# Patient Record
Sex: MALE | Race: WHITE | NOT HISPANIC OR LATINO | Employment: OTHER | URBAN - NONMETROPOLITAN AREA
[De-identification: names, ages, dates, MRNs, and addresses within clinical notes are randomized per-mention and may not be internally consistent; named-entity substitution may affect disease eponyms.]

---

## 2017-01-01 ENCOUNTER — HOSPITAL ENCOUNTER (OUTPATIENT)
Dept: GENERAL RADIOLOGY | Facility: HOSPITAL | Age: 62
Discharge: HOME | End: 2017-01-01

## 2017-01-03 ENCOUNTER — TELEPHONE (OUTPATIENT)
Dept: PULMONOLOGY | Facility: CLINIC | Age: 62
End: 2017-01-03

## 2017-01-03 NOTE — TELEPHONE ENCOUNTER
Pt daughter called states pt has been seen at [facility].I called for records. Fax #[phone].

## 2017-01-10 ENCOUNTER — OFFICE VISIT (OUTPATIENT)
Dept: INTERNAL MEDICINE | Facility: CLINIC | Age: 62
End: 2017-01-10

## 2017-01-10 ENCOUNTER — HOSPITAL ENCOUNTER (OUTPATIENT)
Dept: GENERAL RADIOLOGY | Facility: HOSPITAL | Age: 62
Discharge: HOME | End: 2017-01-10
Attending: INTERNAL MEDICINE | Admitting: INTERNAL MEDICINE

## 2017-01-10 VITALS
DIASTOLIC BLOOD PRESSURE: 70 MMHG | SYSTOLIC BLOOD PRESSURE: 138 MMHG | WEIGHT: 171 LBS | TEMPERATURE: 98.1 F | HEART RATE: 78 BPM | HEIGHT: 72 IN | OXYGEN SATURATION: 98 % | BODY MASS INDEX: 23.16 KG/M2

## 2017-01-10 DIAGNOSIS — J18.9 PNEUMONIA OF RIGHT MIDDLE LOBE DUE TO INFECTIOUS ORGANISM: Primary | ICD-10-CM

## 2017-01-10 DIAGNOSIS — J18.9 PNEUMONIA OF RIGHT MIDDLE LOBE DUE TO INFECTIOUS ORGANISM: ICD-10-CM

## 2017-01-10 PROCEDURE — 99213 OFFICE O/P EST LOW 20 MIN: CPT | Performed by: INTERNAL MEDICINE

## 2017-01-10 PROCEDURE — 71020 HC CHEST PA AND LATERAL: CPT

## 2017-01-10 NOTE — PROGRESS NOTES
"Chief Complaint   Patient presents with   • Follow-up     for pneumonia. Pt states his chest still feels heavy.      Subjective   Saman Aguayo is a 61 y.o. male.     HPI Comments: Patient is here for follow-up of pneumonia.  Patient was seen in [facility] on January 1 and diagnosed with right lower lobe pneumonia.  He was treated with Levaquin 750 mg daily for 7 days.  He also received a shot of rocephin while at the [facility].   He is feeling better overall.  No current F/C/BA.   He still has a cough, but only with some clear mucous. Has some ongoing SOB.  Does have a feeling of weight or tightness in chest.  He is still wearing a heart monitor due to dizziness, will have this on for 30 days.   Has a good appetite and is drinking a lot of water.        The following portions of the patient's history were reviewed and updated as appropriate: allergies, current medications, past family history, past medical history, past social history, past surgical history and problem list.    Review of Systems   Constitutional: Positive for fatigue. Negative for activity change, appetite change, chills, diaphoresis and fever.   Respiratory: Positive for cough, chest tightness and shortness of breath. Negative for wheezing.    Cardiovascular: Negative for chest pain, palpitations and leg swelling.   Neurological: Positive for dizziness. Negative for weakness.       Objective   Visit Vitals   • /70   • Pulse 78   • Temp 98.1 °F (36.7 °C)   • Ht 72\" (182.9 cm)   • Wt 171 lb (77.6 kg)   • SpO2 98%   • BMI 23.19 kg/m2     Body mass index is 23.19 kg/(m^2).  Physical Exam   Constitutional: He is oriented to person, place, and time. He appears well-developed and well-nourished.   HENT:   Head: Normocephalic and atraumatic.   Mouth/Throat: Oropharynx is clear and moist.   Eyes: Conjunctivae and EOM are normal. Pupils are equal, round, and reactive to light.   Neck: Normal range of motion. Neck supple. No thyromegaly " present.   Cardiovascular: Normal rate, regular rhythm, normal heart sounds and intact distal pulses.    No murmur heard.  Pulmonary/Chest: Effort normal and breath sounds normal. No respiratory distress. He has no wheezes. He has no rales. He exhibits tenderness.   Tenderness over right lateral chest wall   Abdominal: Soft. Bowel sounds are normal.   Musculoskeletal: He exhibits no edema.   Lymphadenopathy:     He has no cervical adenopathy.   Neurological: He is alert and oriented to person, place, and time. No cranial nerve deficit.   Psychiatric: He has a normal mood and affect. Judgment normal.   Nursing note and vitals reviewed.      Assessment/Plan   Saman Aguayo is here today and the following problems have been addressed:      Saman was seen today for follow-up.    Diagnoses and all orders for this visit:    Pneumonia of right middle lobe due to infectious organism  -     XR Chest 2 View; Future    XR chest today  Take plain mucinex expectorant once a day  Drink plenty of water to thin secretions  Follow  diet    RTC in 2 weeks as scheduled, labs then

## 2017-01-10 NOTE — MR AVS SNAPSHOT
Saman Aguayo   1/10/2017 11:30 AM   Office Visit    Provider:  Marilyn K Vermeesch, MD   Department:  [facility] PRIMARY CARE   Dept Phone:  [phone]                Your Full Care Plan              Your Updated Medication List          This list is accurate as of: 1/10/17 12:11 PM.  Always use your most recent med list.                meclizine 25 MG tablet   Commonly known as:  ANTIVERT   1/2-1 tablet PO TID PRN dizziness       nicotine 21 MG/24HR patch   Commonly known as:  NICODERM CQ   1 patch daily x 6 weeks, then decrease to 14mg patch               You Were Diagnosed With        Codes Comments    Pneumonia of right middle lobe due to infectious organism    -  Primary ICD-10-CM: J18.9  ICD-9-CM: 483.8       Instructions     None    Patient Instructions History      MyChart Signup     Our records indicate that you have declined [facility] MyChart signup. If you would like to sign up for MyChart, please email [email or web address] or call [phone] to obtain an activation code.             Other Info from Your Visit           Your Appointments     Jan 26, 2017  8:30 AM EST   Follow Up with Marilyn K Vermeesch, MD   [facility] PRIMARY CARE (--)    [address]  [facility] [address] [phone]           Arrive 15 minutes prior to appointment.            Feb 10, 2017  9:30 AM EST   Follow Up with Max Almonte MD   [facility] CARDIOLOGY (--)    [address]  [facility] [address] [phone]           Arrive 15 minutes prior to appointment.            Feb 28, 2017  8:30 AM EST   CT codi chest without contrast with CODI CT 2   [facility] CT ([place])    [address] [address] [phone]           No Prep            Mar 09, 2017  9:30 AM EST   Follow Up with Nannette Lim MD   [facility] PULMONARY CRITICAL CARE AND SLEEP (--)    "[address]  [facility] [address] [phone]           Arrive 15 minutes prior to appointment.              Allergies     No Known Drug Allergy        Reason for Visit     Follow-up for pneumonia. Pt states his chest still feels heavy.       Vital Signs     Blood Pressure Pulse Temperature Height Weight Oxygen Saturation    138/70 78 98.1 °F (36.7 °C) 72\" (182.9 cm) 171 lb (77.6 kg) 98%    Body Mass Index Smoking Status                23.19 kg/m2 Former Smoker          Problems and Diagnoses Noted     Pneumonia due to infectious organism      "

## 2017-01-26 ENCOUNTER — OFFICE VISIT (OUTPATIENT)
Dept: INTERNAL MEDICINE | Facility: CLINIC | Age: 62
End: 2017-01-26

## 2017-01-26 VITALS
HEART RATE: 51 BPM | WEIGHT: 176 LBS | HEIGHT: 72 IN | BODY MASS INDEX: 23.84 KG/M2 | TEMPERATURE: 97.6 F | DIASTOLIC BLOOD PRESSURE: 70 MMHG | OXYGEN SATURATION: 97 % | SYSTOLIC BLOOD PRESSURE: 128 MMHG

## 2017-01-26 DIAGNOSIS — Z13.9 SCREENING: Primary | ICD-10-CM

## 2017-01-26 DIAGNOSIS — J18.9 PNEUMONIA OF RIGHT LOWER LOBE DUE TO INFECTIOUS ORGANISM: ICD-10-CM

## 2017-01-26 DIAGNOSIS — Z12.5 SCREENING PSA (PROSTATE SPECIFIC ANTIGEN): ICD-10-CM

## 2017-01-26 LAB
ALBUMIN SERPL-MCNC: 4.2 G/DL (ref 3.2–4.8)
ALBUMIN/GLOB SERPL: 1.2 G/DL (ref 1.5–2.5)
ALP SERPL-CCNC: 75 U/L (ref 25–100)
ALT SERPL W P-5'-P-CCNC: 22 U/L (ref 7–40)
ANION GAP SERPL CALCULATED.3IONS-SCNC: 6 MMOL/L (ref 3–11)
AST SERPL-CCNC: 24 U/L (ref 0–33)
BASOPHILS # BLD AUTO: 0.03 10*3/MM3 (ref 0–0.2)
BASOPHILS NFR BLD AUTO: 0.5 % (ref 0–1)
BILIRUB SERPL-MCNC: 0.6 MG/DL (ref 0.3–1.2)
BUN BLD-MCNC: 15 MG/DL (ref 9–23)
BUN/CREAT SERPL: 16.7 (ref 7–25)
CALCIUM SPEC-SCNC: 9.7 MG/DL (ref 8.7–10.4)
CHLORIDE SERPL-SCNC: 98 MMOL/L (ref 99–109)
CO2 SERPL-SCNC: 30 MMOL/L (ref 20–31)
CREAT BLD-MCNC: 0.9 MG/DL (ref 0.6–1.3)
DEPRECATED RDW RBC AUTO: 45.2 FL (ref 37–54)
EOSINOPHIL # BLD AUTO: 0.17 10*3/MM3 (ref 0.1–0.3)
EOSINOPHIL NFR BLD AUTO: 2.7 % (ref 0–3)
ERYTHROCYTE [DISTWIDTH] IN BLOOD BY AUTOMATED COUNT: 13.4 % (ref 11.3–14.5)
GFR SERPL CREATININE-BSD FRML MDRD: 85 ML/MIN/1.73
GLOBULIN UR ELPH-MCNC: 3.4 GM/DL
GLUCOSE BLD-MCNC: 79 MG/DL (ref 70–100)
HCT VFR BLD AUTO: 46.1 % (ref 38.9–50.9)
HCV AB SER DONR QL: NORMAL
HGB BLD-MCNC: 15.7 G/DL (ref 13.1–17.5)
IMM GRANULOCYTES # BLD: 0.02 10*3/MM3 (ref 0–0.03)
IMM GRANULOCYTES NFR BLD: 0.3 % (ref 0–0.6)
LYMPHOCYTES # BLD AUTO: 1.92 10*3/MM3 (ref 0.6–4.8)
LYMPHOCYTES NFR BLD AUTO: 30.1 % (ref 24–44)
MCH RBC QN AUTO: 31.4 PG (ref 27–31)
MCHC RBC AUTO-ENTMCNC: 34.1 G/DL (ref 32–36)
MCV RBC AUTO: 92.2 FL (ref 80–99)
MONOCYTES # BLD AUTO: 0.46 10*3/MM3 (ref 0–1)
MONOCYTES NFR BLD AUTO: 7.2 % (ref 0–12)
NEUTROPHILS # BLD AUTO: 3.78 10*3/MM3 (ref 1.5–8.3)
NEUTROPHILS NFR BLD AUTO: 59.2 % (ref 41–71)
PLATELET # BLD AUTO: 211 10*3/MM3 (ref 150–450)
PMV BLD AUTO: 11.4 FL (ref 6–12)
POTASSIUM BLD-SCNC: 4.6 MMOL/L (ref 3.5–5.5)
PROT SERPL-MCNC: 7.6 G/DL (ref 5.7–8.2)
PSA SERPL-MCNC: 1.6 NG/ML (ref 0–4)
RBC # BLD AUTO: 5 10*6/MM3 (ref 4.2–5.76)
SODIUM BLD-SCNC: 134 MMOL/L (ref 132–146)
WBC NRBC COR # BLD: 6.38 10*3/MM3 (ref 3.5–10.8)

## 2017-01-26 PROCEDURE — 85025 COMPLETE CBC W/AUTO DIFF WBC: CPT | Performed by: INTERNAL MEDICINE

## 2017-01-26 PROCEDURE — 86803 HEPATITIS C AB TEST: CPT | Performed by: INTERNAL MEDICINE

## 2017-01-26 PROCEDURE — 99214 OFFICE O/P EST MOD 30 MIN: CPT | Performed by: INTERNAL MEDICINE

## 2017-01-26 PROCEDURE — 36415 COLL VENOUS BLD VENIPUNCTURE: CPT | Performed by: INTERNAL MEDICINE

## 2017-01-26 PROCEDURE — 80053 COMPREHEN METABOLIC PANEL: CPT | Performed by: INTERNAL MEDICINE

## 2017-01-26 PROCEDURE — 90715 TDAP VACCINE 7 YRS/> IM: CPT | Performed by: INTERNAL MEDICINE

## 2017-01-26 PROCEDURE — 84153 ASSAY OF PSA TOTAL: CPT | Performed by: INTERNAL MEDICINE

## 2017-01-26 PROCEDURE — 90471 IMMUNIZATION ADMIN: CPT | Performed by: INTERNAL MEDICINE

## 2017-01-26 NOTE — PROGRESS NOTES
"Chief Complaint   Patient presents with   • Follow-up     2 weeks for labs. No new complaints.      Subjective   Saman Aguayo is a 61 y.o. male.     HPI Comments: Here for routine followup.  PMH of RML small cell lung CA, S/P resection.  Had pneumonia several weeks ago.  His wt is up 5 lbs from a few weeks ago.  O2 sats are good today.   He is feeling better overall, but states he gets fatigued in the afternoon.  Has a good appetite.  Has a minimal cough with clear or white mucous.  Some SOB with exertion.  He denies any type of pain.  No stomach pain or diarrhea from recent ABX.  Does get some dizziness, is still wearing cardiac monitor until next week. Occasional palpitations.  He will see Dr Almonte for followup in February.  Will have CT of chest at end of February.  Follow up with Dr Lim shortly after CT in early March.         The following portions of the patient's history were reviewed and updated as appropriate: allergies, current medications, past family history, past medical history, past social history, past surgical history and problem list.    Review of Systems   Constitutional: Positive for fatigue. Negative for chills and fever.   Respiratory: Positive for cough and shortness of breath.    Cardiovascular: Positive for palpitations. Negative for chest pain and leg swelling.   Gastrointestinal: Negative for diarrhea.   Neurological: Positive for dizziness.   All other systems reviewed and are negative.      Objective   Visit Vitals   • /70   • Pulse 51   • Temp 97.6 °F (36.4 °C)   • Ht 72\" (182.9 cm)   • Wt 176 lb (79.8 kg)   • SpO2 97%   • BMI 23.87 kg/m2     Body mass index is 23.87 kg/(m^2).  Physical Exam   Constitutional: He is oriented to person, place, and time. He appears well-developed and well-nourished.   HENT:   Head: Normocephalic and atraumatic.   Eyes: EOM are normal. Pupils are equal, round, and reactive to light.   Neck: Normal range of motion. Neck supple. No thyromegaly " present.   Cardiovascular: Normal rate, regular rhythm, normal heart sounds and intact distal pulses.    No murmur heard.  Cardiac event monitor in place   Pulmonary/Chest: Effort normal. No respiratory distress.   Few rhonchi noted in right upper lobe   Abdominal: Soft. Bowel sounds are normal.   Musculoskeletal: He exhibits no edema.   Lymphadenopathy:     He has no cervical adenopathy.   Neurological: He is alert and oriented to person, place, and time. No cranial nerve deficit.   Psychiatric: He has a normal mood and affect. Judgment normal.   Nursing note and vitals reviewed.      Assessment/Plan   Saman Aguayo is here today and the following problems have been addressed:      Saman was seen today for follow-up.    Diagnoses and all orders for this visit:    Screening  -     Hepatitis C Antibody; Future    Pneumonia of right lower lobe due to infectious organism  -     CBC & Differential; Future  -     Comprehensive Metabolic Panel; Future    Screening PSA (prostate specific antigen)  -     PSA; Future      Labs as noted  Recommend vit D 2000 u daily  Follow HH diet  See Dr Almonte for follow up of dizziness and palpitations  See Dr Lim for follow up of lung CA  Encouraged regular use of incentive spirometer several times daily    RTC 3 mo

## 2017-01-26 NOTE — MR AVS SNAPSHOT
Saman Aguayo   2017 8:30 AM   Office Visit    Provider:  Marilyn K Vermeesch, MD   Department:  [facility] PRIMARY CARE   Dept Phone:  [phone]                Your Full Care Plan              Today's Medication Changes          These changes are accurate as of: 17  9:24 AM.  If you have any questions, ask your nurse or doctor.               Stop taking medication(s)listed here:     meclizine 25 MG tablet   Commonly known as:  ANTIVERT           nicotine 21 MG/24HR patch   Commonly known as:  NICODERM CQ                      Your Updated Medication List      Notice  As of 2017  9:24 AM    You have not been prescribed any medications.            We Performed the Following     CBC & Differential     CBC Auto Differential     Comprehensive Metabolic Panel     Hepatitis C Antibody     PSA       You Were Diagnosed With        Codes Comments    Screening    -  Primary ICD-10-CM: Z13.9  ICD-9-CM: V82.9     Pneumonia of right lower lobe due to infectious organism     ICD-10-CM: J18.9  ICD-9-CM: 483.8     Screening PSA (prostate specific antigen)     ICD-10-CM: Z12.5  ICD-9-CM: V76.44       Instructions     None    Patient Instructions History      [facility] Signup     [facility] allows you to send messages to your doctor, view your test results, renew your prescriptions, schedule appointments, and more. To sign up, go to [employer] and click on the Sign Up Now link in the New User? box. Enter your [facility] Activation Code exactly as it appears below along with the last four digits of your Social Security Number and your Date of Birth () to complete the sign-up process. If you do not sign up before the expiration date, you must request a new code.    [facility] Activation Code: [email or web address]  Expires: 2017  5:37 AM    If you have questions, you can email [email or web address] or call [phone] to talk to our [facility] staff.  "Remember, MyChart is NOT to be used for urgent needs. For medical emergencies, dial 911.               Other Info from Your Visit           Your Appointments     Feb 10, 2017  9:30 AM EST   Follow Up with Max lAmonte MD   [facility] CARDIOLOGY (--)    [address]  [facility] [address] [phone]           Arrive 15 minutes prior to appointment.            Feb 28, 2017  8:30 AM EST   CT codi chest without contrast with CODI CT 2   [facility] CT ([place])    [address] [address] [phone]           No Prep            Mar 09, 2017  9:30 AM EST   Follow Up with Nannette Lim MD   [facility] PULMONARY CRITICAL CARE AND SLEEP (--)    [address]  [facility] [address] [phone]           Arrive 15 minutes prior to appointment.            Apr 17, 2017  8:00 AM EDT   Follow Up with Marilyn K Vermeesch, MD   [facility] PRIMARY CARE (--)    [address]  [facility] [address] [phone]           Arrive 15 minutes prior to appointment.              Allergies     No Known Drug Allergy        Reason for Visit     Follow-up 2 weeks for labs. No new complaints.       Vital Signs     Blood Pressure Pulse Temperature Height Weight Oxygen Saturation    128/70 51 97.6 °F (36.4 °C) 72\" (182.9 cm) 176 lb (79.8 kg) 97%    Body Mass Index Smoking Status                23.87 kg/m2 Former Smoker          Problems and Diagnoses Noted     Pneumonia due to infectious organism    Screening for prostate cancer    Screening    -  Primary      Results       "

## 2017-02-10 ENCOUNTER — OFFICE VISIT (OUTPATIENT)
Dept: CARDIOLOGY | Facility: CLINIC | Age: 62
End: 2017-02-10

## 2017-02-10 VITALS
SYSTOLIC BLOOD PRESSURE: 124 MMHG | OXYGEN SATURATION: 98 % | HEIGHT: 72 IN | RESPIRATION RATE: 16 BRPM | WEIGHT: 177.8 LBS | DIASTOLIC BLOOD PRESSURE: 70 MMHG | HEART RATE: 52 BPM | BODY MASS INDEX: 24.08 KG/M2

## 2017-02-10 DIAGNOSIS — R00.1 BRADYCARDIA: Primary | ICD-10-CM

## 2017-02-10 DIAGNOSIS — I49.1 PAC (PREMATURE ATRIAL CONTRACTION): ICD-10-CM

## 2017-02-10 PROCEDURE — 99213 OFFICE O/P EST LOW 20 MIN: CPT | Performed by: INTERNAL MEDICINE

## 2017-02-10 NOTE — PROGRESS NOTES
Subjective:     Encounter Date:02/10/2017      Patient ID: Saman Aguayo is a 61 y.o. male.    Chief Complaint: Palpitations  HPI  This is a 61-year-old male patient who recently wore a 30 day event recorder and had multiple symptomatic recurrences of palpitations.  In each and every case the patient was in atrial bigeminy.  Because of the compensatory pause after each PAC there were times when he had significant bradycardia.  However the patient had no presyncope or syncope.  He denies having fatigue or exertional limitations.  These palpitations were more common at rest and tended to improve with activity which is usually the case with PACs.  He continues to have atypical sharp chest pains which are nonexertional and fleeting in duration.  Previous stress testing was negative for ischemia.  He has his baseline shortness of breath which is unchanged in frequency duration and intensity.  There is no orthopnea PND or lower extremity edema.  He reports drinking coffee in large quantities.  The remainder of his past medical history, family history and social history remains unchanged compared to his last visit.    The following portions of the patient's history were reviewed and updated as appropriate: allergies, current medications, past family history, past medical history, past social history, past surgical history and problem  Review of Systems   Constitution: Negative for chills, diaphoresis, fever, weakness, malaise/fatigue, night sweats, weight gain and weight loss.   HENT: Negative for ear discharge, hearing loss, hoarse voice and nosebleeds.    Eyes: Negative for discharge, double vision, pain and photophobia.   Cardiovascular: Positive for chest pain, dyspnea on exertion, irregular heartbeat and palpitations. Negative for claudication, cyanosis, leg swelling, near-syncope, orthopnea, paroxysmal nocturnal dyspnea and syncope.   Respiratory: Positive for shortness of breath. Negative for cough, hemoptysis,  "sputum production and wheezing.    Endocrine: Negative for cold intolerance, heat intolerance, polydipsia, polyphagia and polyuria.   Hematologic/Lymphatic: Negative for adenopathy and bleeding problem. Does not bruise/bleed easily.   Skin: Negative for color change, flushing, itching and rash.   Musculoskeletal: Negative for muscle cramps, muscle weakness, myalgias and stiffness.   Gastrointestinal: Negative for abdominal pain, diarrhea, hematemesis, hematochezia, nausea and vomiting.   Genitourinary: Negative for dysuria, frequency and nocturia.   Neurological: Negative for focal weakness, loss of balance, numbness, paresthesias and seizures.   Psychiatric/Behavioral: Negative for altered mental status, hallucinations and suicidal ideas.   Allergic/Immunologic: Negative for HIV exposure, hives and persistent infections.         No current outpatient prescriptions on file.     Objective:     Physical Exam   Constitutional: He is oriented to person, place, and time. He appears well-developed and well-nourished.   HENT:   Head: Normocephalic and atraumatic.   Eyes: Conjunctivae are normal. No scleral icterus.   Cardiovascular: Normal rate, regular rhythm, normal heart sounds and intact distal pulses.  Exam reveals no gallop and no friction rub.    No murmur heard.  Pulmonary/Chest: Effort normal and breath sounds normal. No respiratory distress.   Abdominal: Soft. Bowel sounds are normal. There is no tenderness.   Musculoskeletal: He exhibits no edema.   Neurological: He is alert and oriented to person, place, and time.   Skin: Skin is warm and dry.   Psychiatric: He has a normal mood and affect. His behavior is normal.     Blood pressure 124/70, pulse 52, resp. rate 16, height 72\" (182.9 cm), weight 177 lb 12.8 oz (80.6 kg), SpO2 98 %.   Lab Review:       Assessment:         1. Bradycardia  There is no intrinsic sinus node dysfunction and his relative bradycardia is secondary to the compensatory pauses after " frequent PACs.    2. PAC (premature atrial contraction)  I suspect his frequent PACs are due to his large amount of caffeine intake.  When reviewing his 24-hour Holter monitor the patient had only 2% of his total QRS complexes represented as PACs.  However when he is symptomatic there is virtually 100% correlation between the presence of symptoms and atrial bigeminy.  Procedures     Plan:       At this point there would need be no benefit from permanent pacemaker implantation.  I would not recommend medical antianginal therapy.  The patient has been counseled regarding the need to discontinue caffeinated beverage intake.  He has been advised to switch to decaffeinated beverages at the very minimum.  He should be drinking no more than 1 or 2 caffeinated beverages per day.  He has also been counseled regarding other potential cardiac stimulants such as over-the-counter cold allergy and sinus preparations.  He has been reassured regarding the benign nature of PACs.  He indicates that because the symptoms do not interfere with his day-to-day activities he would prefer not to take prescription medications.  No additional cardiovascular testing is indicated.

## 2017-02-28 ENCOUNTER — HOSPITAL ENCOUNTER (OUTPATIENT)
Dept: CT IMAGING | Facility: HOSPITAL | Age: 62
Discharge: HOME | End: 2017-02-28
Attending: INTERNAL MEDICINE | Admitting: INTERNAL MEDICINE

## 2017-02-28 DIAGNOSIS — C34.90 NON-SMALL CELL LUNG CANCER (NSCLC) (HCC): ICD-10-CM

## 2017-02-28 PROCEDURE — 71250 CT THORAX DX C-: CPT

## 2017-03-09 ENCOUNTER — PROCEDURE VISIT (OUTPATIENT)
Dept: PULMONOLOGY | Facility: CLINIC | Age: 62
End: 2017-03-09

## 2017-03-09 ENCOUNTER — OFFICE VISIT (OUTPATIENT)
Dept: PULMONOLOGY | Facility: CLINIC | Age: 62
End: 2017-03-09

## 2017-03-09 VITALS
SYSTOLIC BLOOD PRESSURE: 122 MMHG | BODY MASS INDEX: 23.98 KG/M2 | DIASTOLIC BLOOD PRESSURE: 70 MMHG | HEIGHT: 72 IN | OXYGEN SATURATION: 96 % | HEART RATE: 57 BPM | WEIGHT: 177 LBS | RESPIRATION RATE: 18 BRPM

## 2017-03-09 DIAGNOSIS — C34.90 NON-SMALL CELL LUNG CANCER (NSCLC) (HCC): ICD-10-CM

## 2017-03-09 DIAGNOSIS — J43.9 PULMONARY EMPHYSEMA, UNSPECIFIED EMPHYSEMA TYPE (HCC): Primary | ICD-10-CM

## 2017-03-09 DIAGNOSIS — J44.9 CHRONIC OBSTRUCTIVE PULMONARY DISEASE, UNSPECIFIED COPD TYPE (HCC): Primary | ICD-10-CM

## 2017-03-09 PROCEDURE — 94729 DIFFUSING CAPACITY: CPT | Performed by: INTERNAL MEDICINE

## 2017-03-09 PROCEDURE — 94726 PLETHYSMOGRAPHY LUNG VOLUMES: CPT | Performed by: INTERNAL MEDICINE

## 2017-03-09 PROCEDURE — 94060 EVALUATION OF WHEEZING: CPT | Performed by: INTERNAL MEDICINE

## 2017-03-09 PROCEDURE — 99213 OFFICE O/P EST LOW 20 MIN: CPT | Performed by: INTERNAL MEDICINE

## 2017-03-09 NOTE — PROGRESS NOTES
"Chief Complaint   Patient presents with   • Follow-up         Subjective   Saman Aguayo is a 61 y.o. male.     History of Present Illness   The patient is here for follow-up of COPD.    He reports doing well at this time.  He has only needed to use the Ventolin may be twice since his last visit.  His shortness of breath continues to be stable even though he is on no inhaled medications.    He has quit smoking and continues to be smoke free.    He is doing well since his right middle lobectomy and has even returned to work.  He does endorse getting tired easier but denies any shortness of breath.    The following portions of the patient's history were reviewed and updated as appropriate: allergies, current medications, past family history, past medical history, past social history and past surgical history.    Review of Systems   HENT: Negative for sinus pressure, sneezing and sore throat.    Respiratory: Positive for cough and shortness of breath. Negative for wheezing.        Objective   Visit Vitals   • /70   • Pulse 57   • Resp 18   • Ht 72\" (182.9 cm)   • Wt 177 lb (80.3 kg)   • SpO2 96%   • BMI 24.01 kg/m2     Physical Exam   Constitutional: He is oriented to person, place, and time. He appears well-developed.   HENT:   Head: Normocephalic and atraumatic.   Few missing teeth.   Eyes: EOM are normal.   Cardiovascular: Normal rate and regular rhythm.    Pulmonary/Chest: Effort normal and breath sounds normal. He has no wheezes.   Musculoskeletal:   Gait normal.   Neurological: He is alert and oriented to person, place, and time.   Skin: Skin is dry.   Psychiatric: He has a normal mood and affect.   Vitals reviewed.          Assessment/Plan   Saman was seen today for follow-up.    Diagnoses and all orders for this visit:    COPD  Comments:  Mild    Non-small cell lung cancer (NSCLC)  -     CT Chest Without Contrast; Future    Other orders  -     SCANNED - IMAGING           Return in about 3 months (around " 6/9/2017) for Recheck, For Hilda.    DISCUSSION (if any):  I reviewed the patient's PFTs from today with him.  PFTs have not changed much since having the right middle lobectomy.    He will continue Ventolin as needed for shortness of breath.    I will repeat a CT of the chest in 3 months if this is stable and we will schedule it again in 6 months.    I have told him that if he has increased use of the Ventolin inhaler to call the office and let me know.      Errors in dictation may reflect use of voice recognition software and not all errors in transcription may have been detected prior to signing    This document was electronically signed by Nannette Lim MD March 9, 2017  9:41 AM

## 2017-06-01 ENCOUNTER — HOSPITAL ENCOUNTER (OUTPATIENT)
Dept: CT IMAGING | Facility: HOSPITAL | Age: 62
Discharge: HOME | End: 2017-06-01
Attending: INTERNAL MEDICINE | Admitting: INTERNAL MEDICINE

## 2017-06-01 DIAGNOSIS — C34.90 NON-SMALL CELL LUNG CANCER (NSCLC) (HCC): ICD-10-CM

## 2017-06-01 PROCEDURE — 71250 CT THORAX DX C-: CPT

## 2017-06-12 ENCOUNTER — OFFICE VISIT (OUTPATIENT)
Dept: PULMONOLOGY | Facility: CLINIC | Age: 62
End: 2017-06-12

## 2017-06-12 VITALS
OXYGEN SATURATION: 95 % | SYSTOLIC BLOOD PRESSURE: 118 MMHG | HEART RATE: 45 BPM | RESPIRATION RATE: 16 BRPM | WEIGHT: 174.8 LBS | DIASTOLIC BLOOD PRESSURE: 64 MMHG | BODY MASS INDEX: 23.68 KG/M2 | HEIGHT: 72 IN

## 2017-06-12 DIAGNOSIS — C34.90 NON-SMALL CELL LUNG CANCER (NSCLC) (HCC): ICD-10-CM

## 2017-06-12 DIAGNOSIS — J30.9 ALLERGIC RHINITIS, UNSPECIFIED ALLERGIC RHINITIS TRIGGER, UNSPECIFIED RHINITIS SEASONALITY: ICD-10-CM

## 2017-06-12 DIAGNOSIS — J44.9 CHRONIC OBSTRUCTIVE PULMONARY DISEASE, UNSPECIFIED COPD TYPE (HCC): Primary | ICD-10-CM

## 2017-06-12 PROCEDURE — 99214 OFFICE O/P EST MOD 30 MIN: CPT | Performed by: NURSE PRACTITIONER

## 2017-06-12 RX ORDER — FLUNISOLIDE 0.25 MG/ML
1 SOLUTION NASAL EVERY 12 HOURS
Qty: 1 BOTTLE | Refills: 5 | Status: SHIPPED | OUTPATIENT
Start: 2017-06-12 | End: 2017-07-11

## 2017-06-12 NOTE — PROGRESS NOTES
"Chief Complaint   Patient presents with   • Follow-up     COPD, CT         Subjective   Saman Aguayo is a 62 y.o. male.     History of Present Illness   The patient is here for follow-up of COPD, lung cancer and CT results.     He is doing well at this time. He has only used Ventolin 2 times since his last visit. He feels like his shortness of breath has improved.     He has some fatigue in the afternoon but that is his only complaint.     He has some nasal drainage but does not use any medication regularly.    The following portions of the patient's history were reviewed and updated as appropriate: allergies, current medications, past family history, past medical history, past social history and past surgical history.    Review of Systems   HENT: Negative for sinus pressure, sneezing and sore throat.    Respiratory: Positive for cough. Negative for shortness of breath and wheezing.        Objective   /64  Pulse (!) 45  Resp 16  Ht 72\" (182.9 cm)  Wt 174 lb 12.8 oz (79.3 kg)  SpO2 95%  BMI 23.71 kg/m2  Physical Exam   Constitutional: He is oriented to person, place, and time. He appears well-developed.   HENT:   Head: Normocephalic and atraumatic.   Moderate erythema and drainage.   Eyes: EOM are normal.   Neck: Neck supple.   Cardiovascular: Regular rhythm.    Bradycardia.    Pulmonary/Chest: Effort normal. No respiratory distress.   Good A/E bilaterally without wheezing noted.   Musculoskeletal: He exhibits no edema.   Gait normal.   Neurological: He is alert and oriented to person, place, and time.   Skin: Skin is warm and dry.   Psychiatric: He has a normal mood and affect.   Vitals reviewed.          Assessment/Plan   Saman was seen today for follow-up.    Diagnoses and all orders for this visit:    COPD    Non-small cell lung cancer (NSCLC)  -     CT Chest Without Contrast; Future    Allergic rhinitis, unspecified allergic rhinitis trigger, unspecified rhinitis seasonality    Other orders  -     " flunisolide (NASALIDE) 25 MCG/ACT (0.025%) solution nasal spray; Inhale 1 spray Every 12 (Twelve) Hours.           Return in about 6 months (around 12/12/2017) for Recheck, Imaging studies, For Dr. Lim.    DISCUSSION (if any):  Discussed CT results and reviewed images with the patient and his wife. The CT is stable so he may wait 6 months for follow-up CT.    Add Nasalide for better control of allergic rhinitis symptoms.    His heart rate manually checked is 50. He follows with Dr. Almonte, who is aware of the patients bradycardia. The patient is completely asymptomatic at this time. He follows with Dr. Almonte in August. I advised him to call Dr. Aldana office to schedule sooner if his begins to have unusual symptoms like increased dizziness or palpitations or he should go to the ER.       Errors in dictation may reflect use of voice recognition software and not all errors in transcription may have been detected prior to signing    This document was electronically signed by SAMIRA Wylie June 12, 2017  10:45 AM

## 2017-06-13 ENCOUNTER — OFFICE VISIT (OUTPATIENT)
Dept: CARDIAC SURGERY | Facility: CLINIC | Age: 62
End: 2017-06-13

## 2017-06-13 VITALS
WEIGHT: 174 LBS | DIASTOLIC BLOOD PRESSURE: 70 MMHG | SYSTOLIC BLOOD PRESSURE: 130 MMHG | TEMPERATURE: 97.9 F | HEART RATE: 47 BPM | BODY MASS INDEX: 23.57 KG/M2 | HEIGHT: 72 IN | OXYGEN SATURATION: 97 %

## 2017-06-13 DIAGNOSIS — C34.2 LUNG CANCER, MIDDLE LOBE (HCC): Primary | ICD-10-CM

## 2017-06-13 PROCEDURE — 99213 OFFICE O/P EST LOW 20 MIN: CPT | Performed by: THORACIC SURGERY (CARDIOTHORACIC VASCULAR SURGERY)

## 2017-07-11 ENCOUNTER — OFFICE VISIT (OUTPATIENT)
Dept: INTERNAL MEDICINE | Facility: CLINIC | Age: 62
End: 2017-07-11

## 2017-07-11 VITALS
TEMPERATURE: 98.5 F | BODY MASS INDEX: 23.7 KG/M2 | HEIGHT: 72 IN | SYSTOLIC BLOOD PRESSURE: 130 MMHG | DIASTOLIC BLOOD PRESSURE: 62 MMHG | WEIGHT: 175 LBS | HEART RATE: 44 BPM | OXYGEN SATURATION: 98 %

## 2017-07-11 DIAGNOSIS — IMO0002 SECONDARY PULMONARY HYPERTENSION: Primary | ICD-10-CM

## 2017-07-11 DIAGNOSIS — Z90.2 STATUS POST LOBECTOMY OF LUNG: ICD-10-CM

## 2017-07-11 DIAGNOSIS — J43.9 PULMONARY EMPHYSEMA, UNSPECIFIED EMPHYSEMA TYPE (HCC): ICD-10-CM

## 2017-07-11 PROBLEM — J18.9 PNEUMONIA DUE TO INFECTIOUS ORGANISM: Status: RESOLVED | Noted: 2017-01-10 | Resolved: 2017-07-11

## 2017-07-11 LAB
ALBUMIN SERPL-MCNC: 4.2 G/DL (ref 3.5–5)
ALBUMIN/GLOB SERPL: 1.3 G/DL (ref 1–2)
ALP SERPL-CCNC: 74 U/L (ref 38–126)
ALT SERPL-CCNC: 34 U/L (ref 13–69)
AST SERPL-CCNC: 24 U/L (ref 15–46)
BASOPHILS # BLD AUTO: 0.04 10*3/MM3 (ref 0–0.2)
BASOPHILS NFR BLD AUTO: 0.7 % (ref 0–2.5)
BILIRUB SERPL-MCNC: 0.6 MG/DL (ref 0.2–1.3)
BUN SERPL-MCNC: 15 MG/DL (ref 7–20)
BUN/CREAT SERPL: 16.7 (ref 6.3–21.9)
CALCIUM SERPL-MCNC: 9.4 MG/DL (ref 8.4–10.2)
CHLORIDE SERPL-SCNC: 102 MMOL/L (ref 98–107)
CO2 SERPL-SCNC: 29 MMOL/L (ref 26–30)
CREAT SERPL-MCNC: 0.9 MG/DL (ref 0.6–1.3)
EOSINOPHIL # BLD AUTO: 0.1 10*3/MM3 (ref 0–0.7)
EOSINOPHIL NFR BLD AUTO: 1.8 % (ref 0–7)
ERYTHROCYTE [DISTWIDTH] IN BLOOD BY AUTOMATED COUNT: 13 % (ref 11.5–14.5)
GLOBULIN SER CALC-MCNC: 3.2 GM/DL
GLUCOSE SERPL-MCNC: 100 MG/DL (ref 74–98)
HCT VFR BLD AUTO: 47 % (ref 42–52)
HGB BLD-MCNC: 16 G/DL (ref 14–18)
IMM GRANULOCYTES # BLD: 0.02 10*3/MM3 (ref 0–0.06)
IMM GRANULOCYTES NFR BLD: 0.4 % (ref 0–0.6)
LYMPHOCYTES # BLD AUTO: 1.36 10*3/MM3 (ref 0.6–3.4)
LYMPHOCYTES NFR BLD AUTO: 24.8 % (ref 10–50)
MCH RBC QN AUTO: 31.5 PG (ref 27–31)
MCHC RBC AUTO-ENTMCNC: 34 G/DL (ref 30–37)
MCV RBC AUTO: 92.5 FL (ref 80–94)
MONOCYTES # BLD AUTO: 0.46 10*3/MM3 (ref 0–0.9)
MONOCYTES NFR BLD AUTO: 8.4 % (ref 0–12)
NEUTROPHILS # BLD AUTO: 3.5 10*3/MM3 (ref 2–6.9)
NEUTROPHILS NFR BLD AUTO: 63.9 % (ref 37–80)
NRBC BLD AUTO-RTO: 0 /100 WBC (ref 0–0)
PLATELET # BLD AUTO: 183 10*3/MM3 (ref 130–400)
POTASSIUM SERPL-SCNC: 5.2 MMOL/L (ref 3.5–5.1)
PROT SERPL-MCNC: 7.4 G/DL (ref 6.3–8.2)
RBC # BLD AUTO: 5.08 10*6/MM3 (ref 4.7–6.1)
SODIUM SERPL-SCNC: 138 MMOL/L (ref 137–145)
WBC # BLD AUTO: 5.48 10*3/MM3 (ref 4.8–10.8)

## 2017-07-11 PROCEDURE — 90471 IMMUNIZATION ADMIN: CPT | Performed by: INTERNAL MEDICINE

## 2017-07-11 PROCEDURE — 99213 OFFICE O/P EST LOW 20 MIN: CPT | Performed by: INTERNAL MEDICINE

## 2017-07-11 PROCEDURE — 90736 HZV VACCINE LIVE SUBQ: CPT | Performed by: INTERNAL MEDICINE

## 2017-07-11 NOTE — PROGRESS NOTES
"Chief Complaint   Patient presents with   • Follow-up     for secondary pulmonary HTN and COPD. No new complaints.      Subjective   Saman Aguayo is a 62 y.o. male.     HPI Comments: Patient is here today for follow-up of COPD, secondary pulmonary hypertension, bradycardia and small cell lung cancer.  Patient is status post right VATS procedure with right middle lobectomy in November 2016 for small cell lung cancer.  He has occasional dizziness with changes in positions.   He has tried to cut back his caffeine, usually 3 cups a day, used to drink 8-10 cups a day.  Does not smoke at all.   He is eating a lot of Mcgregor cough drops.   His BP is well controlled.   He denies CP, SOB or edema.   He has a good appetite.  Has maintained his wt.   Has been active and does not get SOB.   His insurance approved shingles shot.   Last colonoscopy July 2015.  Gets scope every 3-5 yrs due to FH of colon cancer in mother and father.  He denies abdominal pain, black stools or blood in stool.          The following portions of the patient's history were reviewed and updated as appropriate: allergies, current medications, past family history, past medical history, past social history, past surgical history and problem list.    Review of Systems   Respiratory: Negative for cough and shortness of breath.    Cardiovascular: Negative for chest pain, palpitations and leg swelling.   Musculoskeletal: Positive for arthralgias.   Neurological: Positive for dizziness.   All other systems reviewed and are negative.      Objective   /62  Pulse (!) 44  Temp 98.5 °F (36.9 °C)  Ht 72\" (182.9 cm)  Wt 175 lb (79.4 kg)  SpO2 98%  BMI 23.73 kg/m2  Body mass index is 23.73 kg/(m^2).  Physical Exam   Constitutional: He is oriented to person, place, and time. He appears well-developed and well-nourished.   HENT:   Head: Normocephalic and atraumatic.   Mouth/Throat: Oropharynx is clear and moist.   Eyes: Conjunctivae and EOM are normal. Pupils " are equal, round, and reactive to light.   Neck: Normal range of motion. Neck supple. No thyromegaly present.   Cardiovascular: Regular rhythm, normal heart sounds and intact distal pulses.    No murmur heard.  HR in the 40-50 range   Pulmonary/Chest: Effort normal and breath sounds normal. No respiratory distress.   Abdominal: Soft. Bowel sounds are normal.   Musculoskeletal: He exhibits no edema.   Lymphadenopathy:     He has no cervical adenopathy.   Neurological: He is alert and oriented to person, place, and time. No cranial nerve deficit.   Psychiatric: He has a normal mood and affect. Judgment normal.   Nursing note and vitals reviewed.      Assessment/Plan   Saman Aguayo is here today and the following problems have been addressed:      Saman was seen today for follow-up.    Diagnoses and all orders for this visit:    Secondary pulmonary hypertension  -     CBC & Differential  -     Comprehensive Metabolic Panel    Status post lobectomy of lung    Pulmonary emphysema, unspecified emphysema type  -     CBC & Differential  -     Comprehensive Metabolic Panel    Labs as noted  Given zostavax today  Has chronic PND, uses cough drops- does not want to use nasal spray given per Dr Lim  Encouraged ongoing  diet and regular exercise  Will call pt with lab results  Continue colonoscopy screening every 3-5 yrs due to FH of colon CA      RTC 6 mo    Please note that portions of this note were completed with a voice recognition program.  Efforts were made to edit dictation, but occasionally words are mistranscribed.

## 2017-08-08 ENCOUNTER — OFFICE VISIT (OUTPATIENT)
Dept: CARDIOLOGY | Facility: CLINIC | Age: 62
End: 2017-08-08

## 2017-08-08 VITALS
BODY MASS INDEX: 24.11 KG/M2 | OXYGEN SATURATION: 98 % | SYSTOLIC BLOOD PRESSURE: 122 MMHG | HEART RATE: 47 BPM | HEIGHT: 72 IN | RESPIRATION RATE: 16 BRPM | DIASTOLIC BLOOD PRESSURE: 58 MMHG | WEIGHT: 178 LBS

## 2017-08-08 DIAGNOSIS — R00.1 BRADYCARDIA: Primary | ICD-10-CM

## 2017-08-08 PROCEDURE — 99213 OFFICE O/P EST LOW 20 MIN: CPT | Performed by: INTERNAL MEDICINE

## 2017-08-08 NOTE — PROGRESS NOTES
Subjective:     Encounter Date:08/08/2017      Patient ID: Saman Aguayo is a 62 y.o. male.    Chief Complaint:Bradycardia  HPI  This is a 62-year-old male patient with long-standing sinus bradycardia.  The patient indicates that his dizziness palpitations and fatigue have all improved since his last visit.  He indicates that he walked over 10 miles this past weekend without any chest discomfort or shortness of breath.  He feels as though his energy levels are improving.  He has no orthopnea PND or lower extremity edema.  There has been no syncopal episodes.  The remainder of his past medical history, family history and social history are unchanged compared to his last visit.  The following portions of the patient's history were reviewed and updated as appropriate: allergies, current medications, past family history, past medical history, past social history, past surgical history and problem  Review of Systems   Constitution: Positive for malaise/fatigue. Negative for chills, diaphoresis, fever, weakness, night sweats, weight gain and weight loss.   HENT: Negative for ear discharge, hearing loss, hoarse voice and nosebleeds.    Eyes: Negative for discharge, double vision, pain and photophobia.   Cardiovascular: Positive for palpitations. Negative for chest pain, claudication, cyanosis, dyspnea on exertion, irregular heartbeat, leg swelling, near-syncope, orthopnea, paroxysmal nocturnal dyspnea and syncope.   Respiratory: Negative for cough, hemoptysis, sputum production and wheezing.    Endocrine: Negative for cold intolerance, heat intolerance, polydipsia, polyphagia and polyuria.   Hematologic/Lymphatic: Negative for adenopathy and bleeding problem. Does not bruise/bleed easily.   Skin: Negative for color change, flushing, itching and rash.   Musculoskeletal: Negative for muscle cramps, muscle weakness, myalgias and stiffness.   Gastrointestinal: Negative for abdominal pain, diarrhea, hematemesis,  "hematochezia, nausea and vomiting.   Genitourinary: Negative for dysuria, frequency and nocturia.   Neurological: Positive for dizziness. Negative for focal weakness, loss of balance, numbness, paresthesias and seizures.   Psychiatric/Behavioral: Negative for altered mental status, hallucinations and suicidal ideas.   Allergic/Immunologic: Negative for HIV exposure, hives and persistent infections.         No current outpatient prescriptions on file.     Objective:     Physical Exam   Constitutional: He is oriented to person, place, and time. He appears well-developed and well-nourished.   HENT:   Head: Normocephalic and atraumatic.   Eyes: Conjunctivae are normal. No scleral icterus.   Cardiovascular: Normal rate, regular rhythm, normal heart sounds and intact distal pulses.  Exam reveals no gallop and no friction rub.    No murmur heard.  Pulmonary/Chest: Effort normal and breath sounds normal. No respiratory distress.   Abdominal: Soft. Bowel sounds are normal. There is no tenderness.   Musculoskeletal: He exhibits no edema.   Neurological: He is alert and oriented to person, place, and time.   Skin: Skin is warm and dry.   Psychiatric: He has a normal mood and affect. His behavior is normal.     Blood pressure 122/58, pulse (!) 47, resp. rate 16, height 72\" (182.9 cm), weight 178 lb (80.7 kg), SpO2 98 %.   Lab Review:       Assessment:         1. Bradycardia  Mild to moderate asymptomatic sinus bradycardia.  Overall the patient feels as though he is improving.  Procedures     Plan:       No changes in his medication therapy are indicated at this time.  At this point the patient would not benefit from a permanent pacemaker.  No additional cardiovascular testing is warranted.         "

## 2017-10-18 ENCOUNTER — FLU SHOT (OUTPATIENT)
Dept: INTERNAL MEDICINE | Facility: CLINIC | Age: 62
End: 2017-10-18

## 2017-10-18 DIAGNOSIS — Z23 NEED FOR INFLUENZA VACCINE: ICD-10-CM

## 2017-10-18 PROCEDURE — 90471 IMMUNIZATION ADMIN: CPT | Performed by: INTERNAL MEDICINE

## 2017-10-18 PROCEDURE — 90686 IIV4 VACC NO PRSV 0.5 ML IM: CPT | Performed by: INTERNAL MEDICINE

## 2017-11-06 ENCOUNTER — TELEPHONE (OUTPATIENT)
Dept: PULMONOLOGY | Facility: CLINIC | Age: 62
End: 2017-11-06

## 2017-11-06 NOTE — TELEPHONE ENCOUNTER
Patient wife called states she had received a letter in mail pt ct scan has been denied, in pt chart there is pa-for ct. I gave patient wife number to  Central Scheduling.

## 2017-11-27 ENCOUNTER — TELEPHONE (OUTPATIENT)
Dept: CARDIAC SURGERY | Facility: CLINIC | Age: 62
End: 2017-11-27

## 2017-11-27 NOTE — TELEPHONE ENCOUNTER
PT'S WIFE CALLING TO SCHEDULE  F/U. PT DUE TO HAVE CT SCAN 12/1/17. PT WANTS AN EARLY APPT IF POSSIBLE.

## 2017-12-01 ENCOUNTER — HOSPITAL ENCOUNTER (OUTPATIENT)
Dept: CT IMAGING | Facility: HOSPITAL | Age: 62
Discharge: HOME | End: 2017-12-01
Admitting: NURSE PRACTITIONER

## 2017-12-01 DIAGNOSIS — C34.90 NON-SMALL CELL LUNG CANCER (NSCLC) (HCC): ICD-10-CM

## 2017-12-01 PROCEDURE — 71250 CT THORAX DX C-: CPT

## 2017-12-12 ENCOUNTER — OFFICE VISIT (OUTPATIENT)
Dept: CARDIAC SURGERY | Facility: CLINIC | Age: 62
End: 2017-12-12

## 2017-12-12 VITALS
OXYGEN SATURATION: 95 % | DIASTOLIC BLOOD PRESSURE: 78 MMHG | WEIGHT: 177 LBS | HEART RATE: 67 BPM | TEMPERATURE: 98.5 F | HEIGHT: 72 IN | BODY MASS INDEX: 23.98 KG/M2 | SYSTOLIC BLOOD PRESSURE: 138 MMHG

## 2017-12-12 DIAGNOSIS — C34.2 MALIGNANT NEOPLASM OF MIDDLE LOBE OF RIGHT LUNG (HCC): Primary | ICD-10-CM

## 2017-12-12 PROCEDURE — 99212 OFFICE O/P EST SF 10 MIN: CPT | Performed by: PHYSICIAN ASSISTANT

## 2017-12-12 NOTE — PROGRESS NOTES
12/12/2017  Patient Information  Saman Aguayo                                                                                          [address] [place] [address] [address] [place] [address]   1955  'PCP/Referring Physician'  Marilyn K. Vermeesch, MD  [phone]  No ref. provider found    Chief Complaint   Patient presents with   • Follow-up     6 month follow up for CT chest results, complains of having some dizzy spells.   • Lung Cancer       History of Present Illness:  62 year old M with [id number] stage IA non small cell carcinoma s/p RML lobectomy on 11/18/17 presents to clinic for 6 month follow up. States he has been doing well, no complaints.     Patient Active Problem List   Diagnosis   • Osteoarthritis   • Tobacco use   • Breast lump   • Bradycardia   • Heat syncope   • Secondary pulmonary hypertension   • Vertigo   • Abnormal ECG   • Lung cancer, middle lobe   • Status post lobectomy of lung   • COPD (chronic obstructive pulmonary disease)   • Screening PSA (prostate specific antigen)   • PAC (premature atrial contraction)     Past Medical History:   Diagnosis Date   • Abnormal ECG    • Abscess of skin    • Arrhythmia     bradycardia   • Arthritis     HANDS AND BACK    • Bunion    • COPD (chronic obstructive pulmonary disease)    • History of chest x-ray 09/23/2013    No acute cardiopulmonary disease   • Loose, teeth     top right loose   • Low back pain    • Lung cancer    • Lung cancer, middle lobe 11/8/2016   • Wears eyeglasses      Past Surgical History:   Procedure Laterality Date   • BRONCHOSCOPY     • BUNIONECTOMY Left 2013    GREAT TOE   • COLONOSCOPY     • HERNIA REPAIR Left 2002    Dr. Singh/inguinal    • LUNG REMOVAL, PARTIAL     • THORACOSCOPY N/A 11/18/2016    Procedure: BRONCH THORACOSCOPY VIDEO ASSISTED  WITH LOBECTOMY, MEDIALSTINAL LYMPH NODE DISECTION;  Surgeon: Chris Porter MD;  Location: [facility];  Service:      No current outpatient prescriptions on file.  Allergies   Allergen Reactions   • No  Known Drug Allergy      Social History     Social History   • Marital status:      Spouse name: N/A   • Number of children: 1   • Years of education: N/A     Occupational History   • Runs a radiator shop      Social History Main Topics   • Smoking status: Former Smoker     Packs/day: 3.00     Years: 50.00     Types: Cigarettes     Quit date: 11/4/2016   • Smokeless tobacco: Never Used   • Alcohol use 12.6 oz/week     21 Cans of beer per week      Comment: 3 cans/daily   • Drug use: No   • Sexual activity: Defer     Other Topics Concern   • Not on file     Social History Narrative     Family History   Problem Relation Age of Onset   • Heart attack Father    • Heart disease Father    • Colon cancer Father    • Colon cancer Mother    • Liver cancer Mother      Review of Systems   Constitution: Negative. Negative for chills, fever, malaise/fatigue, night sweats and weight loss.   HENT: Negative.  Negative for hearing loss, odynophagia and sore throat.    Eyes: Negative.    Cardiovascular: Negative.  Negative for chest pain, dyspnea on exertion, leg swelling, orthopnea and palpitations.   Respiratory: Negative.  Negative for cough and hemoptysis.    Endocrine: Negative.  Negative for cold intolerance, heat intolerance, polydipsia, polyphagia and polyuria.   Hematologic/Lymphatic: Bruises/bleeds easily.   Skin: Negative.  Negative for itching and rash.   Musculoskeletal: Negative.  Negative for joint pain, joint swelling and myalgias.   Gastrointestinal: Negative.  Negative for abdominal pain, constipation, diarrhea, hematemesis, hematochezia, melena, nausea and vomiting.   Genitourinary: Negative.  Negative for dysuria, frequency and hematuria.   Neurological: Positive for dizziness. Negative for focal weakness, headaches, numbness and seizures.   Psychiatric/Behavioral: Negative.  Negative for suicidal ideas.   Allergic/Immunologic: Negative.    All other systems reviewed and are negative.    Vitals:    12/12/17  "1231   BP: 138/78   BP Location: Right arm   Pulse: 67   Temp: 98.5 °F (36.9 °C)   SpO2: 95%   Weight: 80.3 kg (177 lb)   Height: 182.9 cm (72\")      Physical Exam   Constitutional: He is oriented to person, place, and time. He appears well-developed and well-nourished.   HENT:   Head: Normocephalic and atraumatic.   Eyes: Conjunctivae and EOM are normal. Pupils are equal, round, and reactive to light.   Neck: Normal range of motion. Neck supple.   Cardiovascular: Normal rate, regular rhythm and normal heart sounds.    Pulmonary/Chest: Effort normal and breath sounds normal.   Abdominal: Soft. Bowel sounds are normal.   Musculoskeletal: Normal range of motion.   Neurological: He is alert and oriented to person, place, and time. He has normal reflexes.   Skin: Skin is warm and dry.   Psychiatric: He has a normal mood and affect. His behavior is normal. Judgment and thought content normal.       Labs/Imaging:    Assessment/Plan:  62 year old M with [id number] stage IA non small cell carcinoma s/p RML lobectomy on 11/18/17 presents to clinic for 6 month follow up. Chest CT (12/1/17) revealed stable RUL nodule 5mm.     - Repeat chest CT in 6 months    Patient Active Problem List   Diagnosis   • Osteoarthritis   • Tobacco use   • Breast lump   • Bradycardia   • Heat syncope   • Secondary pulmonary hypertension   • Vertigo   • Abnormal ECG   • Lung cancer, middle lobe   • Status post lobectomy of lung   • COPD (chronic obstructive pulmonary disease)   • Screening PSA (prostate specific antigen)   • PAC (premature atrial contraction)     Signed by:                 "

## 2017-12-13 ENCOUNTER — OFFICE VISIT (OUTPATIENT)
Dept: PULMONOLOGY | Facility: CLINIC | Age: 62
End: 2017-12-13

## 2017-12-13 VITALS
HEIGHT: 72 IN | DIASTOLIC BLOOD PRESSURE: 68 MMHG | WEIGHT: 178 LBS | SYSTOLIC BLOOD PRESSURE: 119 MMHG | OXYGEN SATURATION: 96 % | BODY MASS INDEX: 24.11 KG/M2 | RESPIRATION RATE: 17 BRPM | HEART RATE: 58 BPM

## 2017-12-13 DIAGNOSIS — C34.91 NON-SMALL CELL CANCER OF RIGHT LUNG (HCC): Primary | ICD-10-CM

## 2017-12-13 DIAGNOSIS — J43.9 PULMONARY EMPHYSEMA, UNSPECIFIED EMPHYSEMA TYPE (HCC): ICD-10-CM

## 2017-12-13 PROCEDURE — 99213 OFFICE O/P EST LOW 20 MIN: CPT | Performed by: INTERNAL MEDICINE

## 2017-12-13 NOTE — PROGRESS NOTES
"Chief Complaint   Patient presents with   • Follow-up     Ct Scan, Non-small cell lung cancer          Subjective   Saman Aguayo is a 62 y.o. male.     History of Present Illness   Comes in today to follow-up on the results of CT scan.    Continues to have no limitation in exercise.  No cough or phlegm production.    The following portions of the patient's history were reviewed and updated as appropriate: allergies, current medications, past family history, past medical history, past social history and past surgical history.    Review of Systems   Constitutional: Negative for chills and fever.   HENT: Positive for sneezing. Negative for postnasal drip, sinus pain and sore throat.    Respiratory: Negative for cough and shortness of breath.        Objective   Visit Vitals   • /68   • Pulse 58   • Resp 17   • Ht 182.9 cm (72.01\")   • Wt 80.7 kg (178 lb)   • SpO2 96%   • BMI 24.14 kg/m2       Physical Exam   Constitutional: He appears well-developed and well-nourished.   Eyes: EOM are normal.   Cardiovascular: Normal rate.    Pulmonary/Chest:   Effort was normal   Musculoskeletal: He exhibits no edema.   Neurological: He is alert.   Psychiatric: He has a normal mood and affect.   Vitals reviewed.        Assessment/Plan   Saman was seen today for follow-up.    Diagnoses and all orders for this visit:    Non-small cell cancer of right lung  -     CT Chest Without Contrast; Future    Pulmonary emphysema, unspecified emphysema type         Return in about 4 months (around 4/13/2018) for Recheck, Imaging study.    DISCUSSION (if any):  I reviewed the CT images with the patient and also compared them to the last CT in June.    The overall size of the right upper lobe nodule has remained same although there seems to be slight increase in the solid component.  It still has remained 5 mm.    Due to history of recent cancer and heavy past history of smoking, a repeat CT scan will be performed in 4 months and if that CT shows " stable right upper lobe nodule, then we can go back to every 6 month surveillance CTs.      Dictated utilizing Dragon dictation.    This document was electronically signed by Nannette Lim MD December 13, 2017  10:19 AM

## 2017-12-27 ENCOUNTER — OFFICE VISIT (OUTPATIENT)
Dept: INTERNAL MEDICINE | Facility: CLINIC | Age: 62
End: 2017-12-27

## 2017-12-27 VITALS
SYSTOLIC BLOOD PRESSURE: 118 MMHG | BODY MASS INDEX: 24.11 KG/M2 | HEART RATE: 54 BPM | TEMPERATURE: 97.8 F | OXYGEN SATURATION: 95 % | DIASTOLIC BLOOD PRESSURE: 66 MMHG | WEIGHT: 178 LBS | HEIGHT: 72 IN

## 2017-12-27 DIAGNOSIS — J43.9 PULMONARY EMPHYSEMA, UNSPECIFIED EMPHYSEMA TYPE (HCC): Primary | ICD-10-CM

## 2017-12-27 DIAGNOSIS — R68.89 SENSITIVITY TO THE COLD: ICD-10-CM

## 2017-12-27 LAB
BASOPHILS # BLD AUTO: 0.05 10*3/MM3 (ref 0–0.2)
BASOPHILS NFR BLD AUTO: 0.8 % (ref 0–2.5)
EOSINOPHIL # BLD AUTO: 0.1 10*3/MM3 (ref 0–0.7)
EOSINOPHIL NFR BLD AUTO: 1.7 % (ref 0–7)
ERYTHROCYTE [DISTWIDTH] IN BLOOD BY AUTOMATED COUNT: 12.8 % (ref 11.5–14.5)
HCT VFR BLD AUTO: 48.7 % (ref 42–52)
HGB BLD-MCNC: 16.4 G/DL (ref 14–18)
IMM GRANULOCYTES # BLD: 0.02 10*3/MM3 (ref 0–0.06)
IMM GRANULOCYTES NFR BLD: 0.3 % (ref 0–0.6)
LYMPHOCYTES # BLD AUTO: 1.41 10*3/MM3 (ref 0.6–3.4)
LYMPHOCYTES NFR BLD AUTO: 23.6 % (ref 10–50)
MCH RBC QN AUTO: 30.7 PG (ref 27–31)
MCHC RBC AUTO-ENTMCNC: 33.7 G/DL (ref 30–37)
MCV RBC AUTO: 91 FL (ref 80–94)
MONOCYTES # BLD AUTO: 0.41 10*3/MM3 (ref 0–0.9)
MONOCYTES NFR BLD AUTO: 6.9 % (ref 0–12)
NEUTROPHILS # BLD AUTO: 3.98 10*3/MM3 (ref 2–6.9)
NEUTROPHILS NFR BLD AUTO: 66.7 % (ref 37–80)
NRBC BLD AUTO-RTO: 0 /100 WBC (ref 0–0)
PLATELET # BLD AUTO: 199 10*3/MM3 (ref 130–400)
RBC # BLD AUTO: 5.35 10*6/MM3 (ref 4.7–6.1)
T4 FREE SERPL-MCNC: 1 NG/DL (ref 0.78–2.19)
TSH SERPL DL<=0.005 MIU/L-ACNC: 2.43 MIU/ML (ref 0.47–4.68)
WBC # BLD AUTO: 5.97 10*3/MM3 (ref 4.8–10.8)

## 2017-12-27 PROCEDURE — 99213 OFFICE O/P EST LOW 20 MIN: CPT | Performed by: INTERNAL MEDICINE

## 2017-12-27 NOTE — PROGRESS NOTES
"Chief Complaint   Patient presents with   • Follow-up     6 months for COPD. Pt states he stays cold all the time.      Subjective   Saman Aguayo is a 62 y.o. male.     HPI Comments: Here for follow up of COPD, DJD.  History of lung CA, RML removed.  Has a RUL nodule that is being followed closely.   He is complaining that he cannot stay warm for past 4-5 mo.  Worse over his legs. He has no difficulty with pain in legs during ambulation, can walk for miles.  His mother had thyroid issues.   He did smoke for over 50 yrs.  He quit smoking about 1.5 yrs ago.   Some constipation, no diarrhea, no voice change.   He is losing more hair.    He does have loosening of teeth.  States this runs in family.  His mother never smoked and she lost her teeth also.  His teeth are falling out.  He does not go to dentist often.   Has had his flu shot.  Has also had his shingles shot.          The following portions of the patient's history were reviewed and updated as appropriate: allergies, current medications, past family history, past medical history, past social history, past surgical history and problem list.    Review of Systems   Constitutional: Negative for chills, fever and unexpected weight change.   HENT:        Tooth loss   Respiratory: Negative for cough, shortness of breath and wheezing.    Cardiovascular: Negative for chest pain, palpitations and leg swelling.   Gastrointestinal: Positive for constipation.   Endocrine: Positive for cold intolerance.   All other systems reviewed and are negative.      Objective   /66  Pulse 54  Temp 97.8 °F (36.6 °C)  Ht 182.9 cm (72\")  Wt 80.7 kg (178 lb)  SpO2 95%  BMI 24.14 kg/m2  Body mass index is 24.14 kg/(m^2).  Physical Exam   Constitutional: He is oriented to person, place, and time. He appears well-developed and well-nourished.   HENT:   Head: Normocephalic and atraumatic.   Mouth/Throat: Oropharynx is clear and moist.   Eyes: Conjunctivae and EOM are normal. Pupils " are equal, round, and reactive to light.   Neck: Normal range of motion. Neck supple. No thyromegaly present.   Cardiovascular: Normal rate, regular rhythm, normal heart sounds and intact distal pulses.    No murmur heard.  Pulmonary/Chest: Effort normal and breath sounds normal. No respiratory distress.   Abdominal: Soft. Bowel sounds are normal.   Musculoskeletal: He exhibits no edema.   Lymphadenopathy:     He has no cervical adenopathy.   Neurological: He is alert and oriented to person, place, and time. No cranial nerve deficit.   Psychiatric: He has a normal mood and affect. Judgment normal.   Nursing note and vitals reviewed.      Assessment/Plan   Saman Aguayo is here today and the following problems have been addressed:      Saman was seen today for follow-up.    Diagnoses and all orders for this visit:    Pulmonary emphysema, unspecified emphysema type    Sensitivity to the cold  -     CBC & Differential  -     TSH  -     T4, Free    Labs as noted  Flu shot is UTD  He will see pulmonary for follow up CT of RUL mass  Will call him with lab results    RTC 6 mo  Please note that portions of this note were completed with a voice recognition program.  Efforts were made to edit dictation, but occasionally words are mistranscribed.

## 2018-03-02 ENCOUNTER — TRANSCRIBE ORDERS (OUTPATIENT)
Dept: ADMINISTRATIVE | Facility: HOSPITAL | Age: 63
End: 2018-03-02

## 2018-03-02 DIAGNOSIS — C34.90 BC (BRONCHOGENIC CARCINOMA) (HCC): Primary | ICD-10-CM

## 2018-03-16 ENCOUNTER — HOSPITAL ENCOUNTER (OUTPATIENT)
Dept: CT IMAGING | Facility: HOSPITAL | Age: 63
Discharge: HOME | End: 2018-03-16
Attending: INTERNAL MEDICINE | Admitting: INTERNAL MEDICINE

## 2018-03-16 DIAGNOSIS — C34.91 NON-SMALL CELL CANCER OF RIGHT LUNG (HCC): ICD-10-CM

## 2018-03-16 PROCEDURE — 71250 CT THORAX DX C-: CPT

## 2018-03-23 ENCOUNTER — APPOINTMENT (OUTPATIENT)
Dept: CT IMAGING | Facility: HOSPITAL | Age: 63
End: 2018-03-23
Attending: INTERNAL MEDICINE

## 2018-04-12 ENCOUNTER — TRANSCRIBE ORDERS (OUTPATIENT)
Dept: ADMINISTRATIVE | Facility: HOSPITAL | Age: 63
End: 2018-04-12

## 2018-04-12 DIAGNOSIS — C34.90 BC (BRONCHOGENIC CARCINOMA) (HCC): Primary | ICD-10-CM

## 2018-04-13 ENCOUNTER — LAB (OUTPATIENT)
Dept: LAB | Facility: HOSPITAL | Age: 63
End: 2018-04-13

## 2018-04-13 DIAGNOSIS — C34.90 BC (BRONCHOGENIC CARCINOMA) (HCC): ICD-10-CM

## 2018-04-13 LAB
ALBUMIN SERPL-MCNC: 4.2 G/DL (ref 3.5–5)
ALBUMIN/GLOB SERPL: 1.2 G/DL (ref 1–2)
ALP SERPL-CCNC: 66 U/L (ref 38–126)
ALT SERPL W P-5'-P-CCNC: 29 U/L (ref 13–69)
ANION GAP SERPL CALCULATED.3IONS-SCNC: 13.7 MMOL/L (ref 10–20)
AST SERPL-CCNC: 19 U/L (ref 15–46)
BASOPHILS # BLD AUTO: 0.06 10*3/MM3 (ref 0–0.2)
BASOPHILS NFR BLD AUTO: 0.9 % (ref 0–2.5)
BILIRUB SERPL-MCNC: 0.5 MG/DL (ref 0.2–1.3)
BUN BLD-MCNC: 20 MG/DL (ref 7–20)
BUN/CREAT SERPL: 22.2 (ref 6.3–21.9)
CALCIUM SPEC-SCNC: 8.9 MG/DL (ref 8.4–10.2)
CHLORIDE SERPL-SCNC: 99 MMOL/L (ref 98–107)
CO2 SERPL-SCNC: 29 MMOL/L (ref 26–30)
CREAT BLD-MCNC: 0.9 MG/DL (ref 0.6–1.3)
DEPRECATED RDW RBC AUTO: 43.8 FL (ref 37–54)
EOSINOPHIL # BLD AUTO: 0.21 10*3/MM3 (ref 0–0.7)
EOSINOPHIL NFR BLD AUTO: 3 % (ref 0–7)
ERYTHROCYTE [DISTWIDTH] IN BLOOD BY AUTOMATED COUNT: 12.9 % (ref 11.5–14.5)
GFR SERPL CREATININE-BSD FRML MDRD: 85 ML/MIN/1.73
GLOBULIN UR ELPH-MCNC: 3.4 GM/DL
GLUCOSE BLD-MCNC: 94 MG/DL (ref 74–98)
HCT VFR BLD AUTO: 46 % (ref 42–52)
HGB BLD-MCNC: 15.8 G/DL (ref 14–18)
IMM GRANULOCYTES # BLD: 0.02 10*3/MM3 (ref 0–0.06)
IMM GRANULOCYTES NFR BLD: 0.3 % (ref 0–0.6)
LYMPHOCYTES # BLD AUTO: 1.65 10*3/MM3 (ref 0.6–3.4)
LYMPHOCYTES NFR BLD AUTO: 23.7 % (ref 10–50)
MCH RBC QN AUTO: 31.5 PG (ref 27–31)
MCHC RBC AUTO-ENTMCNC: 34.3 G/DL (ref 30–37)
MCV RBC AUTO: 91.6 FL (ref 80–94)
MONOCYTES # BLD AUTO: 0.6 10*3/MM3 (ref 0–0.9)
MONOCYTES NFR BLD AUTO: 8.6 % (ref 0–12)
NEUTROPHILS # BLD AUTO: 4.42 10*3/MM3 (ref 2–6.9)
NEUTROPHILS NFR BLD AUTO: 63.5 % (ref 37–80)
NRBC BLD MANUAL-RTO: 0 /100 WBC (ref 0–0)
PLATELET # BLD AUTO: 171 10*3/MM3 (ref 130–400)
PMV BLD AUTO: 10.7 FL (ref 6–12)
POTASSIUM BLD-SCNC: 4.7 MMOL/L (ref 3.5–5.1)
PROT SERPL-MCNC: 7.6 G/DL (ref 6.3–8.2)
RBC # BLD AUTO: 5.02 10*6/MM3 (ref 4.7–6.1)
SODIUM BLD-SCNC: 137 MMOL/L (ref 137–145)
WBC NRBC COR # BLD: 6.96 10*3/MM3 (ref 4.8–10.8)

## 2018-04-13 PROCEDURE — 36415 COLL VENOUS BLD VENIPUNCTURE: CPT

## 2018-04-13 PROCEDURE — 80053 COMPREHEN METABOLIC PANEL: CPT

## 2018-04-13 PROCEDURE — 85025 COMPLETE CBC W/AUTO DIFF WBC: CPT

## 2018-05-09 ENCOUNTER — OFFICE VISIT (OUTPATIENT)
Dept: PULMONOLOGY | Facility: CLINIC | Age: 63
End: 2018-05-09

## 2018-05-09 DIAGNOSIS — R91.1 LUNG NODULE, SOLITARY: ICD-10-CM

## 2018-05-09 DIAGNOSIS — C34.91 NON-SMALL CELL CANCER OF RIGHT LUNG (HCC): Primary | ICD-10-CM

## 2018-05-09 DIAGNOSIS — J43.9 PULMONARY EMPHYSEMA, UNSPECIFIED EMPHYSEMA TYPE (HCC): ICD-10-CM

## 2018-05-09 PROCEDURE — 99213 OFFICE O/P EST LOW 20 MIN: CPT | Performed by: INTERNAL MEDICINE

## 2018-05-09 NOTE — PROGRESS NOTES
"Chief Complaint   Patient presents with   • Follow-up     Non-small cell cancer of right lung       Subjective   Saman Aguayo is a 63 y.o. male.     History of Present Illness   Comes in today to follow-up on the results of CT scan.    The following portions of the patient's history were reviewed and updated as appropriate: allergies, current medications, past family history, past medical history, past social history and past surgical history.    Review of Systems   Constitutional: Negative for chills and fever.   HENT: Negative for sinus pressure, sneezing and sore throat.    Respiratory: Negative for cough, shortness of breath and wheezing.    Psychiatric/Behavioral: Negative for sleep disturbance.       Objective   Visit Vitals  /62   Pulse 58   Resp 17   Ht 182.9 cm (72.01\")   Wt 80.7 kg (178 lb)   SpO2 95%   BMI 24.14 kg/m²       Physical Exam   Constitutional: He appears well-developed and well-nourished.   Eyes: EOM are normal.   Cardiovascular: Normal rate.    Pulmonary/Chest: He has no wheezes.   Effort was normal   Musculoskeletal: He exhibits no edema.   Neurological: He is alert.   Psychiatric: He has a normal mood and affect.   Vitals reviewed.      Assessment/Plan   Saman was seen today for follow-up.    Diagnoses and all orders for this visit:    Non-small cell cancer of right lung  -     Cancel: CT Chest Without Contrast; Future  -     CT Chest Without Contrast; Future    Pulmonary emphysema, unspecified emphysema type  -     Cancel: CT Chest Without Contrast; Future    Lung nodule, solitary  -     Cancel: CT Chest Without Contrast; Future  -     CT Chest Without Contrast; Future         Return in about 5 months (around 10/9/2018) for Recheck, Imaging study.    DISCUSSION (if any):  CT scan was reviewed with the patient and his wife.    Next CT in 5-6 months for continued surveillance.      Dictated utilizing Dragon dictation.    This document was electronically signed by Nannette Lim MD May " 12, 2018  1:27 PM

## 2018-05-12 VITALS
WEIGHT: 178 LBS | SYSTOLIC BLOOD PRESSURE: 130 MMHG | RESPIRATION RATE: 17 BRPM | HEIGHT: 72 IN | HEART RATE: 58 BPM | DIASTOLIC BLOOD PRESSURE: 62 MMHG | BODY MASS INDEX: 24.11 KG/M2 | OXYGEN SATURATION: 95 %

## 2018-07-06 ENCOUNTER — TELEPHONE (OUTPATIENT)
Dept: CARDIAC SURGERY | Facility: CLINIC | Age: 63
End: 2018-07-06

## 2018-07-12 ENCOUNTER — TELEPHONE (OUTPATIENT)
Dept: CARDIAC SURGERY | Facility: CLINIC | Age: 63
End: 2018-07-12

## 2018-07-12 NOTE — TELEPHONE ENCOUNTER
PT RECEIVED 2ND RECALL LETTER PER [facility] [place], PTS SPOUSE TOLD OFFICE THAT PT HAD CT TEST DONE ON 3/16, PTS SPOUSE WILL CALL OFFICE TOMORROW 7/13 TO GIVE INSURANCE INFO, PTS PHONE NUMBER, ADDRESS 7/12/18

## 2018-07-16 ENCOUNTER — TELEPHONE (OUTPATIENT)
Dept: CARDIAC SURGERY | Facility: CLINIC | Age: 63
End: 2018-07-16

## 2018-07-30 ENCOUNTER — OFFICE VISIT (OUTPATIENT)
Dept: SURGERY | Facility: CLINIC | Age: 63
End: 2018-07-30

## 2018-07-30 VITALS
TEMPERATURE: 97.7 F | DIASTOLIC BLOOD PRESSURE: 80 MMHG | WEIGHT: 177.6 LBS | SYSTOLIC BLOOD PRESSURE: 158 MMHG | OXYGEN SATURATION: 99 % | HEART RATE: 63 BPM | BODY MASS INDEX: 24.06 KG/M2 | HEIGHT: 72 IN

## 2018-07-30 DIAGNOSIS — Z86.010 HISTORY OF COLON POLYPS: ICD-10-CM

## 2018-07-30 DIAGNOSIS — Z80.0 FAMILY HISTORY OF COLON CANCER: Primary | ICD-10-CM

## 2018-07-30 PROCEDURE — 99203 OFFICE O/P NEW LOW 30 MIN: CPT | Performed by: SURGERY

## 2018-07-30 RX ORDER — BISACODYL 5 MG/1
5 TABLET, DELAYED RELEASE ORAL DAILY
Qty: 4 TABLET | Refills: 0 | Status: SHIPPED | OUTPATIENT
Start: 2018-07-30 | End: 2018-12-05

## 2018-07-30 RX ORDER — POLYETHYLENE GLYCOL 3350 17 G/17G
238 POWDER, FOR SOLUTION ORAL ONCE
Qty: 14 PACKET | Refills: 0 | Status: SHIPPED | OUTPATIENT
Start: 2018-07-30 | End: 2018-07-30

## 2018-07-30 NOTE — PROGRESS NOTES
Patient: Saman Aguayo    YOB: 1955    Date: 07/30/2018    Primary Care Provider: Marilyn K. Vermeesch, MD    Chief Complaint   Patient presents with   • Colonoscopy       Subjective .     History of present illness:  I saw the patient in the office today as a consultation for evaluation of colonoscopy. He states that both parents passed away from colon cancer. His last colonoscopy was 3 years ago by myself. He denies diarrhea, constipation, abdominal pain, rectal bleeding. He has a history of lung cancer and has had a partial lung removed 2-3 years ago.    He does have a history significant for colon cancer in his mother and father.    The following portions of the patient's history were reviewed and updated as appropriate: allergies, current medications, past family history, past medical history, past social history, past surgical history and problem list.      Review of Systems   Constitutional: Negative for chills, fever and unexpected weight change.   HENT: Negative for trouble swallowing and voice change.    Eyes: Negative for visual disturbance.   Respiratory: Negative for apnea, cough, chest tightness, shortness of breath and wheezing.    Cardiovascular: Negative for chest pain, palpitations and leg swelling.   Gastrointestinal: Negative for abdominal distention, abdominal pain, anal bleeding, blood in stool, constipation, diarrhea, nausea, rectal pain and vomiting.   Endocrine: Negative for cold intolerance and heat intolerance.   Genitourinary: Negative for difficulty urinating, dysuria, flank pain, scrotal swelling and testicular pain.   Musculoskeletal: Negative for back pain, gait problem and joint swelling.   Skin: Negative for color change, rash and wound.   Neurological: Negative for dizziness, syncope, speech difficulty, weakness, numbness and headaches.   Hematological: Negative for adenopathy. Does not bruise/bleed easily.   Psychiatric/Behavioral: Negative for confusion. The patient  "is not nervous/anxious.        History:  Past Medical History:   Diagnosis Date   • Abnormal ECG    • Abscess of skin    • Arrhythmia     bradycardia   • Arthritis     HANDS AND BACK    • Bunion    • COPD (chronic obstructive pulmonary disease) (CMS/HCC)    • History of chest x-ray 09/23/2013    No acute cardiopulmonary disease   • Loose, teeth     top right loose   • Low back pain    • Lung cancer (CMS/HCC)    • Lung cancer, middle lobe (CMS/HCC) 11/8/2016   • Wears eyeglasses        Past Surgical History:   Procedure Laterality Date   • BRONCHOSCOPY     • BUNIONECTOMY Left 2013    GREAT TOE   • COLONOSCOPY     • HERNIA REPAIR Left 2002    Dr. Singh/inguinal    • LUNG REMOVAL, PARTIAL     • THORACOSCOPY N/A 11/18/2016    Procedure: BRONCH THORACOSCOPY VIDEO ASSISTED  WITH LOBECTOMY, MEDIALSTINAL LYMPH NODE DISECTION;  Surgeon: Chris Porter MD;  Location: [facility];  Service:        Family History   Problem Relation Age of Onset   • Heart attack Father    • Heart disease Father    • Colon cancer Father    • Colon cancer Mother    • Liver cancer Mother        Social History   Substance Use Topics   • Smoking status: Former Smoker     Packs/day: 3.00     Years: 50.00     Types: Cigarettes     Quit date: 11/4/2016   • Smokeless tobacco: Never Used   • Alcohol use 12.6 oz/week     21 Cans of beer per week      Comment: 3 cans/daily       Medications:   Current Outpatient Prescriptions:   •  bisacodyl (bisacodyl) 5 MG EC tablet, Take 1 tablet by mouth Daily., Disp: 4 tablet, Rfl: 0       Allergies:   Allergies   Allergen Reactions   • No Known Drug Allergy        Objective     Vital Signs:  Vitals:    07/30/18 0932   BP: 158/80   Pulse: 63   Temp: 97.7 °F (36.5 °C)   SpO2: 99%   Weight: 80.6 kg (177 lb 9.6 oz)   Height: 182.9 cm (72\")       Physical Exam:   General Appearance:    Alert, cooperative, in no acute distress   Head:    Normocephalic, without obvious abnormality, atraumatic   Eyes:            Lids and " lashes normal, conjunctivae and sclerae normal, no   icterus, no pallor, corneas clear, PERRL   Ears:    Ears appear intact with no abnormalities noted   Throat:   No oral lesions, no thrush, oral mucosa moist   Neck:   No adenopathy, supple, trachea midline, no thyromegaly,  no JVD   Lungs:     Clear to auscultation,respirations regular, even and                  unlabored    Heart:    Regular rhythm and normal rate, normal S1 and S2, no            murmur   Abdomen:     no masses, no organomegaly, soft non-tender, non-distended, no guarding   Extremities:   Moves all extremities well, no edema, no cyanosis, no             redness   Pulses:   Pulses palpable and equal bilaterally   Skin:   No bleeding, bruising or rash   Lymph nodes:   No palpable adenopathy   Neurologic:   Cranial nerves 2 - 12 grossly intact, sensation intact  Psychiatric: No evidence of depression or anxiety   Results Review:   I reviewed the patient's new clinical results.  I reviewed the patient's new imaging results and agree with the interpretation.  I reviewed the patient's other test results and agree with the interpretation    Review of Systems was reviewed and confirmed as accurate today.    Assessment/Plan :    1. Family history of colon cancer    2. History of colon polyps        I recommend a colonoscopy for further evaluation. The procedure was explained as well as the risks which include but are not limited to bleeding, infection, perforation, abdominal pain etc. The patient understands these risks and the procedure and wishes to proceed.      Electronically signed by Shady Singh MD  07/31/18  9:33 AM      Portions of this note have been scribed for Shady Singh MD by Mireya Urbano MA 7/31/2018  7:39 AM

## 2018-08-07 ENCOUNTER — OFFICE VISIT (OUTPATIENT)
Dept: CARDIOLOGY | Facility: CLINIC | Age: 63
End: 2018-08-07

## 2018-08-07 VITALS
SYSTOLIC BLOOD PRESSURE: 140 MMHG | DIASTOLIC BLOOD PRESSURE: 62 MMHG | HEART RATE: 55 BPM | HEIGHT: 72 IN | OXYGEN SATURATION: 98 % | BODY MASS INDEX: 23.84 KG/M2 | RESPIRATION RATE: 18 BRPM | WEIGHT: 176 LBS

## 2018-08-07 DIAGNOSIS — Z90.2 STATUS POST LOBECTOMY OF LUNG: ICD-10-CM

## 2018-08-07 DIAGNOSIS — J43.2 CENTRILOBULAR EMPHYSEMA (HCC): ICD-10-CM

## 2018-08-07 DIAGNOSIS — I27.20 PULMONARY HYPERTENSION (HCC): ICD-10-CM

## 2018-08-07 DIAGNOSIS — R00.1 BRADYCARDIA: Primary | ICD-10-CM

## 2018-08-07 DIAGNOSIS — I49.1 PAC (PREMATURE ATRIAL CONTRACTION): ICD-10-CM

## 2018-08-07 PROCEDURE — 99214 OFFICE O/P EST MOD 30 MIN: CPT | Performed by: INTERNAL MEDICINE

## 2018-08-07 RX ORDER — POLYETHYLENE GLYCOL 3350 17 G/DOSE
POWDER (GRAM) ORAL
Refills: 0 | COMMUNITY
Start: 2018-07-30 | End: 2018-12-05

## 2018-08-07 NOTE — PROGRESS NOTES
Subjective:     Encounter Date:08/07/2018      Patient ID: Saman Aguayo is a 63 y.o. male.    Chief Complaint:Bradycardia  HPI  This is a 63-year-old male patient who presented to cardiology clinic for routine follow-up.  The patient has a history of mild asymptomatic sinus bradycardia.  He is not on any offending medications.  The patient indicates that he has done extremely well from a cardiac standpoint since his last visit.  The patient indicates that he walked over 8 miles on Saturday at a car show without any symptoms.  The patient has no chest discomfort at rest or with activity.  There is no exertional chest arm neck jaw shoulder or back discomfort.  There is no orthopnea PND or lower extremity edema.  There is no dizziness palpitations or syncope.  He remains a former smoker.  The following portions of the patient's history were reviewed and updated as appropriate: allergies, current medications, past family history, past medical history, past social history, past surgical history and problem  Review of Systems   Constitution: Negative for chills, diaphoresis, fever, weakness, malaise/fatigue, weight gain and weight loss.   HENT: Negative for ear discharge, hearing loss, hoarse voice and nosebleeds.    Eyes: Negative for discharge, double vision, pain and photophobia.   Cardiovascular: Negative for chest pain, claudication, cyanosis, dyspnea on exertion, irregular heartbeat, leg swelling, near-syncope, orthopnea, palpitations, paroxysmal nocturnal dyspnea and syncope.   Respiratory: Negative for cough, hemoptysis, shortness of breath, sputum production and wheezing.    Endocrine: Negative for cold intolerance, heat intolerance, polydipsia, polyphagia and polyuria.   Hematologic/Lymphatic: Negative for adenopathy and bleeding problem. Does not bruise/bleed easily.   Skin: Negative for color change, flushing, itching and rash.   Musculoskeletal: Negative for muscle cramps, muscle weakness, myalgias and  stiffness.   Gastrointestinal: Negative for abdominal pain, diarrhea, hematemesis, hematochezia, nausea and vomiting.   Genitourinary: Negative for dysuria, frequency and nocturia.   Neurological: Negative for focal weakness, loss of balance, numbness, paresthesias and seizures.   Psychiatric/Behavioral: Negative for altered mental status, hallucinations and suicidal ideas.   Allergic/Immunologic: Negative for HIV exposure, hives and persistent infections.           Current Outpatient Prescriptions:   •  bisacodyl (bisacodyl) 5 MG EC tablet, Take 1 tablet by mouth Daily., Disp: 4 tablet, Rfl: 0  •  [employer] PURELAX powder, TAKE 238 G BY MOUTH 1 (ONE) TIME FOR 1 DOSE., Disp: , Rfl: 0     Objective:     Physical Exam   Constitutional: He is oriented to person, place, and time. He appears well-developed and well-nourished.   HENT:   Head: Normocephalic and atraumatic.   Mouth/Throat: Oropharynx is clear and moist.   Eyes: Pupils are equal, round, and reactive to light. Conjunctivae and EOM are normal. No scleral icterus.   Neck: Normal range of motion. Neck supple. No JVD present. No tracheal deviation present. No thyromegaly present.   Cardiovascular: Normal rate, regular rhythm, S1 normal, S2 normal, normal heart sounds, intact distal pulses and normal pulses.  PMI is not displaced.  Exam reveals no gallop and no friction rub.    No murmur heard.  Pulmonary/Chest: Effort normal and breath sounds normal. No respiratory distress. He has no wheezes. He has no rales.   Abdominal: Soft. Bowel sounds are normal. He exhibits no distension and no mass. There is no tenderness. There is no rebound and no guarding.   Musculoskeletal: Normal range of motion. He exhibits no edema or deformity.   Neurological: He is alert and oriented to person, place, and time. He displays normal reflexes. No cranial nerve deficit. Coordination normal.   Skin: Skin is warm and dry. No rash noted. No erythema.   Psychiatric: He has a normal mood and  "affect. His behavior is normal. Thought content normal.     Blood pressure 140/62, pulse 55, resp. rate 18, height 182.9 cm (72.01\"), weight 79.8 kg (176 lb), SpO2 98 %.   Lab Review:       Assessment:         1. Bradycardia  Mild asymptomatic sinus bradycardia.    2. PAC (premature atrial contraction)  No evidence of symptomatic PACs.  No complaint of palpitations.  No documented arrhythmia.    3. Pulmonary hypertension  This is due to his underlying COPD.  Fortunately he no longer smokes.    Procedures     Plan:       No changes in his medication therapy have been made at today's visit.  No additional cardiovascular testing is indicated.  The patient has been congratulated on his smoking cessation and cautioned to never again resumed cigarette smoking.         "

## 2018-08-10 ENCOUNTER — OUTSIDE FACILITY SERVICE (OUTPATIENT)
Dept: SURGERY | Facility: CLINIC | Age: 63
End: 2018-08-10

## 2018-08-10 PROCEDURE — 45380 COLONOSCOPY AND BIOPSY: CPT | Performed by: SURGERY

## 2018-08-10 PROCEDURE — 45384 COLONOSCOPY W/LESION REMOVAL: CPT | Performed by: SURGERY

## 2018-08-21 ENCOUNTER — OFFICE VISIT (OUTPATIENT)
Dept: CARDIAC SURGERY | Facility: CLINIC | Age: 63
End: 2018-08-21

## 2018-08-21 VITALS
BODY MASS INDEX: 23.57 KG/M2 | DIASTOLIC BLOOD PRESSURE: 80 MMHG | HEART RATE: 53 BPM | SYSTOLIC BLOOD PRESSURE: 160 MMHG | OXYGEN SATURATION: 97 % | WEIGHT: 174 LBS | TEMPERATURE: 98.4 F | HEIGHT: 72 IN

## 2018-08-21 DIAGNOSIS — C34.2 LUNG CANCER, MIDDLE LOBE (HCC): Primary | ICD-10-CM

## 2018-08-21 PROCEDURE — 99213 OFFICE O/P EST LOW 20 MIN: CPT | Performed by: PHYSICIAN ASSISTANT

## 2018-08-21 NOTE — PROGRESS NOTES
08/21/2018  Patient Information  [address] [place] [address]   1955  'PCP/Referring Physician'  Vermeesch, Marilyn K, MD  [phone]  No ref. provider found    Chief Complaint   Patient presents with   • Follow-up     6 MO FU with CT Chest for Lung Cancer   • Lung Cancer       History of Present Illness:  Patient is a 63-year-old  male with history of pulmonary hypertension, vertigo, COPD and [id number] stage IA non-small cell carcinoma status post right middle lobectomy performed 11/18/17 who presents to office today for 6 month follow-up with review and discussion of 3/16/18 CT scan of the chest. The patient was last seen on 12/12/17, and denies any chest pain, shortness of breath, difficulty with ambulation, hemoptysis or weight loss.  The patient was recently seen by Dr. Lim in May 2018.  He is otherwise doing well.    Patient Active Problem List   Diagnosis   • Osteoarthritis   • Bradycardia   • Heat syncope   • Pulmonary hypertension   • Vertigo   • Abnormal ECG   • Lung cancer, middle lobe (CMS/HCC)   • Status post lobectomy of lung   • COPD (chronic obstructive pulmonary disease) (CMS/HCC)   • Screening PSA (prostate specific antigen)   • PAC (premature atrial contraction)   • Sensitivity to the cold   • Centrilobular emphysema (CMS/HCC)     Past Medical History:   Diagnosis Date   • Abnormal ECG    • Abscess of skin    • Arrhythmia     bradycardia   • Arthritis     HANDS AND BACK    • Bunion    • COPD (chronic obstructive pulmonary disease) (CMS/HCC)    • History of chest x-ray 09/23/2013    No acute cardiopulmonary disease   • Loose, teeth     top right loose   • Low back pain    • Lung cancer (CMS/HCC)    • Lung cancer, middle lobe (CMS/HCC) 11/8/2016   • Wears eyeglasses      Past Surgical History:   Procedure Laterality Date   • BRONCHOSCOPY     • BUNIONECTOMY Left 2013    GREAT TOE    • COLONOSCOPY     • HERNIA REPAIR Left 2002    Dr. Singh/inguinal    • LUNG REMOVAL, PARTIAL     • THORACOSCOPY N/A 11/18/2016    Procedure: BRONCH THORACOSCOPY VIDEO ASSISTED  WITH LOBECTOMY, MEDIALSTINAL LYMPH NODE DISECTION;  Surgeon: Chris Porter MD;  Location: [facility];  Service:        Current Outpatient Prescriptions:   •  bisacodyl (bisacodyl) 5 MG EC tablet, Take 1 tablet by mouth Daily., Disp: 4 tablet, Rfl: 0  •  [employer] PURELAX powder, TAKE 238 G BY MOUTH 1 (ONE) TIME FOR 1 DOSE., Disp: , Rfl: 0  Allergies   Allergen Reactions   • No Known Drug Allergy      Social History     Social History   • Marital status:      Spouse name: N/A   • Number of children: 1   • Years of education: N/A     Occupational History   • Runs a radiator shop      Social History Main Topics   • Smoking status: Former Smoker     Packs/day: 3.00     Years: 50.00     Types: Cigarettes     Quit date: 11/4/2016   • Smokeless tobacco: Never Used   • Alcohol use 12.6 oz/week     21 Cans of beer per week      Comment: 3 cans/daily   • Drug use: No   • Sexual activity: Defer     Other Topics Concern   • Not on file     Social History Narrative    Lives in [place].     Family History   Problem Relation Age of Onset   • Heart attack Father    • Heart disease Father    • Colon cancer Father    • Colon cancer Mother    • Liver cancer Mother      Review of Systems   Constitution: Negative for chills, fever, malaise/fatigue, night sweats and weight loss.   HENT: Negative for hearing loss, odynophagia and sore throat.    Cardiovascular: Positive for claudication. Negative for chest pain, dyspnea on exertion, leg swelling, orthopnea and palpitations.   Respiratory: Negative for cough and hemoptysis.    Endocrine: Negative for cold intolerance, heat intolerance, polydipsia, polyphagia and polyuria.   Hematologic/Lymphatic: Bruises/bleeds easily.   Skin: Negative for itching and rash.   Musculoskeletal: Positive for arthritis. Negative  "for joint pain, joint swelling and myalgias.   Gastrointestinal: Negative for abdominal pain, constipation, diarrhea, hematemesis, hematochezia, melena, nausea and vomiting.   Genitourinary: Negative for dysuria, frequency and hematuria.   Neurological: Negative for focal weakness, headaches, numbness and seizures.   Psychiatric/Behavioral: Negative for suicidal ideas.   All other systems reviewed and are negative.    Vitals:    08/21/18 0814   BP: 160/80   BP Location: Left arm   Patient Position: Sitting   Pulse: 53   Temp: 98.4 °F (36.9 °C)   SpO2: 97%   Weight: 78.9 kg (174 lb)   Height: 182.9 cm (72\")      Physical Exam:  Gen - NAD, pleasant, cooperative  CV - Regular rate and rhythm, no murmur gallop or rub  Pulm - Lungs clear to auscultation without wheeze or rhonchi   GI - Soft, normoactive bowel sounds, non-tender  Ext - Without edema  Lymph - Without palpable axillary, supraclavicular or anterior chain lymphadenopathy present    Labs/Imaging: 3/16/18 CT scan of the chest performed at [facility]  1.  Stable right upper lobe nodule favor benign.  2.  No new abnormality or acute findings.    Assessment/Plan:  Patient is a 63-year-old  male with history of pulmonary hypertension, vertigo, COPD and [id number] stage IA non-small cell carcinoma status post right middle lobectomy performed 11/18/17 who presents to office today for 6 month follow-up with review and discussion of 3/16/18 CT scan of the chest.  The patient has been doing well since he was last seen in office on 12/12/17.  We discussed the results of his most recent 3/16/18 CT scan of the chest which shows stable right upper lobe nodule.  The patient is followed by his pulmonologist, Dr. Lim, and will see him again on 10/10/18.  I will repeat a CT scan 6 months from his previous CT scan, which will be in September 2018.  I would like for the patient to follow-up with our office in March 2019 - at that time the patient will have " had another 6 month CT scan performed, which we may review and discuss at that time.  If the patient develops any acutely worsening symptoms, he may call our office at any time during the interval.       Patient Active Problem List   Diagnosis   • Osteoarthritis   • Bradycardia   • Heat syncope   • Pulmonary hypertension   • Vertigo   • Abnormal ECG   • Lung cancer, middle lobe (CMS/HCC)   • Status post lobectomy of lung   • COPD (chronic obstructive pulmonary disease) (CMS/HCC)   • Screening PSA (prostate specific antigen)   • PAC (premature atrial contraction)   • Sensitivity to the cold   • Centrilobular emphysema (CMS/HCC)     Signed by:

## 2018-08-28 DIAGNOSIS — C34.90 MALIGNANT NEOPLASM OF LUNG, UNSPECIFIED LATERALITY, UNSPECIFIED PART OF LUNG (HCC): Primary | ICD-10-CM

## 2018-09-12 ENCOUNTER — HOSPITAL ENCOUNTER (OUTPATIENT)
Dept: CT IMAGING | Facility: HOSPITAL | Age: 63
Discharge: HOME | End: 2018-09-12
Attending: INTERNAL MEDICINE | Admitting: INTERNAL MEDICINE

## 2018-09-12 DIAGNOSIS — R91.1 LUNG NODULE, SOLITARY: ICD-10-CM

## 2018-09-12 DIAGNOSIS — C34.91 NON-SMALL CELL CANCER OF RIGHT LUNG (HCC): ICD-10-CM

## 2018-09-12 PROCEDURE — 71250 CT THORAX DX C-: CPT

## 2018-09-13 ENCOUNTER — OFFICE VISIT (OUTPATIENT)
Dept: INTERNAL MEDICINE | Facility: CLINIC | Age: 63
End: 2018-09-13

## 2018-09-13 VITALS
TEMPERATURE: 98.1 F | WEIGHT: 179 LBS | DIASTOLIC BLOOD PRESSURE: 68 MMHG | HEART RATE: 56 BPM | BODY MASS INDEX: 24.24 KG/M2 | SYSTOLIC BLOOD PRESSURE: 130 MMHG | OXYGEN SATURATION: 96 % | HEIGHT: 72 IN

## 2018-09-13 DIAGNOSIS — Z00.00 ENCOUNTER FOR MEDICARE ANNUAL WELLNESS EXAM: Primary | ICD-10-CM

## 2018-09-13 PROCEDURE — 90662 IIV NO PRSV INCREASED AG IM: CPT | Performed by: INTERNAL MEDICINE

## 2018-09-13 PROCEDURE — G0009 ADMIN PNEUMOCOCCAL VACCINE: HCPCS | Performed by: INTERNAL MEDICINE

## 2018-09-13 PROCEDURE — 96160 PT-FOCUSED HLTH RISK ASSMT: CPT | Performed by: INTERNAL MEDICINE

## 2018-09-13 PROCEDURE — G0008 ADMIN INFLUENZA VIRUS VAC: HCPCS | Performed by: INTERNAL MEDICINE

## 2018-09-13 PROCEDURE — 90670 PCV13 VACCINE IM: CPT | Performed by: INTERNAL MEDICINE

## 2018-09-13 PROCEDURE — G0439 PPPS, SUBSEQ VISIT: HCPCS | Performed by: INTERNAL MEDICINE

## 2018-09-13 NOTE — PROGRESS NOTES
QUICK REFERENCE INFORMATION:  The ABCs of the Annual Wellness Visit    Subsequent Medicare Wellness Visit    HEALTH RISK ASSESSMENT    1955    Recent Hospitalizations:  No hospitalization(s) within the last year..        Current Medical Providers:  Patient Care Team:  Vermeesch, Marilyn K, MD as PCP - General (Internal Medicine)  Max Almonte MD as Consulting Physician (Cardiology)  Juan Alberto Carty MD as Surgeon (Cardiothoracic Surgery)  Shady Singh MD as Consulting Physician (General Surgery)        Smoking Status:  History   Smoking Status   • Former Smoker   • Packs/day: 3.00   • Years: 50.00   • Types: Cigarettes   • Quit date: 11/4/2016   Smokeless Tobacco   • Never Used       Alcohol Consumption:  History   Alcohol Use   • 12.6 oz/week   • 21 Cans of beer per week     Comment: 3 cans/daily       Depression Screen:   PHQ-2/PHQ-9 Depression Screening 9/13/2018   Little interest or pleasure in doing things 0   Feeling down, depressed, or hopeless 0   Total Score 0       Health Habits and Functional and Cognitive Screening:  Functional & Cognitive Status 9/13/2018   Do you have difficulty preparing food and eating? No   Do you have difficulty bathing yourself, getting dressed or grooming yourself? No   Do you have difficulty using the toilet? No   Do you have difficulty moving around from place to place? No   Do you have trouble with steps or getting out of a bed or a chair? No   In the past year have you fallen or experienced a near fall? No   Current Diet Well Balanced Diet   Dental Exam Not up to date   Eye Exam Not up to date   Exercise (times per week) 0 times per week   Current Exercise Activities Include None   Do you need help using the phone?  No   Are you deaf or do you have serious difficulty hearing?  No   Do you need help with transportation? No   Do you need help shopping? No   Do you need help preparing meals?  No   Do you need help with housework?  No   Do you need help with  laundry? No   Do you need help taking your medications? No   Do you need help managing money? No   Do you ever drive or ride in a car without wearing a seat belt? No   Have you felt unusual stress, anger or loneliness in the last month? No   Who do you live with? Spouse   If you need help, do you have trouble finding someone available to you? No   Do you have difficulty concentrating, remembering or making decisions? No           Does the patient have evidence of cognitive impairment? No    Aspirin use counseling: Start ASA 81 mg daily       Recent Lab Results:  CMP:  Lab Results   Component Value Date     (H) 07/11/2017    BUN 20 04/13/2018    CREATININE 0.90 04/13/2018    EGFRIFNONA 85 04/13/2018    EGFRIFAFRI 103 07/11/2017    BCR 22.2 (H) 04/13/2018     04/13/2018    K 4.7 04/13/2018    CO2 29.0 04/13/2018    CALCIUM 8.9 04/13/2018    PROTENTOTREF 7.4 07/11/2017    ALBUMIN 4.20 04/13/2018    LABGLOBREF 3.2 07/11/2017    LABIL2 1.3 07/11/2017    BILITOT 0.5 04/13/2018    ALKPHOS 66 04/13/2018    AST 19 04/13/2018    ALT 29 04/13/2018     Lipid Panel:  Lab Results   Component Value Date    TRIG 81 09/01/2016    HDL 49 09/01/2016    VLDL 16 09/01/2016     HbA1c:  Lab Results   Component Value Date    HGBA1C 5.30 11/17/2016       Visual Acuity:  No exam data present    Age-appropriate Screening Schedule:  Refer to the list below for future screening recommendations based on patient's age, sex and/or medical conditions. Orders for these recommended tests are listed in the plan section. The patient has been provided with a written plan.    Health Maintenance   Topic Date Due   • ZOSTER VACCINE (2 of 3) 09/05/2017   • PNEUMOCOCCAL VACCINES (65+ LOW/MEDIUM RISK) (1 of 2 - PCV13) 04/07/2018   • INFLUENZA VACCINE  08/01/2018   • COLONOSCOPY  07/17/2025   • TDAP/TD VACCINES (2 - Td) 01/26/2027        Subjective   History of Present Illness    Saman Aguayo is a 63 y.o. male who presents for an Subsequent  Wellness Visit. PMH of bradycardia, lung cancer, COPD, DJD.  He had a follow up lung CT yesterday for his lung CA.  He denies any wheezing or cough.  He does have some current allergies, not very bothersome, no meds taken.     The following portions of the patient's history were reviewed and updated as appropriate: allergies, current medications, past family history, past medical history, past social history, past surgical history and problem list.    Outpatient Medications Prior to Visit   Medication Sig Dispense Refill   • bisacodyl (bisacodyl) 5 MG EC tablet Take 1 tablet by mouth Daily. 4 tablet 0   • [facility] PURELAX powder TAKE 238 G BY MOUTH 1 (ONE) TIME FOR 1 DOSE.  0     No facility-administered medications prior to visit.        Patient Active Problem List   Diagnosis   • Osteoarthritis   • Bradycardia   • Heat syncope   • Pulmonary hypertension   • Vertigo   • Abnormal ECG   • Lung cancer, middle lobe (CMS/HCC)   • Status post lobectomy of lung   • COPD (chronic obstructive pulmonary disease) (CMS/[facility])   • Screening PSA (prostate specific antigen)   • PAC (premature atrial contraction)   • Sensitivity to the cold   • Centrilobular emphysema (CMS/[facility])   • Encounter for Medicare annual wellness exam       Advance Care Planning:  has NO advance directive - information provided to the patient today    Identification of Risk Factors:  Risk factors include: unhealthy diet and alcohol use.    Review of Systems   Constitutional: Negative.    HENT: Positive for congestion.    Eyes: Negative.    Respiratory: Negative.    Cardiovascular: Negative.    Gastrointestinal: Negative.    Endocrine: Negative.    Genitourinary: Negative.    Musculoskeletal: Positive for arthralgias.   Skin: Negative.    Allergic/Immunologic: Positive for environmental allergies.   Neurological: Negative.    Hematological: Bruises/bleeds easily.   Psychiatric/Behavioral: Negative.        Compared to one year ago, the patient feels his physical  "health is better.  Compared to one year ago, the patient feels his mental health is the same.    Objective     Physical Exam   Constitutional: He is oriented to person, place, and time. He appears well-developed and well-nourished.   Very pleasant gentleman in no apparent distress   HENT:   Head: Normocephalic and atraumatic.   Right Ear: External ear normal.   Left Ear: External ear normal.   Mouth/Throat: Oropharynx is clear and moist.   Eyes: Pupils are equal, round, and reactive to light. Conjunctivae and EOM are normal.   Neck: Normal range of motion. Neck supple. No thyromegaly present.   Cardiovascular: Normal rate, regular rhythm, normal heart sounds and intact distal pulses.    No murmur heard.  Pulmonary/Chest: Effort normal. No respiratory distress.   Slightly diminished breath sounds throughout   Abdominal: Soft. Bowel sounds are normal.   Musculoskeletal: He exhibits no edema.   Lymphadenopathy:     He has no cervical adenopathy.   Neurological: He is alert and oriented to person, place, and time. He displays normal reflexes. No cranial nerve deficit. Coordination normal.   Psychiatric: He has a normal mood and affect. His behavior is normal. Judgment and thought content normal.   Nursing note and vitals reviewed.      Vitals:    09/13/18 1346   BP: 130/68   Pulse: 56   Temp: 98.1 °F (36.7 °C)   SpO2: 96%   Weight: 81.2 kg (179 lb)   Height: 182.9 cm (72\")   PainSc: 0-No pain       Patient's Body mass index is 24.28 kg/m². BMI is within normal parameters. No follow-up required.      Assessment/Plan   Patient Self-Management and Personalized Health Advice  The patient has been provided with information about: alcohol use and designing advance directives and preventive services including:   · Advance directive, Diabetes screening, see lab orders, Influenza vaccine, Pneumococcal vaccine , recommend shingrix vaccine at pharmacy.    Visit Diagnoses:    ICD-10-CM ICD-9-CM   1. Encounter for Medicare annual " wellness exam Z00.00 V70.0       Orders Placed This Encounter   Procedures   • Pneumococcal Conjugate Vaccine 13-Valent All (PCV13)   • Flu Vaccine High Dose PF 65YR+ (3020-3830)   • Lipid Panel With / Chol / HDL Ratio       Outpatient Encounter Prescriptions as of 9/13/2018   Medication Sig Dispense Refill   • bisacodyl (bisacodyl) 5 MG EC tablet Take 1 tablet by mouth Daily. 4 tablet 0   • [facility] PURELAX powder TAKE 238 G BY MOUTH 1 (ONE) TIME FOR 1 DOSE.  0     No facility-administered encounter medications on file as of 9/13/2018.        Reviewed use of high risk medication in the elderly: not applicable  Reviewed for potential of harmful drug interactions in the elderly: not applicable     Needs annual eye exam, he will schedule  ASA 81 mg daily  Patient given information on advance directives today, request copy of living will for chart  Shingles vaccine recommended at local pharmacy  Labs as noted  Given prevnar and flu shot today  Follow-up with Dr. Lim regarding history of lung cancer and recent CT scan    Follow Up:  Return in about 1 year (around 9/13/2019) for Medicare Wellness.     An After Visit Summary and PPPS with all of these plans were given to the patient.

## 2018-09-14 LAB
CHOLEST SERPL-MCNC: 144 MG/DL (ref 0–199)
CHOLEST/HDLC SERPL: 2.53 {RATIO}
HDLC SERPL-MCNC: 57 MG/DL (ref 40–60)
LDLC SERPL CALC-MCNC: 76 MG/DL (ref 0–99)
TRIGL SERPL-MCNC: 55 MG/DL
VLDLC SERPL CALC-MCNC: 11 MG/DL

## 2018-09-24 DIAGNOSIS — C34.90 MALIGNANT NEOPLASM OF LUNG, UNSPECIFIED LATERALITY, UNSPECIFIED PART OF LUNG (HCC): Primary | ICD-10-CM

## 2018-10-01 ENCOUNTER — HOSPITAL ENCOUNTER (EMERGENCY)
Facility: HOSPITAL | Age: 63
Discharge: HOME | End: 2018-10-01
Attending: EMERGENCY MEDICINE | Admitting: EMERGENCY MEDICINE

## 2018-10-01 ENCOUNTER — APPOINTMENT (OUTPATIENT)
Dept: GENERAL RADIOLOGY | Facility: HOSPITAL | Age: 63
End: 2018-10-01

## 2018-10-01 VITALS
SYSTOLIC BLOOD PRESSURE: 163 MMHG | HEIGHT: 72 IN | OXYGEN SATURATION: 97 % | HEART RATE: 55 BPM | TEMPERATURE: 97.7 F | RESPIRATION RATE: 16 BRPM | DIASTOLIC BLOOD PRESSURE: 92 MMHG | BODY MASS INDEX: 23.84 KG/M2 | WEIGHT: 176 LBS

## 2018-10-01 DIAGNOSIS — S89.91XA INJURY OF RIGHT KNEE, INITIAL ENCOUNTER: Primary | ICD-10-CM

## 2018-10-01 PROCEDURE — 73562 X-RAY EXAM OF KNEE 3: CPT

## 2018-10-01 PROCEDURE — 99283 EMERGENCY DEPT VISIT LOW MDM: CPT

## 2018-10-01 NOTE — ED PROVIDER NOTES
Subjective   History of Present Illness   63-year-old male presenting with right knee pain.  States that he slipped on the wet ground yesterday and felt as though his knee popped.  He felt a valgus stress on the knee as it slid out from him.  Since then, he has had pain at the medial joint line.  Able to bear weight and flex and extended but these both hurt.  Denies other complaints.    Review of Systems   Musculoskeletal: Positive for arthralgias.   All other systems reviewed and are negative.      Past Medical History:   Diagnosis Date   • Abnormal ECG    • Abscess of skin    • Arrhythmia     bradycardia   • Arthritis     HANDS AND BACK    • Bunion    • COPD (chronic obstructive pulmonary disease) (CMS/HCC)    • History of chest x-ray 09/23/2013    No acute cardiopulmonary disease   • Loose, teeth     top right loose   • Low back pain    • Lung cancer (CMS/HCC)    • Lung cancer, middle lobe (CMS/HCC) 11/8/2016   • Wears eyeglasses        Allergies   Allergen Reactions   • No Known Drug Allergy        Past Surgical History:   Procedure Laterality Date   • BRONCHOSCOPY     • BUNIONECTOMY Left 2013    GREAT TOE   • COLONOSCOPY     • HERNIA REPAIR Left 2002    Dr. 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           Objective   Physical Exam   Constitutional: He is oriented to person, place, and time. He appears well-developed and well-nourished. No distress.   HENT:   Head: Normocephalic.   Eyes: No scleral icterus.   Neck: No tracheal deviation present.   Cardiovascular: Normal rate, regular rhythm, normal heart sounds and intact distal pulses.  Exam reveals no gallop and no friction rub.    No murmur heard.  Pulmonary/Chest: Effort normal and breath sounds normal. No stridor. No respiratory distress. He has no wheezes. He has no rales.   Musculoskeletal: Normal range of motion. He exhibits tenderness (TTP over R knee at medial joint line). He exhibits no edema or deformity.   Able to flex and extend normally but with pain.  Tender to palpation of the medial joint line.  No obvious deformity nor swelling.  Pain with any varus or valgus stress on the joint.  Anterior and posterior drawer tests are intact.  Tenderness to palpation on the patella, lateral joint line, femur, tibia or fibula.   Neurological: He is alert and oriented to person, place, and time.   Skin: Skin is warm and dry. No rash noted. He is not diaphoretic. No erythema. No pallor.   Psychiatric: He has a normal mood and affect. His behavior is normal.   Nursing note and vitals reviewed.      Procedures           ED Course                  MDM   53-year-old male here with right heel knee pain after fall.  Afebrile, vital signs stable.  Neurovascular intact.  Likely medial meniscal injury versus medial collateral ligament strain versus other.  Plan for an x-ray, offered crutches and he defers on these.  He does not want anything for pain right now either.    9:23 AM x-ray shows anterior soft tissue swelling but no acute fracture nor dislocation.  We'll  discharge home with a knee brace and recommendations or Tylenol and ibuprofen.  Patient will follow-up with his orthopedic surgeon as soon as he can.    Final diagnoses:   Injury of right knee, initial encounter            Matthew Lomax MD  10/01/18 0958

## 2018-10-02 ENCOUNTER — OFFICE VISIT (OUTPATIENT)
Dept: ORTHOPEDIC SURGERY | Facility: CLINIC | Age: 63
End: 2018-10-02

## 2018-10-02 VITALS — BODY MASS INDEX: 24.24 KG/M2 | WEIGHT: 179 LBS | RESPIRATION RATE: 18 BRPM | HEIGHT: 72 IN

## 2018-10-02 DIAGNOSIS — S83.411A SPRAIN OF MEDIAL COLLATERAL LIGAMENT OF RIGHT KNEE, INITIAL ENCOUNTER: Primary | ICD-10-CM

## 2018-10-02 PROCEDURE — 99203 OFFICE O/P NEW LOW 30 MIN: CPT | Performed by: ORTHOPAEDIC SURGERY

## 2018-10-02 NOTE — PROGRESS NOTES
Subjective   Patient ID: Saman Aguayo is a 63 y.o. male  Pain of the Right Knee (Patient states he fell on 09/30/18, he was washing his car so when he pulled up in the garage to get out of the car his feet went out from under him. He states his pain is 5/10.)             History of Present Illness    63-year-old with history of lung cancer status post resection 2 years ago no sign of distant metastatic disease followed by Dr. Hassan was cleaning his car on Sunday using flip-flop slipped his right knee when out laterally had pain in the medial side of the knee some swelling initial x-rays were negative for fracture comes in today for evaluation with a knee brace on.  Pain and swelling decreased yesterday no calf pain no hip pain or paresthesias is a history of prior knee locking buckling or mechanical symptoms.    Review of Systems   Constitutional: Negative for fever.   HENT: Negative for voice change.    Eyes: Negative for visual disturbance.   Respiratory: Negative for shortness of breath.    Cardiovascular: Negative for chest pain.   Gastrointestinal: Negative for abdominal distention and abdominal pain.   Genitourinary: Negative for dysuria.   Musculoskeletal: Positive for arthralgias. Negative for gait problem and joint swelling.   Skin: Negative for rash.   Neurological: Negative for speech difficulty.   Hematological: Does not bruise/bleed easily.   Psychiatric/Behavioral: Negative for confusion.       Past Medical History:   Diagnosis Date   • Abnormal ECG    • Abscess of skin    • Arrhythmia     bradycardia   • Arthritis     HANDS AND BACK    • Bunion    • COPD (chronic obstructive pulmonary disease) (CMS/HCC)    • History of chest x-ray 09/23/2013    No acute cardiopulmonary disease   • Loose, teeth     top right loose   • Low back pain    • Lung cancer (CMS/HCC)    • Lung cancer, middle lobe (CMS/HCC) 11/8/2016   • Wears eyeglasses         Past Surgical History:   Procedure Laterality Date   • BRONCHOSCOPY   "   • BUNIONECTOMY Left 2013    GREAT TOE   • COLONOSCOPY     • HERNIA REPAIR Left 2002    Dr. Singh/inguinal    • LUNG REMOVAL, PARTIAL     • THORACOSCOPY N/A 11/18/2016    Procedure: BRONCH THORACOSCOPY VIDEO ASSISTED  WITH LOBECTOMY, MEDIALSTINAL LYMPH NODE DISECTION;  Surgeon: Chris Porter MD;  Location: [facility];  Service:        Family History   Problem Relation Age of Onset   • Heart attack Father    • Heart disease Father    • Colon cancer Father    • Colon cancer Mother    • Liver cancer Mother        Social History     Social History   • Marital status:      Spouse name: N/A   • Number of children: 1   • Years of education: N/A     Occupational History   • 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       I have reviewed all of the above social hx, family hx, surgical hx, medications, allergies & ROS and confirm that it is accurate.    Allergies   Allergen Reactions   • No Known Drug Allergy          Current Outpatient Prescriptions:   •  bisacodyl (bisacodyl) 5 MG EC tablet, Take 1 tablet by mouth Daily., Disp: 4 tablet, Rfl: 0  •  [facility] PURELAX powder, TAKE 238 G BY MOUTH 1 (ONE) TIME FOR 1 DOSE., Disp: , Rfl: 0    Objective   Resp 18   Ht 182.9 cm (72\")   Wt 81.2 kg (179 lb)   BMI 24.28 kg/m²    Physical Exam  Constitutional: Patient is oriented to person, place, and time. Patient appears well-developed and well-nourished.   HENT:Head: Normocephalic and atraumatic.   Eyes: EOM are normal. Pupils are equal, round, and reactive to light.   Neck: Normal range of motion. Neck supple.   Cardiovascular: Normal rate.  "   Pulmonary/Chest: Effort normal and breath sounds normal.   Abdominal: Soft.   Neurological: Patient is alert and oriented to person, place, and time.   Skin: Skin is warm and dry.   Psychiatric: Patient has a normal mood and affect.   Nursing note and vitals reviewed.       Ortho Exam   Right knee with point tenderness over the MCL origin no ecchymosis slight tenderness with valgus stress but good stability negative Lockman sign no lateral joint line pain free slight effusion no erythema or abrasions contusions calf supple neurovascularly intact    Assessment/Plan   Review of Radiographic Studies:    No new imaging done today.      Procedures     Saman was seen today for pain.    Diagnoses and all orders for this visit:    Sprain of medial collateral ligament of right knee, initial encounter       Orthopedic activities reviewed and patient expressed appreciation and Use brace as instructed      Recommendations/Plan:   Work/Activity Status: May perform usual activities as tolerated    Patient agreeable to call or return sooner for any concerns.             Impression: Right medial knee pain grade 1 MCL strain     Plan:  Hinge brace weight-bear as tolerated icing continue ibuprofen recheck 2 weeks if still painful we'll order MRI at that time

## 2018-10-04 DIAGNOSIS — R91.1 NODULE OF UPPER LOBE OF RIGHT LUNG: Primary | ICD-10-CM

## 2018-10-09 ENCOUNTER — HOSPITAL ENCOUNTER (OUTPATIENT)
Dept: PET IMAGING | Facility: HOSPITAL | Age: 63
Discharge: HOME | End: 2018-10-09
Admitting: INTERNAL MEDICINE

## 2018-10-09 ENCOUNTER — HOSPITAL ENCOUNTER (OUTPATIENT)
Dept: PET IMAGING | Facility: HOSPITAL | Age: 63
Discharge: HOME | End: 2018-10-09

## 2018-10-09 LAB — GLUCOSE BLDC GLUCOMTR-MCNC: 87 MG/DL (ref 70–130)

## 2018-10-09 PROCEDURE — 82962 GLUCOSE BLOOD TEST: CPT

## 2018-10-09 PROCEDURE — 0 FLUDEOXYGLUCOSE F18 SOLUTION: Performed by: PHYSICIAN ASSISTANT

## 2018-10-09 PROCEDURE — 78815 PET IMAGE W/CT SKULL-THIGH: CPT

## 2018-10-09 PROCEDURE — A9552 F18 FDG: HCPCS | Performed by: PHYSICIAN ASSISTANT

## 2018-10-09 RX ADMIN — FLUDEOXYGLUCOSE F18 1 DOSE: 300 INJECTION INTRAVENOUS at 13:14

## 2018-10-10 ENCOUNTER — OFFICE VISIT (OUTPATIENT)
Dept: PULMONOLOGY | Facility: CLINIC | Age: 63
End: 2018-10-10

## 2018-10-10 VITALS
HEIGHT: 72 IN | DIASTOLIC BLOOD PRESSURE: 60 MMHG | RESPIRATION RATE: 18 BRPM | HEART RATE: 50 BPM | BODY MASS INDEX: 24.24 KG/M2 | WEIGHT: 179 LBS | OXYGEN SATURATION: 95 % | SYSTOLIC BLOOD PRESSURE: 110 MMHG

## 2018-10-10 DIAGNOSIS — R91.1 LUNG NODULE, SOLITARY: ICD-10-CM

## 2018-10-10 DIAGNOSIS — J43.9 PULMONARY EMPHYSEMA, UNSPECIFIED EMPHYSEMA TYPE (HCC): Primary | ICD-10-CM

## 2018-10-10 DIAGNOSIS — C34.91 NON-SMALL CELL CANCER OF RIGHT LUNG (HCC): ICD-10-CM

## 2018-10-10 DIAGNOSIS — R93.89 ABNORMAL CT OF THE CHEST: ICD-10-CM

## 2018-10-10 PROCEDURE — 99214 OFFICE O/P EST MOD 30 MIN: CPT | Performed by: INTERNAL MEDICINE

## 2018-10-10 NOTE — PROGRESS NOTES
"Chief Complaint   Patient presents with   • Follow-up     Non-small cell cancer of right lung          Subjective   Saman Aguayo is a 63 y.o. male.     History of Present Illness   Patient comes back today to discuss the results of CT scan and PET scan.    The patient underwent right-sided lobectomy in November 2016 for stage IA non-small cell lung cancer.  The patient has been having surveillance CT scans and his latest CT scan in September showed right upper lobe lung nodule that had somewhat enlarged and was now measured at 7 mm.    The patient's situation was discussed by our colleagues in [facility] and it was felt that he will need a PET scan and if the PET scan was positive, he may benefit from further therapy including CyberKnife etc.    The patient denies any weight loss, hemoptysis or night sweats.  He is complaining of sinus pressure and occasional \"swimmy head\".    He has not smoked was investigated more than 2 years.    He denies any shortness of breath, cough or phlegm production.    The following portions of the patient's history were reviewed and updated as appropriate: allergies, current medications, past family history, past medical history, past social history and past surgical history.    Review of Systems   Constitutional: Negative for chills and fever.   HENT: Positive for rhinorrhea, sinus pressure and sneezing. Negative for sore throat.    Respiratory: Positive for cough. Negative for shortness of breath and wheezing.    Psychiatric/Behavioral: Negative for sleep disturbance.       Objective   Visit Vitals  /60   Pulse 50   Resp 18   Ht 182.9 cm (72.01\")   Wt 81.2 kg (179 lb)   SpO2 95%   BMI 24.27 kg/m²       Physical Exam   Constitutional: He appears well-developed and well-nourished.   HENT:   Head: Normocephalic and atraumatic.   Eyes: EOM are normal.   Neck: Normal range of motion. Neck supple.   Cardiovascular: Normal rate.    Pulmonary/Chest: Effort normal and breath " sounds normal. He has no wheezes.   Effort was normal   Musculoskeletal: Normal range of motion. He exhibits no edema.   Neurological: He is alert.   Skin: Skin is warm and dry. No rash noted.   Psychiatric: He has a normal mood and affect. His behavior is normal.   Vitals reviewed.        Assessment/Plan   Saman was seen today for follow-up.    Diagnoses and all orders for this visit:    Pulmonary emphysema, unspecified emphysema type (CMS/HCC)  -     Pulmonary Function Test; Future    Non-small cell cancer of right lung (CMS/HCC)  -     CT Chest Without Contrast; Future    Abnormal CT of the chest  -     CT Chest Without Contrast; Future    Lung nodule, solitary  -     CT Chest Without Contrast; Future         Return in about 3 months (around 1/10/2019) for Recheck, Overbook.    DISCUSSION (if any):  I reviewed the CT images from September 2018.      I also reviewed the PET scan images from yesterday.    Based on the results of the PET scan I told the patient that the right upper lobe nodule was PET negative but unfortunately the sensitivity of a PET scan is extremely poor for nodules less than 8 mm and hence it is unreliable.    I discussed the possibility of preemptive CyberKnife for this lesion versus repeat CT scan in a few weeks time.    I also told the patient that as per my review, the CT scan portion on the PET scan shows some suggestion of air bronchograms within the nodule and it also seems to have remained stable between the CT scan on September 12 and October 9.    Given the stability and presence of potential air bronchograms within the lower the nodule, and the fact that it is less than 10 mm at this time, I offered follow-up CT scan as a acceptable alternative CyberKnife.    The patient and his wife have agreed to proceed with CT scan and this will be scheduled in the next 2-3 months.    I will also reviewed the case with Dr. Porter and discussed at length the finding on the PET scan.    He will  review the images himself but has agreed with the plan for now to follow-up with a CT scan in the next 2-3 months.    The next CT scan will be ordered within the last week of December 2018.    In the meanwhile, I have asked the patient to call this office if he develops any symptoms.      Dictated utilizing Dragon dictation.    This document was electronically signed by Nannette Lim MD October 11, 2018  7:34 AM

## 2018-10-16 ENCOUNTER — TELEPHONE (OUTPATIENT)
Dept: CARDIAC SURGERY | Facility: CLINIC | Age: 63
End: 2018-10-16

## 2018-10-16 ENCOUNTER — OFFICE VISIT (OUTPATIENT)
Dept: ORTHOPEDIC SURGERY | Facility: CLINIC | Age: 63
End: 2018-10-16

## 2018-10-16 VITALS — RESPIRATION RATE: 18 BRPM | HEIGHT: 72 IN | WEIGHT: 181 LBS | BODY MASS INDEX: 24.52 KG/M2

## 2018-10-16 DIAGNOSIS — S83.411D SPRAIN OF MEDIAL COLLATERAL LIGAMENT OF RIGHT KNEE, SUBSEQUENT ENCOUNTER: Primary | ICD-10-CM

## 2018-10-16 PROCEDURE — 99213 OFFICE O/P EST LOW 20 MIN: CPT | Performed by: ORTHOPAEDIC SURGERY

## 2018-10-16 NOTE — PROGRESS NOTES
Subjective   Patient ID: Saman Aguayo is a 63 y.o. male  Follow-up and Pain of the Right Knee (Patient states his knee is doing much better, but says it's still sore at times.)             History of Present Illness    He returns 90% improved right knee pain mostly on the medial side some stiffness morning at times otherwise able to do his normal work duties using his brace does not feel a need for medications is no swelling in the calf or ankle and able fully weight-bear.    Review of Systems   Constitutional: Negative for fever.   HENT: Negative for voice change.    Eyes: Negative for visual disturbance.   Respiratory: Negative for shortness of breath.    Cardiovascular: Negative for chest pain.   Gastrointestinal: Negative for abdominal distention and abdominal pain.   Genitourinary: Negative for dysuria.   Musculoskeletal: Positive for arthralgias. Negative for gait problem and joint swelling.   Skin: Negative for rash.   Neurological: Negative for speech difficulty.   Hematological: Does not bruise/bleed easily.   Psychiatric/Behavioral: Negative for confusion.       Past Medical History:   Diagnosis Date   • Abnormal ECG    • Abscess of skin    • Arrhythmia     bradycardia   • Arthritis     HANDS AND BACK    • Bunion    • COPD (chronic obstructive pulmonary disease) (CMS/HCC)    • History of chest x-ray 09/23/2013    No acute cardiopulmonary disease   • Loose, teeth     top right loose   • Low back pain    • Lung cancer (CMS/HCC)    • Lung cancer, middle lobe (CMS/HCC) 11/8/2016   • Wears eyeglasses         Past Surgical History:   Procedure Laterality Date   • BRONCHOSCOPY     • BUNIONECTOMY Left 2013    GREAT TOE   • COLONOSCOPY     • HERNIA REPAIR Left 2002    Dr. Singh/inguinal    • LUNG REMOVAL, PARTIAL     • THORACOSCOPY N/A 11/18/2016    Procedure: BRONCH THORACOSCOPY VIDEO ASSISTED  WITH LOBECTOMY, MEDIALSTINAL LYMPH NODE DISECTION;  Surgeon: Chris Porter MD;  Location: [facility];  Service:   "      Family History   Problem Relation Age of Onset   • Heart attack Father    • Heart disease Father    • Colon cancer Father    • Colon cancer Mother    • Liver cancer Mother        Social History     Social History   • Marital status:      Spouse name: N/A   • Number of children: 1   • Years of education: N/A     Occupational History   • Runs a radiator shop      Social History Main Topics   • Smoking status: Former Smoker     Packs/day: 3.00     Years: 50.00     Types: Cigarettes     Quit date: 11/4/2016   • Smokeless tobacco: Never Used   • Alcohol use 12.6 oz/week     21 Cans of beer per week      Comment: 3 cans/daily   • Drug use: No   • Sexual activity: Defer     Other Topics Concern   • Not on file     Social History Narrative    Lives in [place].       I have reviewed all of the above social hx, family hx, surgical hx, medications, allergies & ROS and confirm that it is accurate.    Allergies   Allergen Reactions   • No Known Drug Allergy          Current Outpatient Prescriptions:   •  bisacodyl (bisacodyl) 5 MG EC tablet, Take 1 tablet by mouth Daily., Disp: 4 tablet, Rfl: 0  •  [employer] PURELAX powder, TAKE 238 G BY MOUTH 1 (ONE) TIME FOR 1 DOSE., Disp: , Rfl: 0    Objective   Resp 18   Ht 182.9 cm (72.01\")   Wt 82.1 kg (181 lb)   BMI 24.54 kg/m²    Physical Exam  Constitutional: Patient is oriented to person, place, and time. Patient appears well-developed and well-nourished.   HENT:Head: Normocephalic and atraumatic.   Eyes: EOM are normal. Pupils are equal, round, and reactive to light.   Neck: Normal range of motion. Neck supple.   Cardiovascular: Normal rate.    Pulmonary/Chest: Effort normal and breath sounds normal.   Abdominal: Soft.   Neurological: Patient is alert and oriented to person, place, and time.   Skin: Skin is warm and dry.   Psychiatric: Patient has a normal mood and affect.   Nursing note and vitals reviewed.       Ortho Exam   Right knee with no effusion full extension very " slight tenderness over the MCL origin no instability with valgus stress Prakash sign negative for anteromedial joint line pain calf supple neurovascularly intact    Assessment/Plan   Review of Radiographic Studies:    No new imaging done today.      Procedures     Saman was seen today for follow-up and pain.    Diagnoses and all orders for this visit:    Sprain of medial collateral ligament of right knee, subsequent encounter       Orthopedic activities reviewed and patient expressed appreciation      Recommendations/Plan:   Work/Activity Status: May perform usual activities as tolerated    Patient agreeable to call or return sooner for any concerns.             Impression: Right medial knee pain resolving grade 1 MCL strain     Plan:  Icing continue use of brace while working recheck 1 month if any swelling occurs or pain with range of motion at that time consider MRI just to rule out medial meniscal tear

## 2018-10-16 NOTE — TELEPHONE ENCOUNTER
----- Message from Chris Porter MD sent at 10/15/2018  5:51 PM EDT -----  Have patient follow-up in 3 months after next scan.  Thanks    ----- Message -----  From: Chip Fuentes  Sent: 10/9/2018  11:14 AM  To: MD Dr. Charlotte Ramos Saman Aguayo is scheduled for his PET scan on 10/09 and a referral has been sent to Dr. Bermeo. From his ENC on 08/21 you wanted to see him back in 6ms. Does he need to come back before then? Thanks.

## 2018-11-20 ENCOUNTER — OFFICE VISIT (OUTPATIENT)
Dept: ORTHOPEDIC SURGERY | Facility: CLINIC | Age: 63
End: 2018-11-20

## 2018-11-20 VITALS — RESPIRATION RATE: 18 BRPM | BODY MASS INDEX: 24.92 KG/M2 | WEIGHT: 184 LBS | HEIGHT: 72 IN

## 2018-11-20 DIAGNOSIS — S83.411D SPRAIN OF MEDIAL COLLATERAL LIGAMENT OF RIGHT KNEE, SUBSEQUENT ENCOUNTER: Primary | ICD-10-CM

## 2018-11-20 PROCEDURE — 99213 OFFICE O/P EST LOW 20 MIN: CPT | Performed by: ORTHOPAEDIC SURGERY

## 2018-11-20 NOTE — PROGRESS NOTES
Subjective   Patient ID: Saman Aguayo is a 63 y.o. male  Follow-up and Pain of the Right Knee (Patient is here today for a 4 week follow up and states his knee is really sore, his pain level is 5-6/10.)             History of Present Illness    63-year-old continues to complain of burning pain swelling right medial knee with weightbearing and twisting standing doesn't get any  relief using a brace uses ice and Aleve with minimal improvement pain is been present for over 6 weeks he would like to pursue further investigation for the cause of the pain.  Denies hip pain back pain paresthesias otherwise medical health is stable    Review of Systems   Constitutional: Negative for fever.   HENT: Negative for voice change.    Eyes: Negative for visual disturbance.   Respiratory: Negative for shortness of breath.    Cardiovascular: Negative for chest pain.   Gastrointestinal: Negative for abdominal distention and abdominal pain.   Genitourinary: Negative for dysuria.   Musculoskeletal: Positive for arthralgias. Negative for gait problem and joint swelling.   Skin: Negative for rash.   Neurological: Negative for speech difficulty.   Hematological: Does not bruise/bleed easily.   Psychiatric/Behavioral: Negative for confusion.       Past Medical History:   Diagnosis Date   • Abnormal ECG    • Abscess of skin    • Arrhythmia     bradycardia   • Arthritis     HANDS AND BACK    • Bunion    • COPD (chronic obstructive pulmonary disease) (CMS/HCC)    • History of chest x-ray 09/23/2013    No acute cardiopulmonary disease   • Loose, teeth     top right loose   • Low back pain    • Lung cancer (CMS/HCC)    • Lung cancer, middle lobe (CMS/HCC) 11/8/2016   • Wears eyeglasses         Past Surgical History:   Procedure Laterality Date   • BRONCHOSCOPY     • BUNIONECTOMY Left 2013    GREAT TOE   • COLONOSCOPY     • HERNIA REPAIR Left 2002    Dr. Singh/inguinal    • LUNG REMOVAL, PARTIAL         Family History   Problem Relation Age of  "Onset   • Heart attack Father    • Heart disease Father    • Colon cancer Father    • Colon cancer Mother    • Liver cancer Mother        Social History     Socioeconomic History   • Marital status:      Spouse name: Not on file   • Number of children: 1   • Years of education: Not on file   • Highest education level: Not on file   Social Needs   • Financial resource strain: Not on file   • Food insecurity - worry: Not on file   • Food insecurity - inability: Not on file   • Transportation needs - medical: Not on file   • Transportation needs - non-medical: Not on file   Occupational History   • Occupation: Runs a radiator shop   Tobacco Use   • Smoking status: Former Smoker     Packs/day: 3.00     Years: 50.00     Pack years: 150.00     Types: Cigarettes     Last attempt to quit: 2016     Years since quittin.0   • Smokeless tobacco: Never Used   Substance and Sexual Activity   • Alcohol use: Yes     Alcohol/week: 12.6 oz     Types: 21 Cans of beer per week     Comment: 3 cans/daily   • Drug use: No   • Sexual activity: Defer   Other Topics Concern   • Not on file   Social History Narrative    Lives in [place].       I have reviewed all of the above social hx, family hx, surgical hx, medications, allergies & ROS and confirm that it is accurate.    Allergies   Allergen Reactions   • No Known Drug Allergy          Current Outpatient Medications:   •  bisacodyl (bisacodyl) 5 MG EC tablet, Take 1 tablet by mouth Daily., Disp: 4 tablet, Rfl: 0  •  [employer] PURELAX powder, TAKE 238 G BY MOUTH 1 (ONE) TIME FOR 1 DOSE., Disp: , Rfl: 0    Objective   Resp 18   Ht 182.9 cm (72.01\")   Wt 83.5 kg (184 lb)   BMI 24.95 kg/m²    Physical Exam  Constitutional: Patient is oriented to person, place, and time. Patient appears well-developed and well-nourished.   HENT:Head: Normocephalic and atraumatic.   Eyes: EOM are normal. Pupils are equal, round, and reactive to light.   Neck: Normal range of motion. Neck supple. "   Cardiovascular: Normal rate.    Pulmonary/Chest: Effort normal and breath sounds normal.   Abdominal: Soft.   Neurological: Patient is alert and oriented to person, place, and time.   Skin: Skin is warm and dry.   Psychiatric: Patient has a normal mood and affect.   Nursing note and vitals reviewed.       [unfilled]   Right knee with tenderness over the medial joint line positive Prakash sign stable ligament exam full extension no lateral joint line pain slight inferomedial patellofemoral pain possible patellofemoral crepitus calf supple neurovascularly intact    Assessment/Plan   Review of Radiographic Studies:    No new imaging done today.      Procedures     Saman was seen today for follow-up and pain.    Diagnoses and all orders for this visit:    Sprain of medial collateral ligament of right knee, subsequent encounter       MRI right knee to rule out medial meniscal tear      Recommendations/Plan:   Exercise, medications, injections, other patient advice, and return appointment as noted.    Patient agreeable to call or return sooner for any concerns.             Impression:  Continued right medial knee pain more than 6 weeks if no improvement with icing medications mechanical symptoms possible meniscal tear  Plan:  MRI right knee discuss arthroscopic surgery next visit

## 2018-12-04 ENCOUNTER — PREP FOR SURGERY (OUTPATIENT)
Dept: OTHER | Facility: HOSPITAL | Age: 63
End: 2018-12-04

## 2018-12-04 ENCOUNTER — OFFICE VISIT (OUTPATIENT)
Dept: ORTHOPEDIC SURGERY | Facility: CLINIC | Age: 63
End: 2018-12-04

## 2018-12-04 VITALS — HEIGHT: 72 IN | WEIGHT: 184 LBS | RESPIRATION RATE: 18 BRPM | BODY MASS INDEX: 24.92 KG/M2

## 2018-12-04 DIAGNOSIS — S83.231D COMPLEX TEAR OF MEDIAL MENISCUS OF RIGHT KNEE AS CURRENT INJURY, SUBSEQUENT ENCOUNTER: Primary | ICD-10-CM

## 2018-12-04 DIAGNOSIS — Z01.818 PREOP EXAMINATION: Primary | ICD-10-CM

## 2018-12-04 PROCEDURE — 99214 OFFICE O/P EST MOD 30 MIN: CPT | Performed by: ORTHOPAEDIC SURGERY

## 2018-12-04 RX ORDER — CEFAZOLIN SODIUM 2 G/50ML
2 SOLUTION INTRAVENOUS
Status: CANCELLED | OUTPATIENT
Start: 2018-12-12 | End: 2018-12-13

## 2018-12-04 NOTE — PROGRESS NOTES
Subjective   Patient ID: Saman Aguayo is a 63 y.o. male  Pain and Pre-op Exam of the Right Knee (Patient is here today for his MRI results and to schedule surgery.)             History of Present Illness  63-year-old with chronic medial right knee pain not improve with medications therapy recent MR confirms complex tear medial meniscus he would like to discuss surgical treatment.  He is self-employed does mechanical work sustaining twisting bending squatting aggravates pain, swelling and loss of motion.    Has history of lung cancer status post total lobe resection followed by  no sign of metastatic disease does have another isolated lesion for which is being followed no current pulmonary complaints shortness of breath fever cough or flulike syndrome.    Has history of chronic bradycardia premature atrial contractures followed by Dr. timothy aguirre saw him in August of this year sent him to be stable did not recommend any further treatment or intervention.  Patient does chronically run low heart rates in the 40-46 range even at times going down a slope in the 20s but is minimally symptomatic and has not required any medications for management of his heart rate.  Dr. aguirre has offered him medications in the past but did not recommend it as he is not symptomatic with his bradycardia.      Review of Systems   Constitutional: Negative for fever.   HENT: Negative for voice change.    Eyes: Negative for visual disturbance.   Respiratory: Negative for shortness of breath.    Cardiovascular: Negative for chest pain.   Gastrointestinal: Negative for abdominal distention and abdominal pain.   Genitourinary: Negative for dysuria.   Musculoskeletal: Positive for arthralgias. Negative for gait problem and joint swelling.   Skin: Negative for rash.   Neurological: Negative for speech difficulty.   Hematological: Does not bruise/bleed easily.   Psychiatric/Behavioral: Negative for confusion.       Past Medical  History:   Diagnosis Date   • Abnormal ECG    • Abscess of skin    • Arrhythmia     bradycardia   • Arthritis     HANDS AND BACK    • Bunion    • COPD (chronic obstructive pulmonary disease) (CMS/HCC)    • History of chest x-ray 2013    No acute cardiopulmonary disease   • Loose, teeth     top right loose   • Low back pain    • Lung cancer (CMS/HCC)    • Lung cancer, middle lobe (CMS/HCC) 2016   • Wears eyeglasses         Past Surgical History:   Procedure Laterality Date   • BRONCHOSCOPY     • BUNIONECTOMY Left     GREAT TOE   • COLONOSCOPY     • HERNIA REPAIR Left     Dr. Singh/inguinal    • LUNG REMOVAL, PARTIAL         Family History   Problem Relation Age of Onset   • Heart attack Father    • Heart disease Father    • Colon cancer Father    • Colon cancer Mother    • Liver cancer Mother        Social History     Socioeconomic History   • Marital status:      Spouse name: Not on file   • Number of children: 1   • Years of education: Not on file   • Highest education level: Not on file   Social Needs   • Financial resource strain: Not on file   • Food insecurity - worry: Not on file   • Food insecurity - inability: Not on file   • Transportation needs - medical: Not on file   • Transportation needs - non-medical: Not on file   Occupational History   • Occupation: Runs a radiator shop   Tobacco Use   • Smoking status: Former Smoker     Packs/day: 3.00     Years: 50.00     Pack years: 150.00     Types: Cigarettes     Last attempt to quit: 2016     Years since quittin.0   • Smokeless tobacco: Never Used   Substance and Sexual Activity   • Alcohol use: Yes     Alcohol/week: 12.6 oz     Types: 21 Cans of beer per week     Comment: 3 cans/daily   • Drug use: No   • Sexual activity: Defer   Other Topics Concern   • Not on file   Social History Narrative    Lives in [place].       I have reviewed all of the above social hx, family hx, surgical hx, medications, allergies & ROS and  "confirm that it is accurate.    Allergies   Allergen Reactions   • No Known Drug Allergy          Current Outpatient Medications:   •  bisacodyl (bisacodyl) 5 MG EC tablet, Take 1 tablet by mouth Daily., Disp: 4 tablet, Rfl: 0  •  [employer] PURELAX powder, TAKE 238 G BY MOUTH 1 (ONE) TIME FOR 1 DOSE., Disp: , Rfl: 0    Objective   Resp 18   Ht 182.9 cm (72.01\")   Wt 83.5 kg (184 lb)   BMI 24.95 kg/m²    Physical Exam   Cardiovascular: Regular rhythm and normal heart sounds. Bradycardia present.   Pulmonary/Chest: Effort normal and breath sounds normal.   Musculoskeletal:        Right knee: He exhibits swelling. Tenderness found. Medial joint line tenderness noted.     Constitutional: Patient is oriented to person, place, and time. Patient appears well-developed and well-nourished.   HENT:Head: Normocephalic and atraumatic.   Eyes: EOM are normal. Pupils are equal, round, and reactive to light.   Neck: Normal range of motion. Neck supple.   Cardiovascular: Normal rate.    Pulmonary/Chest: Effort normal and breath sounds normal.   Abdominal: Soft.   Neurological: Patient is alert and oriented to person, place, and time.   Skin: Skin is warm and dry.   Psychiatric: Patient has a normal mood and affect.   Nursing note and vitals reviewed.       [unfilled]   Right knee with 1+ effusion positive Prakash sign positive tenderness of the medial joint line, full extension pain with flexion internal rotation, negative ligament instability exam negative tenderness at the quadriceps or patellar tendon calf supple neurovascularly intact.    Assessment/Plan   Review of Radiographic Studies:    No new imaging done today.  MRI with evident meniscal tear noted.      Procedures     Saman was seen today for pain and pre-op exam.    Diagnoses and all orders for this visit:    Complex tear of medial meniscus of right knee as current injury, subsequent encounter       Orthopedic activities reviewed and patient expressed appreciation, Risk, " benefits, and merits of treatment alternatives reviewed with the patient and questions answered and The nature of the proposed surgery reviewed with the patient including risks, benefits, rehabilitation, recovery timeframe, and outcome expectations      Recommendations/Plan:   Surgery: Right knee diagnostic arthroscopy partial medial meniscectomy or other procedures as indicated    Patient agreeable to call or return sooner for any concerns.         I discussed with the patient the diagnosis, condition, natural history and treatment alternatives, both surgical and nonsurgical.  I reviewed the surgical procedural details using models, diagrams and reviewing x-ray findings.  I explained the nature of the operation, anesthesia methods and the postoperative recovery.  I explained risks and complications including but not limited to infection, bleeding, blood clotting, poor healing, chronic pain, stiffness, failure of the procedure, possible recurrence of the condition and anesthetic related risks.  The patient had opportunity to ask questions which were all answered to their satisfaction and decided to proceed with the plan for surgery.  I believe informed consent to proceed with the surgery was given verbally in my presence today.  The surgical consent form will be signed in the presence of the nursing staff prior to the surgery.    Impression:  Right medial knee pain complex tear posterior horn medial meniscus, history of chronic bradycardia, status post lung resection for lung cancer  Plan:  Diagnostic arthroscopy right knee with partial medial meniscectomy or other procedures as indicated, hydrocodone for pain postoperative when necessary

## 2018-12-04 NOTE — H&P (VIEW-ONLY)
Subjective   Patient ID: Saman Aguayo is a 63 y.o. male  Pain and Pre-op Exam of the Right Knee (Patient is here today for his MRI results and to schedule surgery.)             History of Present Illness  63-year-old with chronic medial right knee pain not improve with medications therapy recent MR confirms complex tear medial meniscus he would like to discuss surgical treatment.  He is self-employed does mechanical work sustaining twisting bending squatting aggravates pain, swelling and loss of motion.    Has history of lung cancer status post total lobe resection followed by  no sign of metastatic disease does have another isolated lesion for which is being followed no current pulmonary complaints shortness of breath fever cough or flulike syndrome.    Has history of chronic bradycardia premature atrial contractures followed by Dr. timothy aguirre saw him in August of this year sent him to be stable did not recommend any further treatment or intervention.  Patient does chronically run low heart rates in the 40-46 range even at times going down a slope in the 20s but is minimally symptomatic and has not required any medications for management of his heart rate.  Dr. aguirre has offered him medications in the past but did not recommend it as he is not symptomatic with his bradycardia.      Review of Systems   Constitutional: Negative for fever.   HENT: Negative for voice change.    Eyes: Negative for visual disturbance.   Respiratory: Negative for shortness of breath.    Cardiovascular: Negative for chest pain.   Gastrointestinal: Negative for abdominal distention and abdominal pain.   Genitourinary: Negative for dysuria.   Musculoskeletal: Positive for arthralgias. Negative for gait problem and joint swelling.   Skin: Negative for rash.   Neurological: Negative for speech difficulty.   Hematological: Does not bruise/bleed easily.   Psychiatric/Behavioral: Negative for confusion.       Past Medical  History:   Diagnosis Date   • Abnormal ECG    • Abscess of skin    • Arrhythmia     bradycardia   • Arthritis     HANDS AND BACK    • Bunion    • COPD (chronic obstructive pulmonary disease) (CMS/HCC)    • History of chest x-ray 2013    No acute cardiopulmonary disease   • Loose, teeth     top right loose   • Low back pain    • Lung cancer (CMS/HCC)    • Lung cancer, middle lobe (CMS/HCC) 2016   • Wears eyeglasses         Past Surgical History:   Procedure Laterality Date   • BRONCHOSCOPY     • BUNIONECTOMY Left     GREAT TOE   • COLONOSCOPY     • HERNIA REPAIR Left     Dr. Singh/inguinal    • LUNG REMOVAL, PARTIAL         Family History   Problem Relation Age of Onset   • Heart attack Father    • Heart disease Father    • Colon cancer Father    • Colon cancer Mother    • Liver cancer Mother        Social History     Socioeconomic History   • Marital status:      Spouse name: Not on file   • Number of children: 1   • Years of education: Not on file   • Highest education level: Not on file   Social Needs   • Financial resource strain: Not on file   • Food insecurity - worry: Not on file   • Food insecurity - inability: Not on file   • Transportation needs - medical: Not on file   • Transportation needs - non-medical: Not on file   Occupational History   • Occupation: Runs a radiator shop   Tobacco Use   • Smoking status: Former Smoker     Packs/day: 3.00     Years: 50.00     Pack years: 150.00     Types: Cigarettes     Last attempt to quit: 2016     Years since quittin.0   • Smokeless tobacco: Never Used   Substance and Sexual Activity   • Alcohol use: Yes     Alcohol/week: 12.6 oz     Types: 21 Cans of beer per week     Comment: 3 cans/daily   • Drug use: No   • Sexual activity: Defer   Other Topics Concern   • Not on file   Social History Narrative    Lives in [place].       I have reviewed all of the above social hx, family hx, surgical hx, medications, allergies & ROS and  "confirm that it is accurate.    Allergies   Allergen Reactions   • No Known Drug Allergy          Current Outpatient Medications:   •  bisacodyl (bisacodyl) 5 MG EC tablet, Take 1 tablet by mouth Daily., Disp: 4 tablet, Rfl: 0  •  [employer] PURELAX powder, TAKE 238 G BY MOUTH 1 (ONE) TIME FOR 1 DOSE., Disp: , Rfl: 0    Objective   Resp 18   Ht 182.9 cm (72.01\")   Wt 83.5 kg (184 lb)   BMI 24.95 kg/m²    Physical Exam   Cardiovascular: Regular rhythm and normal heart sounds. Bradycardia present.   Pulmonary/Chest: Effort normal and breath sounds normal.   Musculoskeletal:        Right knee: He exhibits swelling. Tenderness found. Medial joint line tenderness noted.     Constitutional: Patient is oriented to person, place, and time. Patient appears well-developed and well-nourished.   HENT:Head: Normocephalic and atraumatic.   Eyes: EOM are normal. Pupils are equal, round, and reactive to light.   Neck: Normal range of motion. Neck supple.   Cardiovascular: Normal rate.    Pulmonary/Chest: Effort normal and breath sounds normal.   Abdominal: Soft.   Neurological: Patient is alert and oriented to person, place, and time.   Skin: Skin is warm and dry.   Psychiatric: Patient has a normal mood and affect.   Nursing note and vitals reviewed.       [unfilled]   Right knee with 1+ effusion positive Prakash sign positive tenderness of the medial joint line, full extension pain with flexion internal rotation, negative ligament instability exam negative tenderness at the quadriceps or patellar tendon calf supple neurovascularly intact.    Assessment/Plan   Review of Radiographic Studies:    No new imaging done today.  MRI with evident meniscal tear noted.      Procedures     Saman was seen today for pain and pre-op exam.    Diagnoses and all orders for this visit:    Complex tear of medial meniscus of right knee as current injury, subsequent encounter       Orthopedic activities reviewed and patient expressed appreciation, Risk, " benefits, and merits of treatment alternatives reviewed with the patient and questions answered and The nature of the proposed surgery reviewed with the patient including risks, benefits, rehabilitation, recovery timeframe, and outcome expectations      Recommendations/Plan:   Surgery: Right knee diagnostic arthroscopy partial medial meniscectomy or other procedures as indicated    Patient agreeable to call or return sooner for any concerns.         I discussed with the patient the diagnosis, condition, natural history and treatment alternatives, both surgical and nonsurgical.  I reviewed the surgical procedural details using models, diagrams and reviewing x-ray findings.  I explained the nature of the operation, anesthesia methods and the postoperative recovery.  I explained risks and complications including but not limited to infection, bleeding, blood clotting, poor healing, chronic pain, stiffness, failure of the procedure, possible recurrence of the condition and anesthetic related risks.  The patient had opportunity to ask questions which were all answered to their satisfaction and decided to proceed with the plan for surgery.  I believe informed consent to proceed with the surgery was given verbally in my presence today.  The surgical consent form will be signed in the presence of the nursing staff prior to the surgery.    Impression:  Right medial knee pain complex tear posterior horn medial meniscus, history of chronic bradycardia, status post lung resection for lung cancer  Plan:  Diagnostic arthroscopy right knee with partial medial meniscectomy or other procedures as indicated, hydrocodone for pain postoperative when necessary

## 2018-12-05 ENCOUNTER — TELEPHONE (OUTPATIENT)
Dept: CARDIOLOGY | Facility: CLINIC | Age: 63
End: 2018-12-05

## 2018-12-05 ENCOUNTER — APPOINTMENT (OUTPATIENT)
Dept: PREADMISSION TESTING | Facility: HOSPITAL | Age: 63
End: 2018-12-05

## 2018-12-05 ENCOUNTER — HOSPITAL ENCOUNTER (OUTPATIENT)
Dept: GENERAL RADIOLOGY | Facility: HOSPITAL | Age: 63
Discharge: HOME | End: 2018-12-05
Admitting: ORTHOPAEDIC SURGERY

## 2018-12-05 VITALS
BODY MASS INDEX: 24.92 KG/M2 | OXYGEN SATURATION: 97 % | SYSTOLIC BLOOD PRESSURE: 140 MMHG | WEIGHT: 184 LBS | HEIGHT: 72 IN | DIASTOLIC BLOOD PRESSURE: 63 MMHG | HEART RATE: 46 BPM

## 2018-12-05 DIAGNOSIS — Z01.818 PREOP EXAMINATION: ICD-10-CM

## 2018-12-05 LAB
ALBUMIN SERPL-MCNC: 4.5 G/DL (ref 3.5–5)
ALBUMIN/GLOB SERPL: 1.3 G/DL (ref 1–2)
ALP SERPL-CCNC: 65 U/L (ref 38–126)
ALT SERPL W P-5'-P-CCNC: 28 U/L (ref 13–69)
ANION GAP SERPL CALCULATED.3IONS-SCNC: 10.6 MMOL/L (ref 10–20)
AST SERPL-CCNC: 25 U/L (ref 15–46)
BASOPHILS # BLD AUTO: 0.05 10*3/MM3 (ref 0–0.2)
BASOPHILS NFR BLD AUTO: 0.9 % (ref 0–2.5)
BILIRUB SERPL-MCNC: 0.8 MG/DL (ref 0.2–1.3)
BUN BLD-MCNC: 14 MG/DL (ref 7–20)
BUN/CREAT SERPL: 15.6 (ref 6.3–21.9)
CALCIUM SPEC-SCNC: 9.1 MG/DL (ref 8.4–10.2)
CHLORIDE SERPL-SCNC: 103 MMOL/L (ref 98–107)
CO2 SERPL-SCNC: 28 MMOL/L (ref 26–30)
CREAT BLD-MCNC: 0.9 MG/DL (ref 0.6–1.3)
DEPRECATED RDW RBC AUTO: 42.9 FL (ref 37–54)
EOSINOPHIL # BLD AUTO: 0.19 10*3/MM3 (ref 0–0.7)
EOSINOPHIL NFR BLD AUTO: 3.4 % (ref 0–7)
ERYTHROCYTE [DISTWIDTH] IN BLOOD BY AUTOMATED COUNT: 12.7 % (ref 11.5–14.5)
GFR SERPL CREATININE-BSD FRML MDRD: 85 ML/MIN/1.73
GLOBULIN UR ELPH-MCNC: 3.4 GM/DL
GLUCOSE BLD-MCNC: 117 MG/DL (ref 74–98)
HCT VFR BLD AUTO: 47.6 % (ref 42–52)
HGB BLD-MCNC: 16.4 G/DL (ref 14–18)
IMM GRANULOCYTES # BLD: 0.01 10*3/MM3 (ref 0–0.06)
IMM GRANULOCYTES NFR BLD: 0.2 % (ref 0–0.6)
LYMPHOCYTES # BLD AUTO: 1.5 10*3/MM3 (ref 0.6–3.4)
LYMPHOCYTES NFR BLD AUTO: 26.5 % (ref 10–50)
MCH RBC QN AUTO: 31.5 PG (ref 27–31)
MCHC RBC AUTO-ENTMCNC: 34.5 G/DL (ref 30–37)
MCV RBC AUTO: 91.5 FL (ref 80–94)
MONOCYTES # BLD AUTO: 0.45 10*3/MM3 (ref 0–0.9)
MONOCYTES NFR BLD AUTO: 8 % (ref 0–12)
NEUTROPHILS # BLD AUTO: 3.45 10*3/MM3 (ref 2–6.9)
NEUTROPHILS NFR BLD AUTO: 61 % (ref 37–80)
NRBC BLD MANUAL-RTO: 0 /100 WBC (ref 0–0)
PLATELET # BLD AUTO: 167 10*3/MM3 (ref 130–400)
PMV BLD AUTO: 10.8 FL (ref 6–12)
POTASSIUM BLD-SCNC: 4.6 MMOL/L (ref 3.5–5.1)
PROT SERPL-MCNC: 7.9 G/DL (ref 6.3–8.2)
RBC # BLD AUTO: 5.2 10*6/MM3 (ref 4.7–6.1)
SODIUM BLD-SCNC: 137 MMOL/L (ref 137–145)
WBC NRBC COR # BLD: 5.65 10*3/MM3 (ref 4.8–10.8)

## 2018-12-05 PROCEDURE — 93005 ELECTROCARDIOGRAM TRACING: CPT | Performed by: ORTHOPAEDIC SURGERY

## 2018-12-05 PROCEDURE — 71046 X-RAY EXAM CHEST 2 VIEWS: CPT

## 2018-12-05 PROCEDURE — 85025 COMPLETE CBC W/AUTO DIFF WBC: CPT | Performed by: ORTHOPAEDIC SURGERY

## 2018-12-05 PROCEDURE — 80053 COMPREHEN METABOLIC PANEL: CPT | Performed by: ORTHOPAEDIC SURGERY

## 2018-12-05 PROCEDURE — 36415 COLL VENOUS BLD VENIPUNCTURE: CPT

## 2018-12-05 PROCEDURE — 87081 CULTURE SCREEN ONLY: CPT | Performed by: ORTHOPAEDIC SURGERY

## 2018-12-05 RX ORDER — NAPROXEN SODIUM 220 MG
220 TABLET ORAL 2 TIMES DAILY PRN
COMMUNITY
End: 2019-10-03

## 2018-12-05 NOTE — TELEPHONE ENCOUNTER
Patient is needing to have a Knee Scope done. Does he need any further testing?   Zainab Thakur MA

## 2018-12-05 NOTE — PAT
"CALLED CORRINE ROTH CRNA R/T PT PMH, AND PRELIMINARY PAT EKG.  ALSO REVIEWED PT PAST SURGICAL HISTORY.  PT WITH HX OF LUNG CANCER AND RIGHT MIDDLE LOBECTOMY IN 2016.  PT STATES THAT HE WAS RECENTLY DIAGNOSED WITH A NODULE IN THE RIGHT UPPER LOBE.  PT ALSO HAS COPD AND PULMONARY HTN.   STATES THAT HE FOLLOWS WITH DR. HERNANDEZ.  PT ALSO HAS HX OF BRADYCARDIA, WHICH HE STATES THAT HE IS ASYMPTOMATIC EXCEPT FOR WHEN HE GETS UP \"TOO FAST\" AND SOMETIMES HAS DIZZINESS.  PT STATES THAT HIS HEART RATE HAS DROPPED IN THE 20'S BEFORE.  PT STATES THAT HE FOLLOWS WITH DR. BAKER YEARLY, AND LAST SAW HIM IN AUGUST OF THIS YEAR.  PT DENIES BEING ON ANY MEDICATIONS EXCEPT FOR ALEVE PRN.  NOTIFIED CRNA REGARDING ABOVE.  ASKED IF ANYTHING FURTHER REQUIRED.  STATES PT NEEDS A CARDIAC CLEARANCE PRIOR TO PROCEDURE WITH DR. LYONS.      CALLED MUKUL GUERRERO AT DR. LYONS'S OFFICE.  NOTIFIED REGARDING NEED FOR CARDIAC CLEARANCE.  MUKUL STATED \"OK, I'LL TAKE CARE OF IT.  THANK YOU\".    "

## 2018-12-05 NOTE — DISCHARGE INSTRUCTIONS
PAT PASS GIVEN/REVIEWED WITH PT.  VERBALIZED UNDERSTANDING OF THE FOLLOWING:  DO NOT EAT, DRINK, SMOKE, USE SMOKELESS TOBACCO OR CHEW GUM AFTER MIDNIGHT THE NIGHT BEFORE SURGERY.  THIS ALSO INCLUDES HARD CANDIES AND MINTS.    DO NOT SHAVE THE AREA TO BE OPERATED ON AT LEAST 48 HOURS PRIOR TO THE PROCEDURE.  DO NOT WEAR MAKE UP OR NAIL POLISH.  DO NOT LEAVE IN ANY PIERCING OR WEAR JEWELRY THE DAY OF SURGERY.      DO NOT USE ADHESIVES IF YOU WEAR DENTURES.    DO NOT WEAR EYE CONTACTS; BRING IN YOUR GLASSES.    ONLY TAKE MEDICATION THE MORNING OF YOUR PROCEDURE IF INSTRUCTED BY YOUR SURGEON WITH ENOUGH WATER TO SWALLOW THE MEDICATION.  IF YOUR SURGEON DID NOT SPECIFY WHICH MEDICATIONS TO TAKE, YOU WILL NEED TO CALL THEIR OFFICE FOR FURTHER INSTRUCTIONS AND DO AS THEY INSTRUCT.    LEAVE ANYTHING YOU CONSIDER VALUABLE AT HOME.    YOU WILL NEED TO ARRANGE FOR SOMEONE TO DRIVE YOU HOME AFTER SURGERY.  IT IS RECOMMENDED THAT YOU DO NOT DRIVE, WORK, DRINK ALCOHOL OR MAKE MAJOR DECISIONS FOR AT LEAST 24 HOURS AFTER YOUR PROCEDURE IS COMPLETE.      THE DAY OF YOUR PROCEDURE, BRING IN THE FOLLOWING IF APPLICABLE:   PICTURE ID AND INSURANCE/MEDICARE OR MEDICAID CARDS   COPY OF ADVANCED DIRECTIVE/LIVING WILL/POWER OR    CPAP/BIPAP/INHALERS   SKIN PREP SHEET   YOUR PREADMISSION TESTING PASS (IF NOT A PHONE HISTORY)    Chlorhexidine wipes along with instruction/verification sheet given to pt.  Instructed pt to apply stickers from chlorhexidine wipes to verification sheet once skin prep has been  completed, and to return to [facility] the day of the procedure.  Pt. Verbalizes understanding.

## 2018-12-08 LAB — MRSA SPEC QL CULT: NORMAL

## 2018-12-12 ENCOUNTER — ANESTHESIA EVENT (OUTPATIENT)
Dept: PERIOP | Facility: HOSPITAL | Age: 63
End: 2018-12-12

## 2018-12-12 ENCOUNTER — ANESTHESIA (OUTPATIENT)
Dept: PERIOP | Facility: HOSPITAL | Age: 63
End: 2018-12-12

## 2018-12-12 ENCOUNTER — HOSPITAL ENCOUNTER (OUTPATIENT)
Facility: HOSPITAL | Age: 63
Setting detail: HOSPITAL OUTPATIENT SURGERY
Discharge: HOME | End: 2018-12-12
Attending: ORTHOPAEDIC SURGERY | Admitting: ORTHOPAEDIC SURGERY

## 2018-12-12 VITALS
TEMPERATURE: 97.4 F | RESPIRATION RATE: 18 BRPM | DIASTOLIC BLOOD PRESSURE: 61 MMHG | OXYGEN SATURATION: 97 % | SYSTOLIC BLOOD PRESSURE: 119 MMHG | HEART RATE: 49 BPM

## 2018-12-12 DIAGNOSIS — Z01.818 PREOP EXAMINATION: ICD-10-CM

## 2018-12-12 PROCEDURE — 25010000002 PROPOFOL 1000 MG/ML EMULSION: Performed by: NURSE ANESTHETIST, CERTIFIED REGISTERED

## 2018-12-12 PROCEDURE — 29881 ARTHRS KNE SRG MNISECTMY M/L: CPT | Performed by: ORTHOPAEDIC SURGERY

## 2018-12-12 PROCEDURE — 25010000002 DEXAMETHASONE PER 1 MG: Performed by: NURSE ANESTHETIST, CERTIFIED REGISTERED

## 2018-12-12 PROCEDURE — 25010000002 MIDAZOLAM PER 1 MG: Performed by: NURSE ANESTHETIST, CERTIFIED REGISTERED

## 2018-12-12 PROCEDURE — 25010000002 FENTANYL CITRATE (PF) 100 MCG/2ML SOLUTION: Performed by: NURSE ANESTHETIST, CERTIFIED REGISTERED

## 2018-12-12 PROCEDURE — 25010000002 ONDANSETRON PER 1 MG: Performed by: NURSE ANESTHETIST, CERTIFIED REGISTERED

## 2018-12-12 PROCEDURE — 25010000003 CEFAZOLIN SODIUM-DEXTROSE 2-3 GM-%(50ML) RECONSTITUTED SOLUTION: Performed by: ORTHOPAEDIC SURGERY

## 2018-12-12 RX ORDER — ONDANSETRON 4 MG/1
4 TABLET, FILM COATED ORAL ONCE AS NEEDED
Status: DISCONTINUED | OUTPATIENT
Start: 2018-12-12 | End: 2018-12-12 | Stop reason: HOSPADM

## 2018-12-12 RX ORDER — ONDANSETRON 2 MG/ML
INJECTION INTRAMUSCULAR; INTRAVENOUS AS NEEDED
Status: DISCONTINUED | OUTPATIENT
Start: 2018-12-12 | End: 2018-12-12 | Stop reason: SURG

## 2018-12-12 RX ORDER — BUPIVACAINE HYDROCHLORIDE AND EPINEPHRINE 2.5; 5 MG/ML; UG/ML
INJECTION, SOLUTION EPIDURAL; INFILTRATION; INTRACAUDAL; PERINEURAL AS NEEDED
Status: DISCONTINUED | OUTPATIENT
Start: 2018-12-12 | End: 2018-12-12 | Stop reason: HOSPADM

## 2018-12-12 RX ORDER — HYDROCODONE BITARTRATE AND ACETAMINOPHEN 5; 325 MG/1; MG/1
1-2 TABLET ORAL EVERY 6 HOURS PRN
Qty: 20 TABLET | Refills: 0 | Status: SHIPPED | OUTPATIENT
Start: 2018-12-12 | End: 2019-10-03

## 2018-12-12 RX ORDER — CEFAZOLIN SODIUM 2 G/50ML
2 SOLUTION INTRAVENOUS
Status: COMPLETED | OUTPATIENT
Start: 2018-12-12 | End: 2018-12-12

## 2018-12-12 RX ORDER — DEXAMETHASONE SODIUM PHOSPHATE 4 MG/ML
INJECTION, SOLUTION INTRA-ARTICULAR; INTRALESIONAL; INTRAMUSCULAR; INTRAVENOUS; SOFT TISSUE AS NEEDED
Status: DISCONTINUED | OUTPATIENT
Start: 2018-12-12 | End: 2018-12-12 | Stop reason: SURG

## 2018-12-12 RX ORDER — FENTANYL CITRATE 50 UG/ML
INJECTION, SOLUTION INTRAMUSCULAR; INTRAVENOUS AS NEEDED
Status: DISCONTINUED | OUTPATIENT
Start: 2018-12-12 | End: 2018-12-12 | Stop reason: SURG

## 2018-12-12 RX ORDER — MIDAZOLAM HYDROCHLORIDE 1 MG/ML
INJECTION INTRAMUSCULAR; INTRAVENOUS AS NEEDED
Status: DISCONTINUED | OUTPATIENT
Start: 2018-12-12 | End: 2018-12-12 | Stop reason: SURG

## 2018-12-12 RX ORDER — KETAMINE HYDROCHLORIDE 50 MG/ML
INJECTION, SOLUTION, CONCENTRATE INTRAMUSCULAR; INTRAVENOUS AS NEEDED
Status: DISCONTINUED | OUTPATIENT
Start: 2018-12-12 | End: 2018-12-12 | Stop reason: SURG

## 2018-12-12 RX ORDER — SODIUM CHLORIDE 0.9 % (FLUSH) 0.9 %
3 SYRINGE (ML) INJECTION AS NEEDED
Status: DISCONTINUED | OUTPATIENT
Start: 2018-12-12 | End: 2018-12-12 | Stop reason: HOSPADM

## 2018-12-12 RX ORDER — ONDANSETRON 2 MG/ML
4 INJECTION INTRAMUSCULAR; INTRAVENOUS ONCE AS NEEDED
Status: DISCONTINUED | OUTPATIENT
Start: 2018-12-12 | End: 2018-12-12 | Stop reason: HOSPADM

## 2018-12-12 RX ORDER — LIDOCAINE HYDROCHLORIDE AND EPINEPHRINE 10; 10 MG/ML; UG/ML
INJECTION, SOLUTION INFILTRATION; PERINEURAL AS NEEDED
Status: DISCONTINUED | OUTPATIENT
Start: 2018-12-12 | End: 2018-12-12 | Stop reason: HOSPADM

## 2018-12-12 RX ORDER — SODIUM CHLORIDE, SODIUM LACTATE, POTASSIUM CHLORIDE, CALCIUM CHLORIDE 600; 310; 30; 20 MG/100ML; MG/100ML; MG/100ML; MG/100ML
1000 INJECTION, SOLUTION INTRAVENOUS CONTINUOUS
Status: DISCONTINUED | OUTPATIENT
Start: 2018-12-12 | End: 2018-12-12 | Stop reason: HOSPADM

## 2018-12-12 RX ADMIN — FENTANYL CITRATE 100 MCG: 50 INJECTION, SOLUTION INTRAMUSCULAR; INTRAVENOUS at 09:17

## 2018-12-12 RX ADMIN — KETAMINE HYDROCHLORIDE 25 MG: 50 INJECTION, SOLUTION INTRAMUSCULAR; INTRAVENOUS at 09:19

## 2018-12-12 RX ADMIN — ONDANSETRON 4 MG: 2 INJECTION INTRAMUSCULAR; INTRAVENOUS at 09:30

## 2018-12-12 RX ADMIN — PROPOFOL 100 MCG/KG/MIN: 10 INJECTION, EMULSION INTRAVENOUS at 09:15

## 2018-12-12 RX ADMIN — MIDAZOLAM HYDROCHLORIDE 2 MG: 1 INJECTION, SOLUTION INTRAMUSCULAR; INTRAVENOUS at 09:14

## 2018-12-12 RX ADMIN — SODIUM CHLORIDE, POTASSIUM CHLORIDE, SODIUM LACTATE AND CALCIUM CHLORIDE 1000 ML: 600; 310; 30; 20 INJECTION, SOLUTION INTRAVENOUS at 07:47

## 2018-12-12 RX ADMIN — CEFAZOLIN SODIUM 2 G: 2 SOLUTION INTRAVENOUS at 09:10

## 2018-12-12 RX ADMIN — DEXAMETHASONE SODIUM PHOSPHATE 4 MG: 4 INJECTION, SOLUTION INTRAMUSCULAR; INTRAVENOUS at 09:30

## 2018-12-12 NOTE — ANESTHESIA POSTPROCEDURE EVALUATION
Patient: Saman Aguayo    Procedure Summary     Date:  12/12/18 Room / Location:  [facility] OR 1 /  [facility] OR    Anesthesia Start:  0917 Anesthesia Stop:  0942    Procedure:  Diagnostic arthroscopy right knee with partial medial meniscectomy (Right Knee) Diagnosis:       Preop examination      (Preop examination [Z01.818])    Surgeon:  Dinh Nova MD Provider:  Cy Duncan CRNA    Anesthesia Type:  general, MAC ASA Status:  3          Anesthesia Type: general, MAC  Last vitals  BP   136/66 (12/12/18 0730)   Temp   97.6 °F (36.4 °C) (12/12/18 0730)   Pulse   (!) 49 (12/12/18 0730)   Resp   16 (12/12/18 0730)     SpO2   95 % (12/12/18 0730)     Post Anesthesia Care and Evaluation    Patient location during evaluation: bedside  Patient participation: complete - patient participated  Level of consciousness: awake and alert  Pain score: 0  Pain management: adequate  Airway patency: patent  Anesthetic complications: No anesthetic complications  PONV Status: none  Cardiovascular status: acceptable  Respiratory status: acceptable  Hydration status: acceptable

## 2018-12-12 NOTE — DISCHARGE INSTRUCTIONS
Rest today  No pushing,pulling,tugging,heavy lifting, or strenuous activity   No major decision making,driving,or drinking alcoholic beverages for 24 hours due to the sedation you received  Always use good hand hygiene/washing technique  No driving on pain medication.    To assist you in voiding:  Drink plenty of fluids  Listen to running water while attempting to void.    If you are unable to urinate and you have an uncomfortable urge to void or it has been   6 hours since you were discharged, return to the Emergency Room.      Keep right leg elevated and use ice to incision site for 20-30 minutes, remove, and reapply in 2-3 hours.  Do not use ice continuously.

## 2018-12-12 NOTE — ANESTHESIA PREPROCEDURE EVALUATION
Anesthesia Evaluation     Patient summary reviewed and Nursing notes reviewed   no history of anesthetic complications:  NPO Solid Status: > 8 hours  NPO Liquid Status: > 8 hours           Airway   Mallampati: II  TM distance: >3 FB  Neck ROM: full  No difficulty expected  Dental      Pulmonary     breath sounds clear to auscultation  (+) a smoker Former, lung cancer, COPD, shortness of breath, decreased breath sounds,     ROS comment: Pul htn  r Middle lobe ca right middle lobe lobectomy  Cardiovascular   Exercise tolerance: good (4-7 METS)    ECG reviewed  Rhythm: regular  Rate: normal    (+) dysrhythmias Bradycardia, BLAS,     ROS comment:       CONCLUSION:      1.  Essentially normal transesophageal echocardiogram.     2.  No evidence of intracardiac shunting.     3.  No evidence of cardioembolic source of embolus    Neuro/Psych  (+) dizziness/light headedness, syncope,     GI/Hepatic/Renal/Endo      Musculoskeletal     (+) arthralgias, back pain, chronic pain,   Abdominal    Substance History      OB/GYN          Other   (+) arthritis   history of cancer active    ROS/Med Hx Other: Nl EF by SAYRA without sig valvular dz; negative stress test gfor myocardial ischemia; question of pulmonary HTN  Labs reviewed  cxr nad  ekg sb with pac's       Phys Exam Other: High arched palate                Anesthesia Plan    ASA 3     general and MAC   (Risks and benefits discussed including risk of aspiration, recall and dental damage. All patient questions answered. Will continue with POC.    Adductor canal block postop for pain in pacu if needed)  intravenous induction   Anesthetic plan, all risks, benefits, and alternatives have been provided, discussed and informed consent has been obtained with: patient and spouse/significant other.    Plan discussed with CRNA.

## 2018-12-12 NOTE — OP NOTE
[address],  [address]  [place]  [address]  [phone]    OPERATIVE REPORT      PATIENT NAME:  Saman Aguayo                            YOB: 1955         PREOP DIAGNOSIS:   Right  knee complex medial  meniscal tear and medial osteoarthritis.    POSTOP DIAGNOSIS:   Same.    PROCEDURE:   Right  knee diagnostic arthroscopy, partial medial menisectomy and medial  compartment chondroplasty. Cpt 80503    SURGEON:     Dinh Nova MD    OPERATIVE TEAM:   Circulator: Jamil Cortés RN  Scrub Person: Cody Hernandez    ANESTHETIST:  EMIL: Cy Duncan CRNA    ANESTHESIA:              Choice    ESTIM BLOOD LOSS:    0cc ml    FINDINGS:                              complex displaced unstable posterior horn medial meniscal tear, medial femoral condyle grade 3 wear and mild diffuse osteoarthritis.    SPECIMENS:    None.    IMPLANTS:     None.    COMPLICATIONS:    None.    DISPOSITION:    Stable to recovery.    INDICATIONS:     Persistent knee pain, swelling, stiffness, mechanical catching and dysfunction with clinical exam and imaging consistent with knee effusion, complex posterior horn medial meniscal tear and osteoarthritis.    NARRATIVE:    The patient presents with continued knee pain and activity limitations.  Treatment efforts thus far have provided limited relief.  The option of elective knee arthroscopy evaluation and treatment discussed and the patient would like to proceed.  Risks, benefits and alternatives discussed and informed consent for surgical procedure obtained. Risks discussed including but not limited to anesthesia, infection and persistent or progressive knee joint symptoms.  Goals include the potential for pain relief, decreased swelling, improved mobility and function with the knee condition.  The patient presents for elective knee diagnostic arthroscopic evaluation and treatment.    Antibiotic prophylaxis was given.  Surgeon site  marking and a time out were performed prior to the procedure.  Anesthesia was effective and well tolerated.  The knee and leg was prepped and draped in the usual sterile fashion.  A tourniquet was not used and there was good hemostasis throughout the procedure.  At the start of the procedure 30 cc of one percent lidocaine and 0.25 percent marcaine with epinephrine was injected at the portal sites and into the knee joint.  After sterile prep and drape a knee arthroscopy was performed.    Anteromedial and anterolateral portals were made.  There was central patella tracking.  There was a large displaced fragmented complex posterior horn medial meniscus tear.  There was diffuse grade 3 fibrillation of the medial compartment surface cartilage.  The lateral compartment was stable with smooth cartilage and an intact lateral meniscus and popliteus tendon.  In the intercondylar notch, the ACL and PCL were probed, intact and stable.  Using various biters and a full radius shaver, a partial medial menisectomy to a smooth stable margin and a two compartment chondroplasty of the involved areas were performed.    Representative arthroscopic photos were saved.  The knee joint was irrigated and suctioned clear.  The portal sites were closed and a sterile dressing was applied. Anesthesia was effective and well tolerated.  There were no complications of the procedure.  The patient was transferred in stable condition to recovery.

## 2018-12-27 ENCOUNTER — OFFICE VISIT (OUTPATIENT)
Dept: ORTHOPEDIC SURGERY | Facility: CLINIC | Age: 63
End: 2018-12-27

## 2018-12-27 VITALS — RESPIRATION RATE: 18 BRPM | WEIGHT: 184 LBS | HEIGHT: 72 IN | BODY MASS INDEX: 24.92 KG/M2

## 2018-12-27 DIAGNOSIS — S83.231D COMPLEX TEAR OF MEDIAL MENISCUS OF RIGHT KNEE AS CURRENT INJURY, SUBSEQUENT ENCOUNTER: Primary | ICD-10-CM

## 2018-12-27 PROCEDURE — 99024 POSTOP FOLLOW-UP VISIT: CPT | Performed by: ORTHOPAEDIC SURGERY

## 2018-12-27 NOTE — PROGRESS NOTES
"Subjective   Patient ID: Saman Aguayo is a 63 y.o. male  Post-op and Pain of the Right Knee (Patient is here today for his first post op and suture removal. He states he is still having some pain but not like before. His pain level is 3/10.)             History of Present Illness  Much improved medial knee pain after surgery the night he slept with less pain has returned to work yesterday little sore at the end of the day yesterday but no incisional issues or swelling in the calf.      Review of Systems   Constitutional: Negative for fever.   HENT: Negative for voice change.    Eyes: Negative for visual disturbance.   Respiratory: Negative for shortness of breath.    Cardiovascular: Negative for chest pain.   Gastrointestinal: Negative for abdominal distention and abdominal pain.   Genitourinary: Negative for dysuria.   Musculoskeletal: Positive for arthralgias. Negative for gait problem and joint swelling.   Skin: Negative for rash.   Neurological: Negative for speech difficulty.   Hematological: Does not bruise/bleed easily.   Psychiatric/Behavioral: Negative for confusion.       Past Medical History:   Diagnosis Date   • Abnormal ECG    • Abscess of skin    • Arthritis     HANDS AND BACK    • Bradycardia    • Bunion     LEFT FOOT   • COPD (chronic obstructive pulmonary disease) (CMS/HCC)    • Full dentures    • History of echocardiogram 09/21/2016   • Hx of exercise stress test 09/27/2016    DR. BAKER \"IT WAS NORMAL\"   • Low back pain    • Lung cancer, middle lobe (CMS/HCC) 11/8/2016    RIGHT MIDDLE LOBE   • Lung nodule     RUL-7MM.  PRESENTLY HAS, AND FOLLOWS WITH DR. HERNANDEZ.  STATES FOUND \"A COUPLE MONTHS AGO\"   • Premature atrial contraction    • Pulmonary hypertension (CMS/HCC)    • Skin cancer     BASAL CELL-NOSE, LIP   • Wears eyeglasses         Past Surgical History:   Procedure Laterality Date   • BASAL CELL CARCINOMA EXCISION      NOSE, LIP   • BRONCHOSCOPY  2016   • BUNIONECTOMY Left 2013    GREAT TOE "   • COLONOSCOPY     • INGUINAL HERNIA REPAIR Left     Dr. Singh/inguinal    • KNEE ARTHROSCOPY Right 2018    Procedure: Diagnostic arthroscopy right knee with partial medial meniscectomy;  Surgeon: Dinh Nova MD;  Location: [facility];  Service: Orthopedics   • LUNG REMOVAL, PARTIAL  2016    RIGHT MIDDLE LOBE   • MOUTH SURGERY      FULL EXTRACTION OF TEETH   • SKIN BIOPSY     • THORACOSCOPY N/A 2016    Procedure: BRONCH THORACOSCOPY VIDEO ASSISTED  WITH LOBECTOMY, MEDIALSTINAL LYMPH NODE DISECTION;  Surgeon: Chris Porter MD;  Location: [facility] OR;  Service:        Family History   Problem Relation Age of Onset   • Heart attack Father    • Heart disease Father    • Colon cancer Father    • Colon cancer Mother    • Liver cancer Mother        Social History     Socioeconomic History   • Marital status:      Spouse name: Not on file   • Number of children: 1   • Years of education: Not on file   • Highest education level: Not on file   Social Needs   • Financial resource strain: Not on file   • Food insecurity - worry: Not on file   • Food insecurity - inability: Not on file   • Transportation needs - medical: Not on file   • Transportation needs - non-medical: Not on file   Occupational History   • Occupation: Runs a radiator shop   Tobacco Use   • Smoking status: Former Smoker     Packs/day: 3.00     Years: 50.00     Pack years: 150.00     Types: Cigarettes     Last attempt to quit: 2016     Years since quittin.1   • Smokeless tobacco: Never Used   Substance and Sexual Activity   • Alcohol use: Yes     Alcohol/week: 12.6 oz     Types: 21 Cans of beer per week     Comment: 3 cans/daily   • Drug use: No   • Sexual activity: Defer   Other Topics Concern   • Not on file   Social History Narrative    Lives in [place].       I have reviewed all of the above social hx, family hx, surgical hx, medications, allergies & ROS and confirm that it is accurate.    Allergies   Allergen  "Reactions   • No Known Drug Allergy          Current Outpatient Medications:   •  HYDROcodone-acetaminophen (NORCO) 5-325 MG per tablet, Take 1-2 tablets by mouth Every 6 (Six) Hours As Needed for pain, Disp: 20 tablet, Rfl: 0  •  naproxen sodium (ALEVE) 220 MG tablet, Take 220 mg by mouth 2 (Two) Times a Day As Needed., Disp: , Rfl:     Objective   Resp 18   Ht 182.9 cm (72\")   Wt 83.5 kg (184 lb)   BMI 24.95 kg/m²    Physical Exam  Constitutional: Patient is oriented to person, place, and time. Patient appears well-developed and well-nourished.   HENT:Head: Normocephalic and atraumatic.   Eyes: EOM are normal. Pupils are equal, round, and reactive to light.   Neck: Normal range of motion. Neck supple.   Cardiovascular: Normal rate.    Pulmonary/Chest: Effort normal and breath sounds normal.   Abdominal: Soft.   Neurological: Patient is alert and oriented to person, place, and time.   Skin: Skin is warm and dry.   Psychiatric: Patient has a normal mood and affect.   Nursing note and vitals reviewed.       [unfilled]   Right knee incisions well healed, no swelling in calf, no effusion, full rom    Assessment/Plan   Review of Radiographic Studies:    No new imaging done today.      Procedures     Jack was seen today for post-op and pain.    Diagnoses and all orders for this visit:    Complex tear of medial meniscus of right knee as current injury, subsequent encounter       Orthopedic activities reviewed and patient expressed appreciation      Recommendations/Plan:   Work/Activity Status: May perform usual activities as tolerated    Patient agreeable to call or return sooner for any concerns.             Impression:  Status post partial medial meniscectomy right knee  Plan:  Resume normal activities return when necessary  "

## 2019-01-15 ENCOUNTER — OFFICE VISIT (OUTPATIENT)
Dept: PULMONOLOGY | Facility: CLINIC | Age: 64
End: 2019-01-15

## 2019-01-15 VITALS
HEIGHT: 72 IN | OXYGEN SATURATION: 95 % | WEIGHT: 181 LBS | DIASTOLIC BLOOD PRESSURE: 62 MMHG | HEART RATE: 64 BPM | RESPIRATION RATE: 18 BRPM | SYSTOLIC BLOOD PRESSURE: 128 MMHG | BODY MASS INDEX: 24.52 KG/M2

## 2019-01-15 DIAGNOSIS — R91.1 LUNG NODULE, SOLITARY: ICD-10-CM

## 2019-01-15 DIAGNOSIS — J43.9 PULMONARY EMPHYSEMA, UNSPECIFIED EMPHYSEMA TYPE (HCC): ICD-10-CM

## 2019-01-15 DIAGNOSIS — C34.91 NON-SMALL CELL CANCER OF RIGHT LUNG (HCC): Primary | ICD-10-CM

## 2019-01-15 DIAGNOSIS — R06.02 SOB (SHORTNESS OF BREATH): Primary | ICD-10-CM

## 2019-01-15 PROCEDURE — 94060 EVALUATION OF WHEEZING: CPT | Performed by: INTERNAL MEDICINE

## 2019-01-15 PROCEDURE — 94729 DIFFUSING CAPACITY: CPT | Performed by: INTERNAL MEDICINE

## 2019-01-15 PROCEDURE — 99213 OFFICE O/P EST LOW 20 MIN: CPT | Performed by: INTERNAL MEDICINE

## 2019-01-15 PROCEDURE — 94726 PLETHYSMOGRAPHY LUNG VOLUMES: CPT | Performed by: INTERNAL MEDICINE

## 2019-01-15 NOTE — PROGRESS NOTES
"Chief Complaint   Patient presents with   • Follow-up     Pulmonary emphysema   • Shortness of Breath         Subjective   Saman Aguayo is a 63 y.o. male.     History of Present Illness   Patient comes in today to follow-up on the lung nodule and status post non-small cell lung cancer surgery.    The patient also has emphysema but denies any shortness of breath.  He continues to have extremely good exercise capacity and has no limitations for the most part.    The following portions of the patient's history were reviewed and updated as appropriate: allergies, current medications, past family history, past medical history, past social history and past surgical history.    Review of Systems   Constitutional: Negative for chills and fever.   HENT: Positive for sneezing. Negative for rhinorrhea and sinus pressure.    Respiratory: Negative for cough, shortness of breath and wheezing.    Psychiatric/Behavioral: Negative for sleep disturbance.       Objective   Visit Vitals  /62   Pulse 64   Resp 18   Ht 182.9 cm (72.01\")   Wt 82.1 kg (181 lb)   SpO2 95%   BMI 24.54 kg/m²       Physical Exam   Constitutional: He is oriented to person, place, and time. He appears well-developed and well-nourished.   Eyes: EOM are normal.   Neck: Neck supple. No JVD present.   Pulmonary/Chest: Effort normal.   No significant wheezing noted.   Musculoskeletal:   Gait was normal.   Neurological: He is alert and oriented to person, place, and time.   Skin: Skin is warm and dry.   Vitals reviewed.      Assessment/Plan   Saman was seen today for follow-up and shortness of breath.    Diagnoses and all orders for this visit:    Non-small cell cancer of right lung (CMS/HCC)  -     CT Chest Without Contrast; Future    Lung nodule, solitary  -     CT Chest Without Contrast; Future    Pulmonary emphysema, unspecified emphysema type (CMS/HCC)  -     Pulmonary Function Test  -     CT Chest Without Contrast; Future         Return in about 6 months " (around 7/15/2019) for Recheck, Imaging study.    DISCUSSION (if any):  The patient's NSCLC surgery was performed in Nov 2016.     I reviewed his last CT scan from within our system and compared it to the images from the last CT scan.  His latest CT scan was performed at an outside facility and did not have comparison but as per my review, the lung nodule has mostly remain unchanged, going back to 2017.    He will definitely need a repeat CT scan in 6 months, which is mostly to continue surveillance in a patient with non-small cell lung cancer.    His PFTs were reviewed.  Continue to show only mild COPD.        Dictated utilizing Dragon dictation.    This document was electronically signed by Nannette Lim MD January 15, 2019  10:53 AM

## 2019-05-11 ENCOUNTER — HOSPITAL ENCOUNTER (EMERGENCY)
Facility: HOSPITAL | Age: 64
Discharge: HOME | End: 2019-05-12
Attending: EMERGENCY MEDICINE | Admitting: EMERGENCY MEDICINE

## 2019-05-11 ENCOUNTER — APPOINTMENT (OUTPATIENT)
Dept: GENERAL RADIOLOGY | Facility: HOSPITAL | Age: 64
End: 2019-05-11

## 2019-05-11 DIAGNOSIS — R07.9 CHEST PAIN, UNSPECIFIED TYPE: Primary | ICD-10-CM

## 2019-05-11 LAB
ALBUMIN SERPL-MCNC: 4.2 G/DL (ref 3.5–5)
ALBUMIN/GLOB SERPL: 1.1 G/DL (ref 1–2)
ALP SERPL-CCNC: 68 U/L (ref 38–126)
ALT SERPL W P-5'-P-CCNC: 29 U/L (ref 13–69)
ANION GAP SERPL CALCULATED.3IONS-SCNC: 12.4 MMOL/L (ref 10–20)
AST SERPL-CCNC: 28 U/L (ref 15–46)
BASOPHILS # BLD AUTO: 0.07 10*3/MM3 (ref 0–0.2)
BASOPHILS NFR BLD AUTO: 0.9 % (ref 0–1.5)
BILIRUB SERPL-MCNC: 0.6 MG/DL (ref 0.2–1.3)
BUN BLD-MCNC: 23 MG/DL (ref 7–20)
BUN/CREAT SERPL: 19.2 (ref 6.3–21.9)
CALCIUM SPEC-SCNC: 8.8 MG/DL (ref 8.4–10.2)
CHLORIDE SERPL-SCNC: 99 MMOL/L (ref 98–107)
CO2 SERPL-SCNC: 30 MMOL/L (ref 26–30)
CREAT BLD-MCNC: 1.2 MG/DL (ref 0.6–1.3)
DEPRECATED RDW RBC AUTO: 45.1 FL (ref 37–54)
EOSINOPHIL # BLD AUTO: 0.18 10*3/MM3 (ref 0–0.4)
EOSINOPHIL NFR BLD AUTO: 2.4 % (ref 0.3–6.2)
ERYTHROCYTE [DISTWIDTH] IN BLOOD BY AUTOMATED COUNT: 13.2 % (ref 12.3–15.4)
GFR SERPL CREATININE-BSD FRML MDRD: 61 ML/MIN/1.73
GLOBULIN UR ELPH-MCNC: 3.7 GM/DL
GLUCOSE BLD-MCNC: 114 MG/DL (ref 74–98)
HCT VFR BLD AUTO: 50.6 % (ref 37.5–51)
HGB BLD-MCNC: 17.7 G/DL (ref 13–17.7)
HOLD SPECIMEN: NORMAL
IMM GRANULOCYTES # BLD AUTO: 0.05 10*3/MM3 (ref 0–0.05)
IMM GRANULOCYTES NFR BLD AUTO: 0.7 % (ref 0–0.5)
LYMPHOCYTES # BLD AUTO: 2.08 10*3/MM3 (ref 0.7–3.1)
LYMPHOCYTES NFR BLD AUTO: 28 % (ref 19.6–45.3)
MCH RBC QN AUTO: 32.4 PG (ref 26.6–33)
MCHC RBC AUTO-ENTMCNC: 35 G/DL (ref 31.5–35.7)
MCV RBC AUTO: 92.5 FL (ref 79–97)
MONOCYTES # BLD AUTO: 0.56 10*3/MM3 (ref 0.1–0.9)
MONOCYTES NFR BLD AUTO: 7.5 % (ref 5–12)
NEUTROPHILS # BLD AUTO: 4.48 10*3/MM3 (ref 1.7–7)
NEUTROPHILS NFR BLD AUTO: 60.5 % (ref 42.7–76)
NRBC BLD AUTO-RTO: 0 /100 WBC (ref 0–0.2)
NT-PROBNP SERPL-MCNC: 149 PG/ML (ref 0–125)
PLATELET # BLD AUTO: 182 10*3/MM3 (ref 140–450)
PMV BLD AUTO: 10.8 FL (ref 6–12)
POTASSIUM BLD-SCNC: 4.4 MMOL/L (ref 3.5–5.1)
PROT SERPL-MCNC: 7.9 G/DL (ref 6.3–8.2)
RBC # BLD AUTO: 5.47 10*6/MM3 (ref 4.14–5.8)
SODIUM BLD-SCNC: 137 MMOL/L (ref 137–145)
TROPONIN I SERPL-MCNC: 0.01 NG/ML (ref 0–0.05)
TROPONIN I SERPL-MCNC: <0.012 NG/ML (ref 0–0.03)
WBC NRBC COR # BLD: 7.42 10*3/MM3 (ref 3.4–10.8)
WHOLE BLOOD HOLD SPECIMEN: NORMAL
WHOLE BLOOD HOLD SPECIMEN: NORMAL

## 2019-05-11 PROCEDURE — 84484 ASSAY OF TROPONIN QUANT: CPT | Performed by: EMERGENCY MEDICINE

## 2019-05-11 PROCEDURE — 93005 ELECTROCARDIOGRAM TRACING: CPT | Performed by: EMERGENCY MEDICINE

## 2019-05-11 PROCEDURE — 84484 ASSAY OF TROPONIN QUANT: CPT | Performed by: PHYSICIAN ASSISTANT

## 2019-05-11 PROCEDURE — 71045 X-RAY EXAM CHEST 1 VIEW: CPT

## 2019-05-11 PROCEDURE — 99284 EMERGENCY DEPT VISIT MOD MDM: CPT

## 2019-05-11 PROCEDURE — 85025 COMPLETE CBC W/AUTO DIFF WBC: CPT | Performed by: EMERGENCY MEDICINE

## 2019-05-11 PROCEDURE — 93005 ELECTROCARDIOGRAM TRACING: CPT | Performed by: PHYSICIAN ASSISTANT

## 2019-05-11 PROCEDURE — 83880 ASSAY OF NATRIURETIC PEPTIDE: CPT | Performed by: PHYSICIAN ASSISTANT

## 2019-05-11 PROCEDURE — 80053 COMPREHEN METABOLIC PANEL: CPT | Performed by: EMERGENCY MEDICINE

## 2019-05-11 RX ORDER — ASPIRIN 325 MG
325 TABLET ORAL ONCE
Status: COMPLETED | OUTPATIENT
Start: 2019-05-11 | End: 2019-05-11

## 2019-05-11 RX ADMIN — ASPIRIN 325 MG ORAL TABLET 325 MG: 325 PILL ORAL at 22:27

## 2019-05-12 VITALS
OXYGEN SATURATION: 94 % | WEIGHT: 178.5 LBS | HEIGHT: 72 IN | RESPIRATION RATE: 20 BRPM | SYSTOLIC BLOOD PRESSURE: 131 MMHG | TEMPERATURE: 97.4 F | DIASTOLIC BLOOD PRESSURE: 83 MMHG | HEART RATE: 56 BPM | BODY MASS INDEX: 24.18 KG/M2

## 2019-05-12 LAB — TROPONIN I SERPL-MCNC: <0.012 NG/ML (ref 0–0.03)

## 2019-05-13 ENCOUNTER — OFFICE VISIT (OUTPATIENT)
Dept: CARDIOLOGY | Facility: CLINIC | Age: 64
End: 2019-05-13

## 2019-05-13 VITALS
BODY MASS INDEX: 24.62 KG/M2 | OXYGEN SATURATION: 99 % | HEIGHT: 72 IN | DIASTOLIC BLOOD PRESSURE: 62 MMHG | WEIGHT: 181.8 LBS | SYSTOLIC BLOOD PRESSURE: 136 MMHG | HEART RATE: 50 BPM

## 2019-05-13 DIAGNOSIS — I27.20 PULMONARY HYPERTENSION (HCC): Primary | ICD-10-CM

## 2019-05-13 DIAGNOSIS — R00.1 BRADYCARDIA: ICD-10-CM

## 2019-05-13 DIAGNOSIS — R07.2 PRECORDIAL PAIN: ICD-10-CM

## 2019-05-13 DIAGNOSIS — J43.2 CENTRILOBULAR EMPHYSEMA (HCC): ICD-10-CM

## 2019-05-13 DIAGNOSIS — C34.2 LUNG CANCER, MIDDLE LOBE (HCC): ICD-10-CM

## 2019-05-13 PROCEDURE — 99214 OFFICE O/P EST MOD 30 MIN: CPT | Performed by: INTERNAL MEDICINE

## 2019-05-13 NOTE — PROGRESS NOTES
"    Subjective:     Encounter Date:05/13/2019      Patient ID: Saman Aguayo is a 64 y.o. male.    Chief Complaint: Chest pain  HPI  This is a 64-year-old male patient who was seen in the emergency room 48 hours ago for severe chest discomfort.  The patient indicates he was awakened from sleep with a retrosternal discomfort that felt as though \"I had been hit in the chest with a sledgehammer\".  The pain radiated down both arms to the elbow.  The discomfort was associated with nausea diaphoresis and shortness of breath.  The discomfort lasted for approximately 45 minutes.  He indicates that he is never had this type of chest discomfort before.  The patient had a normal twelve-lead electrocardiogram in the emergency room recorded during chest discomfort and had 2 sets of normal cardiac enzymes.  The patient attempted a treadmill stress test 2 years ago but was unable to complete the testing protocol due to severe claudication.  He indicates that his ability to ambulate is now further worsened after having recent knee surgery.  He denies shortness of breath at rest or with activity.  He no longer smokes cigarettes.  He was a previous 3 pack/day smoker but indicates that he stopped smoking in November 2016.  He has no orthopnea PND or lower extremity edema.  He has no dizziness palpitations or syncope.  The following portions of the patient's history were reviewed and updated as appropriate: allergies, current medications, past family history, past medical history, past social history, past surgical history and problem  Review of Systems   Constitution: Negative for chills, diaphoresis, fever, weakness, malaise/fatigue, weight gain and weight loss.   HENT: Negative for ear discharge, hearing loss, hoarse voice and nosebleeds.    Eyes: Negative for discharge, double vision, pain and photophobia.   Cardiovascular: Positive for chest pain. Negative for claudication, cyanosis, dyspnea on exertion, irregular heartbeat, leg " swelling, near-syncope, orthopnea, palpitations, paroxysmal nocturnal dyspnea and syncope.   Respiratory: Negative for cough, hemoptysis, shortness of breath, sputum production and wheezing.    Endocrine: Negative for cold intolerance, heat intolerance, polydipsia, polyphagia and polyuria.   Hematologic/Lymphatic: Negative for adenopathy and bleeding problem. Does not bruise/bleed easily.   Skin: Negative for color change, flushing, itching and rash.   Musculoskeletal: Negative for muscle cramps, muscle weakness, myalgias and stiffness.   Gastrointestinal: Negative for abdominal pain, diarrhea, hematemesis, hematochezia, nausea and vomiting.   Genitourinary: Negative for dysuria, frequency and nocturia.   Neurological: Negative for focal weakness, loss of balance, numbness, paresthesias and seizures.   Psychiatric/Behavioral: Negative for altered mental status, hallucinations and suicidal ideas.   Allergic/Immunologic: Negative for HIV exposure, hives and persistent infections.           Current Outpatient Medications:   •  naproxen sodium (ALEVE) 220 MG tablet, Take 220 mg by mouth 2 (Two) Times a Day As Needed., Disp: , Rfl:   •  HYDROcodone-acetaminophen (NORCO) 5-325 MG per tablet, Take 1-2 tablets by mouth Every 6 (Six) Hours As Needed for pain, Disp: 20 tablet, Rfl: 0    Objective:   Physical Exam   Constitutional: He is oriented to person, place, and time. He appears well-developed and well-nourished. No distress.   HENT:   Head: Normocephalic and atraumatic.   Mouth/Throat: Oropharynx is clear and moist.   Eyes: Conjunctivae and EOM are normal. Pupils are equal, round, and reactive to light. No scleral icterus.   Neck: Normal range of motion. Neck supple. No JVD present. No tracheal deviation present. No thyromegaly present.   Cardiovascular: Normal rate, regular rhythm, S1 normal, S2 normal, normal heart sounds, intact distal pulses and normal pulses. PMI is not displaced. Exam reveals no gallop and no  "friction rub.   No murmur heard.  Pulmonary/Chest: Effort normal and breath sounds normal. No stridor. No respiratory distress. He has no wheezes. He has no rales.   Abdominal: Soft. Bowel sounds are normal. He exhibits no distension and no mass. There is no tenderness. There is no rebound and no guarding.   Musculoskeletal: Normal range of motion. He exhibits no edema or deformity.   Neurological: He is alert and oriented to person, place, and time. He displays normal reflexes. No cranial nerve deficit. Coordination normal.   Skin: Skin is warm and dry. No rash noted. He is not diaphoretic. No erythema.   Psychiatric: He has a normal mood and affect. His behavior is normal. Thought content normal.     Blood pressure 136/62, pulse 50, height 182.9 cm (72\"), weight 82.5 kg (181 lb 12.8 oz), SpO2 99 %.   Lab Review:     Assessment:       1. Pulmonary hypertension (CMS/HCC)  I suspect this is due to his underlying emphysema.  - Adult Transthoracic Echo Complete W/ Cont if Necessary Per Protocol    2. Centrilobular emphysema (CMS/HCC)  The patient is a former heavy smoker.  He reports that he stopped smoking in November 2016.  His clothing has an extremely strong odor of tobacco smoke.    3. Lung cancer, middle lobe (CMS/HCC)      4. Precordial pain  The patient's chest discomfort has features both typical and atypical for coronary insufficiency.  The patient has not had an ischemic evaluation in the last 3 years.  His previous stress test was limited in regards to diagnostic accuracy due to inability to exercise for more than 3 minutes due to severe claudication.  The patient has multiple risk factors for coronary artery disease.  The patient is unable to do treadmill exercise stress testing due to shortness of breath with activity, vascular claudication, recent knee surgery and poor effort tolerance.  - Stress Test With Myocardial Perfusion One Day  - Adult Transthoracic Echo Complete W/ Cont if Necessary Per " Protocol    5. Bradycardia  The patient has been known to have mild resting sinus bradycardia.  The patient indicates that he fell asleep on a cardiac monitor while in the emergency room the other night and his heart rate was recorded to be 39 bpm.  He has had no symptomatic bradycardia during waking hours.  Previous cardiac monitoring demonstrated that all of his severe bradycardia was limited to sleeping hours.  - Adult Transthoracic Echo Complete W/ Cont if Necessary Per Protocol    Procedures    Plan:     I have recommended a vasodilator nuclear stress test as the patient is unable to exercise.  I have recommended an echocardiogram.  No changes in his medication therapy have been made at today's visit.  Further recommendations will be predicated on the results of his outpatient testing.

## 2019-05-21 LAB
BH CV ECHO MEAS - % IVS THICK: 47.4 %
BH CV ECHO MEAS - % LVPW THICK: 60 %
BH CV ECHO MEAS - AO MAX PG (FULL): 1.5 MMHG
BH CV ECHO MEAS - AO MAX PG: 5 MMHG
BH CV ECHO MEAS - AO MEAN PG (FULL): 2 MMHG
BH CV ECHO MEAS - AO MEAN PG: 3 MMHG
BH CV ECHO MEAS - AO ROOT AREA: 6.6 CM^2
BH CV ECHO MEAS - AO ROOT DIAM: 2.9 CM
BH CV ECHO MEAS - AO V2 MAX: 114 CM/SEC
BH CV ECHO MEAS - AO V2 MEAN: 76 CM/SEC
BH CV ECHO MEAS - AO V2 VTI: 25.6 CM
BH CV ECHO MEAS - AVA(I,A): 2.3 CM^2
BH CV ECHO MEAS - AVA(I,D): 2.3 CM^2
BH CV ECHO MEAS - AVA(V,A): 2.8 CM^2
BH CV ECHO MEAS - AVA(V,D): 2.8 CM^2
BH CV ECHO MEAS - EDV(CUBED): 221.4 ML
BH CV ECHO MEAS - EDV(MOD-SP4): 119 ML
BH CV ECHO MEAS - EDV(TEICH): 183.4 ML
BH CV ECHO MEAS - EF(CUBED): 73.2 %
BH CV ECHO MEAS - EF(MOD-BP): 65 %
BH CV ECHO MEAS - EF(MOD-SP4): 64.7 %
BH CV ECHO MEAS - EF(TEICH): 64.1 %
BH CV ECHO MEAS - ESV(CUBED): 59.3 ML
BH CV ECHO MEAS - ESV(MOD-SP4): 42 ML
BH CV ECHO MEAS - ESV(TEICH): 65.9 ML
BH CV ECHO MEAS - FS: 35.5 %
BH CV ECHO MEAS - IVS/LVPW: 1.3
BH CV ECHO MEAS - IVSD: 0.95 CM
BH CV ECHO MEAS - IVSS: 1.4 CM
BH CV ECHO MEAS - LA DIMENSION: 3.5 CM
BH CV ECHO MEAS - LA/AO: 1.2
BH CV ECHO MEAS - LAD MAJOR: 5.8 CM
BH CV ECHO MEAS - LAT PEAK E' VEL: 8.3 CM/SEC
BH CV ECHO MEAS - LATERAL E/E' RATIO: 9.1
BH CV ECHO MEAS - LV IVRT: 0.15 SEC
BH CV ECHO MEAS - LV MASS(C)D: 203.6 GRAMS
BH CV ECHO MEAS - LV MASS(C)S: 179.7 GRAMS
BH CV ECHO MEAS - LV MAX PG: 3.5 MMHG
BH CV ECHO MEAS - LV MEAN PG: 1 MMHG
BH CV ECHO MEAS - LV V1 MAX: 93 CM/SEC
BH CV ECHO MEAS - LV V1 MEAN: 54.2 CM/SEC
BH CV ECHO MEAS - LV V1 VTI: 17.3 CM
BH CV ECHO MEAS - LVIDD: 6.1 CM
BH CV ECHO MEAS - LVIDS: 3.9 CM
BH CV ECHO MEAS - LVLD AP4: 8 CM
BH CV ECHO MEAS - LVLS AP4: 6.7 CM
BH CV ECHO MEAS - LVOT AREA (M): 3.5 CM^2
BH CV ECHO MEAS - LVOT AREA: 3.5 CM^2
BH CV ECHO MEAS - LVOT DIAM: 2.1 CM
BH CV ECHO MEAS - LVPWD: 0.75 CM
BH CV ECHO MEAS - LVPWS: 1.2 CM
BH CV ECHO MEAS - MED PEAK E' VEL: 8.1 CM/SEC
BH CV ECHO MEAS - MEDIAL E/E' RATIO: 9.4
BH CV ECHO MEAS - MR MAX PG: 120 MMHG
BH CV ECHO MEAS - MR MAX VEL: 548 CM/SEC
BH CV ECHO MEAS - MR MEAN PG: 80 MMHG
BH CV ECHO MEAS - MR MEAN VEL: 431 CM/SEC
BH CV ECHO MEAS - MR VTI: 209 CM
BH CV ECHO MEAS - MV A MAX VEL: 39.9 CM/SEC
BH CV ECHO MEAS - MV DEC SLOPE: 183.5 CM/SEC^2
BH CV ECHO MEAS - MV DEC TIME: 0.39 SEC
BH CV ECHO MEAS - MV E MAX VEL: 75.6 CM/SEC
BH CV ECHO MEAS - MV E/A: 1.9
BH CV ECHO MEAS - MV MAX PG: 2.9 MMHG
BH CV ECHO MEAS - MV MEAN PG: 1 MMHG
BH CV ECHO MEAS - MV P1/2T MAX VEL: 72.9 CM/SEC
BH CV ECHO MEAS - MV P1/2T: 116.4 MSEC
BH CV ECHO MEAS - MV V2 MAX: 85.5 CM/SEC
BH CV ECHO MEAS - MV V2 MEAN: 46.8 CM/SEC
BH CV ECHO MEAS - MV V2 VTI: 28.8 CM
BH CV ECHO MEAS - MVA P1/2T LCG: 3 CM^2
BH CV ECHO MEAS - MVA(P1/2T): 1.9 CM^2
BH CV ECHO MEAS - MVA(VTI): 2.1 CM^2
BH CV ECHO MEAS - PA MAX PG: 3 MMHG
BH CV ECHO MEAS - PA V2 MAX: 87.3 CM/SEC
BH CV ECHO MEAS - RAP SYSTOLE: 15 MMHG
BH CV ECHO MEAS - RVSP: 58 MMHG
BH CV ECHO MEAS - SV(AO): 169.1 ML
BH CV ECHO MEAS - SV(CUBED): 162.1 ML
BH CV ECHO MEAS - SV(LVOT): 59.9 ML
BH CV ECHO MEAS - SV(MOD-SP4): 77 ML
BH CV ECHO MEAS - SV(TEICH): 117.5 ML
BH CV ECHO MEAS - TR MAX PG: 43 MMHG
BH CV ECHO MEAS - TR MAX VEL: 329 CM/SEC
BH CV ECHO MEAS - TV MAX PG: 0.8 MMHG
BH CV ECHO MEAS - TV V2 MAX: 44.7 CM/SEC
BH CV ECHO MEASUREMENTS AVERAGE E/E' RATIO: 9.22
LEFT ATRIUM VOLUME INDEX: 33 ML/M2
LEFT ATRIUM VOLUME: 67 ML
LV EF 2D ECHO EST: 45 %

## 2019-05-29 ENCOUNTER — HOSPITAL ENCOUNTER (OUTPATIENT)
Dept: NUCLEAR MEDICINE | Facility: HOSPITAL | Age: 64
Discharge: HOME | End: 2019-05-29

## 2019-05-29 PROCEDURE — 25010000002 REGADENOSON 0.4 MG/5ML SOLUTION: Performed by: INTERNAL MEDICINE

## 2019-05-29 PROCEDURE — 93018 CV STRESS TEST I&R ONLY: CPT | Performed by: INTERNAL MEDICINE

## 2019-05-29 PROCEDURE — 93017 CV STRESS TEST TRACING ONLY: CPT

## 2019-05-29 PROCEDURE — A9500 TC99M SESTAMIBI: HCPCS | Performed by: INTERNAL MEDICINE

## 2019-05-29 PROCEDURE — 0 TECHNETIUM SESTAMIBI: Performed by: INTERNAL MEDICINE

## 2019-05-29 PROCEDURE — 78452 HT MUSCLE IMAGE SPECT MULT: CPT | Performed by: INTERNAL MEDICINE

## 2019-05-29 PROCEDURE — 78452 HT MUSCLE IMAGE SPECT MULT: CPT

## 2019-05-29 RX ADMIN — TECHNETIUM TC 99M SESTAMIBI 1 DOSE: 1 INJECTION INTRAVENOUS at 07:48

## 2019-05-29 RX ADMIN — REGADENOSON 0.4 MG: 0.08 INJECTION, SOLUTION INTRAVENOUS at 09:48

## 2019-05-29 RX ADMIN — TECHNETIUM TC 99M SESTAMIBI 1 DOSE: 1 INJECTION INTRAVENOUS at 09:48

## 2019-05-31 ENCOUNTER — TELEPHONE (OUTPATIENT)
Dept: CARDIOLOGY | Facility: CLINIC | Age: 64
End: 2019-05-31

## 2019-05-31 LAB
BH CV STRESS COMMENTS STAGE 1: NORMAL
BH CV STRESS DOSE REGADENOSON STAGE 1: 0.4
BH CV STRESS DURATION MIN STAGE 1: 0
BH CV STRESS DURATION SEC STAGE 1: 10
BH CV STRESS PROTOCOL 1: NORMAL
BH CV STRESS RECOVERY BP: NORMAL MMHG
BH CV STRESS RECOVERY HR: 69 BPM
BH CV STRESS STAGE 1: 1
LV EF NUC BP: 52 %
MAXIMAL PREDICTED HEART RATE: 154 BPM
PERCENT MAX PREDICTED HR: 47.4 %
STRESS BASELINE BP: NORMAL MMHG
STRESS BASELINE HR: 55 BPM
STRESS PERCENT HR: 56 %
STRESS POST PEAK BP: NORMAL MMHG
STRESS POST PEAK HR: 73 BPM
STRESS TARGET HR: 131 BPM

## 2019-05-31 NOTE — TELEPHONE ENCOUNTER
----- Message from Max Almonte MD sent at 5/31/2019  8:18 AM EDT -----  Let him know that this was fine.

## 2019-07-11 DIAGNOSIS — C34.91 NON-SMALL CELL CANCER OF RIGHT LUNG (HCC): ICD-10-CM

## 2019-07-11 DIAGNOSIS — J43.9 PULMONARY EMPHYSEMA, UNSPECIFIED EMPHYSEMA TYPE (HCC): ICD-10-CM

## 2019-07-11 DIAGNOSIS — R91.1 LUNG NODULE, SOLITARY: ICD-10-CM

## 2019-07-15 ENCOUNTER — OFFICE VISIT (OUTPATIENT)
Dept: PULMONOLOGY | Facility: CLINIC | Age: 64
End: 2019-07-15

## 2019-07-15 VITALS
HEIGHT: 72 IN | SYSTOLIC BLOOD PRESSURE: 130 MMHG | RESPIRATION RATE: 16 BRPM | OXYGEN SATURATION: 96 % | DIASTOLIC BLOOD PRESSURE: 62 MMHG | HEART RATE: 58 BPM | WEIGHT: 183 LBS | BODY MASS INDEX: 24.79 KG/M2

## 2019-07-15 DIAGNOSIS — R91.1 LUNG NODULE, SOLITARY: ICD-10-CM

## 2019-07-15 DIAGNOSIS — C34.91 NON-SMALL CELL CANCER OF RIGHT LUNG (HCC): Primary | ICD-10-CM

## 2019-07-15 PROCEDURE — 99213 OFFICE O/P EST LOW 20 MIN: CPT | Performed by: INTERNAL MEDICINE

## 2019-07-15 NOTE — PROGRESS NOTES
"Chief Complaint   Patient presents with   • Follow-up   • Shortness of Breath       Subjective   Saman Aguayo is a 64 y.o. male.     History of Present Illness   Patient comes in today to discuss the results of CT scan & history of lung cancer.    The patient denies any night sweats, weight loss or hemoptysis.     The following portions of the patient's history were reviewed and updated as appropriate: allergies, current medications, past family history, past medical history, past social history and past surgical history.    Review of Systems   HENT: Negative for sinus pressure, sneezing and sore throat.    Respiratory: Negative for cough, shortness of breath and wheezing.        Objective   Visit Vitals  /62   Pulse 58   Resp 16   Ht 182.9 cm (72.01\")   Wt 83 kg (183 lb)   SpO2 96%   BMI 24.81 kg/m²       Physical Exam   Constitutional: He is oriented to person, place, and time. He appears well-developed and well-nourished.   HENT:   Dentures noted.    Eyes: EOM are normal.   Neck: Neck supple. No JVD present.   Pulmonary/Chest: Effort normal.   No significant wheezing noted.   Musculoskeletal:   Gait was normal.   Neurological: He is alert and oriented to person, place, and time.   Skin: Skin is warm and dry.   Vitals reviewed.      Assessment/Plan   Saman was seen today for follow-up and shortness of breath.    Diagnoses and all orders for this visit:    Non-small cell cancer of right lung (CMS/HCC)  Comments:  Resected in November 2016.   Orders:  -     CT Chest Without Contrast; Future    Lung nodule, solitary  -     CT Chest Without Contrast; Future         Return in about 6 months (around 1/19/2020) for Recheck, Imaging study.    DISCUSSION (if any):  The CT scan shows that the lung nodule has remained stable. There was acute change/process identified. Reviewed images.    Repeat CT will be scheduled for January 2020.    His surveillance CTs will need to be done till November 2021.       Dictated " utilizing Dragon dictation.    This document was electronically signed by Nannette Lim MD on 07/15/19 at 11:19 AM

## 2019-08-06 ENCOUNTER — OFFICE VISIT (OUTPATIENT)
Dept: CARDIOLOGY | Facility: CLINIC | Age: 64
End: 2019-08-06

## 2019-08-06 VITALS
HEART RATE: 54 BPM | OXYGEN SATURATION: 98 % | SYSTOLIC BLOOD PRESSURE: 124 MMHG | DIASTOLIC BLOOD PRESSURE: 62 MMHG | WEIGHT: 180.8 LBS | HEIGHT: 72 IN | BODY MASS INDEX: 24.49 KG/M2

## 2019-08-06 DIAGNOSIS — I49.1 PAC (PREMATURE ATRIAL CONTRACTION): ICD-10-CM

## 2019-08-06 DIAGNOSIS — C34.2 LUNG CANCER, MIDDLE LOBE (HCC): ICD-10-CM

## 2019-08-06 DIAGNOSIS — I27.20 PULMONARY HYPERTENSION (HCC): ICD-10-CM

## 2019-08-06 DIAGNOSIS — T67.1XXD HEAT SYNCOPE, SUBSEQUENT ENCOUNTER: ICD-10-CM

## 2019-08-06 DIAGNOSIS — R00.1 BRADYCARDIA: Primary | ICD-10-CM

## 2019-08-06 DIAGNOSIS — J43.8 OTHER EMPHYSEMA (HCC): ICD-10-CM

## 2019-08-06 DIAGNOSIS — R94.31 ABNORMAL ECG: ICD-10-CM

## 2019-08-06 PROCEDURE — 99213 OFFICE O/P EST LOW 20 MIN: CPT | Performed by: NURSE PRACTITIONER

## 2019-08-06 NOTE — PROGRESS NOTES
Saman Aguayo is a 64 y.o. male.  MRN #: [id number]    Referring Provider: Marilyn Vermeesch MD    Chief Complaint:   Chief Complaint   Patient presents with   • Follow-up   • Hypertension        History of Present Illness:  Patient is a 64-year-old gentleman presenting for follow-up following cardiology testing.  Patient had a myocardial perfusion vasodilator stress test and transthoracic echocardiogram on May 13, 2019.  Patient has history of abnormal EKG, bradycardia with syncope, also with history of atrial dysrhythmias, pulmonary hypertension.  Patient also has history of a right middle lobe lung cancer requiring lobectomy and 2016.  He is a former smoker and has not smoked since his diagnosis of lung cancer.  He used to smoke 3 packs/day for 50+ years.  With today's visit, Mr. Aguayo states he has been symptom-free with no complications.  He denies any chest pain, palpitations, near-syncope or syncopal episodes, unusual shortness of breath.  He denies any weight increase or fluid retention.    The patient presents today with their self who contributes to the history of their care.     The following portions of the patient's history were reviewed and updated as appropriate: allergies, current medications, past family history, past medical history, past social history, past surgical history and problem list.     Review of Systems:     Review of Systems   Constitutional: Negative.  Negative for activity change, appetite change, diaphoresis, fatigue, unexpected weight gain and unexpected weight loss.   HENT: Negative.    Eyes: Negative for blurred vision, double vision and visual disturbance.        Wears corrective lenses   Respiratory: Negative for apnea, chest tightness, shortness of breath and wheezing.         Prior history of right middle lobe cancer with lobectomy.  Former smoker of 3 packs/day for 50+ years.   Cardiovascular: Negative for chest pain, palpitations and leg swelling.        History of  "bradycardia arrhythmias with syncopal episode   Gastrointestinal: Negative.  Negative for abdominal distention, abdominal pain, nausea, GERD and indigestion.   Endocrine: Negative.    Genitourinary: Negative.  Negative for decreased libido and hematuria.   Musculoskeletal: Negative.  Negative for back pain, joint swelling and neck pain.   Skin: Negative.  Negative for pallor and bruise.   Allergic/Immunologic: Negative.    Neurological: Negative.  Negative for dizziness, tremors, syncope, facial asymmetry, speech difficulty, weakness and numbness.   Hematological: Negative.  Does not bruise/bleed easily.   Psychiatric/Behavioral: Negative.  Negative for agitation, negative for hyperactivity and stress. The patient is not nervous/anxious.    All other systems reviewed and are negative.         Current Outpatient Medications:   •  HYDROcodone-acetaminophen (NORCO) 5-325 MG per tablet, Take 1-2 tablets by mouth Every 6 (Six) Hours As Needed for pain, Disp: 20 tablet, Rfl: 0  •  naproxen sodium (ALEVE) 220 MG tablet, Take 220 mg by mouth 2 (Two) Times a Day As Needed., Disp: , Rfl:     Vitals:    08/06/19 0839   BP: 124/62   BP Location: Right arm   Patient Position: Sitting   Cuff Size: Adult   Pulse: 54   SpO2: 98%   Weight: 82 kg (180 lb 12.8 oz)   Height: 182.9 cm (72.01\")       Physical Exam:     Physical Exam   Constitutional: He is oriented to person, place, and time. He appears well-developed and well-nourished. No distress.   HENT:   Head: Normocephalic and atraumatic.   Eyes: Pupils are equal, round, and reactive to light. No scleral icterus.   Neck: Trachea normal, normal range of motion and full passive range of motion without pain. Neck supple. No JVD present. Carotid bruit is not present. No thyromegaly present.   Cardiovascular: Regular rhythm, S1 normal, S2 normal, normal heart sounds, intact distal pulses and normal pulses. Bradycardia present. PMI is not displaced. Exam reveals no gallop and no " friction rub.   No murmur heard.  Pulmonary/Chest: Effort normal and breath sounds normal. No respiratory distress. He has no wheezes. He has no rales. He exhibits no tenderness.   Abdominal: Soft. Bowel sounds are normal. He exhibits no mass. There is no tenderness.   Musculoskeletal: Normal range of motion. He exhibits no edema.   Neurological: He is alert and oriented to person, place, and time.   Skin: Skin is warm and dry. Capillary refill takes less than 2 seconds. No rash noted. He is not diaphoretic.   Psychiatric: He has a normal mood and affect. His behavior is normal. Judgment and thought content normal.   Nursing note and vitals reviewed.      Procedures    Results:   Patient's transthoracic echocardiogram with mild mitral valve regurgitation present.  Moderate to severe tricuspid valve regurgitation, left LV borderline dilated, estimated ejection fraction is 45%.  Mildly decreased LV function.  Right ventricular systolic pressure from tricuspid regurg is 58 mm/hg,  Myocardial perfusion vasodilator stress test, left ventricular ejection fraction 52%, findings consistent with normal EKG, no evidence of myocardial ischemia.    Assessment/Plan:   No further testing is warranted at this time, no changes will be made in his medication regimen.  Patient is to follow-up in 1 year or as needed for any cardiac issues.  Jack was seen today for follow-up and hypertension.    Diagnoses and all orders for this visit:    Bradycardia    Heat syncope, subsequent encounter    Pulmonary hypertension (CMS/HCC)    Abnormal ECG    PAC (premature atrial contraction)    Lung cancer, middle lobe (CMS/HCC)    Other emphysema (CMS/HCC)        Return in about 1 year (around 8/6/2020).    SAMIRA Junior

## 2019-10-03 ENCOUNTER — OFFICE VISIT (OUTPATIENT)
Dept: INTERNAL MEDICINE | Facility: CLINIC | Age: 64
End: 2019-10-03

## 2019-10-03 VITALS
WEIGHT: 176 LBS | SYSTOLIC BLOOD PRESSURE: 130 MMHG | RESPIRATION RATE: 16 BRPM | HEART RATE: 51 BPM | HEIGHT: 72 IN | OXYGEN SATURATION: 98 % | DIASTOLIC BLOOD PRESSURE: 70 MMHG | BODY MASS INDEX: 23.84 KG/M2 | TEMPERATURE: 98.5 F

## 2019-10-03 DIAGNOSIS — C34.2 LUNG CANCER, MIDDLE LOBE (HCC): ICD-10-CM

## 2019-10-03 DIAGNOSIS — M15.9 PRIMARY OSTEOARTHRITIS INVOLVING MULTIPLE JOINTS: ICD-10-CM

## 2019-10-03 DIAGNOSIS — J43.8 OTHER EMPHYSEMA (HCC): ICD-10-CM

## 2019-10-03 DIAGNOSIS — Z00.00 ENCOUNTER FOR MEDICARE ANNUAL WELLNESS EXAM: Primary | ICD-10-CM

## 2019-10-03 DIAGNOSIS — Z23 NEED FOR INFLUENZA VACCINATION: ICD-10-CM

## 2019-10-03 DIAGNOSIS — Z91.09 ENVIRONMENTAL ALLERGIES: ICD-10-CM

## 2019-10-03 DIAGNOSIS — R00.1 BRADYCARDIA: ICD-10-CM

## 2019-10-03 DIAGNOSIS — Z12.5 SCREENING PSA (PROSTATE SPECIFIC ANTIGEN): ICD-10-CM

## 2019-10-03 PROBLEM — Z01.818 PREOP EXAMINATION: Status: RESOLVED | Noted: 2018-12-04 | Resolved: 2019-10-03

## 2019-10-03 PROBLEM — J43.2 CENTRILOBULAR EMPHYSEMA (HCC): Status: RESOLVED | Noted: 2018-08-07 | Resolved: 2019-10-03

## 2019-10-03 PROBLEM — R07.2 PRECORDIAL PAIN: Status: RESOLVED | Noted: 2019-05-13 | Resolved: 2019-10-03

## 2019-10-03 PROBLEM — R68.89 SENSITIVITY TO THE COLD: Status: RESOLVED | Noted: 2017-12-27 | Resolved: 2019-10-03

## 2019-10-03 PROCEDURE — 96160 PT-FOCUSED HLTH RISK ASSMT: CPT | Performed by: INTERNAL MEDICINE

## 2019-10-03 PROCEDURE — G0008 ADMIN INFLUENZA VIRUS VAC: HCPCS | Performed by: INTERNAL MEDICINE

## 2019-10-03 PROCEDURE — 90653 IIV ADJUVANT VACCINE IM: CPT | Performed by: INTERNAL MEDICINE

## 2019-10-03 PROCEDURE — 99397 PER PM REEVAL EST PAT 65+ YR: CPT | Performed by: INTERNAL MEDICINE

## 2019-10-03 PROCEDURE — G0439 PPPS, SUBSEQ VISIT: HCPCS | Performed by: INTERNAL MEDICINE

## 2019-10-03 RX ORDER — POLYMYXIN B SULFATE AND TRIMETHOPRIM 1; 10000 MG/ML; [USP'U]/ML
SOLUTION OPHTHALMIC
COMMUNITY
Start: 2019-10-02 | End: 2020-03-12

## 2019-10-03 NOTE — PROGRESS NOTES
The ABCs of the Annual Wellness Visit  Subsequent Medicare Wellness Visit    Chief Complaint   Patient presents with   • Medicare Wellness-subsequent     wellness visit       Subjective   History of Present Illness:  Saman Aguayo is a 64 y.o. male who presents for a Subsequent Medicare Wellness Visit.  PMH of COPD, lung cancer status post lobectomy, pulmonary hypertension, bradycardia, DJD.  He is having terrible allergies, not taking anything for them.  He got a piece of metal in right eye yesterday, went to Dr Rhodes for this.  He denies any wheezing or SOA.  He last saw Dr Lim in May, no changes in treatment.  He has a 6 mm nodule in RUL that is unchanged, is watching him until .  He saw Dr Almonte/Shara recently, no changes.  Was seen in ER with chest pain and all cardiac enzymes were normal.  He had knee surgery with DR Nova, knee is not currently bothering him      HEALTH RISK ASSESSMENT    Recent Hospitalizations:  No hospitalization(s) within the last year.    Current Medical Providers:  Patient Care Team:  Vermeesch, Marilyn K, MD as PCP - General (Internal Medicine)  Max Almonte MD as Consulting Physician (Cardiology)  Juan Alberto Carty MD as Surgeon (Cardiothoracic Surgery)  Shady Singh MD as Consulting Physician (General Surgery)  Dinh Nova MD as Consulting Physician (Orthopedic Surgery)    Smoking Status:  Social History     Tobacco Use   Smoking Status Former Smoker   • Packs/day: 3.00   • Years: 50.00   • Pack years: 150.00   • Types: Cigarettes   • Last attempt to quit: 2016   • Years since quittin.9   Smokeless Tobacco Never Used       Alcohol Consumption:  Social History     Substance and Sexual Activity   Alcohol Use Yes   • Alcohol/week: 12.6 oz   • Types: 21 Cans of beer per week    Comment: 3 cans/daily       Depression Screen:   PHQ-2/PHQ-9 Depression Screening 10/3/2019   Little interest or pleasure in doing things 0   Feeling down, depressed, or  hopeless 0   Total Score 0       Fall Risk Screen:  DENIS Fall Risk Assessment was completed, and patient is at LOW risk for falls.Assessment completed on:10/3/2019    Health Habits and Functional and Cognitive Screening:  Functional & Cognitive Status 10/3/2019   Do you have difficulty preparing food and eating? No   Do you have difficulty bathing yourself, getting dressed or grooming yourself? No   Do you have difficulty using the toilet? No   Do you have difficulty moving around from place to place? No   Do you have trouble with steps or getting out of a bed or a chair? No   Current Diet Well Balanced Diet   Dental Exam Up to date   Eye Exam Up to date   Exercise (times per week) 7 times per week   Current Exercise Activities Include Walking   Do you need help using the phone?  No   Are you deaf or do you have serious difficulty hearing?  No   Do you need help with transportation? No   Do you need help shopping? No   Do you need help preparing meals?  No   Do you need help with housework?  No   Do you need help with laundry? No   Do you need help taking your medications? No   Do you need help managing money? No   Do you ever drive or ride in a car without wearing a seat belt? No   Have you felt unusual stress, anger or loneliness in the last month? No   Who do you live with? Spouse   If you need help, do you have trouble finding someone available to you? No   Have you been bothered in the last four weeks by sexual problems? No   Do you have difficulty concentrating, remembering or making decisions? No         Does the patient have evidence of cognitive impairment? No    Asprin use counseling:Does not need ASA (and currently is not on it)    Age-appropriate Screening Schedule:  Refer to the list below for future screening recommendations based on patient's age, sex and/or medical conditions. Orders for these recommended tests are listed in the plan section. The patient has been provided with a written  plan.    Health Maintenance   Topic Date Due   • ZOSTER VACCINE (2 of 3) 09/05/2017   • INFLUENZA VACCINE  08/01/2019   • PNEUMOCOCCAL VACCINES (65+ LOW/MEDIUM RISK) (2 of 2 - PPSV23) 10/27/2021   • TDAP/TD VACCINES (2 - Td) 01/26/2027   • COLONOSCOPY  08/10/2028          The following portions of the patient's history were reviewed and updated as appropriate: allergies, current medications, past family history, past medical history, past social history, past surgical history and problem list.    Outpatient Medications Prior to Visit   Medication Sig Dispense Refill   • trimethoprim-polymyxin b (POLYTRIM) 48061-7.1 UNIT/ML-% ophthalmic solution      • HYDROcodone-acetaminophen (NORCO) 5-325 MG per tablet Take 1-2 tablets by mouth Every 6 (Six) Hours As Needed for pain 20 tablet 0   • naproxen sodium (ALEVE) 220 MG tablet Take 220 mg by mouth 2 (Two) Times a Day As Needed.       No facility-administered medications prior to visit.        Patient Active Problem List   Diagnosis   • Osteoarthritis   • Bradycardia   • Pulmonary hypertension (CMS/HCC)   • Lung cancer, middle lobe (CMS/HCC)   • Status post lobectomy of lung   • COPD (chronic obstructive pulmonary disease) (CMS/HCC)   • Screening PSA (prostate specific antigen)   • PAC (premature atrial contraction)   • Encounter for Medicare annual wellness exam   • Environmental allergies       Advanced Care Planning:  Patient has an advance directive - a copy has not been provided. Have asked the patient to send this to us to add to record    Review of Systems   Constitutional: Negative.    HENT: Positive for postnasal drip and tinnitus.    Eyes: Positive for redness and itching.   Respiratory: Negative.    Cardiovascular: Negative.    Gastrointestinal: Negative.    Endocrine: Positive for cold intolerance.   Genitourinary: Negative.    Musculoskeletal: Negative.    Skin: Negative.    Allergic/Immunologic: Positive for environmental allergies.   Neurological: Negative.   "  Hematological: Bruises/bleeds easily.   Psychiatric/Behavioral: Negative.        Compared to one year ago, the patient feels his physical health is the same.  Compared to one year ago, the patient feels his mental health is the same.    Reviewed chart for potential of high risk medication in the elderly: yes  Reviewed chart for potential of harmful drug interactions in the elderly:yes    Objective         Vitals:    10/03/19 1422   BP: 130/70   BP Location: Right arm   Pulse: 51   Resp: 16   Temp: 98.5 °F (36.9 °C)   TempSrc: Temporal   SpO2: 98%   Weight: 79.8 kg (176 lb)   Height: 182.9 cm (72\")   PainSc: 1  Comment: eye pain       Body mass index is 23.87 kg/m².  Discussed the patient's BMI with him. The BMI is in the acceptable range.    Physical Exam   Constitutional: He is oriented to person, place, and time. He appears well-developed and well-nourished. No distress.   Very pleasant gentleman who appears his stated age, no distress today   HENT:   Head: Normocephalic and atraumatic.   Right Ear: External ear normal.   Left Ear: External ear normal.   Mouth/Throat: Oropharynx is clear and moist.   Tympanic membranes normal  Clear postnasal drip noted   Eyes: EOM are normal. Pupils are equal, round, and reactive to light.   Mild injection of conjunctiva noted bilaterally with minimal watering   Neck: Normal range of motion. Neck supple. No thyromegaly present.   Cardiovascular: Normal rate, regular rhythm, normal heart sounds and intact distal pulses.   No murmur heard.  No carotid bruits   Pulmonary/Chest: Effort normal and breath sounds normal. No respiratory distress. He has no wheezes.   Slightly decreased breath sounds throughout all lung fields   Abdominal: Soft. Bowel sounds are normal. He exhibits no distension. There is no tenderness.   Musculoskeletal: Normal range of motion. He exhibits no edema.   Lymphadenopathy:     He has no cervical adenopathy.   Neurological: He is alert and oriented to " person, place, and time. He displays normal reflexes. No cranial nerve deficit. Coordination normal.   Psychiatric: He has a normal mood and affect. His behavior is normal. Judgment and thought content normal.   Nursing note and vitals reviewed.            Assessment/Plan   Medicare Risks and Personalized Health Plan  CMS Preventative Services Quick Reference  Immunizations Discussed/Encouraged (specific immunizations; Hepatitis A Vaccine/Series, Influenza and Shingrix )  Prostate Cancer Screening     The above risks/problems have been discussed with the patient.  Pertinent information has been shared with the patient in the After Visit Summary.  Follow up plans and orders are seen below in the Assessment/Plan Section.    Diagnoses and all orders for this visit:    1. Encounter for Medicare annual wellness exam (Primary)  -     PSA Screen    2. Other emphysema (CMS/HCC)    3. Primary osteoarthritis involving multiple joints    4. Bradycardia    5. Screening PSA (prostate specific antigen)  -     PSA Screen    6. Lung cancer, middle lobe (CMS/HCC)    7. Environmental allergies    Flu shot given today  Recommend hepatitis A and shingles vaccine at local pharmacy  Labs as noted  Continue current eyedrops due to recent metal in right eye  Follow-up with Dr. Lim annually regarding lung cancer and underlying emphysema, does not need any current inhalers  Follow-up with Dr. aguirre regarding underlying history of bradycardia and pulmonary hypertension  Patient declines medication for allergies    Follow Up:  Return in about 1 year (around 10/3/2020) for Medicare Wellness.     An After Visit Summary and PPPS were given to the patient.

## 2019-10-04 LAB — PSA SERPL-MCNC: 1.72 NG/ML (ref 0–4)

## 2020-01-30 ENCOUNTER — OFFICE VISIT (OUTPATIENT)
Dept: PULMONOLOGY | Facility: CLINIC | Age: 65
End: 2020-01-30

## 2020-01-30 VITALS
OXYGEN SATURATION: 99 % | SYSTOLIC BLOOD PRESSURE: 120 MMHG | HEIGHT: 72 IN | BODY MASS INDEX: 24.38 KG/M2 | WEIGHT: 180 LBS | RESPIRATION RATE: 18 BRPM | HEART RATE: 73 BPM | DIASTOLIC BLOOD PRESSURE: 80 MMHG

## 2020-01-30 DIAGNOSIS — R91.1 LUNG NODULE, SOLITARY: ICD-10-CM

## 2020-01-30 DIAGNOSIS — J00 COMMON COLD VIRUS: ICD-10-CM

## 2020-01-30 DIAGNOSIS — J43.9 PULMONARY EMPHYSEMA, UNSPECIFIED EMPHYSEMA TYPE (HCC): ICD-10-CM

## 2020-01-30 DIAGNOSIS — C34.91 NON-SMALL CELL CANCER OF RIGHT LUNG (HCC): Primary | ICD-10-CM

## 2020-01-30 LAB
EXPIRATION DATE: NORMAL
FLUAV AG NPH QL: NEGATIVE
FLUBV AG NPH QL: NEGATIVE
INTERNAL CONTROL: NORMAL
Lab: NORMAL

## 2020-01-30 PROCEDURE — 99214 OFFICE O/P EST MOD 30 MIN: CPT | Performed by: INTERNAL MEDICINE

## 2020-01-30 PROCEDURE — 87804 INFLUENZA ASSAY W/OPTIC: CPT | Performed by: INTERNAL MEDICINE

## 2020-01-30 NOTE — PROGRESS NOTES
"Chief Complaint   Patient presents with   • Follow-up   • Shortness of Breath       Subjective   Saman Aguayo is a 64 y.o. male.     History of Present Illness   Patient comes in today to discuss the results of CT scan & history of lung cancer.     The patient denies any night sweats, weight loss or hemoptysis.     Is complaining of soreness, slight fever and arthralgias for the past 3 days. He is taking Nyquil and Dayquil for the past 2 days.     The following portions of the patient's history were reviewed and updated as appropriate: allergies, current medications, past family history, past medical history, past social history and past surgical history.    Review of Systems   Constitutional: Positive for chills and fever.   HENT: Positive for rhinorrhea and sinus pressure. Negative for sore throat.    Respiratory: Positive for cough, shortness of breath and wheezing. Negative for chest tightness.    Psychiatric/Behavioral: Negative for sleep disturbance.       Objective   Visit Vitals  /80   Pulse 73   Resp 18   Ht 182.9 cm (72\")   Wt 81.6 kg (180 lb)   SpO2 99%   BMI 24.41 kg/m²       Physical Exam   Constitutional: He is oriented to person, place, and time. He appears well-developed and well-nourished.   HENT:   Dentures noted.    Eyes: EOM are normal.   Neck: Neck supple. No JVD present.   Pulmonary/Chest: Effort normal.   No significant wheezing noted.   Musculoskeletal:   Gait was normal.   Neurological: He is alert and oriented to person, place, and time.   Skin: Skin is warm and dry.   Vitals reviewed.        Assessment/Plan   Saman was seen today for follow-up and shortness of breath.    Diagnoses and all orders for this visit:    Non-small cell cancer of right lung (CMS/HCC)  -     NM Pet Skull Base To Mid Thigh; Future    Lung nodule, solitary  -     NM Pet Skull Base To Mid Thigh; Future    Pulmonary emphysema, unspecified emphysema type (CMS/HCC)    Common cold virus  -     POC Influenza A / " B           Return in about 4 weeks (around 2/27/2020) for Imaging, Overbook.    DISCUSSION (if any):  I reviewed the patient's CT scan personally and shared the images with the patient and his wife.  The CT definitely shows increasing size of the right upper lobe nodule.    Given the fact that the right upper lobe nodule has increased in size, and appears to have somewhat spiculated appearance at this time, combined with his recent history of non-small cell lung cancer, recurrent malignancy definitely needs to be considered and ruled out.    The nodule is not accessible via bronchoscopy.    The first step will be to obtain PET scan, to rule out recurrent or distant disease.    If the PET scan shows that the right upper lobe nodule is limited to the thoracic cavity without extrathoracic metastasis, we will refer him to our CT surgery colleagues for resection.    His last PFTs showed only mild obstructive defect, performed in January 2019.    I also reviewed last pathology results in great detail.  The bronchoscopy and the surgically resected lesion were both consistent with infiltrating non-small cell undifferentiated carcinoma.    His influenza test was negative today.    I told him that if he has any worsening of his symptoms, he should consider reporting to the ER as he may need further work-up.  He did appear to have viral syndrome, but since his symptoms started more than 3 days ago, Tamiflu would not be appropriate at this time.    I spent a total of 30 minutes face to face with this patient. his pertinent current and previous data, as applicable, were reviewed. Patient's diagnostic studies were also reviewed. More than 15 minutes of the time spent, was spent in counseling about patient's underlying disease process, reviewing and discussing available radiological and laboratory data.       Dictated utilizing Dragon dictation.    This document was electronically signed by Nannette Lim MD on 01/30/20 at  11:20 AM

## 2020-02-06 ENCOUNTER — HOSPITAL ENCOUNTER (OUTPATIENT)
Dept: PET IMAGING | Facility: HOSPITAL | Age: 65
Discharge: HOME | End: 2020-02-06
Admitting: INTERNAL MEDICINE

## 2020-02-06 ENCOUNTER — HOSPITAL ENCOUNTER (OUTPATIENT)
Dept: PET IMAGING | Facility: HOSPITAL | Age: 65
Discharge: HOME | End: 2020-02-06

## 2020-02-06 DIAGNOSIS — C34.91 NON-SMALL CELL CANCER OF RIGHT LUNG (HCC): ICD-10-CM

## 2020-02-06 DIAGNOSIS — R91.1 LUNG NODULE, SOLITARY: ICD-10-CM

## 2020-02-06 LAB — GLUCOSE BLDC GLUCOMTR-MCNC: 100 MG/DL (ref 70–130)

## 2020-02-06 PROCEDURE — A9552 F18 FDG: HCPCS | Performed by: INTERNAL MEDICINE

## 2020-02-06 PROCEDURE — 0 FLUDEOXYGLUCOSE F18 SOLUTION: Performed by: INTERNAL MEDICINE

## 2020-02-06 PROCEDURE — 82962 GLUCOSE BLOOD TEST: CPT

## 2020-02-06 PROCEDURE — 78815 PET IMAGE W/CT SKULL-THIGH: CPT

## 2020-02-06 RX ADMIN — FLUDEOXYGLUCOSE F18 1 DOSE: 300 INJECTION INTRAVENOUS at 09:31

## 2020-02-11 ENCOUNTER — OFFICE VISIT (OUTPATIENT)
Dept: PULMONOLOGY | Facility: CLINIC | Age: 65
End: 2020-02-11

## 2020-02-11 VITALS
OXYGEN SATURATION: 98 % | WEIGHT: 183 LBS | HEART RATE: 43 BPM | DIASTOLIC BLOOD PRESSURE: 74 MMHG | HEIGHT: 72 IN | RESPIRATION RATE: 18 BRPM | BODY MASS INDEX: 24.79 KG/M2 | SYSTOLIC BLOOD PRESSURE: 120 MMHG

## 2020-02-11 DIAGNOSIS — R91.1 LUNG NODULE, SOLITARY: ICD-10-CM

## 2020-02-11 DIAGNOSIS — R93.89 ABNORMAL CT OF THE CHEST: Primary | ICD-10-CM

## 2020-02-11 DIAGNOSIS — C34.91 NON-SMALL CELL CANCER OF RIGHT LUNG (HCC): ICD-10-CM

## 2020-02-11 PROCEDURE — 94618 PULMONARY STRESS TESTING: CPT | Performed by: INTERNAL MEDICINE

## 2020-02-11 PROCEDURE — 99214 OFFICE O/P EST MOD 30 MIN: CPT | Performed by: INTERNAL MEDICINE

## 2020-02-11 NOTE — PROGRESS NOTES
"Chief Complaint   Patient presents with   • Follow-up   • Shortness of Breath       Subjective   Saman Aguayo is a 64 y.o. male.     History of Present Illness   Patient comes in today to follow-up on the results of PET scan.    The patient actually feels somewhat better, when compared to the last visit and feels that his viral syndrome has completely resolved.    He does have occasional hoarseness and minimal cough.    He denies any weight loss, hemoptysis or night sweats.    He continues to work on a regular basis and can usually walk 2 to 3 miles a day without any issues whatsoever.      The following portions of the patient's history were reviewed and updated as appropriate: allergies, current medications, past family history, past medical history, past social history and past surgical history.    Review of Systems   HENT: Positive for rhinorrhea, sinus pressure, sneezing and sore throat.    Respiratory: Positive for cough. Negative for shortness of breath and wheezing.        Objective   Visit Vitals  /74   Pulse (!) 43   Resp 18   Ht 182.9 cm (72\")   Wt 83 kg (183 lb)   SpO2 98%   BMI 24.82 kg/m²       Physical Exam   Constitutional: He is oriented to person, place, and time. He appears well-developed and well-nourished.   HENT:   Dentures noted.    Eyes: EOM are normal.   Neck: Neck supple. No JVD present.   Cardiovascular: Normal rate and regular rhythm.   Pulmonary/Chest: Effort normal.   No significant wheezing noted.   Musculoskeletal:   Gait was normal.   Neurological: He is alert and oriented to person, place, and time.   Skin: Skin is warm and dry.   Vitals reviewed.      Assessment/Plan   Saman was seen today for follow-up and shortness of breath.    Diagnoses and all orders for this visit:    Abnormal CT of the chest  -     Converted Six Minute Walk  -     Ambulatory Referral to Cardiothoracic Surgery    Lung nodule, solitary    Non-small cell cancer of right lung (CMS/HCC)  -     Converted Six " Minute Walk  -     Ambulatory Referral to Cardiothoracic Surgery           Return in about 4 months (around 6/11/2020) for Recheck, Overbook.    DISCUSSION (if any):  PFTs reviewed. Mild obstruction. FEV1 remains >80%.     PET scan images reviewed. Consistent with RUL NSCLC without distant metastasis.     6 minute walk test performed today.  Total distance walked: 468 meters.  Oxygen saturation remained at >92 % during the walk, on room air.     Will refer to Dr. Porter again for either a wedge or a lobectomy.    His previous surgery was also on the right side which may make the upcoming surgery somewhat of a challenge.     In reviewing his last cardiology note, the only following for bradycardia and the patient is aware of it.    It is worth mentioning that even with a walk test, his heart rate only went up to 57.    I spent a total of 25 minutes face to face with this patient. his pertinent current and previous data, as applicable, were reviewed. Patient's diagnostic studies were also reviewed. More than 15 minutes of the time spent, was spent in counseling about patient's underlying disease process, reviewing and discussing available radiological and laboratory data, medication compliance (as applicable) and lifestyle modifications that may impact patient's health.       Dictated utilizing Dragon dictation.    This document was electronically signed by Nannette Lim MD on 02/11/20 at 11:40 AM

## 2020-02-18 ENCOUNTER — OFFICE VISIT (OUTPATIENT)
Dept: CARDIAC SURGERY | Facility: CLINIC | Age: 65
End: 2020-02-18

## 2020-02-18 VITALS
HEART RATE: 54 BPM | WEIGHT: 181 LBS | TEMPERATURE: 98.1 F | BODY MASS INDEX: 24.52 KG/M2 | DIASTOLIC BLOOD PRESSURE: 71 MMHG | HEIGHT: 72 IN | SYSTOLIC BLOOD PRESSURE: 138 MMHG | OXYGEN SATURATION: 98 %

## 2020-02-18 DIAGNOSIS — R91.1 RIGHT UPPER LOBE PULMONARY NODULE: ICD-10-CM

## 2020-02-18 DIAGNOSIS — C34.2 LUNG CANCER, MIDDLE LOBE (HCC): Primary | ICD-10-CM

## 2020-02-18 PROCEDURE — 99214 OFFICE O/P EST MOD 30 MIN: CPT | Performed by: THORACIC SURGERY (CARDIOTHORACIC VASCULAR SURGERY)

## 2020-02-18 NOTE — PROGRESS NOTES
"02/18/2020  Patient Information  Saman Aguayo                                                                                          [address] [place] [address]   1955  'PCP/Referring Physician'  Vermeesch, Marilyn K, MD  [phone]  Nannette Lim MD  [phone]  Chief Complaint   Patient presents with   • Follow-up     Returning patient referred back for abnormal CT chest per Dr. Lim. Pt. states that he gets tired very easy.        History of Present Illness: 64-year-old  male with a history of hypertension, COPD, tobacco abuse and lung cancer who presents with a newly diagnosed right upper lobe nodule.  Mr. Aguayo feels well and denies cough, hemoptysis, weight loss, shortness of breath, lymphadenopathy, fevers or chills.  Repeat imaging has demonstrated enlargement of a right upper lobe lung nodule.  Dr. Lim has referred the patient to discuss possible surgical resection.      Patient Active Problem List   Diagnosis   • Osteoarthritis   • Bradycardia   • Pulmonary hypertension (CMS/HCC)   • Lung cancer, middle lobe (CMS/HCC)   • Status post lobectomy of lung   • COPD (chronic obstructive pulmonary disease) (CMS/HCC)   • Screening PSA (prostate specific antigen)   • PAC (premature atrial contraction)   • Encounter for Medicare annual wellness exam   • Environmental allergies     Past Medical History:   Diagnosis Date   • Abnormal ECG    • Abscess of skin    • Arthritis     HANDS AND BACK    • Bradycardia    • Bunion     LEFT FOOT   • COPD (chronic obstructive pulmonary disease) (CMS/HCC)    • Full dentures    • History of echocardiogram 09/21/2016   • Hx of exercise stress test 09/27/2016    DR. BAKER \"IT WAS NORMAL\"   • Hypertension    • Low back pain    • Lung cancer, middle lobe (CMS/HCC) 11/8/2016    RIGHT MIDDLE LOBE   • Lung nodule     RUL-7MM.  PRESENTLY HAS, AND FOLLOWS WITH DR. LIM.  STATES FOUND \"A COUPLE MONTHS AGO\"   • Premature atrial contraction    • Pulmonary " hypertension (CMS/HCC)    • Skin cancer     BASAL CELL-NOSE, LIP   • Wears eyeglasses      Past Surgical History:   Procedure Laterality Date   • BASAL CELL CARCINOMA EXCISION      NOSE, LIP   • BRONCHOSCOPY  2016   • BUNIONECTOMY Left 2013    GREAT TOE   • COLONOSCOPY  2018   • INGUINAL HERNIA REPAIR Left 2002    Dr. Singh/inguinal    • KNEE ARTHROSCOPY Right 12/12/2018    Procedure: Diagnostic arthroscopy right knee with partial medial meniscectomy;  Surgeon: Dinh Nova MD;  Location: [facility] OR;  Service: Orthopedics   • LUNG REMOVAL, PARTIAL  2016    RIGHT MIDDLE LOBE   • MOUTH SURGERY      FULL EXTRACTION OF TEETH   • SKIN BIOPSY     • THORACOSCOPY N/A 11/18/2016    Procedure: BRONCH THORACOSCOPY VIDEO ASSISTED  WITH LOBECTOMY, MEDIALSTINAL LYMPH NODE DISECTION;  Surgeon: Chris Porter MD;  Location: [facility] OR;  Service:        Current Outpatient Medications:   •  trimethoprim-polymyxin b (POLYTRIM) 13445-8.1 UNIT/ML-% ophthalmic solution, , Disp: , Rfl:   Allergies   Allergen Reactions   • No Known Drug Allergy      Social History     Socioeconomic History   • Marital status:      Spouse name: Not on file   • Number of children: 1   • Years of education: Not on file   • Highest education level: Not on file   Occupational History   • 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     Family History   Problem Relation Age of Onset   • Heart attack Father    • Heart disease Father    • Colon cancer Father    • Colon cancer Mother    • Liver  "cancer Mother      Review of Systems   Constitution: Positive for malaise/fatigue. Negative for chills, fever, night sweats and weight loss.   HENT: Positive for congestion (sinus ). Negative for hearing loss, nosebleeds and odynophagia.    Cardiovascular: Negative for chest pain, claudication, dyspnea on exertion, leg swelling, orthopnea, palpitations and syncope.   Respiratory: Positive for cough and sputum production. Negative for hemoptysis, shortness of breath and wheezing.    Endocrine: Negative for cold intolerance, heat intolerance, polydipsia, polyphagia and polyuria.   Hematologic/Lymphatic: Positive for bleeding problem. Bruises/bleeds easily.   Skin: Negative for itching, poor wound healing and rash.   Musculoskeletal: Positive for arthritis (bilateral hands) and back pain. Negative for joint pain, joint swelling and myalgias.   Gastrointestinal: Negative for abdominal pain, constipation, diarrhea, hematemesis, melena, nausea and vomiting.   Genitourinary: Negative for dysuria, frequency, hematuria, nocturia and urgency.   Neurological: Positive for dizziness (if stands up to fast). Negative for light-headedness, loss of balance and numbness.   Psychiatric/Behavioral: Negative for depression and suicidal ideas. The patient is not nervous/anxious.    Allergic/Immunologic: Positive for environmental allergies. Negative for HIV exposure.     Vitals:    02/18/20 1352   BP: 138/71   Pulse: 54   Temp: 98.1 °F (36.7 °C)   SpO2: 98%   Weight: 82.1 kg (181 lb)   Height: 182.9 cm (72\")      Physical Exam   Constitutional: He is oriented to person, place, and time. He appears well-developed and well-nourished. No distress.    male who appears stated age and is present with his wife   HENT:   Head: Normocephalic and atraumatic.   Eyes: Conjunctivae and EOM are normal. No scleral icterus.   Neck: Normal range of motion. Neck supple. No JVD present. No tracheal deviation present.   Cardiovascular: Normal " rate, regular rhythm and normal heart sounds. Exam reveals no gallop and no friction rub.   No murmur heard.  Pulmonary/Chest: Effort normal and breath sounds normal. No stridor. No respiratory distress. He has no wheezes. He has no rales.   Abdominal: Soft. He exhibits no distension and no mass. There is no tenderness. There is no rebound and no guarding.   Musculoskeletal: Normal range of motion. He exhibits no deformity.   Lymphadenopathy:     He has no cervical adenopathy.     He has no axillary adenopathy.        Right: No supraclavicular adenopathy present.        Left: No supraclavicular adenopathy present.   Neurological: He is alert and oriented to person, place, and time.   Skin: No rash noted. No erythema.   Psychiatric: He has a normal mood and affect. His behavior is normal. Judgment and thought content normal.     Labs/Imaging:  -PET/CT performed 2/7/2020, personally reviewed, demonstrates a right upper lobe nodule with an SUV of 2.3.  There is no active mediastinal lymphadenopathy.  -CT of the chest performed 1/24/2020, per report (images unavailable from [facility]), demonstrates a 1.1 cm right upper lobe nodule that is increased in size from 8 mm on 7/19/2019.  No lymphadenopathy is present.  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  The pleural and bronchial margins are free.  There was lymphovascular invasion present and lymph nodes from stations 2R, 7, 9 and 10 were negative for malignancy. [id number] - STAGE 1A.    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  With his history of lung cancer, this is concerning for possible malignancy.  We discussed options  including continued observation, CyberKnife radiation treatment versus surgical resection.  The patient wished to proceed with surgical resection over the other two options.  This corresponds with NCCN guidelines.  The risks and benefits of surgery were discussed with the patient including pain, bleeding, infection, air leak, conversion to thoracotomy, myocardial infarction and death.  The patient understood these risks and wished to proceed with surgery.  I will discuss his case at multidisciplinary tumor board.  He will need repeat full pulmonary function testing prior to surgery with FEV1 and DLCO.    Patient Active Problem List   Diagnosis   • Osteoarthritis   • Bradycardia   • Pulmonary hypertension (CMS/HCC)   • Lung cancer, middle lobe (CMS/HCC)   • Status post lobectomy of lung   • COPD (chronic obstructive pulmonary disease) (CMS/HCC)   • Screening PSA (prostate specific antigen)   • PAC (premature atrial contraction)   • Encounter for Medicare annual wellness exam   • Environmental allergies

## 2020-02-27 ENCOUNTER — PREP FOR SURGERY (OUTPATIENT)
Dept: OTHER | Facility: HOSPITAL | Age: 65
End: 2020-02-27

## 2020-02-27 DIAGNOSIS — R91.1 RIGHT UPPER LOBE PULMONARY NODULE: Primary | ICD-10-CM

## 2020-02-27 RX ORDER — CHLORHEXIDINE GLUCONATE 500 MG/1
1 CLOTH TOPICAL EVERY 12 HOURS PRN
Status: CANCELLED | OUTPATIENT
Start: 2020-03-12

## 2020-03-02 ENCOUNTER — MDT ASSESSMENT (OUTPATIENT)
Dept: ONCOLOGY | Facility: CLINIC | Age: 65
End: 2020-03-02

## 2020-03-02 NOTE — PROGRESS NOTES
THORACIC CONFERENCE INITIAL TREATMENT PLAN    PRESENTER:   Chris Porter M.D.  DATE:  2020   SITE:  Lung    Saman Aguayo  :  1955  [address] [place] [address]  Home phone: [phone]  Mobile phone: [phone]  Work phone: Work phone not available  MRN:  [id number]    CLINICAL HISTORY:        HPI:  64 YOWM who presented with a slowly-enlarging lung nodule with a history of right NSCC ().       REFERRING PROVIDER: Nannette Lim M.D. (Pulmonary Medicine - [place], [place])         PLACE OF RESIDENCE:   [place], [place]       SOCIAL HISTORY:  Former smoker 3 PPD x50 years.  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       COPD, hypertension       PAST SURGICAL HISTORY:  Right VATS with lobectomy (2016) and bronchoscopy (10/25/2016).           PFTs: 01/15/2019 ([place], [place])    IMAGIN/12/2018 ([facility]):  CT chest - Right upper lobe nodule in the medial right lung apex has enlarged measuring 7 mm, previously 5 mm. When reviewing older exams from 2017, there is definite enlargement of this nodule.       2020 ([facility]):  CT chest - There has been interval increase in size of a RUL nodule measuring 11 x 8 mm. Previously  measured 8 x 6 mm.        2020 ([facility]):  PET w/CT - In the chest, there is a small but increasing medial right apical  pulmonary nodule with maximal SUV today of 2.3, previously 1.4, concerning for either recurrent disease or new primary.    BIOPSY/STAGING RESULTS     None Performed    CLINICAL STAGING  T1b N0 M0 stage IA2    RECOMMENDED TREATMENT  [facility]  Refer to medical oncology for gene analysis  Followed by active surveillance    REFERRAL SUPPORT  Medical  Oncology recommended: yes    Electronically signed by:  Nia Elkins MD  03/06/20  6:12 PM  Privileged and Confidential Patient Safety Work Product:  The information contained herein has been compiled as part of the [facility] Patient Safety Evaluation System and is deemed to be a Patient Safety Work Product and is privileged and confidential.  Disclaimer:  Multidisciplinary cancer conferences provide consultative services to formulate an effective treatment plan for patients and include physician representation from medical oncology, radiation oncology, radiology, surgery, and pathology.  AJCC or other appropriate staging is discussed, along with site-specific prognostic indicators and treatment planning used by evidence-based treatment guidelines.  Treatment plans which are discussed during conference may/may not necessarily be followed by the patient’s managing physician(s), as there may be other factors taken into consideration which impact the treatment plan.  The specific treatment plan is ultimately determined on an individual basis by the patient and the physician(s) involved in his/her care.

## 2020-03-12 ENCOUNTER — ANESTHESIA EVENT (OUTPATIENT)
Dept: PERIOP | Facility: HOSPITAL | Age: 65
End: 2020-03-12

## 2020-03-12 ENCOUNTER — HOSPITAL ENCOUNTER (OUTPATIENT)
Dept: GENERAL RADIOLOGY | Facility: HOSPITAL | Age: 65
Discharge: HOME | End: 2020-03-12
Admitting: PHYSICIAN ASSISTANT

## 2020-03-12 ENCOUNTER — APPOINTMENT (OUTPATIENT)
Dept: PREADMISSION TESTING | Facility: HOSPITAL | Age: 65
End: 2020-03-12

## 2020-03-12 ENCOUNTER — HOSPITAL ENCOUNTER (OUTPATIENT)
Dept: PULMONOLOGY | Facility: HOSPITAL | Age: 65
Discharge: HOME | End: 2020-03-12

## 2020-03-12 ENCOUNTER — TELEPHONE (OUTPATIENT)
Dept: CARDIOLOGY | Facility: CLINIC | Age: 65
End: 2020-03-12

## 2020-03-12 VITALS — OXYGEN SATURATION: 94 % | HEIGHT: 72 IN | WEIGHT: 178 LBS | BODY MASS INDEX: 24.11 KG/M2

## 2020-03-12 DIAGNOSIS — R91.1 RIGHT UPPER LOBE PULMONARY NODULE: ICD-10-CM

## 2020-03-12 LAB
ABO GROUP BLD: NORMAL
ALBUMIN SERPL-MCNC: 4.5 G/DL (ref 3.5–5.2)
ALP SERPL-CCNC: 65 U/L (ref 39–117)
ALT SERPL W P-5'-P-CCNC: 15 U/L (ref 1–41)
ANION GAP SERPL CALCULATED.3IONS-SCNC: 9 MMOL/L (ref 5–15)
AST SERPL-CCNC: 16 U/L (ref 1–40)
BASOPHILS # BLD AUTO: 0.04 10*3/MM3 (ref 0–0.2)
BASOPHILS NFR BLD AUTO: 0.6 % (ref 0–1.5)
BILIRUB CONJ SERPL-MCNC: <0.2 MG/DL (ref 0.2–0.3)
BILIRUB INDIRECT SERPL-MCNC: ABNORMAL MG/DL
BILIRUB SERPL-MCNC: 0.6 MG/DL (ref 0.2–1.2)
BLD GP AB SCN SERPL QL: NEGATIVE
BUN BLD-MCNC: 15 MG/DL (ref 8–23)
BUN/CREAT SERPL: 17.2 (ref 7–25)
CALCIUM SPEC-SCNC: 9.7 MG/DL (ref 8.6–10.5)
CHLORIDE SERPL-SCNC: 99 MMOL/L (ref 98–107)
CO2 SERPL-SCNC: 30 MMOL/L (ref 22–29)
CREAT BLD-MCNC: 0.87 MG/DL (ref 0.76–1.27)
DEPRECATED RDW RBC AUTO: 43.8 FL (ref 37–54)
EOSINOPHIL # BLD AUTO: 0.14 10*3/MM3 (ref 0–0.4)
EOSINOPHIL NFR BLD AUTO: 1.9 % (ref 0.3–6.2)
ERYTHROCYTE [DISTWIDTH] IN BLOOD BY AUTOMATED COUNT: 12.8 % (ref 12.3–15.4)
GFR SERPL CREATININE-BSD FRML MDRD: 88 ML/MIN/1.73
GLUCOSE BLD-MCNC: 109 MG/DL (ref 65–99)
HBA1C MFR BLD: 5.5 % (ref 4.8–5.6)
HCT VFR BLD AUTO: 48.2 % (ref 37.5–51)
HGB BLD-MCNC: 16.7 G/DL (ref 13–17.7)
IMM GRANULOCYTES # BLD AUTO: 0.03 10*3/MM3 (ref 0–0.05)
IMM GRANULOCYTES NFR BLD AUTO: 0.4 % (ref 0–0.5)
INR PPP: 1.01 (ref 0.85–1.16)
LYMPHOCYTES # BLD AUTO: 1.76 10*3/MM3 (ref 0.7–3.1)
LYMPHOCYTES NFR BLD AUTO: 24.3 % (ref 19.6–45.3)
MCH RBC QN AUTO: 32.6 PG (ref 26.6–33)
MCHC RBC AUTO-ENTMCNC: 34.6 G/DL (ref 31.5–35.7)
MCV RBC AUTO: 94 FL (ref 79–97)
MONOCYTES # BLD AUTO: 0.56 10*3/MM3 (ref 0.1–0.9)
MONOCYTES NFR BLD AUTO: 7.7 % (ref 5–12)
NEUTROPHILS # BLD AUTO: 4.72 10*3/MM3 (ref 1.7–7)
NEUTROPHILS NFR BLD AUTO: 65.1 % (ref 42.7–76)
NRBC BLD AUTO-RTO: 0 /100 WBC (ref 0–0.2)
PLATELET # BLD AUTO: 181 10*3/MM3 (ref 140–450)
PMV BLD AUTO: 10.8 FL (ref 6–12)
POTASSIUM BLD-SCNC: 4.8 MMOL/L (ref 3.5–5.2)
PROT SERPL-MCNC: 7.5 G/DL (ref 6–8.5)
PROTHROMBIN TIME: 13 SECONDS (ref 11.5–14)
RBC # BLD AUTO: 5.13 10*6/MM3 (ref 4.14–5.8)
RH BLD: POSITIVE
SODIUM BLD-SCNC: 138 MMOL/L (ref 136–145)
T&S EXPIRATION DATE: NORMAL
WBC NRBC COR # BLD: 7.25 10*3/MM3 (ref 3.4–10.8)

## 2020-03-12 PROCEDURE — 86901 BLOOD TYPING SEROLOGIC RH(D): CPT | Performed by: PHYSICIAN ASSISTANT

## 2020-03-12 PROCEDURE — 80076 HEPATIC FUNCTION PANEL: CPT | Performed by: PHYSICIAN ASSISTANT

## 2020-03-12 PROCEDURE — 80048 BASIC METABOLIC PNL TOTAL CA: CPT | Performed by: PHYSICIAN ASSISTANT

## 2020-03-12 PROCEDURE — 86923 COMPATIBILITY TEST ELECTRIC: CPT

## 2020-03-12 PROCEDURE — 71046 X-RAY EXAM CHEST 2 VIEWS: CPT

## 2020-03-12 PROCEDURE — 94010 BREATHING CAPACITY TEST: CPT

## 2020-03-12 PROCEDURE — 85610 PROTHROMBIN TIME: CPT | Performed by: PHYSICIAN ASSISTANT

## 2020-03-12 PROCEDURE — 94010 BREATHING CAPACITY TEST: CPT | Performed by: INTERNAL MEDICINE

## 2020-03-12 PROCEDURE — 93010 ELECTROCARDIOGRAM REPORT: CPT | Performed by: INTERNAL MEDICINE

## 2020-03-12 PROCEDURE — 93005 ELECTROCARDIOGRAM TRACING: CPT

## 2020-03-12 PROCEDURE — 36415 COLL VENOUS BLD VENIPUNCTURE: CPT

## 2020-03-12 PROCEDURE — 86900 BLOOD TYPING SEROLOGIC ABO: CPT | Performed by: PHYSICIAN ASSISTANT

## 2020-03-12 PROCEDURE — 86850 RBC ANTIBODY SCREEN: CPT | Performed by: PHYSICIAN ASSISTANT

## 2020-03-12 PROCEDURE — 83036 HEMOGLOBIN GLYCOSYLATED A1C: CPT | Performed by: PHYSICIAN ASSISTANT

## 2020-03-12 PROCEDURE — 85025 COMPLETE CBC W/AUTO DIFF WBC: CPT | Performed by: PHYSICIAN ASSISTANT

## 2020-03-12 RX ORDER — CHLORHEXIDINE GLUCONATE 500 MG/1
1 CLOTH TOPICAL EVERY 12 HOURS PRN
Status: DISCONTINUED | OUTPATIENT
Start: 2020-03-12 | End: 2020-03-16

## 2020-03-12 NOTE — TELEPHONE ENCOUNTER
After evaluation of his stress test 05/19, He has a low risk myocardial perfusion stress test, no ischemia, and EF of 52% . He may proceed at low cardiac risk .

## 2020-03-12 NOTE — ANESTHESIA PREPROCEDURE EVALUATION
Anesthesia Evaluation     Patient summary reviewed and Nursing notes reviewed   NPO Solid Status: > 8 hours  NPO Liquid Status: > 2 hours           Airway   Mallampati: II  TM distance: >3 FB  Neck ROM: full  No difficulty expected  Dental      Pulmonary    (+) a smoker (2016) Former, lung cancer, COPD moderate,   (-) asthma, shortness of breath, recent URI    ROS comment: SP THOR LOBECTOMY   PULM HTN   RA sats 96%   Cardiovascular   Exercise tolerance: good (4-7 METS)    ECG reviewed    (+) hypertension, dysrhythmias,   (-) past MI, CAD, angina, cardiac stents    ROS comment: ECG SB c PAC's pattern of bigeminy    ECHO EF 45% Moderate to sev TVR RVSP(TR) 58     GXT EF = 52%.  normal myocardial perfusion -no evidence of ischemia.  Low risk study. Normal ECG stress test.  Stress negative for myocardial ischemia; Normal Yoon scan        Neuro/Psych  (-) seizures, CVA  GI/Hepatic/Renal/Endo    (-) liver disease, no renal disease, diabetes, no thyroid disorder    Musculoskeletal     Abdominal    Substance History      OB/GYN          Other   arthritis,    history of cancer (RML resection 2016 )        Phys Exam Other: Downey 2 grade IIa after cricoid pressure               Anesthesia Plan    ASA 3     general with block   (Intercostals per surgeon)  intravenous induction     Anesthetic plan, all risks, benefits, and alternatives have been provided, discussed and informed consent has been obtained with: patient.    Plan discussed with CRNA.

## 2020-03-12 NOTE — PAT
Cardiac clearance on chart.     Patient to apply Chlorhexadine wipes  to surgical area (as instructed) the night before procedure and the AM of procedure. Wipes provided.    Bactroban given to patient along with verbal and written instructions to use tonight in both nostrils. Patient and wife verbalized understanding.

## 2020-03-12 NOTE — TELEPHONE ENCOUNTER
Patient is to have Bronchoscopy w/ lobectomy tomorrow and needs Pre-op Clearance. This was not known about at his last visit. Please review and advise.

## 2020-03-13 ENCOUNTER — HOSPITAL ENCOUNTER (INPATIENT)
Facility: HOSPITAL | Age: 65
LOS: 7 days | Discharge: HOME | End: 2020-03-20
Attending: THORACIC SURGERY (CARDIOTHORACIC VASCULAR SURGERY) | Admitting: THORACIC SURGERY (CARDIOTHORACIC VASCULAR SURGERY)

## 2020-03-13 ENCOUNTER — ANESTHESIA (OUTPATIENT)
Dept: PERIOP | Facility: HOSPITAL | Age: 65
End: 2020-03-13

## 2020-03-13 ENCOUNTER — APPOINTMENT (OUTPATIENT)
Dept: GENERAL RADIOLOGY | Facility: HOSPITAL | Age: 65
End: 2020-03-13

## 2020-03-13 DIAGNOSIS — R91.1 RIGHT UPPER LOBE PULMONARY NODULE: ICD-10-CM

## 2020-03-13 DIAGNOSIS — C34.2 LUNG CANCER, MIDDLE LOBE (HCC): Primary | ICD-10-CM

## 2020-03-13 DIAGNOSIS — J43.8 OTHER EMPHYSEMA (HCC): ICD-10-CM

## 2020-03-13 DIAGNOSIS — C34.2 LUNG CANCER, MIDDLE LOBE (HCC): ICD-10-CM

## 2020-03-13 PROBLEM — I50.20 SYSTOLIC HEART FAILURE: Status: ACTIVE | Noted: 2020-03-13

## 2020-03-13 PROBLEM — I38 VALVULAR HEART DISEASE: Status: ACTIVE | Noted: 2020-03-13

## 2020-03-13 PROCEDURE — 32480 PARTIAL REMOVAL OF LUNG: CPT | Performed by: THORACIC SURGERY (CARDIOTHORACIC VASCULAR SURGERY)

## 2020-03-13 PROCEDURE — 25010000002 PROPOFOL 10 MG/ML EMULSION: Performed by: NURSE ANESTHETIST, CERTIFIED REGISTERED

## 2020-03-13 PROCEDURE — 07T70ZZ RESECTION OF THORAX LYMPHATIC, OPEN APPROACH: ICD-10-PCS | Performed by: THORACIC SURGERY (CARDIOTHORACIC VASCULAR SURGERY)

## 2020-03-13 PROCEDURE — 25010000002 CEFUROXIME: Performed by: PHYSICIAN ASSISTANT

## 2020-03-13 PROCEDURE — 25010000003 CEFUROXIME SODIUM 1.5 G RECONSTITUTED SOLUTION: Performed by: PHYSICIAN ASSISTANT

## 2020-03-13 PROCEDURE — 88309 TISSUE EXAM BY PATHOLOGIST: CPT | Performed by: THORACIC SURGERY (CARDIOTHORACIC VASCULAR SURGERY)

## 2020-03-13 PROCEDURE — 88341 IMHCHEM/IMCYTCHM EA ADD ANTB: CPT | Performed by: THORACIC SURGERY (CARDIOTHORACIC VASCULAR SURGERY)

## 2020-03-13 PROCEDURE — 94799 UNLISTED PULMONARY SVC/PX: CPT

## 2020-03-13 PROCEDURE — 71045 X-RAY EXAM CHEST 1 VIEW: CPT

## 2020-03-13 PROCEDURE — 25010000002 MORPHINE PER 10 MG: Performed by: PHYSICIAN ASSISTANT

## 2020-03-13 PROCEDURE — C2618 PROBE/NEEDLE, CRYO: HCPCS | Performed by: THORACIC SURGERY (CARDIOTHORACIC VASCULAR SURGERY)

## 2020-03-13 PROCEDURE — 88307 TISSUE EXAM BY PATHOLOGIST: CPT | Performed by: THORACIC SURGERY (CARDIOTHORACIC VASCULAR SURGERY)

## 2020-03-13 PROCEDURE — 0BTC0ZZ RESECTION OF RIGHT UPPER LUNG LOBE, OPEN APPROACH: ICD-10-PCS | Performed by: THORACIC SURGERY (CARDIOTHORACIC VASCULAR SURGERY)

## 2020-03-13 PROCEDURE — 88331 PATH CONSLTJ SURG 1 BLK 1SPC: CPT | Performed by: PATHOLOGY

## 2020-03-13 PROCEDURE — 38746 REMOVE THORACIC LYMPH NODES: CPT | Performed by: THORACIC SURGERY (CARDIOTHORACIC VASCULAR SURGERY)

## 2020-03-13 PROCEDURE — 0BTC4ZZ RESECTION OF RIGHT UPPER LUNG LOBE, PERCUTANEOUS ENDOSCOPIC APPROACH: ICD-10-PCS | Performed by: THORACIC SURGERY (CARDIOTHORACIC VASCULAR SURGERY)

## 2020-03-13 PROCEDURE — 25010000003 LIDOCAINE 1 % SOLUTION: Performed by: NURSE ANESTHETIST, CERTIFIED REGISTERED

## 2020-03-13 PROCEDURE — 25010000002 HYDROMORPHONE PER 4 MG: Performed by: NURSE ANESTHETIST, CERTIFIED REGISTERED

## 2020-03-13 PROCEDURE — 25010000002 DEXAMETHASONE PER 1 MG: Performed by: NURSE ANESTHETIST, CERTIFIED REGISTERED

## 2020-03-13 PROCEDURE — 25010000002 ONDANSETRON PER 1 MG: Performed by: NURSE ANESTHETIST, CERTIFIED REGISTERED

## 2020-03-13 PROCEDURE — 31622 DX BRONCHOSCOPE/WASH: CPT | Performed by: THORACIC SURGERY (CARDIOTHORACIC VASCULAR SURGERY)

## 2020-03-13 PROCEDURE — 25010000002 MORPHINE PER 10 MG: Performed by: THORACIC SURGERY (CARDIOTHORACIC VASCULAR SURGERY)

## 2020-03-13 PROCEDURE — 88342 IMHCHEM/IMCYTCHM 1ST ANTB: CPT | Performed by: THORACIC SURGERY (CARDIOTHORACIC VASCULAR SURGERY)

## 2020-03-13 PROCEDURE — 32507 WEDGE RESECT OF LUNG DIAG: CPT | Performed by: THORACIC SURGERY (CARDIOTHORACIC VASCULAR SURGERY)

## 2020-03-13 PROCEDURE — 25010000002 FENTANYL CITRATE (PF) 100 MCG/2ML SOLUTION: Performed by: NURSE ANESTHETIST, CERTIFIED REGISTERED

## 2020-03-13 PROCEDURE — 25010000002 NEOSTIGMINE 10 MG/10ML SOLUTION: Performed by: NURSE ANESTHETIST, CERTIFIED REGISTERED

## 2020-03-13 PROCEDURE — 0BJ08ZZ INSPECTION OF TRACHEOBRONCHIAL TREE, VIA NATURAL OR ARTIFICIAL OPENING ENDOSCOPIC: ICD-10-PCS | Performed by: THORACIC SURGERY (CARDIOTHORACIC VASCULAR SURGERY)

## 2020-03-13 PROCEDURE — 99232 SBSQ HOSP IP/OBS MODERATE 35: CPT | Performed by: INTERNAL MEDICINE

## 2020-03-13 DEVICE — ARTICULATION RELOAD WITH TRI-STAPLE TECHNOLOGY
Type: IMPLANTABLE DEVICE | Site: LUNG | Status: FUNCTIONAL
Brand: ENDO GIA

## 2020-03-13 DEVICE — ARTICULATING RELOAD WITH TRI-STAPLE TECHNOLOGY
Type: IMPLANTABLE DEVICE | Site: LUNG | Status: FUNCTIONAL
Brand: ENDO GIA

## 2020-03-13 DEVICE — BLACK INTELLIGENT RELOAD
Type: IMPLANTABLE DEVICE | Site: LUNG | Status: FUNCTIONAL
Brand: TRI-STAPLE 2.0

## 2020-03-13 DEVICE — CURVED TIP INTELLIGENT RELOAD AND INTRODUCER
Type: IMPLANTABLE DEVICE | Site: LUNG | Status: FUNCTIONAL
Brand: TRI-STAPLE 2.0

## 2020-03-13 RX ORDER — LIDOCAINE HYDROCHLORIDE 10 MG/ML
INJECTION, SOLUTION INFILTRATION; PERINEURAL AS NEEDED
Status: DISCONTINUED | OUTPATIENT
Start: 2020-03-13 | End: 2020-03-13 | Stop reason: SURG

## 2020-03-13 RX ORDER — ONDANSETRON 2 MG/ML
4 INJECTION INTRAMUSCULAR; INTRAVENOUS EVERY 6 HOURS PRN
Status: DISCONTINUED | OUTPATIENT
Start: 2020-03-13 | End: 2020-03-20 | Stop reason: HOSPADM

## 2020-03-13 RX ORDER — PROMETHAZINE HYDROCHLORIDE 25 MG/ML
6.25 INJECTION, SOLUTION INTRAMUSCULAR; INTRAVENOUS ONCE AS NEEDED
Status: DISCONTINUED | OUTPATIENT
Start: 2020-03-13 | End: 2020-03-13 | Stop reason: HOSPADM

## 2020-03-13 RX ORDER — FAMOTIDINE 20 MG/1
20 TABLET, FILM COATED ORAL
Status: DISCONTINUED | OUTPATIENT
Start: 2020-03-13 | End: 2020-03-13 | Stop reason: HOSPADM

## 2020-03-13 RX ORDER — ROCURONIUM BROMIDE 10 MG/ML
INJECTION, SOLUTION INTRAVENOUS AS NEEDED
Status: DISCONTINUED | OUTPATIENT
Start: 2020-03-13 | End: 2020-03-13 | Stop reason: SURG

## 2020-03-13 RX ORDER — DEXAMETHASONE SODIUM PHOSPHATE 4 MG/ML
INJECTION, SOLUTION INTRA-ARTICULAR; INTRALESIONAL; INTRAMUSCULAR; INTRAVENOUS; SOFT TISSUE AS NEEDED
Status: DISCONTINUED | OUTPATIENT
Start: 2020-03-13 | End: 2020-03-13 | Stop reason: SURG

## 2020-03-13 RX ORDER — PROMETHAZINE HYDROCHLORIDE 25 MG/1
25 SUPPOSITORY RECTAL ONCE AS NEEDED
Status: DISCONTINUED | OUTPATIENT
Start: 2020-03-13 | End: 2020-03-13 | Stop reason: HOSPADM

## 2020-03-13 RX ORDER — GLYCOPYRROLATE 0.2 MG/ML
INJECTION INTRAMUSCULAR; INTRAVENOUS AS NEEDED
Status: DISCONTINUED | OUTPATIENT
Start: 2020-03-13 | End: 2020-03-13 | Stop reason: SURG

## 2020-03-13 RX ORDER — MAGNESIUM HYDROXIDE 1200 MG/15ML
LIQUID ORAL AS NEEDED
Status: DISCONTINUED | OUTPATIENT
Start: 2020-03-13 | End: 2020-03-13 | Stop reason: HOSPADM

## 2020-03-13 RX ORDER — SODIUM CHLORIDE 9 MG/ML
30 INJECTION, SOLUTION INTRAVENOUS CONTINUOUS
Status: DISCONTINUED | OUTPATIENT
Start: 2020-03-13 | End: 2020-03-14

## 2020-03-13 RX ORDER — PROMETHAZINE HYDROCHLORIDE 25 MG/1
25 TABLET ORAL ONCE AS NEEDED
Status: DISCONTINUED | OUTPATIENT
Start: 2020-03-13 | End: 2020-03-13 | Stop reason: HOSPADM

## 2020-03-13 RX ORDER — HEPARIN SODIUM 5000 [USP'U]/ML
5000 INJECTION, SOLUTION INTRAVENOUS; SUBCUTANEOUS EVERY 12 HOURS SCHEDULED
Status: DISCONTINUED | OUTPATIENT
Start: 2020-03-14 | End: 2020-03-20 | Stop reason: HOSPADM

## 2020-03-13 RX ORDER — SODIUM CHLORIDE 0.9 % (FLUSH) 0.9 %
10 SYRINGE (ML) INJECTION AS NEEDED
Status: DISCONTINUED | OUTPATIENT
Start: 2020-03-13 | End: 2020-03-13 | Stop reason: HOSPADM

## 2020-03-13 RX ORDER — SODIUM CHLORIDE 0.9 % (FLUSH) 0.9 %
10 SYRINGE (ML) INJECTION EVERY 12 HOURS SCHEDULED
Status: DISCONTINUED | OUTPATIENT
Start: 2020-03-13 | End: 2020-03-13 | Stop reason: HOSPADM

## 2020-03-13 RX ORDER — FENTANYL CITRATE 50 UG/ML
INJECTION, SOLUTION INTRAMUSCULAR; INTRAVENOUS AS NEEDED
Status: DISCONTINUED | OUTPATIENT
Start: 2020-03-13 | End: 2020-03-13 | Stop reason: SURG

## 2020-03-13 RX ORDER — HYDROCODONE BITARTRATE AND ACETAMINOPHEN 7.5; 325 MG/1; MG/1
1 TABLET ORAL EVERY 6 HOURS PRN
Status: DISCONTINUED | OUTPATIENT
Start: 2020-03-13 | End: 2020-03-15

## 2020-03-13 RX ORDER — BUPIVACAINE HYDROCHLORIDE AND EPINEPHRINE 5; 5 MG/ML; UG/ML
INJECTION, SOLUTION PERINEURAL AS NEEDED
Status: DISCONTINUED | OUTPATIENT
Start: 2020-03-13 | End: 2020-03-13 | Stop reason: HOSPADM

## 2020-03-13 RX ORDER — PROPOFOL 10 MG/ML
VIAL (ML) INTRAVENOUS AS NEEDED
Status: DISCONTINUED | OUTPATIENT
Start: 2020-03-13 | End: 2020-03-13 | Stop reason: SURG

## 2020-03-13 RX ORDER — HYDROMORPHONE HYDROCHLORIDE 1 MG/ML
0.5 INJECTION, SOLUTION INTRAMUSCULAR; INTRAVENOUS; SUBCUTANEOUS
Status: DISCONTINUED | OUTPATIENT
Start: 2020-03-13 | End: 2020-03-13 | Stop reason: HOSPADM

## 2020-03-13 RX ORDER — MEPERIDINE HYDROCHLORIDE 25 MG/ML
12.5 INJECTION INTRAMUSCULAR; INTRAVENOUS; SUBCUTANEOUS
Status: DISCONTINUED | OUTPATIENT
Start: 2020-03-13 | End: 2020-03-13 | Stop reason: HOSPADM

## 2020-03-13 RX ORDER — FENTANYL CITRATE 50 UG/ML
50 INJECTION, SOLUTION INTRAMUSCULAR; INTRAVENOUS
Status: DISCONTINUED | OUTPATIENT
Start: 2020-03-13 | End: 2020-03-13 | Stop reason: HOSPADM

## 2020-03-13 RX ORDER — LIDOCAINE HYDROCHLORIDE 10 MG/ML
0.5 INJECTION, SOLUTION EPIDURAL; INFILTRATION; INTRACAUDAL; PERINEURAL ONCE AS NEEDED
Status: COMPLETED | OUTPATIENT
Start: 2020-03-13 | End: 2020-03-13

## 2020-03-13 RX ORDER — AMOXICILLIN 250 MG
2 CAPSULE ORAL NIGHTLY
Status: DISCONTINUED | OUTPATIENT
Start: 2020-03-13 | End: 2020-03-20 | Stop reason: HOSPADM

## 2020-03-13 RX ORDER — MORPHINE SULFATE 4 MG/ML
2 INJECTION, SOLUTION INTRAMUSCULAR; INTRAVENOUS EVERY 4 HOURS PRN
Status: DISCONTINUED | OUTPATIENT
Start: 2020-03-13 | End: 2020-03-13

## 2020-03-13 RX ORDER — ONDANSETRON 4 MG/1
4 TABLET, FILM COATED ORAL EVERY 6 HOURS PRN
Status: DISCONTINUED | OUTPATIENT
Start: 2020-03-13 | End: 2020-03-20 | Stop reason: HOSPADM

## 2020-03-13 RX ORDER — NEOSTIGMINE METHYLSULFATE 1 MG/ML
INJECTION, SOLUTION INTRAVENOUS AS NEEDED
Status: DISCONTINUED | OUTPATIENT
Start: 2020-03-13 | End: 2020-03-13 | Stop reason: SURG

## 2020-03-13 RX ORDER — BISACODYL 10 MG
10 SUPPOSITORY, RECTAL RECTAL DAILY PRN
Status: DISCONTINUED | OUTPATIENT
Start: 2020-03-13 | End: 2020-03-20 | Stop reason: HOSPADM

## 2020-03-13 RX ORDER — SODIUM CHLORIDE, SODIUM LACTATE, POTASSIUM CHLORIDE, CALCIUM CHLORIDE 600; 310; 30; 20 MG/100ML; MG/100ML; MG/100ML; MG/100ML
9 INJECTION, SOLUTION INTRAVENOUS CONTINUOUS PRN
Status: DISCONTINUED | OUTPATIENT
Start: 2020-03-13 | End: 2020-03-13

## 2020-03-13 RX ORDER — ONDANSETRON 2 MG/ML
INJECTION INTRAMUSCULAR; INTRAVENOUS AS NEEDED
Status: DISCONTINUED | OUTPATIENT
Start: 2020-03-13 | End: 2020-03-13 | Stop reason: SURG

## 2020-03-13 RX ORDER — ACETAMINOPHEN 325 MG/1
650 TABLET ORAL EVERY 4 HOURS PRN
Status: DISCONTINUED | OUTPATIENT
Start: 2020-03-13 | End: 2020-03-20 | Stop reason: HOSPADM

## 2020-03-13 RX ORDER — MORPHINE SULFATE 4 MG/ML
2 INJECTION, SOLUTION INTRAMUSCULAR; INTRAVENOUS
Status: DISCONTINUED | OUTPATIENT
Start: 2020-03-13 | End: 2020-03-20 | Stop reason: HOSPADM

## 2020-03-13 RX ADMIN — HYDROMORPHONE HYDROCHLORIDE 0.5 MG: 1 INJECTION, SOLUTION INTRAMUSCULAR; INTRAVENOUS; SUBCUTANEOUS at 12:49

## 2020-03-13 RX ADMIN — FENTANYL CITRATE 50 MCG: 50 INJECTION INTRAMUSCULAR; INTRAVENOUS at 13:55

## 2020-03-13 RX ADMIN — ROCURONIUM BROMIDE 10 MG: 10 INJECTION INTRAVENOUS at 10:06

## 2020-03-13 RX ADMIN — FENTANYL CITRATE 100 MCG: 50 INJECTION, SOLUTION INTRAMUSCULAR; INTRAVENOUS at 07:48

## 2020-03-13 RX ADMIN — LIDOCAINE HYDROCHLORIDE 0.4 ML: 10 INJECTION, SOLUTION EPIDURAL; INFILTRATION; INTRACAUDAL; PERINEURAL at 06:52

## 2020-03-13 RX ADMIN — ROCURONIUM BROMIDE 20 MG: 10 INJECTION INTRAVENOUS at 08:39

## 2020-03-13 RX ADMIN — LIDOCAINE HYDROCHLORIDE 50 MG: 10 INJECTION, SOLUTION INFILTRATION; PERINEURAL at 07:48

## 2020-03-13 RX ADMIN — MORPHINE SULFATE 2 MG: 4 INJECTION, SOLUTION INTRAMUSCULAR; INTRAVENOUS at 18:57

## 2020-03-13 RX ADMIN — FENTANYL CITRATE 50 MCG: 50 INJECTION, SOLUTION INTRAMUSCULAR; INTRAVENOUS at 11:47

## 2020-03-13 RX ADMIN — HYDROCODONE BITARTRATE AND ACETAMINOPHEN 1 TABLET: 7.5; 325 TABLET ORAL at 15:26

## 2020-03-13 RX ADMIN — GLYCOPYRROLATE 0.4 MG: 0.2 INJECTION INTRAMUSCULAR; INTRAVENOUS at 11:33

## 2020-03-13 RX ADMIN — DEXAMETHASONE SODIUM PHOSPHATE 8 MG: 4 INJECTION, SOLUTION INTRAMUSCULAR; INTRAVENOUS at 07:56

## 2020-03-13 RX ADMIN — SODIUM CHLORIDE, POTASSIUM CHLORIDE, SODIUM LACTATE AND CALCIUM CHLORIDE 9 ML/HR: 600; 310; 30; 20 INJECTION, SOLUTION INTRAVENOUS at 06:58

## 2020-03-13 RX ADMIN — ONDANSETRON 4 MG: 2 INJECTION INTRAMUSCULAR; INTRAVENOUS at 11:17

## 2020-03-13 RX ADMIN — HYDROMORPHONE HYDROCHLORIDE 0.5 MG: 1 INJECTION, SOLUTION INTRAMUSCULAR; INTRAVENOUS; SUBCUTANEOUS at 14:02

## 2020-03-13 RX ADMIN — MORPHINE SULFATE 2 MG: 4 INJECTION, SOLUTION INTRAMUSCULAR; INTRAVENOUS at 15:26

## 2020-03-13 RX ADMIN — SODIUM CHLORIDE 30 ML/HR: 9 INJECTION, SOLUTION INTRAVENOUS at 14:38

## 2020-03-13 RX ADMIN — ROCURONIUM BROMIDE 20 MG: 10 INJECTION INTRAVENOUS at 09:31

## 2020-03-13 RX ADMIN — NEOSTIGMINE 4 MG: 1 INJECTION INTRAVENOUS at 11:33

## 2020-03-13 RX ADMIN — FENTANYL CITRATE 50 MCG: 50 INJECTION, SOLUTION INTRAMUSCULAR; INTRAVENOUS at 11:59

## 2020-03-13 RX ADMIN — CEFUROXIME SODIUM 1.5 G: 1.5 INJECTION, POWDER, FOR SOLUTION INTRAVENOUS at 18:37

## 2020-03-13 RX ADMIN — CEFUROXIME 1.5 G: 1.5 INJECTION, POWDER, FOR SOLUTION INTRAVENOUS at 10:42

## 2020-03-13 RX ADMIN — FENTANYL CITRATE 50 MCG: 50 INJECTION INTRAMUSCULAR; INTRAVENOUS at 13:06

## 2020-03-13 RX ADMIN — CEFUROXIME 1.5 G: 1.5 INJECTION, POWDER, FOR SOLUTION INTRAVENOUS at 07:43

## 2020-03-13 RX ADMIN — PROPOFOL 150 MG: 10 INJECTION, EMULSION INTRAVENOUS at 07:48

## 2020-03-13 RX ADMIN — MORPHINE SULFATE 2 MG: 4 INJECTION, SOLUTION INTRAMUSCULAR; INTRAVENOUS at 20:51

## 2020-03-13 RX ADMIN — ROCURONIUM BROMIDE 50 MG: 10 INJECTION INTRAVENOUS at 07:48

## 2020-03-13 RX ADMIN — HYDROCODONE BITARTRATE AND ACETAMINOPHEN 1 TABLET: 7.5; 325 TABLET ORAL at 23:11

## 2020-03-13 RX ADMIN — SENNOSIDES AND DOCUSATE SODIUM 2 TABLET: 8.6; 5 TABLET ORAL at 20:12

## 2020-03-13 RX ADMIN — FAMOTIDINE 20 MG: 20 TABLET, FILM COATED ORAL at 06:52

## 2020-03-13 RX ADMIN — HYDROMORPHONE HYDROCHLORIDE 0.5 MG: 1 INJECTION, SOLUTION INTRAMUSCULAR; INTRAVENOUS; SUBCUTANEOUS at 12:30

## 2020-03-13 NOTE — PLAN OF CARE
Problem: Patient Care Overview  Goal: Plan of Care Review  Outcome: Ongoing (interventions implemented as appropriate)  Flowsheets (Taken 3/13/2020 8123)  Progress: improving  Plan of Care Reviewed With: patient  Outcome Summary: Pt arrived from PACU.  Pt arrived with NRB, currently on 6LNC.  Pt has 2 chest tubes; fluctuation present, no subq air detected.  CT thin and serosanguineous.  Pt complains of pain, given meds to aid.  VSS; will continue to monitor.

## 2020-03-13 NOTE — BRIEF OP NOTE
BRONCHOSCOPY, THORACOSCOPY VIDEO ASSISTED WITH LOBECTOMY  Progress Note    Saman Aguayo  3/13/2020    Pre-op Diagnosis:   Lung cancer, middle lobe (CMS/HCC) [C34.2]  Right upper lobe pulmonary nodule [R91.1]       Post-Op Diagnosis Codes:     * Lung cancer, middle lobe (CMS/HCC) [C34.2]     * Right upper lobe pulmonary nodule [R91.1]    Procedure/CPT® Codes:      Procedure(s):  BRONCHOSCOPY  Redo right THORACOSCOPY VIDEO ASSISTED with right upper lobe wedge resection, conversion to open thorocotomy for right upper lobectomy with intercostal nerve cryoablation    Surgeon(s):  Chris Porter MD Earle, Gary F, MD    Anesthesia: General    Staff:   Circulator: Hiro Galloway RN; Noa Magallanes RN  Scrub Person: Donna Wilkerson; Britt Chamberlain; Harjinder Weeks  Nursing Assistant: Ursula Rivera; Aziza Harvey; Tri Nugent  Assistant: Efrain Landa PA    Estimated Blood Loss: 150 mL    Urine Voided: * No values recorded between 3/13/2020  7:43 AM and 3/13/2020 12:07 PM *    Specimens:                Specimens     ID Source Type Tests Collected By Collected At Frozen?      A Lung, Right Upper Lobe Tissue · TISSUE PATHOLOGY EXAM   Chris Porter MD 3/13/20 0840 Yes     Description: Right upper lobe wedge resection for frozen     This specimen was not marked as sent.    B Lung, Right Upper Lobe Tissue · TISSUE PATHOLOGY EXAM   Chris Porter MD 3/13/20 0901 Yes     Description: Right upper lobe for margins    This specimen was not marked as sent.                Drains:   Urethral Catheter Temperature probe (Active)       Y Chest Tube 1 and 2 1 Right 28 Fr. 2 Right 28 Fr. (Active)       Findings: NSCLC    Complications: None      Chris Porter MD     Date: 3/13/2020  Time: 12:42

## 2020-03-13 NOTE — ANESTHESIA PROCEDURE NOTES
Airway  Urgency: elective    Date/Time: 3/13/2020 7:49 AM  Airway not difficult    General Information and Staff    Patient location during procedure: OR  CRNA: Tiffany Longoria CRNA    Indications and Patient Condition  Indications for airway management: airway protection    Preoxygenated: yes  MILS not maintained throughout  Mask difficulty assessment: 1 - vent by mask    Final Airway Details  Final airway type: endotracheal airway      Successful airway: ETT and EBT - double lumen left  Cuffed: yes   Successful intubation technique: direct laryngoscopy  Endotracheal tube insertion site: oral  Blade: Baljeet  Blade size: 3  ETT size (mm): 7.5  EBT DL size (fr): 39  Cormack-Lehane Classification: grade I - full view of glottis  Placement verified by: chest auscultation and capnometry   Cuff volume (mL): 10  Measured from: lips  ETT/EBT  to lips (cm): 20  Number of attempts at approach: 1    Additional Comments  Negative epigastric sounds, Breath sound equal bilaterally with symmetric chest rise and fall

## 2020-03-13 NOTE — ANESTHESIA POSTPROCEDURE EVALUATION
Patient: Saman Aguayo    Procedure Summary     Date:  03/13/20 Room / Location:   [facility] OR [address] [place] [address] [facility]    Anesthesia Start:  0743 Anesthesia Stop:  1215    Procedures:       BRONCHOSCOPY (N/A Bronchus)      Redo right THORACOSCOPY VIDEO ASSISTED with right upper lobe wedge resection and with conversion to open thorocotomy for right upper lobectomy with intercostal cryoablation (Right Chest) Diagnosis:       Lung cancer, middle lobe (CMS/HCC)      Right upper lobe pulmonary nodule      (Lung cancer, middle lobe (CMS/HCC) [C34.2])      (Right upper lobe pulmonary nodule [R91.1])    Surgeon:  Chris Porter MD Provider:  Mo Torres MD    Anesthesia Type:  general with block ASA Status:  3          Anesthesia Type: general with block    Vitals  Vitals Value Taken Time   /76 3/13/2020 12:10 PM   Temp     Pulse 102 3/13/2020 12:14 PM   Resp     SpO2 96 % 3/13/2020 12:14 PM   Vitals shown include unvalidated device data.        Post Anesthesia Care and Evaluation    Patient location during evaluation: PACU  Patient participation: complete - patient participated  Level of consciousness: awake and alert  Pain score: 0  Pain management: adequate  Airway patency: patent  Anesthetic complications: No anesthetic complications  PONV Status: none  Cardiovascular status: hemodynamically stable and acceptable  Respiratory status: nonlabored ventilation, acceptable and nasal cannula  Hydration status: acceptable

## 2020-03-13 NOTE — INTERVAL H&P NOTE
Pre-Op H&P (See Recent Office Note Attached for Full H&P)    Chief complaint: Fatigue    HPI:    64-year-old  male with a history of hypertension, COPD, tobacco abuse and lung cancer who presents with a newly diagnosed right upper lobe nodule.  Mr. Aguayo feels well and denies cough, hemoptysis, weight loss, shortness of breath, lymphadenopathy, fevers or chills.  Repeat imaging has demonstrated enlargement of a right upper lobe lung nodule.    -PET/CT performed 2/7/2020, personally reviewed, demonstrates a right upper lobe nodule with an SUV of 2.3.  There is no active mediastinal lymphadenopathy.  -CT of the chest performed 1/24/2020, per report (images unavailable from [facility]), demonstrates a 1.1 cm right upper lobe nodule that is increased in size from 8 mm on 7/19/2019.  No lymphadenopathy is present.  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  The pleural and bronchial margins are free.  There was lymphovascular invasion present and lymph nodes from stations 2R, 7, 9 and 10 were negative for malignancy. [id number] - STAGE 1A.    He presents with a newly diagnosed right upper lobe nodule.  With his history of lung cancer, this is concerning for possible malignancy.  We discussed options including continued observation, CyberKnife radiation treatment versus surgical resection.  The patient wished to proceed with surgical resection over the other two options.  This corresponds with NCCN guidelines    He presents today for a bronchoscopy, thoracoscopy video assisted with lobectomy, right, and mediastinal lymph node dissection, right    Review of Systems:  General ROS:  no fever, chills, rashes, No change since last office visit  Cardiovascular ROS: no chest pain or dyspnea on exertion  Respiratory ROS: no cough, shortness of breath, or wheezing      Meds:    No current  facility-administered medications on file prior to encounter.      No current outpatient medications on file prior to encounter.       Vital Signs:  BP: 157/74   HR: 49   SpO2: 94%    Physical Exam:    CV:   Regular rhythm. No murmur, no rubs               Resp:  Clear to auscultation; respirations regular, even and unlabored    Results Review:     Lab Results   Component Value Date    WBC 7.25 03/12/2020    HGB 16.7 03/12/2020    HCT 48.2 03/12/2020    MCV 94.0 03/12/2020     03/12/2020    NEUTROABS 4.72 03/12/2020    GLUCOSE 109 (H) 03/12/2020    BUN 15 03/12/2020    CREATININE 0.87 03/12/2020    EGFRIFNONA 88 03/12/2020    EGFRIFAFRI 103 07/11/2017     03/12/2020    K 4.8 03/12/2020    CL 99 03/12/2020    CO2 30.0 (H) 03/12/2020    MG 1.8 09/01/2016    PHOS 3.0 09/01/2016    CALCIUM 9.7 03/12/2020    ALBUMIN 4.50 03/12/2020    AST 16 03/12/2020    ALT 15 03/12/2020    BILITOT 0.6 03/12/2020        I reviewed the patient's new clinical results.   Cardiac clearance documentation on file.    Cancer Staging (if applicable)  Cancer Patient: _x_ yes __no __unknown; If yes, clinical stage T:__ N:__M:__, stage group or __N/A    Assessment:  Right upper lobe pulmonary nodule  History of lung cancer    Plan:  Bronchoscopy, thoracoscopy video assisted with lobectomy, right, and mediastinal lymph node dissection, right    Ruth Toledo PA-C  3/13/2020   06:37

## 2020-03-13 NOTE — PROGRESS NOTES
INTENSIVIST / PULMONARY FOLLOW UP NOTE     Hospital:  LOS: 0 days   Mr. Saman Aguayo, 64 y.o. male is followed for:     Right upper lobe pulmonary nodule    History of tobacco use    Pulmonary hypertension     Lung cancer, middle lobe s/p right VATS with lobectomy    COPD    Valvular heart disease    HFrEF (EF 45%)          SUBJECTIVE   Saman Aguayo is a 64 y.o. male who presents to [facility] on  03/13/20 to undergo a scheduled bronchoscopy and right thoracoscopy per Dr. Porter.    The patient has a history of tobacco abuse, COPD, and lung cancer (Stage IA NSCC) s/p right VATS with lobectomy in 2016. He has since followed with Dr. Lim with regular surveillance imaging. Most recent PET w/CT demonstrated a new right upper lobe nodule concerning for metastatic disease.     He was referred to Dr. Porter of [facility] and upon further discussion regarding treatment options, the patient elected to undergo a bronchoscopy with right thoracoscopy. The procedure was performed 03/13/20 per Dr. Porter and Mr. Aguayo was transferred to the ICU for postoperative management.     The patient's relevant past medical, surgical, family, and social history were reviewed    Allergies and medications were reviewed    ROS:  Per subjective, all other systems were reviewed and were negative        OBJECTIVE     Vital Sign Min/Max for last 24 hours:  Temp  Min: 97.1 °F (36.2 °C)  Max: 97.9 °F (36.6 °C)   BP  Min: 107/78  Max: 157/74   Pulse  Min: 48  Max: 109   Resp  Min: 12  Max: 18   SpO2  Min: 88 %  Max: 97 %   No data recorded     Physical Exam:  General Appearance:  Conversant, in no acute distress  Eyes:  No scleral icterus or pallor, pupils normal  Ears, Nose, Mouth, Throat:  Atraumatic, oropharynx clear  Neck:  Trachea midline, thyroid normal  Respiratory:  Clear to auscultation bilaterally, normal effort, no tenderness to palpation. Right chest tube with serosanguinous drainage. No air leak.   Cardiovascular:  Regular rate and rhythm, no  murmurs, no peripheral edema, no thrill  Gastrointestinal:  Soft, non-tender, non-distended, no hepatosplenomegaly  Skin:  Normal temperature, no rash  Psychiatric:  Alert and oriented x 3, normal judgement and insight  Neuro:  No new focal neurologic deficits observed             Hematology:  Results from last 7 days   Lab Units 03/12/20  1236   WBC 10*3/mm3 7.25   HEMOGLOBIN g/dL 16.7   HEMATOCRIT % 48.2   PLATELETS 10*3/mm3 181     Electrolytes, Magnesium and Phosphorus:  Results from last 7 days   Lab Units 03/12/20  1236   SODIUM mmol/L 138   CHLORIDE mmol/L 99   POTASSIUM mmol/L 4.8   CO2 mmol/L 30.0*     Renal:  Results from last 7 days   Lab Units 03/12/20  1236   CREATININE mg/dL 0.87   BUN mg/dL 15     Estimated Creatinine Clearance: 95.3 mL/min (by C-G formula based on SCr of 0.87 mg/dL).  Hepatic:  Results from last 7 days   Lab Units 03/12/20  1236   ALK PHOS U/L 65   BILIRUBIN mg/dL 0.6   BILIRUBIN DIRECT mg/dL <0.2*   ALT (SGPT) U/L 15   AST (SGOT) U/L 16         Results from last 7 days   Lab Units 03/12/20  1236   HEMOGLOBIN A1C % 5.50     Imaging Results (Last 24 Hours)     Procedure Component Value Units Date/Time    XR Chest 1 View [[id number]] Collected:  03/13/20 1302     Updated:  03/13/20 1306    Narrative:       EXAMINATION: XR CHEST 1 VW-03/13/2020:     INDICATION: Postop; R91.1-Solitary pulmonary nodule; C34.2-Malignant  neoplasm of middle lobe, bronchus or lung.     COMPARISON: 03/12/2020.     FINDINGS: Post-thoracotomy changes are noted on the right with two  large-bore thoracotomy tubes in place and some generalized right lung  volume loss. There is a slightly hyperlucent appearance of the right  apex, but no well-defined pleural line and no definite pneumothorax.  There is only a trace amount of right-sided subcutaneous emphysema,  typical for recent thoracotomy. The left lung appears clear except for  mild chronic appearing changes. The heart shadow is upper normal size.  The  vasculature appears upper normal as well.       Impression:       Post right thoracotomy changes with no definite evidence of  pneumothorax. Some left lung volume loss overall. No clearly acute chest  disease elsewhere.     D:  03/13/2020  E:  03/13/2020                    Relevant imaging studies and labs from 03/13/20 were reviewed and interpreted by me      Assessment/Plan   IMPRESSION / PLAN     Inpatient Problem List:  64 y.o.male:  Active Hospital Problems    Diagnosis   • **Right upper lobe pulmonary nodule   • Valvular heart disease     TTE 5/21/29:  · Mild mitral valve regurgitation is present.  · Moderate to severe tricuspid valve regurgitation is present.     • HFrEF (EF 45%)     TTE 5/21/29:  · Left ventricular systolic function is mildly decreased.  · Estimated LVEF 45%     • COPD   • Lung cancer, middle lobe s/p right VATS with lobectomy     T1b N0 M0 stage IA2     • Pulmonary hypertension      TTE 5/21/19:  · Estimated right ventricular systolic pressure from tricuspid regurgitation is markedly elevated (>55 mmHg). Calculated right ventricular systolic pressure from tricuspid regurgitation is 58 mmHg. Severe pulmonary hypertension is present.     • History of tobacco use     3 PPD x50 years          Impression:  Mr. Aguayo is a 65yo M with a history of stage IA non-small cell lung cancer in 2016 s/p lobectomy who has been followed by Dr. Lim and was noted to have a new right upper lobe nodule concerning for metastatic disease. He was referred to Dr. Porter and underwent a right upper lobe wedge resection. Frozen pathology was consistent with NSCLC and a thoracotomy was performed with completion right upper lobectomy. He was transferred to the ICU for monitoring. He is a former smoker but quit smoking in 2016. Preoperative spirometry was performed and demonstrated a mild airway obstruction with an FEV1 of 2.76L.       Plan:  1. Monitor in the ICU  2. Chest tube management per CTS  3. No home  medications prior to admission  4. Bronchodilators as needed  5. Pain control/Supportive care  6. AM labs and CXR    Nutrition - Diet Regular; Cardiac    Plan of care and goals reviewed with mulitdisciplinary team at daily rounds       Beth Graf DNP, SAMIRA, RUSSELL-BC  Pulmonary and Critical Care Medicine     I have personally seen, interviewed and examined the patient and verified all the key components of the history, physical examination, assessment and plan with Beth Graf DNP, SAMIRA, RUSSELL-BC and reviewed the note, which reflects my changes and contributions.    Debra Harris, DO  Intensive Care Medicine

## 2020-03-14 ENCOUNTER — APPOINTMENT (OUTPATIENT)
Dept: GENERAL RADIOLOGY | Facility: HOSPITAL | Age: 65
End: 2020-03-14

## 2020-03-14 LAB
ANION GAP SERPL CALCULATED.3IONS-SCNC: 12 MMOL/L (ref 5–15)
BASOPHILS # BLD AUTO: 0.03 10*3/MM3 (ref 0–0.2)
BASOPHILS NFR BLD AUTO: 0.2 % (ref 0–1.5)
BUN BLD-MCNC: 15 MG/DL (ref 8–23)
BUN/CREAT SERPL: 17.6 (ref 7–25)
CALCIUM SPEC-SCNC: 8.1 MG/DL (ref 8.6–10.5)
CHLORIDE SERPL-SCNC: 97 MMOL/L (ref 98–107)
CO2 SERPL-SCNC: 25 MMOL/L (ref 22–29)
CREAT BLD-MCNC: 0.85 MG/DL (ref 0.76–1.27)
DEPRECATED RDW RBC AUTO: 47 FL (ref 37–54)
EOSINOPHIL # BLD AUTO: 0.27 10*3/MM3 (ref 0–0.4)
EOSINOPHIL NFR BLD AUTO: 2.1 % (ref 0.3–6.2)
ERYTHROCYTE [DISTWIDTH] IN BLOOD BY AUTOMATED COUNT: 13.2 % (ref 12.3–15.4)
GFR SERPL CREATININE-BSD FRML MDRD: 90 ML/MIN/1.73
GLUCOSE BLD-MCNC: 129 MG/DL (ref 65–99)
HCT VFR BLD AUTO: 48.9 % (ref 37.5–51)
HGB BLD-MCNC: 15.9 G/DL (ref 13–17.7)
IMM GRANULOCYTES # BLD AUTO: 0.03 10*3/MM3 (ref 0–0.05)
IMM GRANULOCYTES NFR BLD AUTO: 0.2 % (ref 0–0.5)
LYMPHOCYTES # BLD AUTO: 1.02 10*3/MM3 (ref 0.7–3.1)
LYMPHOCYTES NFR BLD AUTO: 8.1 % (ref 19.6–45.3)
MCH RBC QN AUTO: 30.9 PG (ref 26.6–33)
MCHC RBC AUTO-ENTMCNC: 32.5 G/DL (ref 31.5–35.7)
MCV RBC AUTO: 95.1 FL (ref 79–97)
MONOCYTES # BLD AUTO: 0.88 10*3/MM3 (ref 0.1–0.9)
MONOCYTES NFR BLD AUTO: 7 % (ref 5–12)
NEUTROPHILS # BLD AUTO: 10.42 10*3/MM3 (ref 1.7–7)
NEUTROPHILS NFR BLD AUTO: 82.4 % (ref 42.7–76)
NRBC BLD AUTO-RTO: 0 /100 WBC (ref 0–0.2)
PLATELET # BLD AUTO: 179 10*3/MM3 (ref 140–450)
PMV BLD AUTO: 11 FL (ref 6–12)
POTASSIUM BLD-SCNC: 5.2 MMOL/L (ref 3.5–5.2)
RBC # BLD AUTO: 5.14 10*6/MM3 (ref 4.14–5.8)
SODIUM BLD-SCNC: 134 MMOL/L (ref 136–145)
WBC NRBC COR # BLD: 12.65 10*3/MM3 (ref 3.4–10.8)

## 2020-03-14 PROCEDURE — 25010000002 ONDANSETRON PER 1 MG: Performed by: PHYSICIAN ASSISTANT

## 2020-03-14 PROCEDURE — 99232 SBSQ HOSP IP/OBS MODERATE 35: CPT | Performed by: INTERNAL MEDICINE

## 2020-03-14 PROCEDURE — 71045 X-RAY EXAM CHEST 1 VIEW: CPT

## 2020-03-14 PROCEDURE — 25010000002 MORPHINE PER 10 MG: Performed by: PHYSICIAN ASSISTANT

## 2020-03-14 PROCEDURE — 25010000003 CEFUROXIME SODIUM 1.5 G RECONSTITUTED SOLUTION: Performed by: PHYSICIAN ASSISTANT

## 2020-03-14 PROCEDURE — 80048 BASIC METABOLIC PNL TOTAL CA: CPT | Performed by: PHYSICIAN ASSISTANT

## 2020-03-14 PROCEDURE — 25010000002 HEPARIN (PORCINE) PER 1000 UNITS: Performed by: PHYSICIAN ASSISTANT

## 2020-03-14 PROCEDURE — 25010000002 KETOROLAC TROMETHAMINE PER 15 MG: Performed by: NURSE PRACTITIONER

## 2020-03-14 PROCEDURE — 85025 COMPLETE CBC W/AUTO DIFF WBC: CPT | Performed by: PHYSICIAN ASSISTANT

## 2020-03-14 RX ORDER — KETOROLAC TROMETHAMINE 15 MG/ML
15 INJECTION, SOLUTION INTRAMUSCULAR; INTRAVENOUS ONCE
Status: COMPLETED | OUTPATIENT
Start: 2020-03-14 | End: 2020-03-14

## 2020-03-14 RX ADMIN — SENNOSIDES AND DOCUSATE SODIUM 2 TABLET: 8.6; 5 TABLET ORAL at 20:37

## 2020-03-14 RX ADMIN — CEFUROXIME SODIUM 1.5 G: 1.5 INJECTION, POWDER, FOR SOLUTION INTRAVENOUS at 02:33

## 2020-03-14 RX ADMIN — KETOROLAC TROMETHAMINE 15 MG: 15 INJECTION, SOLUTION INTRAMUSCULAR; INTRAVENOUS at 16:41

## 2020-03-14 RX ADMIN — HEPARIN SODIUM 5000 UNITS: 5000 INJECTION, SOLUTION INTRAVENOUS; SUBCUTANEOUS at 20:37

## 2020-03-14 RX ADMIN — HEPARIN SODIUM 5000 UNITS: 5000 INJECTION, SOLUTION INTRAVENOUS; SUBCUTANEOUS at 08:53

## 2020-03-14 RX ADMIN — MORPHINE SULFATE 2 MG: 4 INJECTION, SOLUTION INTRAMUSCULAR; INTRAVENOUS at 16:30

## 2020-03-14 RX ADMIN — HYDROCODONE BITARTRATE AND ACETAMINOPHEN 1 TABLET: 7.5; 325 TABLET ORAL at 13:12

## 2020-03-14 RX ADMIN — MORPHINE SULFATE 2 MG: 4 INJECTION, SOLUTION INTRAMUSCULAR; INTRAVENOUS at 22:37

## 2020-03-14 RX ADMIN — ONDANSETRON 4 MG: 2 INJECTION INTRAMUSCULAR; INTRAVENOUS at 10:23

## 2020-03-14 RX ADMIN — POLYETHYLENE GLYCOL 3350 17 G: 17 POWDER, FOR SOLUTION ORAL at 08:53

## 2020-03-14 RX ADMIN — MORPHINE SULFATE 2 MG: 4 INJECTION, SOLUTION INTRAMUSCULAR; INTRAVENOUS at 13:12

## 2020-03-14 RX ADMIN — MORPHINE SULFATE 2 MG: 4 INJECTION, SOLUTION INTRAMUSCULAR; INTRAVENOUS at 08:52

## 2020-03-14 RX ADMIN — HYDROCODONE BITARTRATE AND ACETAMINOPHEN 1 TABLET: 7.5; 325 TABLET ORAL at 06:49

## 2020-03-14 RX ADMIN — HYDROCODONE BITARTRATE AND ACETAMINOPHEN 1 TABLET: 7.5; 325 TABLET ORAL at 19:19

## 2020-03-14 RX ADMIN — MORPHINE SULFATE 2 MG: 4 INJECTION, SOLUTION INTRAMUSCULAR; INTRAVENOUS at 04:27

## 2020-03-14 NOTE — PLAN OF CARE
Problem: Patient Care Overview  Goal: Plan of Care Review  Outcome: Ongoing (interventions implemented as appropriate)  Flowsheets (Taken 3/14/2020 4553)  Plan of Care Reviewed With: patient  Outcome Summary: Pt remains on 6L/NC sat >90%. VSS throughout night. CT output 160ml. UOP 850ml. Pt did not walk but got up to chair, c/o increased pain. Pain being managed with PRN meds. Awaiting a.m. labs will continue to monitor.

## 2020-03-14 NOTE — PROGRESS NOTES
INTENSIVIST / PULMONARY FOLLOW UP NOTE     Hospital:  LOS: 1 day   Mr. Saman Aguayo, 64 y.o. male is followed for:     Right upper lobe pulmonary nodule    History of tobacco use    Pulmonary hypertension     Lung cancer, middle lobe s/p right VATS with lobectomy    COPD    Valvular heart disease    HFrEF (EF 45%)          SUBJECTIVE   No acute events. Chest pain at tube insertion site.     ROS:  Constitutional: Negative for fever.   Respiratory: Negative for dyspnea.   Cardiovascular: Positive for chest pain.   Gastrointestinal: Negative for  nausea, vomiting and diarrhea.           OBJECTIVE     Vital Sign Min/Max for last 24 hours:  Temp  Min: 97.1 °F (36.2 °C)  Max: 98.1 °F (36.7 °C)   BP  Min: 107/61  Max: 130/64   Pulse  Min: 46  Max: 105   Resp  Min: 12  Max: 18   SpO2  Min: 91 %  Max: 97 %   No data recorded     Physical Exam:  General Appearance:  Conversant, in no acute distress. Sitting up in a chair.   Eyes:  No scleral icterus or pallor, pupils normal  Ears, Nose, Mouth, Throat:  Atraumatic, oropharynx clear  Neck:  Trachea midline, thyroid normal  Respiratory:  Clear to auscultation bilaterally, normal effort, no tenderness to palpation. Right chest tube with serosanguinous drainage. No air leak.   Cardiovascular:  Regular rate and rhythm, no murmurs, no peripheral edema, no thrill  Gastrointestinal:  Soft, non-tender, non-distended, no hepatosplenomegaly  Skin:  Normal temperature, no rash  Psychiatric:  Alert and oriented x 3, normal judgement and insight  Neuro:  No new focal neurologic deficits observed             Hematology:  Results from last 7 days   Lab Units 03/14/20  0342 03/12/20  1236   WBC 10*3/mm3 12.65* 7.25   HEMOGLOBIN g/dL 15.9 16.7   HEMATOCRIT % 48.9 48.2   PLATELETS 10*3/mm3 179 181     Electrolytes, Magnesium and Phosphorus:  Results from last 7 days   Lab Units 03/14/20  0342 03/12/20  1236   SODIUM mmol/L 134* 138   CHLORIDE mmol/L 97* 99   POTASSIUM mmol/L 5.2 4.8   CO2 mmol/L  25.0 30.0*     Renal:  Results from last 7 days   Lab Units 03/14/20  0342 03/12/20  1236   CREATININE mg/dL 0.85 0.87   BUN mg/dL 15 15     Estimated Creatinine Clearance: 97.6 mL/min (by C-G formula based on SCr of 0.85 mg/dL).  Hepatic:  Results from last 7 days   Lab Units 03/12/20  1236   ALK PHOS U/L 65   BILIRUBIN mg/dL 0.6   BILIRUBIN DIRECT mg/dL <0.2*   ALT (SGPT) U/L 15   AST (SGOT) U/L 16         Results from last 7 days   Lab Units 03/12/20  1236   HEMOGLOBIN A1C % 5.50     Imaging Results (Last 24 Hours)     Procedure Component Value Units Date/Time    XR Chest 1 View [[id number]] Collected:  03/14/20 1010     Updated:  03/14/20 1011    Narrative:          EXAMINATION: XR CHEST 1 VW-      INDICATION: postop; R91.1-Solitary pulmonary nodule; C34.2-Malignant  neoplasm of middle lobe, bronchus or lung      COMPARISON: 03/13/2020     FINDINGS: Portable chest reveals chest tubes identified on the right.  Tiny apical pneumothorax identified with ill-defined opacification seen  within the right lung base. The left lung is grossly clear with  prominence of the pulmonary vascularity. Degenerative changes seen  within the spine.           Impression:       Stable chest with chest tubes identified on the right and  ill-defined opacification of the right hilar region. Small apical  pneumothorax on the right which is slightly increased when compared to  the prior study.          XR Chest 1 View [[id number]] Collected:  03/13/20 1302     Updated:  03/14/20 0000    Narrative:       EXAMINATION: XR CHEST 1 VW-03/13/2020:     INDICATION: Postop; R91.1-Solitary pulmonary nodule; C34.2-Malignant  neoplasm of middle lobe, bronchus or lung.     COMPARISON: 03/12/2020.     FINDINGS: Post-thoracotomy changes are noted on the right with two  large-bore thoracotomy tubes in place and some generalized right lung  volume loss. There is a slightly hyperlucent appearance of the right  apex, but no well-defined pleural line and no  definite pneumothorax.  There is only a trace amount of right-sided subcutaneous emphysema,  typical for recent thoracotomy. The left lung appears clear except for  mild chronic appearing changes. The heart shadow is upper normal size.  The vasculature appears upper normal as well.       Impression:       Post right thoracotomy changes with no definite evidence of  pneumothorax. Some left lung volume loss overall. No clearly acute chest  disease elsewhere.     D:  03/13/2020  E:  03/13/2020            This report was finalized on 3/13/2020 11:57 PM by Dr. Laith Hammer MD.             Relevant imaging studies and labs from 03/14/20 were reviewed and interpreted by me      Assessment/Plan   IMPRESSION / PLAN     Inpatient Problem List:  64 y.o.male:  Active Hospital Problems    Diagnosis   • **Right upper lobe pulmonary nodule   • Valvular heart disease     TTE 5/21/29:  · Mild mitral valve regurgitation is present.  · Moderate to severe tricuspid valve regurgitation is present.     • HFrEF (EF 45%)     TTE 5/21/29:  · Left ventricular systolic function is mildly decreased.  · Estimated LVEF 45%     • COPD   • Lung cancer, middle lobe s/p right VATS with lobectomy     T1b N0 M0 stage IA2     • Pulmonary hypertension      TTE 5/21/19:  · Estimated right ventricular systolic pressure from tricuspid regurgitation is markedly elevated (>55 mmHg). Calculated right ventricular systolic pressure from tricuspid regurgitation is 58 mmHg. Severe pulmonary hypertension is present.     • History of tobacco use     3 PPD x50 years          Impression:  Mr. Aguayo is a 65yo M with a history of stage IA non-small cell lung cancer in 2016 s/p lobectomy who has been followed by Dr. Lim and was noted to have a new right upper lobe nodule concerning for metastatic disease. He was referred to Dr. Porter and underwent a right upper lobe wedge resection. Frozen pathology was consistent with NSCLC and a thoracotomy was performed with  completion right upper lobectomy. He was transferred to the ICU for monitoring. He is a former smoker but quit smoking in 2016. Preoperative spirometry was performed and demonstrated a mild airway obstruction with an FEV1 of 2.76L.       Plan:  1. Chest tube management per CTS  2. No home medications prior to admission  3. Bronchodilators as needed  4. Pain control/Supportive care  5. AM labs and CXR  6. Okay to transfer to telemetry.     Nutrition - Diet Regular; Cardiac    Plan of care and goals reviewed with mulitdisciplinary team at daily rounds           Debra Harris, DO  Intensive Care Medicine

## 2020-03-14 NOTE — PLAN OF CARE
Pt weaned to 4L NC. Pt ambulated 100 feet. Pain hard to manage, one time dose of toradol given. Giving lortab and morphine. VSS, will continue to monitor.

## 2020-03-14 NOTE — PROGRESS NOTES
Cardiothoracic Surgery Progress Note      POD # 1 s/p Right upper lobectomy     LOS: 1 day      Subjective:  Some pain.      Objective:  Vital Signs  Temp:  [97.1 °F (36.2 °C)-98.1 °F (36.7 °C)] 97.6 °F (36.4 °C)  Heart Rate:  [] 53  Resp:  [12-18] 16  BP: (107-147)/(54-88) 122/71    Physical Exam:   General Appearance: alert, appears stated age and cooperative   Lungs: clear to auscultation, respirations regular, respirations even and respirations unlabored   Heart: regular rhythm & normal rate, normal S1, S2 and no murmur, no gallop, no rub   Skin: Incision c/d/i   No air leak from chest tubes  Results:  Results from last 7 days   Lab Units 03/14/20  0342   WBC 10*3/mm3 12.65*   HEMOGLOBIN g/dL 15.9   HEMATOCRIT % 48.9   PLATELETS 10*3/mm3 179     Results from last 7 days   Lab Units 03/14/20  0342   SODIUM mmol/L 134*   POTASSIUM mmol/L 5.2   CHLORIDE mmol/L 97*   CO2 mmol/L 25.0   BUN mg/dL 15   CREATININE mg/dL 0.85   GLUCOSE mg/dL 129*   CALCIUM mg/dL 8.1*       Assessment:  POD # 1 s/p Right upper lobectomy  Expected recovery    Plan:  Transfer to telemetry  Continue chest tubes to suction  Ambulate  Pulmonary toilet  D/C barry catheter  Await final pathology    Chris Porter MD  03/14/20  10:25

## 2020-03-15 LAB
ANION GAP SERPL CALCULATED.3IONS-SCNC: 8 MMOL/L (ref 5–15)
BUN BLD-MCNC: 14 MG/DL (ref 8–23)
BUN/CREAT SERPL: 19.4 (ref 7–25)
CALCIUM SPEC-SCNC: 8 MG/DL (ref 8.6–10.5)
CHLORIDE SERPL-SCNC: 95 MMOL/L (ref 98–107)
CO2 SERPL-SCNC: 28 MMOL/L (ref 22–29)
CREAT BLD-MCNC: 0.72 MG/DL (ref 0.76–1.27)
GFR SERPL CREATININE-BSD FRML MDRD: 109 ML/MIN/1.73
GLUCOSE BLD-MCNC: 102 MG/DL (ref 65–99)
POTASSIUM BLD-SCNC: 5 MMOL/L (ref 3.5–5.2)
SODIUM BLD-SCNC: 131 MMOL/L (ref 136–145)

## 2020-03-15 PROCEDURE — 99232 SBSQ HOSP IP/OBS MODERATE 35: CPT | Performed by: INTERNAL MEDICINE

## 2020-03-15 PROCEDURE — 25010000002 HEPARIN (PORCINE) PER 1000 UNITS: Performed by: PHYSICIAN ASSISTANT

## 2020-03-15 PROCEDURE — 25010000002 MORPHINE PER 10 MG: Performed by: PHYSICIAN ASSISTANT

## 2020-03-15 PROCEDURE — 80048 BASIC METABOLIC PNL TOTAL CA: CPT | Performed by: THORACIC SURGERY (CARDIOTHORACIC VASCULAR SURGERY)

## 2020-03-15 RX ORDER — BUMETANIDE 0.25 MG/ML
2 INJECTION INTRAMUSCULAR; INTRAVENOUS ONCE
Status: COMPLETED | OUTPATIENT
Start: 2020-03-15 | End: 2020-03-15

## 2020-03-15 RX ORDER — HYDROCODONE BITARTRATE AND ACETAMINOPHEN 10; 325 MG/1; MG/1
1 TABLET ORAL EVERY 4 HOURS PRN
Status: DISCONTINUED | OUTPATIENT
Start: 2020-03-15 | End: 2020-03-20 | Stop reason: HOSPADM

## 2020-03-15 RX ADMIN — HYDROCODONE BITARTRATE AND ACETAMINOPHEN 1 TABLET: 10; 325 TABLET ORAL at 09:39

## 2020-03-15 RX ADMIN — MORPHINE SULFATE 2 MG: 4 INJECTION, SOLUTION INTRAMUSCULAR; INTRAVENOUS at 09:39

## 2020-03-15 RX ADMIN — HYDROCODONE BITARTRATE AND ACETAMINOPHEN 1 TABLET: 10; 325 TABLET ORAL at 21:19

## 2020-03-15 RX ADMIN — HYDROCODONE BITARTRATE AND ACETAMINOPHEN 1 TABLET: 10; 325 TABLET ORAL at 17:23

## 2020-03-15 RX ADMIN — BUMETANIDE 2 MG: 0.25 INJECTION, SOLUTION INTRAMUSCULAR; INTRAVENOUS at 13:17

## 2020-03-15 RX ADMIN — SENNOSIDES AND DOCUSATE SODIUM 2 TABLET: 8.6; 5 TABLET ORAL at 21:19

## 2020-03-15 RX ADMIN — POLYETHYLENE GLYCOL 3350 17 G: 17 POWDER, FOR SOLUTION ORAL at 08:20

## 2020-03-15 RX ADMIN — HYDROCODONE BITARTRATE AND ACETAMINOPHEN 1 TABLET: 7.5; 325 TABLET ORAL at 04:13

## 2020-03-15 RX ADMIN — HEPARIN SODIUM 5000 UNITS: 5000 INJECTION, SOLUTION INTRAVENOUS; SUBCUTANEOUS at 08:20

## 2020-03-15 RX ADMIN — MORPHINE SULFATE 2 MG: 4 INJECTION, SOLUTION INTRAMUSCULAR; INTRAVENOUS at 06:49

## 2020-03-15 RX ADMIN — MORPHINE SULFATE 2 MG: 4 INJECTION, SOLUTION INTRAMUSCULAR; INTRAVENOUS at 20:04

## 2020-03-15 RX ADMIN — HYDROCODONE BITARTRATE AND ACETAMINOPHEN 1 TABLET: 10; 325 TABLET ORAL at 13:17

## 2020-03-15 RX ADMIN — MORPHINE SULFATE 2 MG: 4 INJECTION, SOLUTION INTRAMUSCULAR; INTRAVENOUS at 23:15

## 2020-03-15 RX ADMIN — HEPARIN SODIUM 5000 UNITS: 5000 INJECTION, SOLUTION INTRAVENOUS; SUBCUTANEOUS at 21:19

## 2020-03-15 NOTE — PROGRESS NOTES
INTENSIVIST / PULMONARY FOLLOW UP NOTE     Hospital:  LOS: 2 days   Mr. Saman Aguayo, 64 y.o. male is followed for:     Right upper lobe pulmonary nodule    History of tobacco use    Pulmonary hypertension     Lung cancer, middle lobe s/p right VATS with lobectomy    COPD    Valvular heart disease    HFrEF (EF 45%)          SUBJECTIVE   No acute events. Continues to have pain despite pain medication. Received 1 dose of Toradol last night.     ROS:  Constitutional: Negative for fever.   Respiratory: Negative for dyspnea.   Cardiovascular: Positive for chest pain.   Gastrointestinal: Negative for  nausea, vomiting and diarrhea.           OBJECTIVE     Vital Sign Min/Max for last 24 hours:  Temp  Min: 97.5 °F (36.4 °C)  Max: 98.7 °F (37.1 °C)   BP  Min: 112/68  Max: 154/99   Pulse  Min: 48  Max: 82   Resp  Min: 16  Max: 20   SpO2  Min: 91 %  Max: 99 %   No data recorded     Physical Exam:  General Appearance:  Conversant, in no acute distress. Sitting up in bed.   Eyes:  No scleral icterus or pallor, pupils normal  Ears, Nose, Mouth, Throat:  Atraumatic, oropharynx clear  Neck:  Trachea midline, thyroid normal  Respiratory:  Clear to auscultation bilaterally, normal effort, no tenderness to palpation. Right chest tube with serosanguinous drainage. No air leak.   Cardiovascular:  Regular rate and rhythm, no murmurs, no peripheral edema, no thrill  Gastrointestinal:  Soft, non-tender, non-distended, no hepatosplenomegaly  Skin:  Normal temperature, no rash  Psychiatric:  Alert and oriented x 3, normal judgement and insight  Neuro:  No new focal neurologic deficits observed             Hematology:  Results from last 7 days   Lab Units 03/14/20  0342 03/12/20  1236   WBC 10*3/mm3 12.65* 7.25   HEMOGLOBIN g/dL 15.9 16.7   HEMATOCRIT % 48.9 48.2   PLATELETS 10*3/mm3 179 181     Electrolytes, Magnesium and Phosphorus:  Results from last 7 days   Lab Units 03/15/20  0308 03/14/20  0342 03/12/20  1236   SODIUM mmol/L 131* 134*  138   CHLORIDE mmol/L 95* 97* 99   POTASSIUM mmol/L 5.0 5.2 4.8   CO2 mmol/L 28.0 25.0 30.0*     Renal:  Results from last 7 days   Lab Units 03/15/20  0308 03/14/20  0342 03/12/20  1236   CREATININE mg/dL 0.72* 0.85 0.87   BUN mg/dL 14 15 15     Estimated Creatinine Clearance: 103.7 mL/min (A) (by C-G formula based on SCr of 0.72 mg/dL (L)).  Hepatic:  Results from last 7 days   Lab Units 03/12/20  1236   ALK PHOS U/L 65   BILIRUBIN mg/dL 0.6   BILIRUBIN DIRECT mg/dL <0.2*   ALT (SGPT) U/L 15   AST (SGOT) U/L 16         Results from last 7 days   Lab Units 03/12/20  1236   HEMOGLOBIN A1C % 5.50     Imaging Results (Last 24 Hours)     Procedure Component Value Units Date/Time    XR Chest 1 View [[id number]] Collected:  03/14/20 1010     Updated:  03/14/20 1333    Narrative:          EXAMINATION: XR CHEST 1 VW - 03/14/2020     INDICATION:  R91.1-Solitary pulmonary nodule; C34.2-Malignant neoplasm  of middle lobe, bronchus or lung. Post-op exam.      COMPARISON: 03/13/2020     FINDINGS: Portable chest reveals chest tubes identified on the right.  Tiny apical pneumothorax identified with ill-defined opacification seen  within the right lung base. The left lung is grossly clear with  prominence of the pulmonary vascularity. Degenerative changes seen  within the spine.           Impression:       Stable chest with chest tubes identified on the right and  ill-defined opacification of the right hilar region. Small apical  pneumothorax on the right which is slightly increased when compared to  the prior study.     DICTATED:   03/14/2020  EDITED/ls :   03/14/2020              Relevant imaging studies and labs from 03/15/20 were reviewed and interpreted by me      Assessment/Plan   IMPRESSION / PLAN     Inpatient Problem List:  64 y.o.male:  Active Hospital Problems    Diagnosis   • **Right upper lobe pulmonary nodule   • Valvular heart disease     TTE 5/21/29:  · Mild mitral valve regurgitation is present.  · Moderate to  severe tricuspid valve regurgitation is present.     • HFrEF (EF 45%)     TTE 5/21/29:  · Left ventricular systolic function is mildly decreased.  · Estimated LVEF 45%     • COPD   • Lung cancer, middle lobe s/p right VATS with lobectomy     T1b N0 M0 stage IA2     • Pulmonary hypertension      TTE 5/21/19:  · Estimated right ventricular systolic pressure from tricuspid regurgitation is markedly elevated (>55 mmHg). Calculated right ventricular systolic pressure from tricuspid regurgitation is 58 mmHg. Severe pulmonary hypertension is present.     • History of tobacco use     3 PPD x50 years          Impression:  Mr. Aguayo is a 63yo M with a history of stage IA non-small cell lung cancer in 2016 s/p lobectomy who has been followed by Dr. Lim and was noted to have a new right upper lobe nodule concerning for metastatic disease. He was referred to Dr. Porter and underwent a right upper lobe wedge resection. Frozen pathology was consistent with NSCLC and a thoracotomy was performed with completion right upper lobectomy. He was transferred to the ICU for monitoring. He is a former smoker but quit smoking in 2016. Preoperative spirometry was performed and demonstrated a mild airway obstruction with an FEV1 of 2.76L.       Plan:  1. Chest tube management per CTS  2. Increase hydrocodone to every 4 hours.   3. Bronchodilators as needed  4. Pain control/Supportive care  5. AM labs and CXR  6. Okay to transfer to telemetry when a bed is available.     Nutrition - Diet Regular; Cardiac    Plan of care and goals reviewed with mulitdisciplinary team at daily rounds           Debra Harris DO  Intensive Care Medicine

## 2020-03-15 NOTE — PROGRESS NOTES
Cardiothoracic Surgery Progress Note      POD # 2 s/p Right upper lobectomy     LOS: 2 days      Subjective:  No complaints today    Objective:  Vital Signs  Temp:  [97.5 °F (36.4 °C)-98.7 °F (37.1 °C)] 98.7 °F (37.1 °C)  Heart Rate:  [48-82] 71  Resp:  [16-20] 16  BP: (112-154)/() 138/91    Physical Exam:   General Appearance: alert, appears stated age and cooperative   Lungs: clear to auscultation, respirations regular, respirations even and respirations unlabored   Heart: regular rhythm & normal rate, normal S1, S2 and no murmur, no gallop, no rub   Skin: Incision c/d/i   No air leak from chest tubes  Results:    Results from last 7 days   Lab Units 03/14/20  0342   WBC 10*3/mm3 12.65*   HEMOGLOBIN g/dL 15.9   HEMATOCRIT % 48.9   PLATELETS 10*3/mm3 179     Results from last 7 days   Lab Units 03/15/20  0308   SODIUM mmol/L 131*   POTASSIUM mmol/L 5.0   CHLORIDE mmol/L 95*   CO2 mmol/L 28.0   BUN mg/dL 14   CREATININE mg/dL 0.72*   GLUCOSE mg/dL 102*   CALCIUM mg/dL 8.0*       Assessment:  POD # 2 s/p Right upper lobectomy  Expected recovery    Plan:  Transfer to telemetry (awaiting bed > 24 hours)  Continue chest tubes to suction given output  Ambulate  Pulmonary toilet  Await final pathology    Chris Porter MD  03/15/20  12:17

## 2020-03-15 NOTE — PLAN OF CARE
Pt remains on 4L/NC, pain is biggest issue, treating with prn medications, UOP 1400ml, PT's had 240ml. Ambulated well with one, increased pain afterwards. Awaiting a.m. labs, will continue to monitor.

## 2020-03-15 NOTE — PLAN OF CARE
Problem: Patient Care Overview  Goal: Plan of Care Review  Outcome: Ongoing (interventions implemented as appropriate)  Flowsheets (Taken 3/13/2020 1557 by Danna Benitez RN)  Progress: improving  Note:   • Patient alert & oriented.   • VSS. Afebrile. On 2L NC.  • Right thor site - CDI.  • CT drainage- 150ml - 12hours   • Bumex given- greater than 2L UOP.  • Pain well controlled- Lortab 10mg/Q4hrs.   • PO intake adequate.      Problem: Patient Care Overview  Goal: Individualization and Mutuality  Outcome: Ongoing (interventions implemented as appropriate)     Problem: Patient Care Overview  Goal: Discharge Needs Assessment  Outcome: Ongoing (interventions implemented as appropriate)     Problem: Patient Care Overview  Goal: Interprofessional Rounds/Family Conf  Outcome: Ongoing (interventions implemented as appropriate)

## 2020-03-16 ENCOUNTER — APPOINTMENT (OUTPATIENT)
Dept: GENERAL RADIOLOGY | Facility: HOSPITAL | Age: 65
End: 2020-03-16

## 2020-03-16 LAB
ABO + RH BLD: NORMAL
ABO + RH BLD: NORMAL
ANION GAP SERPL CALCULATED.3IONS-SCNC: 7 MMOL/L (ref 5–15)
BH BB BLOOD EXPIRATION DATE: NORMAL
BH BB BLOOD EXPIRATION DATE: NORMAL
BH BB BLOOD TYPE BARCODE: 5100
BH BB BLOOD TYPE BARCODE: 5100
BH BB DISPENSE STATUS: NORMAL
BH BB DISPENSE STATUS: NORMAL
BH BB PRODUCT CODE: NORMAL
BH BB PRODUCT CODE: NORMAL
BH BB UNIT NUMBER: NORMAL
BH BB UNIT NUMBER: NORMAL
BUN BLD-MCNC: 12 MG/DL (ref 8–23)
BUN/CREAT SERPL: 17.4 (ref 7–25)
CALCIUM SPEC-SCNC: 8.8 MG/DL (ref 8.6–10.5)
CHLORIDE SERPL-SCNC: 93 MMOL/L (ref 98–107)
CO2 SERPL-SCNC: 32 MMOL/L (ref 22–29)
CREAT BLD-MCNC: 0.69 MG/DL (ref 0.76–1.27)
DEPRECATED RDW RBC AUTO: 46.1 FL (ref 37–54)
ERYTHROCYTE [DISTWIDTH] IN BLOOD BY AUTOMATED COUNT: 12.9 % (ref 12.3–15.4)
GFR SERPL CREATININE-BSD FRML MDRD: 115 ML/MIN/1.73
GLUCOSE BLD-MCNC: 103 MG/DL (ref 65–99)
HCT VFR BLD AUTO: 46.6 % (ref 37.5–51)
HGB BLD-MCNC: 15.3 G/DL (ref 13–17.7)
MAGNESIUM SERPL-MCNC: 1.9 MG/DL (ref 1.6–2.4)
MCH RBC QN AUTO: 31.7 PG (ref 26.6–33)
MCHC RBC AUTO-ENTMCNC: 32.8 G/DL (ref 31.5–35.7)
MCV RBC AUTO: 96.7 FL (ref 79–97)
PHOSPHATE SERPL-MCNC: 2.3 MG/DL (ref 2.5–4.5)
PLATELET # BLD AUTO: 160 10*3/MM3 (ref 140–450)
PMV BLD AUTO: 10.9 FL (ref 6–12)
POTASSIUM BLD-SCNC: 4.5 MMOL/L (ref 3.5–5.2)
RBC # BLD AUTO: 4.82 10*6/MM3 (ref 4.14–5.8)
SODIUM BLD-SCNC: 132 MMOL/L (ref 136–145)
UNIT  ABO: NORMAL
UNIT  ABO: NORMAL
UNIT  RH: NORMAL
UNIT  RH: NORMAL
WBC NRBC COR # BLD: 8.86 10*3/MM3 (ref 3.4–10.8)

## 2020-03-16 PROCEDURE — 25010000003 MAGNESIUM SULFATE 4 GM/100ML SOLUTION: Performed by: THORACIC SURGERY (CARDIOTHORACIC VASCULAR SURGERY)

## 2020-03-16 PROCEDURE — 25010000002 HEPARIN (PORCINE) PER 1000 UNITS: Performed by: PHYSICIAN ASSISTANT

## 2020-03-16 PROCEDURE — 83735 ASSAY OF MAGNESIUM: CPT | Performed by: INTERNAL MEDICINE

## 2020-03-16 PROCEDURE — 25010000002 MORPHINE PER 10 MG: Performed by: PHYSICIAN ASSISTANT

## 2020-03-16 PROCEDURE — 71045 X-RAY EXAM CHEST 1 VIEW: CPT

## 2020-03-16 PROCEDURE — 25010000002 KETOROLAC TROMETHAMINE PER 15 MG: Performed by: PHYSICIAN ASSISTANT

## 2020-03-16 PROCEDURE — 80048 BASIC METABOLIC PNL TOTAL CA: CPT | Performed by: INTERNAL MEDICINE

## 2020-03-16 PROCEDURE — 99232 SBSQ HOSP IP/OBS MODERATE 35: CPT | Performed by: INTERNAL MEDICINE

## 2020-03-16 PROCEDURE — 99024 POSTOP FOLLOW-UP VISIT: CPT | Performed by: PHYSICIAN ASSISTANT

## 2020-03-16 PROCEDURE — 85027 COMPLETE CBC AUTOMATED: CPT | Performed by: INTERNAL MEDICINE

## 2020-03-16 PROCEDURE — 84100 ASSAY OF PHOSPHORUS: CPT | Performed by: INTERNAL MEDICINE

## 2020-03-16 RX ORDER — MAGNESIUM SULFATE HEPTAHYDRATE 40 MG/ML
4 INJECTION, SOLUTION INTRAVENOUS AS NEEDED
Status: DISCONTINUED | OUTPATIENT
Start: 2020-03-16 | End: 2020-03-20 | Stop reason: HOSPADM

## 2020-03-16 RX ORDER — KETOROLAC TROMETHAMINE 15 MG/ML
15 INJECTION, SOLUTION INTRAMUSCULAR; INTRAVENOUS ONCE
Status: DISCONTINUED | OUTPATIENT
Start: 2020-03-16 | End: 2020-03-20 | Stop reason: HOSPADM

## 2020-03-16 RX ORDER — MAGNESIUM SULFATE HEPTAHYDRATE 40 MG/ML
2 INJECTION, SOLUTION INTRAVENOUS AS NEEDED
Status: DISCONTINUED | OUTPATIENT
Start: 2020-03-16 | End: 2020-03-20 | Stop reason: HOSPADM

## 2020-03-16 RX ORDER — KETOROLAC TROMETHAMINE 15 MG/ML
15 INJECTION, SOLUTION INTRAMUSCULAR; INTRAVENOUS ONCE
Status: COMPLETED | OUTPATIENT
Start: 2020-03-16 | End: 2020-03-16

## 2020-03-16 RX ORDER — POTASSIUM CHLORIDE 1.5 G/1.77G
40 POWDER, FOR SOLUTION ORAL AS NEEDED
Status: DISCONTINUED | OUTPATIENT
Start: 2020-03-16 | End: 2020-03-20 | Stop reason: HOSPADM

## 2020-03-16 RX ORDER — BUMETANIDE 0.25 MG/ML
2 INJECTION INTRAMUSCULAR; INTRAVENOUS ONCE
Status: COMPLETED | OUTPATIENT
Start: 2020-03-16 | End: 2020-03-16

## 2020-03-16 RX ORDER — POTASSIUM CHLORIDE 750 MG/1
40 CAPSULE, EXTENDED RELEASE ORAL AS NEEDED
Status: DISCONTINUED | OUTPATIENT
Start: 2020-03-16 | End: 2020-03-20 | Stop reason: HOSPADM

## 2020-03-16 RX ADMIN — HEPARIN SODIUM 5000 UNITS: 5000 INJECTION, SOLUTION INTRAVENOUS; SUBCUTANEOUS at 09:20

## 2020-03-16 RX ADMIN — MORPHINE SULFATE 2 MG: 4 INJECTION, SOLUTION INTRAMUSCULAR; INTRAVENOUS at 11:20

## 2020-03-16 RX ADMIN — HYDROCODONE BITARTRATE AND ACETAMINOPHEN 1 TABLET: 10; 325 TABLET ORAL at 15:41

## 2020-03-16 RX ADMIN — HYDROCODONE BITARTRATE AND ACETAMINOPHEN 1 TABLET: 10; 325 TABLET ORAL at 09:20

## 2020-03-16 RX ADMIN — MAGNESIUM SULFATE IN WATER 4 G: 40 INJECTION, SOLUTION INTRAVENOUS at 16:23

## 2020-03-16 RX ADMIN — BUMETANIDE 2 MG: 0.25 INJECTION INTRAMUSCULAR; INTRAVENOUS at 11:20

## 2020-03-16 RX ADMIN — HEPARIN SODIUM 5000 UNITS: 5000 INJECTION, SOLUTION INTRAVENOUS; SUBCUTANEOUS at 20:05

## 2020-03-16 RX ADMIN — HYDROCODONE BITARTRATE AND ACETAMINOPHEN 1 TABLET: 10; 325 TABLET ORAL at 20:05

## 2020-03-16 RX ADMIN — HYDROCODONE BITARTRATE AND ACETAMINOPHEN 1 TABLET: 10; 325 TABLET ORAL at 01:22

## 2020-03-16 RX ADMIN — MORPHINE SULFATE 2 MG: 4 INJECTION, SOLUTION INTRAMUSCULAR; INTRAVENOUS at 06:00

## 2020-03-16 RX ADMIN — HYDROCODONE BITARTRATE AND ACETAMINOPHEN 1 TABLET: 10; 325 TABLET ORAL at 23:54

## 2020-03-16 RX ADMIN — MORPHINE SULFATE 2 MG: 4 INJECTION, SOLUTION INTRAMUSCULAR; INTRAVENOUS at 09:38

## 2020-03-16 RX ADMIN — KETOROLAC TROMETHAMINE 15 MG: 15 INJECTION, SOLUTION INTRAMUSCULAR; INTRAVENOUS at 11:57

## 2020-03-16 RX ADMIN — HYDROCODONE BITARTRATE AND ACETAMINOPHEN 1 TABLET: 10; 325 TABLET ORAL at 04:59

## 2020-03-16 RX ADMIN — MORPHINE SULFATE 2 MG: 4 INJECTION, SOLUTION INTRAMUSCULAR; INTRAVENOUS at 03:49

## 2020-03-16 RX ADMIN — MORPHINE SULFATE 2 MG: 4 INJECTION, SOLUTION INTRAMUSCULAR; INTRAVENOUS at 16:39

## 2020-03-16 RX ADMIN — SENNOSIDES AND DOCUSATE SODIUM 2 TABLET: 8.6; 5 TABLET ORAL at 20:05

## 2020-03-16 NOTE — PLAN OF CARE
Problem: Patient Care Overview  Goal: Plan of Care Review  Outcome: Ongoing (interventions implemented as appropriate)  Flowsheets (Taken 3/16/2020 4064)  Progress: no change  Plan of Care Reviewed With: patient; spouse   Pt having pain at CT site and lower back requiring frequent po and IV pain meds.  VSS.  Has orders to transfer to floor.  Will cont POC and cont to monitor.

## 2020-03-16 NOTE — PLAN OF CARE
Problem: Patient Care Overview  Goal: Plan of Care Review  Outcome: Ongoing (interventions implemented as appropriate)  Flowsheets (Taken 3/16/2020 153)  Progress: improving  Note:   Pt on 2 L/ NC, Sats > 93%.  Using IS to 1000, fair non prod cough. 2PT's to -20cm LWSx w mod amt serosanguinous drainage noted.  Mild air leak noted in atrium, no SQ air.   Eating well, refused Miralax this AM, no BM since day prior to surgery.  Education done re: narcotics effect on GI tract.  Voiding perm urinal.  Pain control is biggest issue, given Toradol as adjunct and it improved tremendously.

## 2020-03-16 NOTE — PROGRESS NOTES
Discharge Planning Assessment  [facility]     Patient Name: Saman Aguayo  MRN: [id number]  Today's Date: 3/16/2020    Admit Date: 3/13/2020    Discharge Needs Assessment     Row Name 03/16/20 1037       Living Environment    Lives With  spouse    Current Living Arrangements  home/apartment/condo    Primary Care Provided by  self    Provides Primary Care For  no one    Family Caregiver if Needed  spouse;grandchild(kavin), adult;child(kavin), adult    Quality of Family Relationships  helpful;involved;supportive    Able to Return to Prior Arrangements  yes       Resource/Environmental Concerns    Resource/Environmental Concerns  none       Transition Planning    Patient/Family Anticipates Transition to  home with family    Patient/Family Anticipated Services at Transition  none    Transportation Anticipated  family or friend will provide       Discharge Needs Assessment    Readmission Within the Last 30 Days  no previous admission in last 30 days    Concerns to be Addressed  discharge planning;denies needs/concerns at this time    Equipment Currently Used at Home  none        Discharge Plan     Row Name 03/16/20 1041       Plan    Plan  Home    Patient/Family in Agreement with Plan  yes    Plan Comments  Met with patient and his wife in the room to initiate discharge planning. Patient lives with his wife in a home in [facility]. The home has main-floor living available. Patient is normally independent with ADLs and mobility and does not use any DME. Patient still works full-time. Patient's goal is home at discharge with family to assist, and family will provide his ride. They do not anticipate any discharge needs at this time. CM will continue to follow.     Final Discharge Disposition Code  01 - home or self-care        Destination      Coordination has not been started for this encounter.      Durable Medical Equipment      Coordination has not been started for this encounter.      Dialysis/Infusion       Coordination has not been started for this encounter.      Home Medical Care      Coordination has not been started for this encounter.      Therapy      Coordination has not been started for this encounter.      Community Resources      Coordination has not been started for this encounter.        Expected Discharge Date and Time     Expected Discharge Date Expected Discharge Time    Mar 19, 2020         Demographic Summary     Row Name 03/16/20 1036       General Information    Admission Type  inpatient    Referral Source  admission list    Reason for Consult  discharge planning    General Information Comments  PCP is Marilyn Vermeesch       Contact Information    Permission Granted to Share Info With  ;family/designee wife, Anna Aguayo; daughter, Maricruz Calloway        Functional Status     Row Name 03/16/20 1037       Functional Status    Usual Activity Tolerance  good       Functional Status, IADL    Medications  independent    Meal Preparation  independent    Housekeeping  independent    Laundry  independent    Shopping  independent       Employment/    Employment/ Comments  Confirmed with patient's wife that he had medical and rx coverage through [employer] [facility].         Psychosocial    No documentation.       Abuse/Neglect    No documentation.       Legal    No documentation.       Substance Abuse    No documentation.       Patient Forms    No documentation.           Kasey Cooper RN

## 2020-03-16 NOTE — PLAN OF CARE
Problem: Patient Care Overview  Goal: Plan of Care Review  Outcome: Ongoing (interventions implemented as appropriate)  Flowsheets  Taken 3/16/2020 1830  Progress: improving  Outcome Summary: Patient transferred from ICU today, Chest tubes to suction. VSS on 2L. Pain difficult to manage today. Ambulation limited d/t pain. PA for CTS gave 1 time dose of toradol to regimen. Will continue to monitor.  Mg replaced today  Taken 3/16/2020 1440  Plan of Care Reviewed With: patient

## 2020-03-16 NOTE — PROGRESS NOTES
Cardiothoracic Surgery Progress Note      POD # 3 s/p Right upper lobectomy     LOS: 3 days      Subjective:  No complaints today    Objective:  Vital Signs  Temp:  [97.4 °F (36.3 °C)-98.1 °F (36.7 °C)] 97.4 °F (36.3 °C)  Heart Rate:  [48-79] 53  Resp:  [16-20] 20  BP: (114-138)/(54-91) 131/67    Physical Exam:   General Appearance: alert, appears stated age and cooperative   Lungs: clear to auscultation, respirations regular, respirations even and respirations unlabored   Heart: regular rhythm & normal rate, normal S1, S2 and no murmur, no gallop, no rub   Skin: Incision c/d/i   No air leak from chest tubes  Results:    Results from last 7 days   Lab Units 03/16/20  0319   WBC 10*3/mm3 8.86   HEMOGLOBIN g/dL 15.3   HEMATOCRIT % 46.6   PLATELETS 10*3/mm3 160     Results from last 7 days   Lab Units 03/16/20  0319   SODIUM mmol/L 132*   POTASSIUM mmol/L 4.5   CHLORIDE mmol/L 93*   CO2 mmol/L 32.0*   BUN mg/dL 12   CREATININE mg/dL 0.69*   GLUCOSE mg/dL 103*   CALCIUM mg/dL 8.8       Assessment:  POD # 3 s/p Right upper lobectomy  Expected recovery  330 cc from chest tube/24 hours    Plan:  Transfer to telemetry (awaiting bed > 48 hours)  Continue chest tubes to suction given output  Ambulate  Pulmonary toilet  Await final pathology  Diuresis    03/16/20  06:42

## 2020-03-16 NOTE — PROGRESS NOTES
Multidisciplinary Rounds    Patient Name: Saman Aguayo  Date of Encounter: 03/16/20 11:24  MRN: [id number]  Admission date: 3/13/2020    Reason for visit: MDR. RD to continue to follow per protocol.     Additional information obtained during MDR:  S/P upper lobectomy (3/13).  Recovering well.  Order to transfer out of the ICU.      Current diet: Diet Regular; Cardiac  Supplement: No active supplement orders    Evaluation of Received Nutrient/Fluid Intake:  2 Days: 88% x 4 meals.      EMR reviewed     Intervention:  Follow treatment plan  Care plan reviewed    Follow up:   Per protocol      Felicity Clark RD  11:24 AM  Time: 10min

## 2020-03-16 NOTE — PROGRESS NOTES
"INTENSIVIST NOTE     Hospital Day: 3    Mr. Saman Aguayo, 64 y.o. male is followed for:   Perioperative management of comorbid medical conditions       SUBJECTIVE     64-year-old male status post right upper lobe lobectomy on 3/13/2020    Interval history:    Afebrile.  2 L nasal cannula.  Fluid balance -2.9 L.  Chest tubes x2 remain in place.    The patient's relevant past medical, surgical and social history were reviewed and updated in Epic as appropriate.       OBJECTIVE     Vital Sign Min/Max for last 24 hours  Temp  Min: 97.4 °F (36.3 °C)  Max: 98.5 °F (36.9 °C)   BP  Min: 114/64  Max: 139/84   Pulse  Min: 50  Max: 87   Resp  Min: 16  Max: 22   SpO2  Min: 94 %  Max: 96 %   No data recorded   No data recorded      Intake/Output Summary (Last 24 hours) at 3/16/2020 1348  Last data filed at 3/16/2020 1200  Gross per 24 hour   Intake 656 ml   Output 4100 ml   Net -3444 ml      Flowsheet Rows      First Filed Value   Admission Height  182.9 cm (72\") Documented at 03/13/2020 0653   Admission Weight  80.7 kg (178 lb) Documented at 03/13/2020 0653             03/13/20  0653   Weight: 80.7 kg (178 lb)            Objective:  General Appearance:  In no acute distress.    Vital signs: (most recent): Blood pressure 136/91, pulse 87, temperature 98.2 °F (36.8 °C), temperature source Oral, resp. rate 18, height 182.9 cm (72\"), weight 80.7 kg (178 lb), SpO2 94 %.    HEENT: Normal HEENT exam.  (Nasal cannula O2)    Lungs:  Normal effort and normal respiratory rate.  He is not in respiratory distress.  There are decreased breath sounds.  No rales, wheezes or rhonchi.    Heart: Normal rate.  Regular rhythm.  S1 normal and S2 normal.  No murmur, gallop or friction rub.   Chest: Symmetric chest wall expansion. (Right thoracotomy  Chest tubes x2 on right)  Abdomen: Abdomen is soft and non-distended.  Bowel sounds are normal.   There is no abdominal tenderness.   There is no mass. There is no splenomegaly. There is no hepatomegaly. "   Extremities: There is no deformity or dependent edema.    Neurological: Patient is alert and oriented to person, place and time.    Pupils:  Pupils are equal, round, and reactive to light.    Skin:  Warm and dry.              I reviewed the patient's new clinical results.  I reviewed the patient's new imaging results/reports including actual images and agree with reports.      Chest X-Ray: Small right pneumothorax.  Left lung without infiltrates.  Chest tubes x2.    INFUSIONS    niCARdipine 5-15 mg/hr       Results from last 7 days   Lab Units 03/16/20  0319 03/14/20  0342 03/12/20  1236   WBC 10*3/mm3 8.86 12.65* 7.25   HEMOGLOBIN g/dL 15.3 15.9 16.7   HEMATOCRIT % 46.6 48.9 48.2   PLATELETS 10*3/mm3 160 179 181     Results from last 7 days   Lab Units 03/16/20  0319 03/15/20  0308 03/14/20  0342   SODIUM mmol/L 132* 131* 134*   POTASSIUM mmol/L 4.5 5.0 5.2   CHLORIDE mmol/L 93* 95* 97*   CO2 mmol/L 32.0* 28.0 25.0   BUN mg/dL 12 14 15   GLUCOSE mg/dL 103* 102* 129*   CREATININE mg/dL 0.69* 0.72* 0.85   MAGNESIUM mg/dL 1.9  --   --    CALCIUM mg/dL 8.8 8.0* 8.1*                 [facility] Ventilation:      I reviewed the patient's medications.    Assessment/Plan   ASSESSMENT/PLAN     Active Hospital Problems    Diagnosis   • **Right upper lobe pulmonary nodule   • Lung cancer, middle lobe s/p right VATS with lobectomy     T1b N0 M0 stage IA2     • COPD   • Valvular heart disease     TTE 5/21/29:  · Mild mitral valve regurgitation is present.  · Moderate to severe tricuspid valve regurgitation is present.     • HFrEF (EF 45%)     TTE 5/21/29:  · Left ventricular systolic function is mildly decreased.  · Estimated LVEF 45%     • Pulmonary hypertension      TTE 5/21/19:  · Estimated right ventricular systolic pressure from tricuspid regurgitation is markedly elevated (>55 mmHg). Calculated right ventricular systolic pressure from tricuspid regurgitation is 58 mmHg. Severe pulmonary hypertension is present.     • History  of tobacco use     3 PPD x50 years         65yo M with a history of stage IA non-small cell lung cancer in 2016 s/p lobectomy who has been followed by Dr. Lim and was noted to have a new right upper lobe nodule concerning for metastatic disease. He was referred to Dr. Porter and underwent a right upper lobe wedge resection. Frozen pathology was consistent with NSCLC and a thoracotomy was performed with completion right upper lobectomy. He was transferred to the ICU for monitoring. He is a former smoker but quit smoking in 2016. Preoperative spirometry was performed and demonstrated a mild airway obstruction with an FEV1 of 2.76L    1. Pain control with Norco orally and IV morphine as needed for breakthrough pain  2. Bumex x1  3. Mobilize  4. Pulmonary toilet  5. Chest tubes per thoracic surgery  6. Okay to telemetry from pulmonary standpoint     I discussed the patient's findings and my recommendations with patient, family and nursing staff     Plan of care and goals reviewed with multidisciplinary team at daily rounds.    .    Yunior Hankins MD  Pulmonary and Critical Care Medicine  03/16/20 13:48

## 2020-03-17 ENCOUNTER — APPOINTMENT (OUTPATIENT)
Dept: GENERAL RADIOLOGY | Facility: HOSPITAL | Age: 65
End: 2020-03-17

## 2020-03-17 LAB
CYTO UR: NORMAL
LAB AP CASE REPORT: NORMAL
LAB AP CLINICAL INFORMATION: NORMAL
LAB AP DIAGNOSIS COMMENT: NORMAL
Lab: NORMAL
MAGNESIUM SERPL-MCNC: 2.3 MG/DL (ref 1.6–2.4)
PATH REPORT.FINAL DX SPEC: NORMAL
PATH REPORT.GROSS SPEC: NORMAL

## 2020-03-17 PROCEDURE — 25010000002 KETOROLAC TROMETHAMINE PER 15 MG: Performed by: NURSE PRACTITIONER

## 2020-03-17 PROCEDURE — 71045 X-RAY EXAM CHEST 1 VIEW: CPT

## 2020-03-17 PROCEDURE — 99232 SBSQ HOSP IP/OBS MODERATE 35: CPT | Performed by: FAMILY MEDICINE

## 2020-03-17 PROCEDURE — 83735 ASSAY OF MAGNESIUM: CPT | Performed by: THORACIC SURGERY (CARDIOTHORACIC VASCULAR SURGERY)

## 2020-03-17 PROCEDURE — 25010000002 HEPARIN (PORCINE) PER 1000 UNITS: Performed by: PHYSICIAN ASSISTANT

## 2020-03-17 PROCEDURE — 25010000002 MORPHINE PER 10 MG: Performed by: PHYSICIAN ASSISTANT

## 2020-03-17 PROCEDURE — 94799 UNLISTED PULMONARY SVC/PX: CPT

## 2020-03-17 RX ORDER — DOCUSATE SODIUM 100 MG/1
100 CAPSULE, LIQUID FILLED ORAL DAILY
Status: DISCONTINUED | OUTPATIENT
Start: 2020-03-17 | End: 2020-03-20 | Stop reason: HOSPADM

## 2020-03-17 RX ORDER — BUMETANIDE 0.25 MG/ML
2 INJECTION INTRAMUSCULAR; INTRAVENOUS ONCE
Status: DISCONTINUED | OUTPATIENT
Start: 2020-03-17 | End: 2020-03-17

## 2020-03-17 RX ORDER — IPRATROPIUM BROMIDE AND ALBUTEROL SULFATE 2.5; .5 MG/3ML; MG/3ML
3 SOLUTION RESPIRATORY (INHALATION) EVERY 6 HOURS PRN
Status: DISCONTINUED | OUTPATIENT
Start: 2020-03-17 | End: 2020-03-20 | Stop reason: HOSPADM

## 2020-03-17 RX ORDER — BUMETANIDE 0.25 MG/ML
2 INJECTION INTRAMUSCULAR; INTRAVENOUS ONCE
Status: COMPLETED | OUTPATIENT
Start: 2020-03-17 | End: 2020-03-17

## 2020-03-17 RX ORDER — BISACODYL 5 MG/1
10 TABLET, DELAYED RELEASE ORAL DAILY PRN
Status: DISCONTINUED | OUTPATIENT
Start: 2020-03-17 | End: 2020-03-20 | Stop reason: HOSPADM

## 2020-03-17 RX ORDER — KETOROLAC TROMETHAMINE 30 MG/ML
15 INJECTION, SOLUTION INTRAMUSCULAR; INTRAVENOUS EVERY 6 HOURS PRN
Status: COMPLETED | OUTPATIENT
Start: 2020-03-17 | End: 2020-03-18

## 2020-03-17 RX ADMIN — HEPARIN SODIUM 5000 UNITS: 5000 INJECTION, SOLUTION INTRAVENOUS; SUBCUTANEOUS at 08:41

## 2020-03-17 RX ADMIN — MORPHINE SULFATE 2 MG: 4 INJECTION, SOLUTION INTRAMUSCULAR; INTRAVENOUS at 23:04

## 2020-03-17 RX ADMIN — SENNOSIDES AND DOCUSATE SODIUM 2 TABLET: 8.6; 5 TABLET ORAL at 20:14

## 2020-03-17 RX ADMIN — MORPHINE SULFATE 2 MG: 4 INJECTION, SOLUTION INTRAMUSCULAR; INTRAVENOUS at 14:47

## 2020-03-17 RX ADMIN — HEPARIN SODIUM 5000 UNITS: 5000 INJECTION, SOLUTION INTRAVENOUS; SUBCUTANEOUS at 20:14

## 2020-03-17 RX ADMIN — BUMETANIDE 2 MG: 0.25 INJECTION INTRAMUSCULAR; INTRAVENOUS at 10:55

## 2020-03-17 RX ADMIN — BISACODYL 10 MG: 5 TABLET, COATED ORAL at 10:55

## 2020-03-17 RX ADMIN — HYDROCODONE BITARTRATE AND ACETAMINOPHEN 1 TABLET: 10; 325 TABLET ORAL at 06:41

## 2020-03-17 RX ADMIN — DOCUSATE SODIUM 100 MG: 100 CAPSULE, LIQUID FILLED ORAL at 10:25

## 2020-03-17 RX ADMIN — KETOROLAC TROMETHAMINE 15 MG: 30 INJECTION, SOLUTION INTRAMUSCULAR at 01:11

## 2020-03-17 RX ADMIN — POLYETHYLENE GLYCOL 3350 17 G: 17 POWDER, FOR SOLUTION ORAL at 08:42

## 2020-03-17 RX ADMIN — MORPHINE SULFATE 2 MG: 4 INJECTION, SOLUTION INTRAMUSCULAR; INTRAVENOUS at 00:19

## 2020-03-17 RX ADMIN — HYDROCODONE BITARTRATE AND ACETAMINOPHEN 1 TABLET: 10; 325 TABLET ORAL at 20:15

## 2020-03-17 RX ADMIN — HYDROCODONE BITARTRATE AND ACETAMINOPHEN 1 TABLET: 10; 325 TABLET ORAL at 12:54

## 2020-03-17 RX ADMIN — POTASSIUM & SODIUM PHOSPHATES POWDER PACK 280-160-250 MG 2 PACKET: 280-160-250 PACK at 16:25

## 2020-03-17 RX ADMIN — HYDROCODONE BITARTRATE AND ACETAMINOPHEN 1 TABLET: 10; 325 TABLET ORAL at 17:03

## 2020-03-17 NOTE — PROGRESS NOTES
[facility] Medicine Services  PROGRESS NOTE    Patient Name: Saman Aguayo  : 1955  MRN: [id number]    Date of Admission: 3/13/2020  Primary Care Physician: Vermeesch, Marilyn K, MD    Subjective   Subjective     CC:  F/U Med Mgmt    HPI:  Pt seen and examined. Nursing notes reviewed. No acute events overnight other than patient having some pain. Feels well this AM, wife at bedside.     Review of Systems  Gen- No fevers, chills  CV- No chest pain, palpitations  Resp- No cough, dyspnea  GI- No N/V/D, abd pain    Objective   Objective     Vital Signs:   Temp:  [97.6 °F (36.4 °C)-98.3 °F (36.8 °C)] 97.6 °F (36.4 °C)  Heart Rate:  [51-92] 82  Resp:  [18-22] 20  BP: (111-149)/(70-91) 131/75        Physical Exam:  Constitutional: No acute distress, awake, alert, non-toxic appearance  HENT: NCAT, mucous membranes moist  Respiratory: Coarse breath sounds in bases, respiratory effort normal, +R side chest tube to suction  Cardiovascular: RRR, no murmurs, rubs, or gallops, palpable pedal pulses bilaterally  Gastrointestinal: Positive bowel sounds, soft, nontender, nondistended  Musculoskeletal: No bilateral ankle edema  Psychiatric: Appropriate affect, cooperative  Neurologic: Oriented x 3, strength symmetric in all extremities, Cranial Nerves grossly intact to confrontation, speech clear  Skin: No rashes    Results Reviewed:  Results from last 7 days   Lab Units 20  1236   WBC 10*3/mm3 8.86 12.65* 7.25   HEMOGLOBIN g/dL 15.3 15.9 16.7   HEMATOCRIT % 46.6 48.9 48.2   PLATELETS 10*3/mm3 160 179 181   INR   --   --  1.01     Results from last 7 days   Lab Units 03/16/20  0319 03/15/20  0308 20  1236   SODIUM mmol/L 132* 131* 134* 138   POTASSIUM mmol/L 4.5 5.0 5.2 4.8   CHLORIDE mmol/L 93* 95* 97* 99   CO2 mmol/L 32.0* 28.0 25.0 30.0*   BUN mg/dL 12 14 15 15   CREATININE mg/dL 0.69* 0.72* 0.85 0.87   GLUCOSE mg/dL 103* 102* 129* 109*      CALCIUM mg/dL 8.8 8.0* 8.1* 9.7   ALT (SGPT) U/L  --   --   --  15   AST (SGOT) U/L  --   --   --  16     Estimated Creatinine Clearance: 103.7 mL/min (A) (by C-G formula based on SCr of 0.69 mg/dL (L)).    Microbiology Results Abnormal     None          Imaging Results (Last 24 Hours)     Procedure Component Value Units Date/Time    XR Chest 1 View [[id number]] Collected:  03/17/20 0756     Updated:  03/17/20 1007    Narrative:       EXAMINATION: XR CHEST 1 VW- 03/17/2020     INDICATION: Postthoracotomy; R91.1-Solitary pulmonary nodule;  C34.2-Malignant neoplasm of middle lobe, bronchus or lung      COMPARISON: 03/16/2020     FINDINGS: Right chest tubes remain in place with enlargement of a now  moderate volume right pneumothorax and nearly entirely atelectatic right  lung adjacent to an asymmetrically elevated right hemidiaphragm. No  mediastinal shift.           Impression:       Right chest tubes remain in place with enlargement of a now  moderate volume right pneumothorax and nearly entirely atelectatic right  lung adjacent to an asymmetrically elevated right hemidiaphragm. No  mediastinal shift.         D:  03/17/2020  E:  03/17/2020     This report was finalized on 3/17/2020 10:04 AM by Dr. Anibal Ramos.       XR Chest 1 View [[id number]] Collected:  03/16/20 0819     Updated:  03/16/20 1356    Narrative:       EXAMINATION: XR CHEST 1 VW-      INDICATION: Chest tube management; R91.1-Solitary pulmonary nodule;  C34.2-Malignant neoplasm of middle lobe, bronchus or lung.      COMPARISON: 03/14/2020.     FINDINGS: Portable chest reveals two chest tubes identified on the  right. There is no significant change seen in the right apical  pneumothorax. Increased markings seen within the right lung base. The  left lung remains grossly clear with underlying chronic and  emphysematous changes present.           Impression:       Stable pneumothorax identified on the right. Elevation of  the right hemidiaphragm with  volume loss and two chest tubes present. No  change in the increased markings in the right midlung.     D:  03/16/2020  E:  03/16/2020     This report was finalized on 3/16/2020 1:52 PM by Dr. Yeni Mata MD.             Results for orders placed in visit on 05/13/19   Adult Transthoracic Echo Complete W/ Cont if Necessary Per Protocol    Narrative · Right ventricular cavity is mildly dilated.  · Mild mitral valve regurgitation is present  · Moderate to severe tricuspid valve regurgitation is present.  · The left ventricular cavity is borderline dilated.  · Estimated EF = 45%.  · Left ventricular systolic function is mildly decreased.  · Calculated right ventricular systolic pressure from tricuspid   regurgitation is 58 mmHg.          I have reviewed the medications:  Scheduled Meds:  docusate sodium 100 mg Oral Daily   heparin (porcine) 5,000 Units Subcutaneous Q12H   ketorolac 15 mg Intravenous Once   polyethylene glycol 17 g Oral Daily   sennosides-docusate 2 tablet Oral Nightly     Continuous Infusions:   PRN Meds:.•  acetaminophen  •  bisacodyl  •  bisacodyl  •  HYDROcodone-acetaminophen  •  ketorolac  •  magnesium sulfate **OR** magnesium sulfate **OR** magnesium sulfate  •  Morphine  •  ondansetron **OR** ondansetron  •  potassium & sodium phosphates **OR** potassium & sodium phosphates  •  potassium chloride  •  potassium chloride    Assessment/Plan   Assessment & Plan     Active Hospital Problems    Diagnosis  POA   • **Right upper lobe pulmonary nodule [R91.1]  Yes   • Valvular heart disease [I38]  Yes   • HFrEF (EF 45%) [I50.20]  Yes   • COPD [J44.9]  Yes   • Lung cancer, middle lobe s/p right VATS with lobectomy [C34.2]  Yes   • Pulmonary hypertension  [I27.20]  Yes   • History of tobacco use [Z87.891]  Not Applicable      Resolved Hospital Problems   No resolved problems to display.        Brief Hospital Course to date:  Saman Aguayo is a 64 y.o. male with a history of stage IA non-small cell  lung cancer in 2016 s/p lobectomy who has been followed by Dr. Lim and was noted to have a new right upper lobe nodule concerning for metastatic disease. He was referred to Dr. Porter and underwent a right upper lobe wedge resection. Frozen pathology was consistent with NSCLC and a thoracotomy was performed with completion right upper lobectomy. He was transferred to the ICU for monitoring and then to telemetry on 3/16. Hospitalist service consulted for medical management    POD # 4 s/p Right upper lobectomy  New opacification of right lung fields  -440 cc from chest tube/24 hours  -CTS following, aggressive pulmonary toilet with percussion and percussive vest q2 hours while awake.  -If fails to resolve, will need bronchoscopy  -Continue chest tube to suction due to output  -Await final pathology   -CXR this AM personally reviewed     History of COPD  -add PRN Duonebs     HF with reduced EF  Valvular heart disease  -Moderate MVR, moderate to severe TVR on Echo  -EF 45%    Pulmonary HTN  History of Tobacco abuse: 3ppd x 50 years    Constipation  -Continue bowel regimen    DVT Prophylaxis:  Subq Heparin    Disposition: I expect the patient to be discharged per primary service when medically ready     CODE STATUS:   Code Status and Medical Interventions:   Ordered at: 03/13/20 1421     Code Status:    CPR     Medical Interventions (Level of Support Prior to Arrest):    Full         Electronically signed by Britt Walker DO, 03/17/20, 11:39.

## 2020-03-17 NOTE — PROGRESS NOTES
Cardiothoracic Surgery Progress Note      POD # 4 s/p Right upper lobectomy     LOS: 4 days      Subjective:  No complaints today other than pain    Objective:  Vital Signs  Temp:  [97.8 °F (36.6 °C)-98.3 °F (36.8 °C)] 98.2 °F (36.8 °C)  Heart Rate:  [51-92] 81  Resp:  [18-22] 20  BP: (111-149)/(69-91) 120/70    Physical Exam:   General Appearance: alert, appears stated age and cooperative   Lungs: clear to auscultation, respirations regular, respirations even and respirations unlabored   Heart: regular rhythm & normal rate, normal S1, S2 and no murmur, no gallop, no rub   Skin: Incision c/d/i   No air leak from chest tubes  Results:    Results from last 7 days   Lab Units 03/16/20  0319   WBC 10*3/mm3 8.86   HEMOGLOBIN g/dL 15.3   HEMATOCRIT % 46.6   PLATELETS 10*3/mm3 160     Results from last 7 days   Lab Units 03/16/20  0319   SODIUM mmol/L 132*   POTASSIUM mmol/L 4.5   CHLORIDE mmol/L 93*   CO2 mmol/L 32.0*   BUN mg/dL 12   CREATININE mg/dL 0.69*   GLUCOSE mg/dL 103*   CALCIUM mg/dL 8.8       Assessment:  POD # 4 s/p Right upper lobectomy  440 cc from chest tube/24 hours  New opacification of right lung fields    Plan:  Aggressive pulmonary toilet - percussion and percussive vest q 2 hours while awake  If this fails to resolve, he will need a bronchoscopy  Continue chest tubes to suction given output  Ambulate  Pulmonary toilet  Await final pathology  Diuresis    03/17/20  08:47

## 2020-03-17 NOTE — PLAN OF CARE
Problem: Patient Care Overview  Goal: Plan of Care Review  Flowsheets (Taken 3/17/2020 5740)  Progress: improving  Plan of Care Reviewed With: patient; spouse  Outcome Summary: PT still c/o pain after prn medications.  Intensivist contacted and Toradol 15mg IV order.  PT ambulated in halls.   Denies any SOA or any other complaints at this time.

## 2020-03-18 ENCOUNTER — APPOINTMENT (OUTPATIENT)
Dept: GENERAL RADIOLOGY | Facility: HOSPITAL | Age: 65
End: 2020-03-18

## 2020-03-18 LAB
ANION GAP SERPL CALCULATED.3IONS-SCNC: 10 MMOL/L (ref 5–15)
BUN BLD-MCNC: 17 MG/DL (ref 8–23)
BUN/CREAT SERPL: 25.8 (ref 7–25)
CALCIUM SPEC-SCNC: 9.1 MG/DL (ref 8.6–10.5)
CHLORIDE SERPL-SCNC: 90 MMOL/L (ref 98–107)
CO2 SERPL-SCNC: 31 MMOL/L (ref 22–29)
CREAT BLD-MCNC: 0.66 MG/DL (ref 0.76–1.27)
DEPRECATED RDW RBC AUTO: 41.4 FL (ref 37–54)
ERYTHROCYTE [DISTWIDTH] IN BLOOD BY AUTOMATED COUNT: 12.4 % (ref 12.3–15.4)
GFR SERPL CREATININE-BSD FRML MDRD: 121 ML/MIN/1.73
GLUCOSE BLD-MCNC: 115 MG/DL (ref 65–99)
HCT VFR BLD AUTO: 46.5 % (ref 37.5–51)
HGB BLD-MCNC: 15.8 G/DL (ref 13–17.7)
MCH RBC QN AUTO: 31.1 PG (ref 26.6–33)
MCHC RBC AUTO-ENTMCNC: 34 G/DL (ref 31.5–35.7)
MCV RBC AUTO: 91.5 FL (ref 79–97)
PHOSPHATE SERPL-MCNC: 3.5 MG/DL (ref 2.5–4.5)
PLATELET # BLD AUTO: 207 10*3/MM3 (ref 140–450)
PMV BLD AUTO: 10.6 FL (ref 6–12)
POTASSIUM BLD-SCNC: 4.1 MMOL/L (ref 3.5–5.2)
RBC # BLD AUTO: 5.08 10*6/MM3 (ref 4.14–5.8)
SODIUM BLD-SCNC: 131 MMOL/L (ref 136–145)
WBC NRBC COR # BLD: 8.74 10*3/MM3 (ref 3.4–10.8)

## 2020-03-18 PROCEDURE — 99231 SBSQ HOSP IP/OBS SF/LOW 25: CPT | Performed by: FAMILY MEDICINE

## 2020-03-18 PROCEDURE — 85027 COMPLETE CBC AUTOMATED: CPT | Performed by: FAMILY MEDICINE

## 2020-03-18 PROCEDURE — 25010000002 KETOROLAC TROMETHAMINE PER 15 MG: Performed by: NURSE PRACTITIONER

## 2020-03-18 PROCEDURE — 71045 X-RAY EXAM CHEST 1 VIEW: CPT

## 2020-03-18 PROCEDURE — 84100 ASSAY OF PHOSPHORUS: CPT | Performed by: FAMILY MEDICINE

## 2020-03-18 PROCEDURE — 25010000002 HEPARIN (PORCINE) PER 1000 UNITS: Performed by: PHYSICIAN ASSISTANT

## 2020-03-18 PROCEDURE — 25010000002 MORPHINE PER 10 MG: Performed by: PHYSICIAN ASSISTANT

## 2020-03-18 PROCEDURE — 80048 BASIC METABOLIC PNL TOTAL CA: CPT | Performed by: FAMILY MEDICINE

## 2020-03-18 RX ORDER — BUMETANIDE 0.25 MG/ML
2 INJECTION INTRAMUSCULAR; INTRAVENOUS ONCE
Status: COMPLETED | OUTPATIENT
Start: 2020-03-18 | End: 2020-03-18

## 2020-03-18 RX ADMIN — HYDROCODONE BITARTRATE AND ACETAMINOPHEN 1 TABLET: 10; 325 TABLET ORAL at 20:41

## 2020-03-18 RX ADMIN — MORPHINE SULFATE 2 MG: 4 INJECTION, SOLUTION INTRAMUSCULAR; INTRAVENOUS at 03:25

## 2020-03-18 RX ADMIN — KETOROLAC TROMETHAMINE 15 MG: 30 INJECTION, SOLUTION INTRAMUSCULAR at 15:25

## 2020-03-18 RX ADMIN — HYDROCODONE BITARTRATE AND ACETAMINOPHEN 1 TABLET: 10; 325 TABLET ORAL at 15:22

## 2020-03-18 RX ADMIN — HEPARIN SODIUM 5000 UNITS: 5000 INJECTION, SOLUTION INTRAVENOUS; SUBCUTANEOUS at 08:00

## 2020-03-18 RX ADMIN — HYDROCODONE BITARTRATE AND ACETAMINOPHEN 1 TABLET: 10; 325 TABLET ORAL at 08:01

## 2020-03-18 RX ADMIN — HYDROCODONE BITARTRATE AND ACETAMINOPHEN 1 TABLET: 10; 325 TABLET ORAL at 02:15

## 2020-03-18 RX ADMIN — MORPHINE SULFATE 2 MG: 4 INJECTION, SOLUTION INTRAMUSCULAR; INTRAVENOUS at 18:54

## 2020-03-18 RX ADMIN — POLYETHYLENE GLYCOL 3350 17 G: 17 POWDER, FOR SOLUTION ORAL at 08:01

## 2020-03-18 RX ADMIN — HEPARIN SODIUM 5000 UNITS: 5000 INJECTION, SOLUTION INTRAVENOUS; SUBCUTANEOUS at 20:41

## 2020-03-18 RX ADMIN — SENNOSIDES AND DOCUSATE SODIUM 2 TABLET: 8.6; 5 TABLET ORAL at 20:41

## 2020-03-18 RX ADMIN — DOCUSATE SODIUM 100 MG: 100 CAPSULE, LIQUID FILLED ORAL at 08:01

## 2020-03-18 RX ADMIN — HYDROCODONE BITARTRATE AND ACETAMINOPHEN 1 TABLET: 10; 325 TABLET ORAL at 11:47

## 2020-03-18 RX ADMIN — BUMETANIDE 2 MG: 0.25 INJECTION, SOLUTION INTRAMUSCULAR; INTRAVENOUS at 12:35

## 2020-03-18 RX ADMIN — MORPHINE SULFATE 2 MG: 4 INJECTION, SOLUTION INTRAMUSCULAR; INTRAVENOUS at 08:01

## 2020-03-18 NOTE — PLAN OF CARE
Problem: Patient Care Overview  Goal: Plan of Care Review  Outcome: Ongoing (interventions implemented as appropriate)  Flowsheets  Taken 3/18/2020 0137  Progress: improving  Outcome Summary: Pt is resting well at this time. Pain controlled with PO and IV PRN pain medications. Pt has had a BM this evening per Pt report. VSS. Will continue to monitor. 0130 3/18/2020  Taken 3/17/2020 2000  Plan of Care Reviewed With: patient

## 2020-03-18 NOTE — PROGRESS NOTES
Continued Stay Note  [facility]     Patient Name: Saman Aguayo  MRN: [id number]  Today's Date: 3/18/2020    Admit Date: 3/13/2020    Discharge Plan     Row Name 03/18/20 1538       Plan    Plan  home    Patient/Family in Agreement with Plan  yes    Plan Comments  I met with Mr. Aguayo and his wife at the bedside to discuss discharge planning. Mr. Aguayo plans on returning home with assistance from his wife. He still has a chest tube in place and is requiring supplemental oxygen. Nursing will attempt to wean once chest tube is removed. Mr. Aguayo is requesting a rolling walker for home use. Case management will arrange this closer to discharge. He denies having any additional discharge needs.    Final Discharge Disposition Code  01 - home or self-care        Discharge Codes    No documentation.       Expected Discharge Date and Time     Expected Discharge Date Expected Discharge Time    Mar 20, 2020             Erick Berger RN

## 2020-03-18 NOTE — PROGRESS NOTES
Cardiothoracic Surgery Progress Note      POD # 5 s/p Right upper lobectomy     LOS: 5 days      Subjective:  No complaints today.  Had a good day yesterday with a bowel movement    Objective:  Vital Signs  Temp:  [98.1 °F (36.7 °C)-98.6 °F (37 °C)] 98.1 °F (36.7 °C)  Heart Rate:  [] 59  Resp:  [20] 20  BP: (110-131)/(62-81) 116/81    Physical Exam:   General Appearance: alert, appears stated age and cooperative   Lungs: clear to auscultation, respirations regular, respirations even and respirations unlabored   Heart: regular rhythm & normal rate, normal S1, S2 and no murmur, no gallop, no rub   Skin: Incision c/d/i   Trivial air leak from chest tubes  Results:    Results from last 7 days   Lab Units 03/18/20  0357   WBC 10*3/mm3 8.74   HEMOGLOBIN g/dL 15.8   HEMATOCRIT % 46.5   PLATELETS 10*3/mm3 207     Results from last 7 days   Lab Units 03/18/20  0357   SODIUM mmol/L 131*   POTASSIUM mmol/L 4.1   CHLORIDE mmol/L 90*   CO2 mmol/L 31.0*   BUN mg/dL 17   CREATININE mg/dL 0.66*   GLUCOSE mg/dL 115*   CALCIUM mg/dL 9.1       Assessment:  POD # 5 s/p Right upper lobectomy  400 cc from chest tube/24 hours  Slightly improved opacification of right lung fields  [id number] Stage IA2 adenocarcinoma of the lung - discussed with patient    Plan:  Aggressive pulmonary toilet - percussion and percussive vest q 2 hours while awake  If this fails to resolve, he will need a bronchoscopy  Chest tubes to waterseal   Ambulate  Pulmonary toilet  Diuresis    03/18/20  07:36

## 2020-03-18 NOTE — PLAN OF CARE
Problem: Patient Care Overview  Goal: Plan of Care Review  Outcome: Ongoing (interventions implemented as appropriate)  Flowsheets  Taken 3/18/2020 4288  Progress: improving  Outcome Summary: Pt ambulating in [place] with spouse. PO meds for pain with one dose of toradol today. CT to waterseal per CTS this AM. still on 1L but unable to wean completely yet. Pulm toilet encouraged. continue to monitor.  Taken 3/18/2020 0800  Plan of Care Reviewed With: patient;spouse

## 2020-03-19 ENCOUNTER — APPOINTMENT (OUTPATIENT)
Dept: GENERAL RADIOLOGY | Facility: HOSPITAL | Age: 65
End: 2020-03-19

## 2020-03-19 LAB
ANION GAP SERPL CALCULATED.3IONS-SCNC: 15 MMOL/L (ref 5–15)
BASOPHILS # BLD AUTO: 0.06 10*3/MM3 (ref 0–0.2)
BASOPHILS NFR BLD AUTO: 0.6 % (ref 0–1.5)
BUN BLD-MCNC: 25 MG/DL (ref 8–23)
BUN/CREAT SERPL: 27.2 (ref 7–25)
CALCIUM SPEC-SCNC: 9.2 MG/DL (ref 8.6–10.5)
CHLORIDE SERPL-SCNC: 85 MMOL/L (ref 98–107)
CO2 SERPL-SCNC: 32 MMOL/L (ref 22–29)
CREAT BLD-MCNC: 0.92 MG/DL (ref 0.76–1.27)
DEPRECATED RDW RBC AUTO: 42.6 FL (ref 37–54)
EOSINOPHIL # BLD AUTO: 0.33 10*3/MM3 (ref 0–0.4)
EOSINOPHIL NFR BLD AUTO: 3.2 % (ref 0.3–6.2)
ERYTHROCYTE [DISTWIDTH] IN BLOOD BY AUTOMATED COUNT: 12.6 % (ref 12.3–15.4)
GFR SERPL CREATININE-BSD FRML MDRD: 82 ML/MIN/1.73
GLUCOSE BLD-MCNC: 114 MG/DL (ref 65–99)
HCT VFR BLD AUTO: 48.2 % (ref 37.5–51)
HGB BLD-MCNC: 16.4 G/DL (ref 13–17.7)
IMM GRANULOCYTES # BLD AUTO: 0.05 10*3/MM3 (ref 0–0.05)
IMM GRANULOCYTES NFR BLD AUTO: 0.5 % (ref 0–0.5)
LYMPHOCYTES # BLD AUTO: 1.56 10*3/MM3 (ref 0.7–3.1)
LYMPHOCYTES NFR BLD AUTO: 15.4 % (ref 19.6–45.3)
MCH RBC QN AUTO: 31.3 PG (ref 26.6–33)
MCHC RBC AUTO-ENTMCNC: 34 G/DL (ref 31.5–35.7)
MCV RBC AUTO: 92 FL (ref 79–97)
MONOCYTES # BLD AUTO: 1.03 10*3/MM3 (ref 0.1–0.9)
MONOCYTES NFR BLD AUTO: 10.1 % (ref 5–12)
NEUTROPHILS # BLD AUTO: 7.13 10*3/MM3 (ref 1.7–7)
NEUTROPHILS NFR BLD AUTO: 70.2 % (ref 42.7–76)
NRBC BLD AUTO-RTO: 0 /100 WBC (ref 0–0.2)
PLATELET # BLD AUTO: 225 10*3/MM3 (ref 140–450)
PMV BLD AUTO: 10.8 FL (ref 6–12)
POTASSIUM BLD-SCNC: 4.1 MMOL/L (ref 3.5–5.2)
RBC # BLD AUTO: 5.24 10*6/MM3 (ref 4.14–5.8)
SODIUM BLD-SCNC: 132 MMOL/L (ref 136–145)
WBC NRBC COR # BLD: 10.16 10*3/MM3 (ref 3.4–10.8)

## 2020-03-19 PROCEDURE — 71045 X-RAY EXAM CHEST 1 VIEW: CPT

## 2020-03-19 PROCEDURE — 85025 COMPLETE CBC W/AUTO DIFF WBC: CPT | Performed by: FAMILY MEDICINE

## 2020-03-19 PROCEDURE — 80048 BASIC METABOLIC PNL TOTAL CA: CPT | Performed by: FAMILY MEDICINE

## 2020-03-19 PROCEDURE — 99232 SBSQ HOSP IP/OBS MODERATE 35: CPT | Performed by: FAMILY MEDICINE

## 2020-03-19 PROCEDURE — 25010000002 HEPARIN (PORCINE) PER 1000 UNITS: Performed by: PHYSICIAN ASSISTANT

## 2020-03-19 RX ORDER — BUMETANIDE 0.25 MG/ML
2 INJECTION INTRAMUSCULAR; INTRAVENOUS ONCE
Status: COMPLETED | OUTPATIENT
Start: 2020-03-19 | End: 2020-03-19

## 2020-03-19 RX ADMIN — HYDROCODONE BITARTRATE AND ACETAMINOPHEN 1 TABLET: 10; 325 TABLET ORAL at 21:02

## 2020-03-19 RX ADMIN — HEPARIN SODIUM 5000 UNITS: 5000 INJECTION, SOLUTION INTRAVENOUS; SUBCUTANEOUS at 21:02

## 2020-03-19 RX ADMIN — HEPARIN SODIUM 5000 UNITS: 5000 INJECTION, SOLUTION INTRAVENOUS; SUBCUTANEOUS at 08:18

## 2020-03-19 RX ADMIN — DOCUSATE SODIUM 100 MG: 100 CAPSULE, LIQUID FILLED ORAL at 08:18

## 2020-03-19 RX ADMIN — POLYETHYLENE GLYCOL 3350 17 G: 17 POWDER, FOR SOLUTION ORAL at 08:18

## 2020-03-19 RX ADMIN — SENNOSIDES AND DOCUSATE SODIUM 2 TABLET: 8.6; 5 TABLET ORAL at 21:02

## 2020-03-19 RX ADMIN — HYDROCODONE BITARTRATE AND ACETAMINOPHEN 1 TABLET: 10; 325 TABLET ORAL at 15:44

## 2020-03-19 RX ADMIN — HYDROCODONE BITARTRATE AND ACETAMINOPHEN 1 TABLET: 10; 325 TABLET ORAL at 01:12

## 2020-03-19 RX ADMIN — BUMETANIDE 2 MG: 0.25 INJECTION INTRAMUSCULAR; INTRAVENOUS at 17:14

## 2020-03-19 RX ADMIN — HYDROCODONE BITARTRATE AND ACETAMINOPHEN 1 TABLET: 10; 325 TABLET ORAL at 10:25

## 2020-03-19 NOTE — PROGRESS NOTES
[facility] Medicine Services  PROGRESS NOTE    Patient Name: Saman Aguayo  : 1955  MRN: [id number]    Date of Admission: 3/13/2020  Primary Care Physician: Vermeesch, Marilyn K, MD    Subjective   Subjective     CC:  F/U Med Mgmt    HPI:  Patient is sitting up in chair. He states he feels much better today. He says he is able to cough more now and feels better. He is up and ambulating .      Review of Systems  Gen- No fevers, chills  CV- No chest pain, palpitations  Resp- + cough, dyspnea  GI- No N/V/D, abd pain    Objective   Objective     Vital Signs:   Temp:  [98.1 °F (36.7 °C)-98.2 °F (36.8 °C)] 98.1 °F (36.7 °C)  Heart Rate:  [54-80] 57  Resp:  [18-20] 18  BP: (103-119)/(51-75) 112/56        Physical Exam:  Constitutional: No acute distress, awake, alert, female   HENT: NCAT, mucous membranes moist  Respiratory: CTAB , respiratory effort normal, CHest tube in place (was d/c'ed after exam)   Cardiovascular: RRR, no murmurs, rubs, or gallops, palpable pedal pulses bilaterally  Gastrointestinal: Positive bowel sounds, soft, nontender, nondistended  Musculoskeletal: No bilateral ankle edema  Psychiatric: Appropriate affect, cooperative  Neurologic: Oriented x 3, strength symmetric in all extremities, Cranial Nerves grossly intact to confrontation, speech clear  Skin: No rashes    Results Reviewed:  Results from last 7 days   Lab Units 20  0403 03/18/20  0357 03/16/20  0319   WBC 10*3/mm3 10.16 8.74 8.86   HEMOGLOBIN g/dL 16.4 15.8 15.3   HEMATOCRIT % 48.2 46.5 46.6   PLATELETS 10*3/mm3 225 207 160     Results from last 7 days   Lab Units 20  0403 207 209   SODIUM mmol/L 132* 131* 132*   POTASSIUM mmol/L 4.1 4.1 4.5   CHLORIDE mmol/L 85* 90* 93*   CO2 mmol/L 32.0* 31.0* 32.0*   BUN mg/dL 25* 17 12   CREATININE mg/dL 0.92 0.66* 0.69*   GLUCOSE mg/dL 114* 115* 103*   CALCIUM mg/dL 9.2 9.1 8.8     Estimated Creatinine Clearance: 90.2 mL/min (by C-G  formula based on SCr of 0.92 mg/dL).    Microbiology Results Abnormal     None          Imaging Results (Last 24 Hours)     Procedure Component Value Units Date/Time    XR Chest 1 View [[id number]] Collected:  03/19/20 1229     Updated:  03/19/20 1716    Narrative:       EXAMINATION: XR CHEST 1 VW-03/19/2020:      INDICATION: PNEUMOTHORAX.      COMPARISON: Chest x-ray 03/19/2020.     FINDINGS: Interval removal of right chest tubes with interval increase  in now moderate volume right apical pneumothorax present. Volume loss in  the right hemithorax from combined pneumothorax and asymmetric elevation  of the right hemidiaphragm without mediastinal shift.           Impression:       Interval removal of right chest tubes with interval increase  in now moderate volume right apical pneumothorax present. Volume loss in  the right hemithorax from combined pneumothorax and asymmetric elevation  of the right hemidiaphragm without mediastinal shift.         D:  03/19/2020  E:  03/19/2020     This report was finalized on 3/19/2020 5:13 PM by Dr. Anibal Ramos.       XR Chest 1 View [[id number]] Collected:  03/19/20 0823     Updated:  03/19/20 0907    Narrative:       EXAMINATION: XR CHEST 1 VW-03/19/2020:      INDICATION: Postop; R91.1-Solitary pulmonary nodule; C34.2-Malignant  neoplasm of middle lobe, bronchus or lung.      COMPARISON: 03/18/2020.     FINDINGS: Right chest tubes remain in place with a trace right apical  pneumothorax decreased from prior in volume. Improved aeration and lung  volume in the right hemithorax with decreased opacifications and  atelectatic change, however, persistent asymmetric elevation of the  right hemidiaphragm noted. Left hemithorax remains grossly clear.  Cardiac silhouette unchanged.       Impression:       Right chest tubes remain in place with a trace right apical  pneumothorax decreased from prior in volume. Improved aeration and lung  volume in the right hemithorax with decreased  opacifications and  atelectatic change, however, persistent asymmetric elevation of the  right hemidiaphragm noted. Left hemithorax remains grossly clear.  Cardiac silhouette unchanged.     D:  03/19/2020  E:  03/19/2020     This report was finalized on 3/19/2020 9:04 AM by Dr. Anibal Ramos.       XR Chest 1 View [[id number]] Collected:  03/18/20 0845     Updated:  03/18/20 2258    Narrative:       EXAMINATION: XR CHEST 1 VW-     INDICATION: Postop; R91.1-Solitary pulmonary nodule; C34.2-Malignant  neoplasm of middle lobe, bronchus or lung.      COMPARISON: 03/17/2020.     FINDINGS: Right apical pneumothorax is decreased from approximately 42  mm, approximately 16 mm since yesterday's exam and there is much better  expansion of the remaining right lung. Left lung remains well expanded  and clear. Heart is probably enlarged. Vasculature appears normal.       Impression:       Significantly improved right apical pneumothorax, and  improving aeration of the right lung compared to yesterday's study. No  new chest disease is seen.      D:  03/18/2020  E:  03/18/2020     This report was finalized on 3/18/2020 10:54 PM by Dr. Laith Hammer MD.             Results for orders placed in visit on 05/13/19   Adult Transthoracic Echo Complete W/ Cont if Necessary Per Protocol    Narrative · Right ventricular cavity is mildly dilated.  · Mild mitral valve regurgitation is present  · Moderate to severe tricuspid valve regurgitation is present.  · The left ventricular cavity is borderline dilated.  · Estimated EF = 45%.  · Left ventricular systolic function is mildly decreased.  · Calculated right ventricular systolic pressure from tricuspid   regurgitation is 58 mmHg.          I have reviewed the medications:  Scheduled Meds:    docusate sodium 100 mg Oral Daily   heparin (porcine) 5,000 Units Subcutaneous Q12H   ketorolac 15 mg Intravenous Once   polyethylene glycol 17 g Oral Daily   sennosides-docusate 2 tablet Oral Nightly          Continuous Infusions:   PRN Meds:.•  acetaminophen  •  bisacodyl  •  bisacodyl  •  HYDROcodone-acetaminophen  •  ipratropium-albuterol  •  magnesium sulfate **OR** magnesium sulfate **OR** magnesium sulfate  •  Morphine  •  ondansetron **OR** ondansetron  •  potassium & sodium phosphates **OR** potassium & sodium phosphates  •  potassium chloride  •  potassium chloride    Assessment/Plan   Assessment & Plan     Active Hospital Problems    Diagnosis  POA   • **Right upper lobe pulmonary nodule [R91.1]  Yes   • Valvular heart disease [I38]  Yes   • HFrEF (EF 45%) [I50.20]  Yes   • COPD [J44.9]  Yes   • Lung cancer, middle lobe s/p right VATS with lobectomy [C34.2]  Yes   • Pulmonary hypertension  [I27.20]  Yes   • History of tobacco use [Z87.891]  Not Applicable      Resolved Hospital Problems   No resolved problems to display.        Brief Hospital Course to date:  Saman Aguayo is a 64 y.o. male with a history of stage IA non-small cell lung cancer in 2016 s/p lobectomy who has been followed by Dr. Lim and was noted to have a new right upper lobe nodule concerning for metastatic disease. He was referred to Dr. Porter and underwent a right upper lobe wedge resection. Frozen pathology was consistent with NSCLC and a thoracotomy was performed with completion right upper lobectomy. He was transferred to the ICU for monitoring and then to telemetry on 3/16. Hospitalist service following for medical management.     POD # 6 s/p Right upper lobectomy  New opacification of right lung fields  -CTS following, aggressive pulmonary toilet with percussion and percussive vest q2 hours while awake.  -chest tube in place   -path came back as adenocarcinoma     History of COPD  - PRN Duonebs     HF with reduced EF  Valvular heart disease  -Moderate MVR, moderate to severe TVR on Echo from 5/21/19  -EF 45%    Pulmonary HTN  History of Tobacco abuse: 3ppd x 50 years    Constipation  -Continue bowel regimen    DVT Prophylaxis:   Subq Heparin    Disposition: I expect the patient to be discharged per primary service when medically ready     CODE STATUS:   Code Status and Medical Interventions:   Ordered at: 03/13/20 1421     Code Status:    CPR     Medical Interventions (Level of Support Prior to Arrest):    Full         Electronically signed by Hanna Pickett DO, 03/19/20, 17:43.

## 2020-03-19 NOTE — PROGRESS NOTES
Cardiothoracic Surgery Progress Note      POD # 6 s/p Right upper lobectomy     LOS: 6 days      Subjective:  No complaints today.     Objective:  Vital Signs  Temp:  [98 °F (36.7 °C)-98.2 °F (36.8 °C)] 98.1 °F (36.7 °C)  Heart Rate:  [54-80] 59  Resp:  [18-20] 18  BP: (103-119)/(51-75) 119/75    Physical Exam:   General Appearance: alert, appears stated age and cooperative   Lungs: clear to auscultation, respirations regular, respirations even and respirations unlabored   Heart: regular rhythm & normal rate, normal S1, S2 and no murmur, no gallop, no rub   Skin: Incision c/d/i   No air leak from chest tubes  Results:    Results from last 7 days   Lab Units 03/19/20  0403   WBC 10*3/mm3 10.16   HEMOGLOBIN g/dL 16.4   HEMATOCRIT % 48.2   PLATELETS 10*3/mm3 225     Results from last 7 days   Lab Units 03/19/20  0403   SODIUM mmol/L 132*   POTASSIUM mmol/L 4.1   CHLORIDE mmol/L 85*   CO2 mmol/L 32.0*   BUN mg/dL 25*   CREATININE mg/dL 0.92   GLUCOSE mg/dL 114*   CALCIUM mg/dL 9.2       Assessment:  POD # 6 s/p Right upper lobectomy  Improved opacification of right lung fields  [id number] Stage IA2 adenocarcinoma of the lung - discussed with patient    Plan:  Aggressive pulmonary toilet - percussion and percussive vest q 2 hours while awake  D/C chest tubes  Ambulate  Pulmonary toilet    03/19/20  16:11

## 2020-03-19 NOTE — PLAN OF CARE
Problem: Patient Care Overview  Goal: Plan of Care Review  Outcome: Ongoing (interventions implemented as appropriate)  Flowsheets (Taken 3/19/2020 8607)  Progress: no change  Plan of Care Reviewed With: patient; spouse  Outcome Summary: pt rested well throughout night, minimal c/o pain, no c/o n/v/d, no soa, chest tube to water seal, will continue to monitor.

## 2020-03-19 NOTE — PROGRESS NOTES
Adult Nutrition  Assessment/PES    Patient Name:  Saman Aguayo  YOB: 1955  MRN: [id number]  Admit Date:  3/13/2020    Assessment Date:  3/19/2020      Reason for Assessment     Row Name 03/19/20 1443          Reason for Assessment    Reason For Assessment  -- LOS. 25 mins     Diagnosis  -- lung cancer, s/p R upper lobectomy. H/o HF, COPD. Complete list of dx/hx per MD notes this adm.         Nutrition/Diet History     Row Name 03/19/20 1446          Nutrition/Diet History    Factors Affecting Nutritional Intake  -- wife states pt eats well and with good appetite at home, and eating just a little less than nl during adm..          Anthropometrics     Row Name 03/19/20 1444          Admit Weight    Admit Weight  -- ht=72in, eu=759fu per standing scale wt on 3/13; BMI=24               Nutrition Prescription Ordered     Row Name 03/19/20 1445          Nutrition Prescription PO    Common Modifiers  Cardiac         Evaluation of Received Nutrient/Fluid Intake     Row Name 03/19/20 1447 03/19/20 1445       PO Evaluation    Number of Meals  --  4    % PO Intake  -- at meals attempted; noted 0% intake at one meal on 3/17 and 3/18; wife states she brought pt food from elsewhere so pt did not consume meal tray at those times.   50              Problem/Interventions:  Problem 1     Row Name 03/19/20 1446          Nutrition Diagnoses Problem 1    Problem 1  Inadequate Intake/Infusion     Etiology (related to)  MNT for Treatment/Condition     Signs/Symptoms (evidenced by)  PO Intake     Percent (%) intake recorded  50 %     Over number of meals  4               Intervention Goal     Row Name 03/19/20 1446          Intervention Goal    PO  Increase intake         Nutrition Intervention     Row Name 03/19/20 1446          Nutrition Intervention    RD/Tech Action  Follow Tx progress;Supplement offered/refused; encourage intake           Education/Evaluation     Row Name 03/19/20 1446          Monitor/Evaluation     Monitor  Per protocol           Electronically signed by:  Stefanie Briceno MS,RD,LD  03/19/20 14:48

## 2020-03-20 ENCOUNTER — READMISSION MANAGEMENT (OUTPATIENT)
Dept: CALL CENTER | Facility: HOSPITAL | Age: 65
End: 2020-03-20

## 2020-03-20 ENCOUNTER — APPOINTMENT (OUTPATIENT)
Dept: GENERAL RADIOLOGY | Facility: HOSPITAL | Age: 65
End: 2020-03-20

## 2020-03-20 VITALS
WEIGHT: 178 LBS | OXYGEN SATURATION: 97 % | DIASTOLIC BLOOD PRESSURE: 76 MMHG | RESPIRATION RATE: 18 BRPM | HEIGHT: 72 IN | HEART RATE: 62 BPM | TEMPERATURE: 97.6 F | BODY MASS INDEX: 24.11 KG/M2 | SYSTOLIC BLOOD PRESSURE: 123 MMHG

## 2020-03-20 LAB
ANION GAP SERPL CALCULATED.3IONS-SCNC: 8 MMOL/L (ref 5–15)
BASOPHILS # BLD AUTO: 0.07 10*3/MM3 (ref 0–0.2)
BASOPHILS NFR BLD AUTO: 0.9 % (ref 0–1.5)
BUN BLD-MCNC: 21 MG/DL (ref 8–23)
BUN/CREAT SERPL: 21.9 (ref 7–25)
CALCIUM SPEC-SCNC: 8.8 MG/DL (ref 8.6–10.5)
CHLORIDE SERPL-SCNC: 90 MMOL/L (ref 98–107)
CO2 SERPL-SCNC: 34 MMOL/L (ref 22–29)
CREAT BLD-MCNC: 0.96 MG/DL (ref 0.76–1.27)
DEPRECATED RDW RBC AUTO: 42.9 FL (ref 37–54)
EOSINOPHIL # BLD AUTO: 0.43 10*3/MM3 (ref 0–0.4)
EOSINOPHIL NFR BLD AUTO: 5.2 % (ref 0.3–6.2)
ERYTHROCYTE [DISTWIDTH] IN BLOOD BY AUTOMATED COUNT: 12.4 % (ref 12.3–15.4)
GFR SERPL CREATININE-BSD FRML MDRD: 78 ML/MIN/1.73
GLUCOSE BLD-MCNC: 109 MG/DL (ref 65–99)
HCT VFR BLD AUTO: 44.2 % (ref 37.5–51)
HGB BLD-MCNC: 15 G/DL (ref 13–17.7)
IMM GRANULOCYTES # BLD AUTO: 0.1 10*3/MM3 (ref 0–0.05)
IMM GRANULOCYTES NFR BLD AUTO: 1.2 % (ref 0–0.5)
LYMPHOCYTES # BLD AUTO: 1.56 10*3/MM3 (ref 0.7–3.1)
LYMPHOCYTES NFR BLD AUTO: 19 % (ref 19.6–45.3)
MCH RBC QN AUTO: 31.8 PG (ref 26.6–33)
MCHC RBC AUTO-ENTMCNC: 33.9 G/DL (ref 31.5–35.7)
MCV RBC AUTO: 93.6 FL (ref 79–97)
MONOCYTES # BLD AUTO: 0.88 10*3/MM3 (ref 0.1–0.9)
MONOCYTES NFR BLD AUTO: 10.7 % (ref 5–12)
NEUTROPHILS # BLD AUTO: 5.19 10*3/MM3 (ref 1.7–7)
NEUTROPHILS NFR BLD AUTO: 63 % (ref 42.7–76)
NRBC BLD AUTO-RTO: 0 /100 WBC (ref 0–0.2)
PLATELET # BLD AUTO: 208 10*3/MM3 (ref 140–450)
PMV BLD AUTO: 10.8 FL (ref 6–12)
POTASSIUM BLD-SCNC: 4.2 MMOL/L (ref 3.5–5.2)
RBC # BLD AUTO: 4.72 10*6/MM3 (ref 4.14–5.8)
SODIUM BLD-SCNC: 132 MMOL/L (ref 136–145)
WBC NRBC COR # BLD: 8.23 10*3/MM3 (ref 3.4–10.8)

## 2020-03-20 PROCEDURE — 99231 SBSQ HOSP IP/OBS SF/LOW 25: CPT | Performed by: NURSE PRACTITIONER

## 2020-03-20 PROCEDURE — 93010 ELECTROCARDIOGRAM REPORT: CPT | Performed by: INTERNAL MEDICINE

## 2020-03-20 PROCEDURE — 85025 COMPLETE CBC W/AUTO DIFF WBC: CPT | Performed by: FAMILY MEDICINE

## 2020-03-20 PROCEDURE — 94669 MECHANICAL CHEST WALL OSCILL: CPT

## 2020-03-20 PROCEDURE — 99024 POSTOP FOLLOW-UP VISIT: CPT | Performed by: PHYSICIAN ASSISTANT

## 2020-03-20 PROCEDURE — 25010000002 HEPARIN (PORCINE) PER 1000 UNITS: Performed by: PHYSICIAN ASSISTANT

## 2020-03-20 PROCEDURE — 80048 BASIC METABOLIC PNL TOTAL CA: CPT | Performed by: FAMILY MEDICINE

## 2020-03-20 PROCEDURE — 93005 ELECTROCARDIOGRAM TRACING: CPT | Performed by: NURSE PRACTITIONER

## 2020-03-20 PROCEDURE — 71045 X-RAY EXAM CHEST 1 VIEW: CPT

## 2020-03-20 RX ORDER — HYDROCODONE BITARTRATE AND ACETAMINOPHEN 10; 325 MG/1; MG/1
1 TABLET ORAL EVERY 6 HOURS PRN
Qty: 30 TABLET | Refills: 0
Start: 2020-03-20 | End: 2020-03-27

## 2020-03-20 RX ADMIN — HYDROCODONE BITARTRATE AND ACETAMINOPHEN 1 TABLET: 10; 325 TABLET ORAL at 05:46

## 2020-03-20 RX ADMIN — HEPARIN SODIUM 5000 UNITS: 5000 INJECTION, SOLUTION INTRAVENOUS; SUBCUTANEOUS at 09:16

## 2020-03-20 RX ADMIN — HYDROCODONE BITARTRATE AND ACETAMINOPHEN 1 TABLET: 10; 325 TABLET ORAL at 09:59

## 2020-03-20 NOTE — DISCHARGE PLACEMENT REQUEST
"Saman Aguayo (64 y.o. Male)     Date of Birth Social Security Number Address Home Phone MRN    1955  [address] [place] [address] [phone] [id number]    [employer] Marital Status          Orthodox        Admission Date Admission Type Admitting Provider Attending Provider Department, Room/Bed    3/13/20 Elective Chris Porter MD Chaney, John H, MD [address], [address]/1    Discharge Date Discharge Disposition Discharge Destination         Home or Self Care              Attending Provider:  Chris Porter MD    Allergies:  No Known Drug Allergy    Isolation:  None   Infection:  None   Code Status:  CPR    Ht:  182.9 cm (72\")   Wt:  80.7 kg (178 lb)    Admission Cmt:  None   Principal Problem:  Right upper lobe pulmonary nodule [R91.1]                 Active Insurance as of 3/13/2020     Primary Coverage     Payor Plan Insurance Group Employer/Plan Group    HUMANA MEDICARE REPLACEMENT HUMANA MEDICARE REPLACEMENT [id number]     Payor Plan Address Payor Plan Phone Number Payor Plan Fax Number Effective Dates    [address] [address] [phone]  2018 - None Entered    [facility] [address]       Subscriber Name Subscriber Birth Date Member ID       Saman Aguayo 1955 [id number]                 Emergency Contacts      (Rel.) Home Phone Work Phone Mobile Phone    Anna Aguayo (Spouse) [phone] -- [phone]    Maricruz Calloway (Daughter) -- -- [phone]        [address] [address]  Dept. Phone:  [phone]  Dept. Fax:   Date Ordered: Mar 20, 2020         Patient:  Saman Aguayo MRN:  [id number]   [address] [place] [address] :  1955  SSN:    Phone: [phone] Sex:  M     Weight: 80.7 kg (178 lb)         Ht Readings from Last 1 Encounters:   20 182.9 cm (72\")         Oxygen Therapy         (Order ID: [id number])    Diagnosis:  Lung cancer, middle lobe (CMS/HCC) (C34.2 " "[ICD-10-CM] 162.4 [ICD-9-CM])  Other emphysema (CMS/HCC) (J43.8 [ICD-10-CM] 492.8 [ICD-9-CM])   Quantity:  1     Delivery Modality: Nasal Cannula  Liters Per Minute: 2  Duration: Continuous  Equipment:  Oxygen Concentrator &  &  Portable Gaseous Oxygen System & Portable Oxygen Contents Gaseous &  Conserving Regulator  The face to face evaluation was performed on: 3/20/2020  Length of Need (99 Months = Lifetime): 99 Months = Lifetime        Authorizing Provider's Phone: [phone]   Verbal Order Mode: Verbal with readback   Authorizing Provider: Chris Porter MD  Authorizing Provider's NPI: [id number]     Order Entered By: Erick Berger RN 3/20/2020 11:00 AM     Electronically signed by:          [address] [address]  Dept. Phone:  [phone]  Dept. Fax:   Date Ordered: Mar 20, 2020         Patient:  Saman Agauyo MRN:  [id number]   [address] [place] [address] :  1955  SSN:    Phone: [phone] Sex:  M     Weight: 80.7 kg (178 lb)         Ht Readings from Last 1 Encounters:   20 182.9 cm (72\")         Walker               (Order ID: [id number])    Diagnosis:  Lung cancer, middle lobe (CMS/HCC) (C34.2 [ICD-10-CM] 162.4 [ICD-9-CM])  Other emphysema (CMS/HCC) (J43.8 [ICD-10-CM] 492.8 [ICD-9-CM])   Quantity:  1     Equipment:  Walker Folding with Wheels  Length of Need (99 Months = Lifetime): 99 Months = Lifetime        Authorizing Provider's Phone: [phone]   Verbal Order Mode: Verbal with readback   Authorizing Provider: Chris Porter MD  Authorizing Provider's NPI: [id number]     Order Entered By: Erick Berger RN 3/20/2020 10:59 AM     Electronically signed by:          20 1045  --  --  --  --  room air, at rest  --  84Abnormal        20 1000  --  55  --  --  2 L NC  --  97   20 0752  97.6 (36.4)  53  18  123/76  --  --  96   20 0743  --  50  --  --  --  --  " 97   03/20/20 0546  --  --  --  --  nasal cannula  4  --            History & Physical      Ruth Toledo PA-C at 03/13/20 0636     Attestation signed by Chris Porter MD at 03/13/20 0700    Agree with above.  No interval change.  Plan for bronchoscopy, right VATS, right upper lobe wedge resection, possible right upper lobectomy, mediastinal lymph node dissection and intercostal nerve blocks with cryoablation.  This tentatively represents a [id number] Clinical stage IA2 malignancy.                   Pre-Op H&P (See Recent Office Note Attached for Full H&P)    Chief complaint: Fatigue    HPI:    64-year-old  male with a history of hypertension, COPD, tobacco abuse and lung cancer who presents with a newly diagnosed right upper lobe nodule.  Mr. Aguayo feels well and denies cough, hemoptysis, weight loss, shortness of breath, lymphadenopathy, fevers or chills.  Repeat imaging has demonstrated enlargement of a right upper lobe lung nodule.    -PET/CT performed 2/7/2020, personally reviewed, demonstrates a right upper lobe nodule with an SUV of 2.3.  There is no active mediastinal lymphadenopathy.  -CT of the chest performed 1/24/2020, per report (images unavailable from [facility]), demonstrates a 1.1 cm right upper lobe nodule that is increased in size from 8 mm on 7/19/2019.  No lymphadenopathy is present.  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  The pleural and bronchial margins are free.  There was lymphovascular invasion present and lymph nodes from stations 2R, 7, 9 and 10 were negative for malignancy. [id number] - STAGE 1A.    He presents with a newly diagnosed right upper lobe nodule.  With his history of lung cancer, this is concerning for possible malignancy.  We discussed options including continued observation, CyberKnife radiation treatment versus surgical resection.  The  patient wished to proceed with surgical resection over the other two options.  This corresponds with NCCN guidelines    He presents today for a bronchoscopy, thoracoscopy video assisted with lobectomy, right, and mediastinal lymph node dissection, right    Review of Systems:  General ROS:  no fever, chills, rashes, No change since last office visit  Cardiovascular ROS: no chest pain or dyspnea on exertion  Respiratory ROS: no cough, shortness of breath, or wheezing      Meds:    No current facility-administered medications on file prior to encounter.      No current outpatient medications on file prior to encounter.       Vital Signs:  BP: 157/74   HR: 49   SpO2: 94%    Physical Exam:    CV:   Regular rhythm. No murmur, no rubs               Resp:  Clear to auscultation; respirations regular, even and unlabored    Results Review:     Lab Results   Component Value Date    WBC 7.25 03/12/2020    HGB 16.7 03/12/2020    HCT 48.2 03/12/2020    MCV 94.0 03/12/2020     03/12/2020    NEUTROABS 4.72 03/12/2020    GLUCOSE 109 (H) 03/12/2020    BUN 15 03/12/2020    CREATININE 0.87 03/12/2020    EGFRIFNONA 88 03/12/2020    EGFRIFAFRI 103 07/11/2017     03/12/2020    K 4.8 03/12/2020    CL 99 03/12/2020    CO2 30.0 (H) 03/12/2020    MG 1.8 09/01/2016    PHOS 3.0 09/01/2016    CALCIUM 9.7 03/12/2020    ALBUMIN 4.50 03/12/2020    AST 16 03/12/2020    ALT 15 03/12/2020    BILITOT 0.6 03/12/2020        I reviewed the patient's new clinical results.   Cardiac clearance documentation on file.    Cancer Staging (if applicable)  Cancer Patient: _x_ yes __no __unknown; If yes, clinical stage T:__ N:__M:__, stage group or __N/A    Assessment:  Right upper lobe pulmonary nodule  History of lung cancer    Plan:  Bronchoscopy, thoracoscopy video assisted with lobectomy, right, and mediastinal lymph node dissection, right    Ruth Toledo PA-C  3/13/2020   06:37      Electronically signed by Chris Porter MD at 03/13/20  "0700   Source Note          02/18/2020  Patient Information  Saman Aguayo                                                                                          [address] [place] [address]   1955  'PCP/Referring Physician'  Vermeesch, Marilyn K, MD  [phone]  Nannette Lim MD  [phone]  Chief Complaint   Patient presents with   • Follow-up     Returning patient referred back for abnormal CT chest per Dr. Lim. Pt. states that he gets tired very easy.        History of Present Illness: 64-year-old  male with a history of hypertension, COPD, tobacco abuse and lung cancer who presents with a newly diagnosed right upper lobe nodule.  Mr. Aguayo feels well and denies cough, hemoptysis, weight loss, shortness of breath, lymphadenopathy, fevers or chills.  Repeat imaging has demonstrated enlargement of a right upper lobe lung nodule.  Dr. Lim has referred the patient to discuss possible surgical resection.      Patient Active Problem List   Diagnosis   • Osteoarthritis   • Bradycardia   • Pulmonary hypertension (CMS/HCC)   • Lung cancer, middle lobe (CMS/HCC)   • Status post lobectomy of lung   • COPD (chronic obstructive pulmonary disease) (CMS/HCC)   • Screening PSA (prostate specific antigen)   • PAC (premature atrial contraction)   • Encounter for Medicare annual wellness exam   • Environmental allergies     Past Medical History:   Diagnosis Date   • Abnormal ECG    • Abscess of skin    • Arthritis     HANDS AND BACK    • Bradycardia    • Bunion     LEFT FOOT   • COPD (chronic obstructive pulmonary disease) (CMS/HCC)    • Full dentures    • History of echocardiogram 09/21/2016   • Hx of exercise stress test 09/27/2016    DR. BAKER \"IT WAS NORMAL\"   • Hypertension    • Low back pain    • Lung cancer, middle lobe (CMS/HCC) 11/8/2016    RIGHT MIDDLE LOBE   • Lung nodule     RUL-7MM.  PRESENTLY HAS, AND FOLLOWS WITH DR. LIM.  STATES FOUND \"A COUPLE MONTHS AGO\"   • Premature " atrial contraction    • Pulmonary hypertension (CMS/HCC)    • Skin cancer     BASAL CELL-NOSE, LIP   • Wears eyeglasses      Past Surgical History:   Procedure Laterality Date   • BASAL CELL CARCINOMA EXCISION      NOSE, LIP   • BRONCHOSCOPY  2016   • BUNIONECTOMY Left 2013    GREAT TOE   • COLONOSCOPY  2018   • INGUINAL HERNIA REPAIR Left 2002    Dr. Singh/inguinal    • KNEE ARTHROSCOPY Right 12/12/2018    Procedure: Diagnostic arthroscopy right knee with partial medial meniscectomy;  Surgeon: Dinh Nova MD;  Location: [facility];  Service: Orthopedics   • LUNG REMOVAL, PARTIAL  2016    RIGHT MIDDLE LOBE   • MOUTH SURGERY      FULL EXTRACTION OF TEETH   • SKIN BIOPSY     • THORACOSCOPY N/A 11/18/2016    Procedure: BRONCH THORACOSCOPY VIDEO ASSISTED  WITH LOBECTOMY, MEDIALSTINAL LYMPH NODE DISECTION;  Surgeon: Chris Porter MD;  Location: [facility] OR;  Service:        Current Outpatient Medications:   •  trimethoprim-polymyxin b (POLYTRIM) 04236-9.1 UNIT/ML-% ophthalmic solution, , Disp: , Rfl:   Allergies   Allergen Reactions   • No Known Drug Allergy      Social History     Socioeconomic History   • Marital status:      Spouse name: Not on file   • Number of children: 1   • Years of education: Not on file   • Highest education level: Not on file   Occupational History   • 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     Family History   Problem Relation Age of Onset   • Heart attack Father    • Heart disease Father    • Colon cancer Father    •  "Colon cancer Mother    • Liver cancer Mother      Review of Systems   Constitution: Positive for malaise/fatigue. Negative for chills, fever, night sweats and weight loss.   HENT: Positive for congestion (sinus ). Negative for hearing loss, nosebleeds and odynophagia.    Cardiovascular: Negative for chest pain, claudication, dyspnea on exertion, leg swelling, orthopnea, palpitations and syncope.   Respiratory: Positive for cough and sputum production. Negative for hemoptysis, shortness of breath and wheezing.    Endocrine: Negative for cold intolerance, heat intolerance, polydipsia, polyphagia and polyuria.   Hematologic/Lymphatic: Positive for bleeding problem. Bruises/bleeds easily.   Skin: Negative for itching, poor wound healing and rash.   Musculoskeletal: Positive for arthritis (bilateral hands) and back pain. Negative for joint pain, joint swelling and myalgias.   Gastrointestinal: Negative for abdominal pain, constipation, diarrhea, hematemesis, melena, nausea and vomiting.   Genitourinary: Negative for dysuria, frequency, hematuria, nocturia and urgency.   Neurological: Positive for dizziness (if stands up to fast). Negative for light-headedness, loss of balance and numbness.   Psychiatric/Behavioral: Negative for depression and suicidal ideas. The patient is not nervous/anxious.    Allergic/Immunologic: Positive for environmental allergies. Negative for HIV exposure.     Vitals:    02/18/20 1352   BP: 138/71   Pulse: 54   Temp: 98.1 °F (36.7 °C)   SpO2: 98%   Weight: 82.1 kg (181 lb)   Height: 182.9 cm (72\")      Physical Exam   Constitutional: He is oriented to person, place, and time. He appears well-developed and well-nourished. No distress.    male who appears stated age and is present with his wife   HENT:   Head: Normocephalic and atraumatic.   Eyes: Conjunctivae and EOM are normal. No scleral icterus.   Neck: Normal range of motion. Neck supple. No JVD present. No tracheal deviation " present.   Cardiovascular: Normal rate, regular rhythm and normal heart sounds. Exam reveals no gallop and no friction rub.   No murmur heard.  Pulmonary/Chest: Effort normal and breath sounds normal. No stridor. No respiratory distress. He has no wheezes. He has no rales.   Abdominal: Soft. He exhibits no distension and no mass. There is no tenderness. There is no rebound and no guarding.   Musculoskeletal: Normal range of motion. He exhibits no deformity.   Lymphadenopathy:     He has no cervical adenopathy.     He has no axillary adenopathy.        Right: No supraclavicular adenopathy present.        Left: No supraclavicular adenopathy present.   Neurological: He is alert and oriented to person, place, and time.   Skin: No rash noted. No erythema.   Psychiatric: He has a normal mood and affect. His behavior is normal. Judgment and thought content normal.     Labs/Imaging:  -PET/CT performed 2/7/2020, personally reviewed, demonstrates a right upper lobe nodule with an SUV of 2.3.  There is no active mediastinal lymphadenopathy.  -CT of the chest performed 1/24/2020, per report (images unavailable from [facility]), demonstrates a 1.1 cm right upper lobe nodule that is increased in size from 8 mm on 7/19/2019.  No lymphadenopathy is present.  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  The pleural and bronchial margins are free.  There was lymphovascular invasion present and lymph nodes from stations 2R, 7, 9 and 10 were negative for malignancy. [id number] - STAGE 1A.    Assessment/Plan:  64-year-old  male with a history of hypertension, COPD, tobacco abuse and lung cancer ([id number] Stage IA non-small cell undifferentiated lung cancer of the middle lobe resected 11/18/16) who presents with a newly diagnosed right upper lobe nodule.  With his history of lung cancer, this is concerning for possible  malignancy.  We discussed options including continued observation, CyberKnife radiation treatment versus surgical resection.  The patient wished to proceed with surgical resection over the other two options.  This corresponds with NCCN guidelines.  The risks and benefits of surgery were discussed with the patient including pain, bleeding, infection, air leak, conversion to thoracotomy, myocardial infarction and death.  The patient understood these risks and wished to proceed with surgery.  I will discuss his case at multidisciplinary tumor board.  He will need repeat full pulmonary function testing prior to surgery with FEV1 and DLCO.    Patient Active Problem List   Diagnosis   • Osteoarthritis   • Bradycardia   • Pulmonary hypertension (CMS/HCC)   • Lung cancer, middle lobe (CMS/HCC)   • Status post lobectomy of lung   • COPD (chronic obstructive pulmonary disease) (CMS/HCC)   • Screening PSA (prostate specific antigen)   • PAC (premature atrial contraction)   • Encounter for Medicare annual wellness exam   • Environmental allergies                        Electronically signed by Chris Porter MD at 02/18/20 1713             Chris Porter MD at 02/18/20 1430          02/18/2020  Patient Information  Saman Aguayo                                                                                          [address] [place] [address] [address] [place] [address]   1955  'PCP/Referring Physician'  Vermeesch, Marilyn K, MD  [phone]  Nannette Lim MD  [phone]  Chief Complaint   Patient presents with   • Follow-up     Returning patient referred back for abnormal CT chest per Dr. Lim. Pt. states that he gets tired very easy.        History of Present Illness: 64-year-old  male with a history of hypertension, COPD, tobacco abuse and lung cancer who presents with a newly diagnosed right upper lobe nodule.  Mr. Aguayo feels well and denies cough, hemoptysis, weight loss, shortness of breath, lymphadenopathy, fevers  "or chills.  Repeat imaging has demonstrated enlargement of a right upper lobe lung nodule.  Dr. Lim has referred the patient to discuss possible surgical resection.      Patient Active Problem List   Diagnosis   • Osteoarthritis   • Bradycardia   • Pulmonary hypertension (CMS/HCC)   • Lung cancer, middle lobe (CMS/HCC)   • Status post lobectomy of lung   • COPD (chronic obstructive pulmonary disease) (CMS/HCC)   • Screening PSA (prostate specific antigen)   • PAC (premature atrial contraction)   • Encounter for Medicare annual wellness exam   • Environmental allergies     Past Medical History:   Diagnosis Date   • Abnormal ECG    • Abscess of skin    • Arthritis     HANDS AND BACK    • Bradycardia    • Bunion     LEFT FOOT   • COPD (chronic obstructive pulmonary disease) (CMS/HCC)    • Full dentures    • History of echocardiogram 09/21/2016   • Hx of exercise stress test 09/27/2016    DR. BAKER \"IT WAS NORMAL\"   • Hypertension    • Low back pain    • Lung cancer, middle lobe (CMS/HCC) 11/8/2016    RIGHT MIDDLE LOBE   • Lung nodule     RUL-7MM.  PRESENTLY HAS, AND FOLLOWS WITH DR. LIM.  STATES FOUND \"A COUPLE MONTHS AGO\"   • Premature atrial contraction    • Pulmonary hypertension (CMS/HCC)    • Skin cancer     BASAL CELL-NOSE, LIP   • Wears eyeglasses      Past Surgical History:   Procedure Laterality Date   • BASAL CELL CARCINOMA EXCISION      NOSE, LIP   • BRONCHOSCOPY  2016   • BUNIONECTOMY Left 2013    GREAT TOE   • COLONOSCOPY  2018   • INGUINAL HERNIA REPAIR Left 2002    Dr. Singh/inguinal    • KNEE ARTHROSCOPY Right 12/12/2018    Procedure: Diagnostic arthroscopy right knee with partial medial meniscectomy;  Surgeon: Dinh Nova MD;  Location: [facility];  Service: Orthopedics   • LUNG REMOVAL, PARTIAL  2016    RIGHT MIDDLE LOBE   • MOUTH SURGERY      FULL EXTRACTION OF TEETH   • SKIN BIOPSY     • THORACOSCOPY N/A 11/18/2016    Procedure: BRONCH THORACOSCOPY VIDEO ASSISTED  WITH LOBECTOMY, " MEDIALSTINAL LYMPH NODE DISECTION;  Surgeon: Chris Porter MD;  Location: [facility];  Service:        Current Outpatient Medications:   •  trimethoprim-polymyxin b (POLYTRIM) 05524-6.1 UNIT/ML-% ophthalmic solution, , Disp: , Rfl:   Allergies   Allergen Reactions   • No Known Drug Allergy      Social History     Socioeconomic History   • Marital status:      Spouse name: Not on file   • Number of children: 1   • Years of education: Not on file   • Highest education level: Not on file   Occupational History   • Occupation: Runs a radiator shop   Tobacco Use   • Smoking status: Former Smoker     Packs/day: 3.00     Years: 50.00     Pack years: 150.00     Types: Cigarettes     Last attempt to quit: 11/4/2016     Years since quitting: 3.2   • Smokeless tobacco: Never Used   Substance and Sexual Activity   • Alcohol use: Yes     Alcohol/week: 21.0 standard drinks     Types: 21 Cans of beer per week     Comment: 3 cans/daily   • Drug use: No   • Sexual activity: Defer   Social History Narrative    Lives in [place].     Family History   Problem Relation Age of Onset   • Heart attack Father    • Heart disease Father    • Colon cancer Father    • Colon cancer Mother    • Liver cancer Mother      Review of Systems   Constitution: Positive for malaise/fatigue. Negative for chills, fever, night sweats and weight loss.   HENT: Positive for congestion (sinus ). Negative for hearing loss, nosebleeds and odynophagia.    Cardiovascular: Negative for chest pain, claudication, dyspnea on exertion, leg swelling, orthopnea, palpitations and syncope.   Respiratory: Positive for cough and sputum production. Negative for hemoptysis, shortness of breath and wheezing.    Endocrine: Negative for cold intolerance, heat intolerance, polydipsia, polyphagia and polyuria.   Hematologic/Lymphatic: Positive for bleeding problem. Bruises/bleeds easily.   Skin: Negative for itching, poor wound healing and rash.   Musculoskeletal: Positive for  "arthritis (bilateral hands) and back pain. Negative for joint pain, joint swelling and myalgias.   Gastrointestinal: Negative for abdominal pain, constipation, diarrhea, hematemesis, melena, nausea and vomiting.   Genitourinary: Negative for dysuria, frequency, hematuria, nocturia and urgency.   Neurological: Positive for dizziness (if stands up to fast). Negative for light-headedness, loss of balance and numbness.   Psychiatric/Behavioral: Negative for depression and suicidal ideas. The patient is not nervous/anxious.    Allergic/Immunologic: Positive for environmental allergies. Negative for HIV exposure.     Vitals:    02/18/20 1352   BP: 138/71   Pulse: 54   Temp: 98.1 °F (36.7 °C)   SpO2: 98%   Weight: 82.1 kg (181 lb)   Height: 182.9 cm (72\")      Physical Exam   Constitutional: He is oriented to person, place, and time. He appears well-developed and well-nourished. No distress.    male who appears stated age and is present with his wife   HENT:   Head: Normocephalic and atraumatic.   Eyes: Conjunctivae and EOM are normal. No scleral icterus.   Neck: Normal range of motion. Neck supple. No JVD present. No tracheal deviation present.   Cardiovascular: Normal rate, regular rhythm and normal heart sounds. Exam reveals no gallop and no friction rub.   No murmur heard.  Pulmonary/Chest: Effort normal and breath sounds normal. No stridor. No respiratory distress. He has no wheezes. He has no rales.   Abdominal: Soft. He exhibits no distension and no mass. There is no tenderness. There is no rebound and no guarding.   Musculoskeletal: Normal range of motion. He exhibits no deformity.   Lymphadenopathy:     He has no cervical adenopathy.     He has no axillary adenopathy.        Right: No supraclavicular adenopathy present.        Left: No supraclavicular adenopathy present.   Neurological: He is alert and oriented to person, place, and time.   Skin: No rash noted. No erythema.   Psychiatric: He has a normal " mood and affect. His behavior is normal. Judgment and thought content normal.     Labs/Imaging:  -PET/CT performed 2/7/2020, personally reviewed, demonstrates a right upper lobe nodule with an SUV of 2.3.  There is no active mediastinal lymphadenopathy.  -CT of the chest performed 1/24/2020, per report (images unavailable from [facility]), demonstrates a 1.1 cm right upper lobe nodule that is increased in size from 8 mm on 7/19/2019.  No lymphadenopathy is present.  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  The pleural and bronchial margins are free.  There was lymphovascular invasion present and lymph nodes from stations 2R, 7, 9 and 10 were negative for malignancy. [id number] - STAGE 1A.    Assessment/Plan:  64-year-old  male with a history of hypertension, COPD, tobacco abuse and lung cancer ([id number] Stage IA non-small cell undifferentiated lung cancer of the middle lobe resected 11/18/16) who presents with a newly diagnosed right upper lobe nodule.  With his history of lung cancer, this is concerning for possible malignancy.  We discussed options including continued observation, CyberKnife radiation treatment versus surgical resection.  The patient wished to proceed with surgical resection over the other two options.  This corresponds with NCCN guidelines.  The risks and benefits of surgery were discussed with the patient including pain, bleeding, infection, air leak, conversion to thoracotomy, myocardial infarction and death.  The patient understood these risks and wished to proceed with surgery.  I will discuss his case at multidisciplinary tumor board.  He will need repeat full pulmonary function testing prior to surgery with FEV1 and DLCO.    Patient Active Problem List   Diagnosis   • Osteoarthritis   • Bradycardia   • Pulmonary hypertension (CMS/HCC)   • Lung cancer, middle lobe  (CMS/formerly [facility])   • Status post lobectomy of lung   • COPD (chronic obstructive pulmonary disease) (CMS/formerly [facility])   • Screening PSA (prostate specific antigen)   • PAC (premature atrial contraction)   • Encounter for Medicare annual wellness exam   • Environmental allergies                        Electronically signed by Chris Porter MD at 02/18/20 3829

## 2020-03-20 NOTE — PROGRESS NOTES
Cardiothoracic Surgery Progress Note      POD # 7 s/p Right upper lobectomy     LOS: 7 days      Subjective:  No complaints today. Denies shortness of breath    Objective:  Vital Signs  Temp:  [97.4 °F (36.3 °C)-98.3 °F (36.8 °C)] 97.6 °F (36.4 °C)  Heart Rate:  [50-72] 53  Resp:  [18-20] 18  BP: (103-123)/(56-76) 123/76    Physical Exam:   General Appearance: alert, appears stated age and cooperative   Lungs: clear to auscultation, respirations regular, respirations even and respirations unlabored   Heart: regular rhythm & normal rate, normal S1, S2 and no murmur, no gallop, no rub   Skin: Incision c/d/i  Results:    Results from last 7 days   Lab Units 03/20/20  0428   WBC 10*3/mm3 8.23   HEMOGLOBIN g/dL 15.0   HEMATOCRIT % 44.2   PLATELETS 10*3/mm3 208     Results from last 7 days   Lab Units 03/20/20  0428   SODIUM mmol/L 132*   POTASSIUM mmol/L 4.2   CHLORIDE mmol/L 90*   CO2 mmol/L 34.0*   BUN mg/dL 21   CREATININE mg/dL 0.96   GLUCOSE mg/dL 109*   CALCIUM mg/dL 8.8       Assessment:  POD # 7 s/p Right upper lobectomy  Resolved opacification of right lung fields  [id number] Stage IA2 adenocarcinoma of the lung - discussed with patient  Stable apical pneumothorax after removing chest tubes    Plan:  Aggressive pulmonary toilet  Ambulate  Pulmonary toilet  D/C home    03/20/20  10:03

## 2020-03-20 NOTE — PROGRESS NOTES
Case Management Discharge Note      Final Note: I met with Mr. Aguayo and his wife at the bedside. He is being discharged home today. He will require oxygen at home. He has no preference in DME agency. I called a referral to Travis with [employer]. Travis is going to deliver a portable oxygen tank as well as a rolling walker to the bedside. Mr. Aguayo denies having any additional discharge needs. His wife will transport him home by car.         Destination      No service has been selected for the patient.      Durable Medical Equipment - Selection Complete      Service Provider Request Status Selected Services Address Phone Number Fax Number    [facility] Selected Durable Medical Equipment [address] [place] [phone]      Dialysis/Infusion      No service has been selected for the patient.      Home Medical Care      No service has been selected for the patient.      Therapy      No service has been selected for the patient.      Community Resources      No service has been selected for the patient.             Final Discharge Disposition Code: 01 - home or self-care

## 2020-03-20 NOTE — PLAN OF CARE
Problem: Patient Care Overview  Goal: Plan of Care Review  Outcome: Ongoing (interventions implemented as appropriate)  Flowsheets (Taken 3/20/2020 0632)  Progress: no change  Plan of Care Reviewed With: patient  Outcome Summary: pt rested well throughout night, minimal c/o pain, no c/o n/v/d, vss, hopeful to go home today, will continue to monitor.

## 2020-03-20 NOTE — DISCHARGE SUMMARY
"CTS Discharge Summary    Patient Care Team:  Vermeesch, Marilyn K, MD as PCP - General (Internal Medicine)  Max Baker MD as Consulting Physician (Cardiology)  Juan Alberto Carty MD as Surgeon (Cardiothoracic Surgery)  Shady Singh MD as Consulting Physician (General Surgery)  Dinh Nova MD as Consulting Physician (Orthopedic Surgery)      Date of Admission: 3/13/2020  5:39 AM  Date of Discharge: 3/20/2020    Discharge Diagnosis  Past Medical History:   Diagnosis Date   • Abnormal ECG    • Abscess of skin    • Arthritis     HANDS AND BACK    • Bradycardia    • Bunion     LEFT FOOT   • COPD (chronic obstructive pulmonary disease) (CMS/HCC)    • Full dentures    • History of echocardiogram 09/21/2016   • Hx of exercise stress test 09/27/2016    DR. BAKER \"IT WAS NORMAL\"   • Hypertension    • Low back pain    • Lung cancer, middle lobe (CMS/HCC) 11/8/2016    RIGHT MIDDLE LOBE   • Lung nodule     RUL-7MM.  PRESENTLY HAS, AND FOLLOWS WITH DR. HERNANDEZ.  STATES FOUND \"A COUPLE MONTHS AGO\"   • Premature atrial contraction    • Pulmonary hypertension (CMS/HCC)    • Skin cancer     BASAL CELL-NOSE, LIP   • Wears eyeglasses          Right upper lobe pulmonary nodule    History of tobacco use    Pulmonary hypertension     Lung cancer, middle lobe s/p right VATS with lobectomy    COPD    Valvular heart disease    HFrEF (EF 45%)    Patient Active Problem List   Diagnosis   • Osteoarthritis   • History of tobacco use   • Pulmonary hypertension    • Lung cancer, middle lobe s/p right VATS with lobectomy   • COPD   • Right upper lobe pulmonary nodule   • Valvular heart disease   • HFrEF (EF 45%)         History of Present Illness64-year-old  male with a history of hypertension, COPD, tobacco abuse and lung cancer who presents with a newly diagnosed right upper lobe nodule.  Mr. Augayo feels well and denies cough, hemoptysis, weight loss, shortness of breath, lymphadenopathy, fevers or chills.  Repeat " imaging has demonstrated enlargement of a right upper lobe lung nodule.     -PET/CT performed 2/7/2020, personally reviewed, demonstrates a right upper lobe nodule with an SUV of 2.3.  There is no active mediastinal lymphadenopathy.  -CT of the chest performed 1/24/2020, per report (images unavailable from [facility]), demonstrates a 1.1 cm right upper lobe nodule that is increased in size from 8 mm on 7/19/2019.  No lymphadenopathy is present.  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  The pleural and bronchial margins are free.  There was lymphovascular invasion present and lymph nodes from stations 2R, 7, 9 and 10 were negative for malignancy. [id number] - STAGE 1A.     He presents with a newly diagnosed right upper lobe nodule.  With his history of lung cancer, this is concerning for possible malignancy.  We discussed options including continued observation, CyberKnife radiation treatment versus surgical resection.  The patient wished to proceed with surgical resection over the other two options.  This corresponds with NCCN guidelines     He presents today for a bronchoscopy, thoracoscopy video assisted with lobectomy, right, and mediastinal lymph node dissection, right        Hospital Course  Patient is a 64 y.o. male admitted with a newly diagnosed right upper lobe lung nodule.  After admission patient the operating suite and underwent a bronchoscopy then a redo right video-assisted thoracic surgery right upper lobe wedge resection conversion to a right thoracotomy with a right upper lobe completion lobectomy.  Underwent a mediastinal lymph node dissection and intercostal nerve blocks with cryoablation.  Patient tolerated procedure well.  Was closed, taken to the recovery room then to the intensive care unit.  Overnight seen by the hospital intensivist while in the intensive care unit.  Postop day  #1 awake, alert, cooperative.  Chest tubes with minimal drainage they were continued to suction.  Has small air leak.  Olivas catheter was removed and patient was put on transfer to telemetry.  Day 2 postop, patient now up on telemetry.  Still with a little bit excess chest tube drainage otherwise patient remained hemodynamically stable.  Ambulating with physical therapy.  Slowly increase p.o. intake.  He had decreased breath sounds throughout the right lung field and opacification of the right lung on chest x-ray.  He was encouraged to cough and deep breathing go to percussion a percussive vest every 2 hours while awake.  It was felt that if that failed he would require bronchoscopy with therapeutic lavage.  Over the next 48 hours his chest x-ray improved dramatically.  He had no airleak his chest tube was removed.  Pathology came back  Final Diagnosis   1. LUNG, RIGHT UPPER LOBE, WEDGE EXCISION:  Nonsmall cell carcinoma with immunohistochemical staining most consistent with adenocarcinoma, 1.4 cm in maximum dimension.  See tumor synoptic and Comment for additional details.  2. LUNG, RIGHT UPPER LOBE, LOBECTOMY:   No residual carcinoma identified.  Mild emphysematous change.  Bronchial and vascular margins negative for carcinoma.  Ten lymph nodes negative for carcinoma with remote hyalinized and focally calcified granulomata present (0/10).     LUNG CARCINOMA  SPECIMEN TYPE/PROCEDURE:  Lobectomy   SPECIMEN INTEGRITY:  Intact  LATERALITY (Left or Right):  Right   SYNCHRONOUS TUMORS PRESENT: None identified   TUMOR FOCALITY:  Unifocal   TUMOR SITE:  Right upper lobe  TUMOR SIZE (greatest dimension, other than invasive adenocarcinoma with lepidic component):  1.4 cm maximum dimension   HISTOLOGIC TYPE:  Adenocarcinoma   HISTOLOGIC GRADE:  G2-3 (moderate-poorly differentiated)  VISCERAL PLEURAL INVASION: Not identified   DIRECT INVASION OF ADJACENT STRUCTURES (No adjacent structures or specify): Not identified    BRONCHIAL MARGIN:  Negative   VASCULAR MARGIN:  Negative   LYMPHVASCULAR INVASION:  Not identified   PARENCHYMAL MARGIN:  N/A   CHEST WALL MARGIN:  N/A   OTHER TISSUE MARGIN:  N/A   CLOSEST MARGIN (Specify): Vascular   DISTANCE OF INVASIVE CARCINOMA FROM CLOSEST MARGIN: 5 cm   DISTANCE OF CARCINOMA IN-SITU FOR CLOSEST MARGIN: N/A   REGIONAL LYMPH NODE STATUS:  Negative   NUMBER OF LYMPH NODES EXAMINED: 10  MIMA STATIONS EXAMINED: Station 12  NUMBER OF LYMPH NODES INVOLVED: 0  MIMA STATIONS INVOLVED: None   EXTRANODAL TUMOR SPREAD:  N/A   TREATMENT EFFECT:  No known presurgical therapy   OTHER METASTATIC SITES:  Unknown    OTHER NEOPLASM SITES:  Unknown   ADDITIONAL PATHOLOGIC FINDINGS:  Mild emphysematous change   ANCILLARY STUDIES: Immunohistochemical staining   BIOMARKER REPORTING SPECIMEN ADEQUACY:  Adequate (block 1B)  AJCC PATHOLOGIC STAGE:  (COMPLETED BY PATHOLOGIST, BASED ONLY ON TISSUE FINDINGS, MORE EXTENSIVE DISEASE MAY NOT BE KNOWN TO THE PATHOLOGIST)  pT= 1b  pN= 0   AJCC PATHOLOGIC STAGE:  IA2  PCC/dlb:mbc         Procedures Performed  Procedure(s):  BRONCHOSCOPY  Redo right THORACOSCOPY VIDEO ASSISTED with right upper lobe wedge resection and with conversion to open thorocotomy for right upper lobectomy with intercostal cryoablation   His chest tubes were removed on 3/19/2020.  The following morning he is able to be discharged home.    Consults:   Consults     No orders found from 2/13/2020 to 3/14/2020.        This discharge took less than 30 minutes to compile.    Discharge Medications     Discharge Medications      New Medications      Instructions Start Date   HYDROcodone-acetaminophen  MG per tablet  Commonly known as:  NORCO   1 tablet, Oral, Every 6 Hours PRN             Discharge Diet:   Diet Instructions     Diet: Cardiac; Thin      Discharge Diet:  Cardiac    Fluid Consistency:  Thin          Activity at Discharge:   Activity Instructions     Gradually Increase Activity Until at  Pre-Hospitalization Level      Lifting Restrictions      Type of Restriction:  Lifting    Lifting Restrictions:  Lifting Restriction (Indicate Limit)    Weight Limit (Pounds):  10    Length of Lifting Restriction:  3 weeks        Do not drive while taking narcotics    Follow-up Appointments  Future Appointments   Date Time Provider Department Center   3/26/2020  9:45 AM Vermeesch, Marilyn K, MD [facility] None   6/24/2020  1:15 PM Nannette Lim MD [facility] None   8/6/2020  8:30 AM Isra Olmedo Jr., APRN [facility] None   10/5/2020  9:00 AM Vermeesch, Marilyn K, MD [facility] None          MARY Lombardo  03/20/20  13:32

## 2020-03-20 NOTE — PROGRESS NOTES
[facility] Medicine Services  PROGRESS NOTE    Patient Name: Saman Aguayo  : 1955  MRN: [id number]    Date of Admission: 3/13/2020  Primary Care Physician: Vermeesch, Marilyn K, MD    Subjective   Subjective     CC:  F/U Med [place]    HPI:  Patient states he feels very well today. No complaints. Been up in HW. No dyspnea and very little pain.       Review of Systems  Gen- No fevers, chills  CV- No chest pain, palpitations  Resp- no cough, dyspnea  GI- No N/V/D, abd pain    Objective   Objective     Vital Signs:   Temp:  [97.4 °F (36.3 °C)-98.3 °F (36.8 °C)] 97.6 °F (36.4 °C)  Heart Rate:  [50-72] 55  Resp:  [18-20] 18  BP: (103-123)/(56-76) 123/76        Physical Exam:  Constitutional: No acute distress,  Sitting up in bed  HENT: NCAT, mucous membranes moist  Respiratory: CTAB- decreased R base , respiratory effort normal, on 3-4 LNC  Cardiovascular: RRR, no murmurs, rubs, or gallops, palpable pedal pulses bilaterally  Gastrointestinal: Positive bowel sounds, soft, nontender, nondistended  Musculoskeletal: No bilateral ankle edema  Psychiatric: Appropriate affect, cooperative  Neurologic: Oriented x 3, strength symmetric in all extremities, Cranial Nerves grossly intact to confrontation, speech clear  Skin: No rashes    Results Reviewed:  Results from last 7 days   Lab Units 203 20  0357   WBC 10*3/mm3 8.23 10.16 8.74   HEMOGLOBIN g/dL 15.0 16.4 15.8   HEMATOCRIT % 44.2 48.2 46.5   PLATELETS 10*3/mm3 208 225 207     Results from last 7 days   Lab Units 20  0403 20  0357   SODIUM mmol/L 132* 132* 131*   POTASSIUM mmol/L 4.2 4.1 4.1   CHLORIDE mmol/L 90* 85* 90*   CO2 mmol/L 34.0* 32.0* 31.0*   BUN mg/dL 21 25* 17   CREATININE mg/dL 0.96 0.92 0.66*   GLUCOSE mg/dL 109* 114* 115*   CALCIUM mg/dL 8.8 9.2 9.1     Estimated Creatinine Clearance: 86.4 mL/min (by C-G formula based on SCr of 0.96 mg/dL).    Microbiology Results  Abnormal     None          Imaging Results (Last 24 Hours)     Procedure Component Value Units Date/Time    XR Chest 1 View [[id number]] Collected:  03/20/20 0913     Updated:  03/20/20 0926    Narrative:       EXAMINATION: XR CHEST 1 VW- 03/20/2020     INDICATION: postop; R91.1-Solitary pulmonary nodule; C34.2-Malignant  neoplasm of middle lobe, bronchus or lung     COMPARISON: 03/19/2020     FINDINGS: Patient's right apical pneumothorax appears stable, again with  approximately 3 cm of apical pleural separation. Elevated right  hemidiaphragm and partial pneumonectomy are again noted. Mild coarsening  of left lung interstitial markings is stable and apparently chronic. No  left pneumothorax or effusion is seen. Heart and vasculature appear  normal in size.       Impression:       Stable chest exam, including stable 3 cm right apical  pneumothorax.     D:  03/20/2020  E:  03/20/2020           XR Chest 1 View [[id number]] Collected:  03/19/20 1229     Updated:  03/19/20 1716    Narrative:       EXAMINATION: XR CHEST 1 VW-03/19/2020:      INDICATION: PNEUMOTHORAX.      COMPARISON: Chest x-ray 03/19/2020.     FINDINGS: Interval removal of right chest tubes with interval increase  in now moderate volume right apical pneumothorax present. Volume loss in  the right hemithorax from combined pneumothorax and asymmetric elevation  of the right hemidiaphragm without mediastinal shift.           Impression:       Interval removal of right chest tubes with interval increase  in now moderate volume right apical pneumothorax present. Volume loss in  the right hemithorax from combined pneumothorax and asymmetric elevation  of the right hemidiaphragm without mediastinal shift.         D:  03/19/2020  E:  03/19/2020     This report was finalized on 3/19/2020 5:13 PM by Dr. Anibal Ramos.             Results for orders placed in visit on 05/13/19   Adult Transthoracic Echo Complete W/ Cont if Necessary Per Protocol    Narrative · Right  ventricular cavity is mildly dilated.  · Mild mitral valve regurgitation is present  · Moderate to severe tricuspid valve regurgitation is present.  · The left ventricular cavity is borderline dilated.  · Estimated EF = 45%.  · Left ventricular systolic function is mildly decreased.  · Calculated right ventricular systolic pressure from tricuspid   regurgitation is 58 mmHg.          I have reviewed the medications:  Scheduled Meds:    docusate sodium 100 mg Oral Daily   heparin (porcine) 5,000 Units Subcutaneous Q12H   ketorolac 15 mg Intravenous Once   polyethylene glycol 17 g Oral Daily   sennosides-docusate 2 tablet Oral Nightly     Continuous Infusions:   PRN Meds:.•  acetaminophen  •  bisacodyl  •  bisacodyl  •  HYDROcodone-acetaminophen  •  ipratropium-albuterol  •  magnesium sulfate **OR** magnesium sulfate **OR** magnesium sulfate  •  Morphine  •  ondansetron **OR** ondansetron  •  potassium & sodium phosphates **OR** potassium & sodium phosphates  •  potassium chloride  •  potassium chloride    Assessment/Plan   Assessment & Plan     Active Hospital Problems    Diagnosis  POA   • **Right upper lobe pulmonary nodule [R91.1]  Yes   • Valvular heart disease [I38]  Yes   • HFrEF (EF 45%) [I50.20]  Yes   • COPD [J44.9]  Yes   • Lung cancer, middle lobe s/p right VATS with lobectomy [C34.2]  Yes   • Pulmonary hypertension  [I27.20]  Yes   • History of tobacco use [Z87.891]  Not Applicable      Resolved Hospital Problems   No resolved problems to display.        Brief Hospital Course to date:  Saman Aguayo is a 64 y.o. male with a history of stage IA non-small cell lung cancer in 2016 s/p lobectomy who has been followed by Dr. Lim and was noted to have a new right upper lobe nodule concerning for metastatic disease. He was referred to Dr. Porter and underwent a right upper lobe wedge resection. Frozen pathology was consistent with NSCLC and a thoracotomy was performed with completion right upper lobectomy. He  was transferred to the ICU for monitoring and then to telemetry on 3/16. Hospitalist service following for medical management.     POD # 6 s/p Right upper lobectomy  New opacification of right lung fields  -CTS following. Stable CXR today after CT removed 3/19  -path came back as adenocarcinoma     History of COPD  - PRN [place]   - CM to set up home O2    HF with reduced EF  Valvular heart disease  -Moderate MVR, moderate to severe TVR on Echo from 5/21/19  -EF 45%    Pulmonary HTN  History of Tobacco abuse: 3ppd x 50 years    Constipation  -Continue bowel regimen    DVT Prophylaxis:  Subq Heparin    Disposition: I expect the patient to be discharged today per CTS    CODE STATUS:   Code Status and Medical Interventions:   Ordered at: 03/13/20 1421     Code Status:    CPR     Medical Interventions (Level of Support Prior to Arrest):    Full         Electronically signed by SAMIRA Cox, 03/20/20, 11:24.

## 2020-03-21 NOTE — OUTREACH NOTE
Prep Survey      Responses   [facility] patient discharged from?  [place]   Is LACE score < 7 ?  No   Eligibility  [facility]   Date of Admission  03/13/20   Date of Discharge  03/20/20   Discharge Disposition  Home or Self Care   Discharge diagnosis  PNA   Does the patient have one of the following disease processes/diagnoses(primary or secondary)?  COPD/Pneumonia   Does the patient have Home health ordered?  No   Is there a DME ordered?  Yes   What DME was ordered?  [employer] for home oxygen   Prep survey completed?  Yes          Gilda Mitchell RN

## 2020-03-23 ENCOUNTER — TRANSITIONAL CARE MANAGEMENT TELEPHONE ENCOUNTER (OUTPATIENT)
Dept: CALL CENTER | Facility: HOSPITAL | Age: 65
End: 2020-03-23

## 2020-03-23 DIAGNOSIS — E87.1 HYPONATREMIA: Primary | ICD-10-CM

## 2020-03-23 DIAGNOSIS — R91.1 RIGHT UPPER LOBE PULMONARY NODULE: ICD-10-CM

## 2020-03-23 NOTE — PROGRESS NOTES
Please tell his wife that they may cancel March 26 follow-up appointment, however his sodium and chloride levels were quite low throughout hospital stay.  I have ordered lab test to repeat these levels, however I feel it may have been due to his underlying lung cancer.  Lung cancer can cause hyponatremia and I would like him to repeat these labs at approximately mid April to see if they have returned to normal.  Please tell him to come in downstairs around mid April, make sure he calls ahead of time to make sure we are still doing labs at that time.  Make a follow-up appointment for him in approximately 3 months thank you

## 2020-03-23 NOTE — OUTREACH NOTE
Call Center TCM Note      Responses   [facility] patient discharged from?  [place]   Does the patient have one of the following disease processes/diagnoses(primary or secondary)?  COPD/Pneumonia   TCM attempt successful?  Yes   Call start time  1048   Call end time  1053   Discharge diagnosis  s/p right VATS with lobectomy   Meds reviewed with patient/caregiver?  Yes   Is the patient having any side effects they believe may be caused by any medication additions or changes?  No   Does the patient have all medications ordered at discharge?  Yes   Is the patient taking all medications as directed (includes completed medication regime)?  Yes   Does the patient have a primary care provider?   Yes   Does the patient have an appointment with their PCP or pulmonologist within 7 days of discharge?  Yes   Comments regarding PCP  f/u with Dr. Vermeesch on 3/26 at 9:45 am   Has the patient kept scheduled appointments due by today?  N/A   Has home health visited the patient within 72 hours of discharge?  N/A   What DME was ordered?  [employer] for home oxygen   Has all DME been delivered?  Yes   Psychosocial issues?  No   Did the patient receive a copy of their discharge instructions?  Yes   Nursing interventions  Reviewed instructions with patient   What is the patient's perception of their health status since discharge?  Improving   Nursing Interventions  Nurse provided patient education   Is the patient/caregiver able to teach back the hierarchy of who to call/visit for symptoms/problems? PCP, Specialist, Home health nurse, Urgent Care, ED, 911  Yes   TCM call completed?  Yes   Wrap up additional comments  Per wife, pt is doing as well as to be expected after his surgery, no questions or concerns for PCP at this time.          hCristiane Silva RN    3/23/2020, 10:53

## 2020-03-30 ENCOUNTER — READMISSION MANAGEMENT (OUTPATIENT)
Dept: CALL CENTER | Facility: HOSPITAL | Age: 65
End: 2020-03-30

## 2020-03-30 NOTE — OUTREACH NOTE
COPD/PN Week 2 Survey      Responses   [facility], [place], operated by [facility] patient discharged from?  [place]   Does the patient have one of the following disease processes/diagnoses(primary or secondary)?  COPD/Pneumonia   Was the primary reason for admission:  COPD exacerbation   Week 2 attempt successful?  Yes   Call start time  1752   Call end time  1756   Discharge diagnosis  s/p right VATS with lobectomy   Meds reviewed with patient/caregiver?  Yes   Is the patient having any side effects they believe may be caused by any medication additions or changes?  No   Does the patient have all medications ordered at discharge?  Yes   Is the patient taking all medications as directed (includes completed medication regime)?  Yes   Does the patient have a primary care provider?   Yes   Has the patient kept scheduled appointments due by today?  N/A   Comments  Has appt scheduled with Dr. Porter   Has home health visited the patient within 72 hours of discharge?  N/A   What DME was ordered?  [employer] for home oxygen   Has all DME been delivered?  Yes   Did the patient receive a copy of their discharge instructions?  Yes   Nursing interventions  Reviewed instructions with patient   What is the patient's perception of their health status since discharge?  Improving   Nursing Interventions  Nurse provided patient education   Is the patient/caregiver able to teach back the hierarchy of who to call/visit for symptoms/problems? PCP, Specialist, Home health nurse, Urgent Care, ED, 911  Yes   Additional teach back comments  States he is still  has some  soreness but is doing well.   Is the patient able to teach back COPD zones?  Yes   Patient reports what zone on this call?  Green Zone   Green Zone  Breathing without shortness of breath, Reports doing well   Week 2 call completed?  Yes   Wrap up additional comments  No questions or needs at this time          Ashley Menendez LPN

## 2020-04-07 ENCOUNTER — READMISSION MANAGEMENT (OUTPATIENT)
Dept: CALL CENTER | Facility: HOSPITAL | Age: 65
End: 2020-04-07

## 2020-04-07 NOTE — OUTREACH NOTE
COPD/PN Week 3 Survey      Responses   [facility] patient discharged from?  [place]   COVID-19 Test Status  Not tested   Does the patient have one of the following disease processes/diagnoses(primary or secondary)?  COPD/Pneumonia   Was the primary reason for admission:  COPD exacerbation   Week 3 attempt successful?  Yes   Call start time  1019   Call end time  1022   Discharge diagnosis  s/p right VATS with lobectomy   Meds reviewed with patient/caregiver?  Yes   Is the patient having any side effects they believe may be caused by any medication additions or changes?  No   Does the patient have all medications ordered at discharge?  Yes   Is the patient taking all medications as directed (includes completed medication regime)?  Yes   Does the patient have a primary care provider?   Yes   Does the patient have an appointment with their PCP or pulmonologist within 7 days of discharge?  No   Has the patient kept scheduled appointments due by today?  N/A   Comments  Has appt with Dr. Porter next week.  Due to Covid19 his PCP told him to wait on appt and if need can contact her.   Has home health visited the patient within 72 hours of discharge?  N/A   What DME was ordered?  [employer] for home oxygen   Has all DME been delivered?  Yes   Psychosocial issues?  No   Did the patient receive a copy of their discharge instructions?  Yes   Nursing interventions  Reviewed instructions with patient   What is the patient's perception of their health status since discharge?  Improving   Nursing Interventions  Nurse provided patient education   Is the patient/caregiver able to teach back the hierarchy of who to call/visit for symptoms/problems? PCP, Specialist, Home health nurse, Urgent Care, ED, 911  Yes   Additional teach back comments  States he is doing well and using oxygen when needed.     Is the patient able to teach back COPD zones?  Yes   Patient reports what zone on this call?  Green Zone   Green Zone  Reports  doing well, Breathing without shortness of breath, Usual activity and exercise level, Usual amount of phlegm/mucus without difficulty coughing up, Sleeping well, Appetite is good   Green Zone interventions:  Take daily medications, Use oxygen as prescribed, Avoid indoor/outdoor triggers   Week 3 call completed?  Yes   Revoked  No further contact(revokes)-requires comment   Graduated/Revoked comments  No questions or needs at this time          Ashley Menendez LPN

## 2020-04-14 ENCOUNTER — TELEPHONE (OUTPATIENT)
Dept: INTERNAL MEDICINE | Facility: CLINIC | Age: 65
End: 2020-04-14

## 2020-04-14 ENCOUNTER — OFFICE VISIT (OUTPATIENT)
Dept: CARDIAC SURGERY | Facility: CLINIC | Age: 65
End: 2020-04-14

## 2020-04-14 VITALS
SYSTOLIC BLOOD PRESSURE: 149 MMHG | TEMPERATURE: 98 F | WEIGHT: 169 LBS | HEIGHT: 72 IN | OXYGEN SATURATION: 98 % | DIASTOLIC BLOOD PRESSURE: 84 MMHG | BODY MASS INDEX: 22.89 KG/M2 | HEART RATE: 61 BPM

## 2020-04-14 DIAGNOSIS — C34.2 LUNG CANCER, MIDDLE LOBE (HCC): Primary | ICD-10-CM

## 2020-04-14 DIAGNOSIS — Z98.890 S/P THORACOTOMY: ICD-10-CM

## 2020-04-14 PROCEDURE — 71046 X-RAY EXAM CHEST 2 VIEWS: CPT | Performed by: NURSE PRACTITIONER

## 2020-04-14 PROCEDURE — 99024 POSTOP FOLLOW-UP VISIT: CPT | Performed by: NURSE PRACTITIONER

## 2020-04-14 RX ORDER — HYDROCODONE BITARTRATE AND ACETAMINOPHEN 7.5; 325 MG/1; MG/1
1 TABLET ORAL EVERY 6 HOURS PRN
COMMUNITY
End: 2020-06-03 | Stop reason: HOSPADM

## 2020-04-14 NOTE — TELEPHONE ENCOUNTER
Pt's wife called because he is supposed to have some lab work done tomorrow. Pt's wife stated that she would like to know if he could wait for labs because pt will be seeing his oncologist on Friday and believes they will draw labs as well    Please call and adv   [phone]

## 2020-04-14 NOTE — PROGRESS NOTES
[facility] Cardiothoracic Surgery Follow-Up Note    Name:  Saman Aguayo  MRN Number:  [id number]  Date of Encounter:  04/14/2020    Referred By:  No ref. provider found  PCP:  Vermeesch, Marilyn K, MD    Chief Complaint:    Chief Complaint   Patient presents with   • Post-op Follow-up     Hospital follow-up s/p right thoracotomy with right upper lobectomy 3/13/20. Patient states he is still having pain around right sided chest incision.        History of Present Illness:    Mr. Saman Aguayo is a pleasant 65 y.o. male with a history of hypertension, COPD, tobacco abuse, lung cancer ([id number] stage Ia non-small cell undifferentiated lung cancer of the middle lobe resected 11/18/2016 via VATS), and a right upper lobe lung nodule status post redo right VATS, with conversion to right thoracotomy with right upper lobe wedge resection, right upper lobe completion lobectomy, and  mediastinal lymph node dissection  3/13/2020 per Dr. Porter revealing stage 1A2 adenocarcinoma who returns the office today for postoperative exam.  The patient does have significant neuropathic pain from his thoracotomy.  He is currently not using any pain medication.  He denies shortness of breath, fever, chills, weight loss and hemoptysis.    Review of Systems:  Review of Systems   Constitution: Negative for chills, fever and malaise/fatigue.   Eyes: Negative for visual disturbance.   Cardiovascular: Negative for chest pain, dyspnea on exertion and leg swelling.   Respiratory: Positive for cough. Negative for hemoptysis and shortness of breath.    Skin: Negative for poor wound healing.   Gastrointestinal: Negative for abdominal pain.   Neurological: Negative for weakness.   Psychiatric/Behavioral: Negative for altered mental status.       Past Medical History:    Past Medical History:   Diagnosis Date   • Abnormal ECG    • Abscess of skin    • Arthritis     HANDS AND BACK    • Bradycardia    • Bunion     LEFT FOOT   • COPD (chronic  "obstructive pulmonary disease) (CMS/HCC)    • Full dentures    • History of echocardiogram 09/21/2016   • Hx of exercise stress test 09/27/2016    DR. BAKER \"IT WAS NORMAL\"   • Hypertension    • Low back pain    • Lung cancer, middle lobe (CMS/HCC) 11/8/2016    RIGHT MIDDLE LOBE   • Lung nodule     RUL-7MM.  PRESENTLY HAS, AND FOLLOWS WITH DR. HERNANDEZ.  STATES FOUND \"A COUPLE MONTHS AGO\"   • Premature atrial contraction    • Pulmonary hypertension (CMS/HCC)    • Skin cancer     BASAL CELL-NOSE, LIP   • Wears eyeglasses        Past Surgical History:    Past Surgical History:   Procedure Laterality Date   • BASAL CELL CARCINOMA EXCISION      NOSE, LIP   • BRONCHOSCOPY  2016   • BRONCHOSCOPY N/A 3/13/2020    Procedure: BRONCHOSCOPY;  Surgeon: Chris Porter MD;  Location:  [facility];  Service: Cardiothoracic;  Laterality: N/A;   • BUNIONECTOMY Left 2013    GREAT TOE   • COLONOSCOPY  2018   • INGUINAL HERNIA REPAIR Left 2002    Dr. Singh/inguinal    • KNEE ARTHROSCOPY Right 12/12/2018    Procedure: Diagnostic arthroscopy right knee with partial medial meniscectomy;  Surgeon: Dinh Nova MD;  Location:  [facility];  Service: Orthopedics   • LUNG REMOVAL, PARTIAL  2016    RIGHT MIDDLE LOBE   • MOUTH SURGERY      FULL EXTRACTION OF TEETH   • SKIN BIOPSY     • THORACOSCOPY N/A 11/18/2016    Procedure: BRONCH THORACOSCOPY VIDEO ASSISTED  WITH LOBECTOMY, MEDIALSTINAL LYMPH NODE DISECTION;  Surgeon: Chris Porter MD;  Location:  [facility];  Service:    • THORACOSCOPY VIDEO ASSISTED WITH LOBECTOMY Right 3/13/2020    Procedure: Redo right THORACOSCOPY VIDEO ASSISTED with right upper lobe wedge resection and with conversion to open thorocotomy for right upper lobectomy with intercostal cryoablation;  Surgeon: Chris Porter MD;  Location:  [facility];  Service: Cardiothoracic;  Laterality: Right;       Patient Active Problem List   Diagnosis   • Osteoarthritis   • History of tobacco use   • Pulmonary hypertension    • Lung " "cancer, middle lobe s/p right VATS with lobectomy   • COPD   • Right upper lobe pulmonary nodule   • Valvular heart disease   • HFrEF (EF 45%)     Social History     Tobacco Use   • Smoking status: Former Smoker     Packs/day: 3.00     Years: 50.00     Pack years: 150.00     Types: Cigarettes     Last attempt to quit: 11/4/2016     Years since quitting: 3.4   • Smokeless tobacco: Never Used   Substance Use Topics   • Alcohol use: Yes     Alcohol/week: 21.0 standard drinks     Types: 21 Cans of beer per week     Comment: 3 cans/daily   • Drug use: No     Family History   Problem Relation Age of Onset   • Heart attack Father    • Heart disease Father    • Colon cancer Father    • Colon cancer Mother    • Liver cancer Mother        Medications:      Current Outpatient Medications:   •  HYDROcodone-acetaminophen (NORCO) 7.5-325 MG per tablet, Take 1 tablet by mouth Every 6 (Six) Hours As Needed for Moderate Pain ., Disp: , Rfl:     Allergies:  Allergies   Allergen Reactions   • No Known Drug Allergy        Physical Exam:  Vital Signs:    Vitals:    04/14/20 1259   BP: 149/84   BP Location: Right arm   Patient Position: Sitting   Pulse: 61   Temp: 98 °F (36.7 °C)   TempSrc: Temporal   SpO2: 98%   Weight: 76.7 kg (169 lb)   Height: 182.9 cm (72\")       Physical Exam   Gen- NAD, pleasant, cooperative  CV- Regular rate and rhythm, no murmur, gallop or rub  Pulm- Clear to auscultation bilateral without wheeze or rhonchi, decreased breath sounds throughout right lung  GI- Soft, normoactive bowel sounds, non-tender  Ext- Without edema,   Incision- Well approximated right thoracotomy and right chest tube sites with no erythema, drainage or dehiscence noted.  Neuro- CN II- XII grossly intact, tongue midline, voice normal.    Labs/Imaging:  Chest x-ray, [facility], 4/14/2020  Impression:  Resolution of the previously seen pneumothorax on the right.  Elevation of the right hemidiaphragm with postsurgical changes seen in the " right hilar region.  I personally reviewed these images in the office today.    Assessment / Plan:  Mr. Saman Aguayo is a pleasant 65 y.o. male with a history of hypertension, COPD, tobacco abuse, lung cancer ([id number] stage Ia non-small cell undifferentiated lung cancer of the middle lobe resected 11/18/2016 via VATS), and a right upper lobe lung nodule status post redo right VATS, with conversion to right thoracotomy with right upper lobe wedge resection, right upper lobe completion lobectomy, and  mediastinal lymph node dissection  3/13/2020 per Dr. Porter revealing stage 1A2 adenocarcinoma who returns the office today for postoperative exam.  The patient is having a stable postoperative course.  His incisions are healing well and chest x-ray obtained in the office today is satisfactory.  I discussed the results of the patient's pathology with him in the office today.  I also discussed this case with Dr. Porter who felt the patient should be referred on to medical oncology for further evaluation with the resection of this second lung cancer.  The patient has previously seen Dr. Liddle and would like to be referred back to her today.  We will facilitate this appointment.  At this time, we will plan to see the patient back in 6 months with repeat CT scan of the chest.  Should he have any questions or concerns in the interval, he may contact the office.    Please note, this document was produced using voice recognition software.    SAMIRA Salomon  [facility] Cardiothoracic Surgery

## 2020-04-14 NOTE — TELEPHONE ENCOUNTER
He can likely wait and have labs drawn with oncology.  I did not order the labs and he did not follow-up with me so I am uncertain.  He had an office visit with someone today, they apparently are ordered labs so he should discuss this with them.

## 2020-05-19 ENCOUNTER — OFFICE VISIT (OUTPATIENT)
Dept: CARDIAC SURGERY | Facility: CLINIC | Age: 65
End: 2020-05-19

## 2020-05-19 ENCOUNTER — TELEPHONE (OUTPATIENT)
Dept: CARDIAC SURGERY | Facility: CLINIC | Age: 65
End: 2020-05-19

## 2020-05-19 VITALS
SYSTOLIC BLOOD PRESSURE: 144 MMHG | HEIGHT: 72 IN | DIASTOLIC BLOOD PRESSURE: 79 MMHG | WEIGHT: 171 LBS | BODY MASS INDEX: 23.16 KG/M2 | OXYGEN SATURATION: 98 % | TEMPERATURE: 97.3 F | HEART RATE: 51 BPM

## 2020-05-19 DIAGNOSIS — R10.11 RIGHT UPPER QUADRANT ABDOMINAL PAIN: Primary | ICD-10-CM

## 2020-05-19 DIAGNOSIS — R10.11 RIGHT UPPER QUADRANT PAIN: ICD-10-CM

## 2020-05-19 DIAGNOSIS — C34.2 LUNG CANCER, MIDDLE LOBE (HCC): Primary | ICD-10-CM

## 2020-05-19 DIAGNOSIS — R91.1 RIGHT UPPER LOBE PULMONARY NODULE: ICD-10-CM

## 2020-05-19 DIAGNOSIS — Z98.890 S/P THORACOTOMY: ICD-10-CM

## 2020-05-19 PROCEDURE — 99024 POSTOP FOLLOW-UP VISIT: CPT | Performed by: NURSE PRACTITIONER

## 2020-05-19 PROCEDURE — 71046 X-RAY EXAM CHEST 2 VIEWS: CPT | Performed by: NURSE PRACTITIONER

## 2020-05-19 RX ORDER — GABAPENTIN 600 MG/1
600 TABLET ORAL 3 TIMES DAILY PRN
Qty: 30 TABLET | Refills: 0 | Status: SHIPPED | OUTPATIENT
Start: 2020-05-19 | End: 2020-06-25

## 2020-05-19 NOTE — PROGRESS NOTES
[facility] Cardiothoracic Surgery Follow-Up Note    Name:  Saman Aguayo  MRN Number:  [id number]  Date of Encounter:  05/19/2020    Referred By:  No ref. provider found  PCP:  Vermeesch, Marilyn K, MD    Chief Complaint:    Chief Complaint   Patient presents with   • Post-op     Follow up for right side abdominal and chest pain s/p right upper lobectomy 3/13/20.   • Lung Cancer       History of Present Illness:    Mr. Saman Aguayo is a pleasant 65 y.o. male with a history of hypertension, COPD, tobacco abuse, lung cancer (T1b N0 M0 stage Ia non-small cell undifferentiated lung cancer in the middle lobe resected 11/18/2016 via VATS), and a right upper lobe lung nodule status post redo right VATS with conversion to right thoracotomy with right upper lobe wedge resection, right upper lobe completion lobectomy, and mediastinal lymph node dissection 3/13/2020 per Dr. Porter revealing stage I A2 adenocarcinoma who returns the office today for postoperative exam.  The patient was last seen in our office on 4/14/2020 and denies interval worsening shortness of breath, fever, chills, weight loss and hemoptysis.  He continues to have significant pain from his thoracotomy site radiating around under his breasts and into his right upper quadrant.  The patient states the pain is so severe that sometimes he has to leave work and just lie down.  It is making him difficult to perform his regular activities of daily living.    Review of Systems:  Review of Systems   Constitution: Negative for chills, fever and malaise/fatigue.   Eyes: Negative for visual disturbance.   Cardiovascular: Negative for chest pain, dyspnea on exertion and leg swelling.   Respiratory: Positive for shortness of breath. Negative for hemoptysis.    Skin: Negative for poor wound healing.   Gastrointestinal: Positive for abdominal pain.   Neurological: Negative for weakness.   Psychiatric/Behavioral: Negative for altered mental status.       Past  "Medical History:    Past Medical History:   Diagnosis Date   • Abnormal ECG    • Abscess of skin    • Arthritis     HANDS AND BACK    • Bradycardia    • Bunion     LEFT FOOT   • COPD (chronic obstructive pulmonary disease) (CMS/HCC)    • Full dentures    • History of echocardiogram 09/21/2016   • Hx of exercise stress test 09/27/2016    DR. BAKER \"IT WAS NORMAL\"   • Hypertension    • Low back pain    • Lung cancer, middle lobe (CMS/HCC) 11/8/2016    RIGHT MIDDLE LOBE   • Lung nodule     RUL-7MM.  PRESENTLY HAS, AND FOLLOWS WITH DR. HERNANDEZ.  STATES FOUND \"A COUPLE MONTHS AGO\"   • Premature atrial contraction    • Pulmonary hypertension (CMS/HCC)    • Skin cancer     BASAL CELL-NOSE, LIP   • Wears eyeglasses        Past Surgical History:    Past Surgical History:   Procedure Laterality Date   • BASAL CELL CARCINOMA EXCISION      NOSE, LIP   • BRONCHOSCOPY  2016   • BRONCHOSCOPY N/A 3/13/2020    Procedure: BRONCHOSCOPY;  Surgeon: Chris Porter MD;  Location: [facility] OR;  Service: Cardiothoracic;  Laterality: N/A;   • BUNIONECTOMY Left 2013    GREAT TOE   • COLONOSCOPY  2018   • INGUINAL HERNIA REPAIR Left 2002    Dr. Singh/inguinal    • KNEE ARTHROSCOPY Right 12/12/2018    Procedure: Diagnostic arthroscopy right knee with partial medial meniscectomy;  Surgeon: Dinh Nova MD;  Location: [facility] OR;  Service: Orthopedics   • LUNG REMOVAL, PARTIAL  2016    RIGHT MIDDLE LOBE   • MOUTH SURGERY      FULL EXTRACTION OF TEETH   • SKIN BIOPSY     • THORACOSCOPY N/A 11/18/2016    Procedure: BRONCH THORACOSCOPY VIDEO ASSISTED  WITH LOBECTOMY, MEDIALSTINAL LYMPH NODE DISECTION;  Surgeon: Chris Porter MD;  Location:  [facility];  Service:    • THORACOSCOPY VIDEO ASSISTED WITH LOBECTOMY Right 3/13/2020    Procedure: Redo right THORACOSCOPY VIDEO ASSISTED with right upper lobe wedge resection and with conversion to open thorocotomy for right upper lobectomy with intercostal cryoablation;  Surgeon: Chris Porter MD;  " "Location: [facility];  Service: Cardiothoracic;  Laterality: Right;       Patient Active Problem List   Diagnosis   • Osteoarthritis   • History of tobacco use   • Pulmonary hypertension    • Lung cancer, middle lobe s/p right VATS with lobectomy   • COPD   • Right upper lobe pulmonary nodule   • Valvular heart disease   • HFrEF (EF 45%)     Social History     Tobacco Use   • Smoking status: Former Smoker     Packs/day: 3.00     Years: 50.00     Pack years: 150.00     Types: Cigarettes     Last attempt to quit: 11/4/2016     Years since quitting: 3.5   • Smokeless tobacco: Never Used   Substance Use Topics   • Alcohol use: Yes     Alcohol/week: 21.0 standard drinks     Types: 21 Cans of beer per week     Comment: 3 cans/daily   • Drug use: No     Family History   Problem Relation Age of Onset   • Heart attack Father    • Heart disease Father    • Colon cancer Father    • Colon cancer Mother    • Liver cancer Mother        Medications:      Current Outpatient Medications:   •  HYDROcodone-acetaminophen (NORCO) 7.5-325 MG per tablet, Take 1 tablet by mouth Every 6 (Six) Hours As Needed for Moderate Pain ., Disp: , Rfl:     Allergies:  Allergies   Allergen Reactions   • No Known Drug Allergy        Physical Exam:  Vital Signs:    Vitals:    05/19/20 0836   BP: 144/79   BP Location: Right arm   Patient Position: Sitting   Pulse: 51   Temp: 97.3 °F (36.3 °C)   SpO2: 98%   Weight: 77.6 kg (171 lb)   Height: 182.9 cm (72\")       Physical Exam   Gen- NAD, pleasant, cooperative  CV- Regular rate and rhythm, no murmur, gallop or rub  Pulm- Clear to auscultation bilateral without wheeze or rhonchi  GI- Soft, normoactive bowel sounds, non-tender  Ext- Without edema  Incision- Right thoracotomy site well-healed with no erythema, drainage or dehiscence noted.  Neuro- CN II- XII grossly intact, tongue midline, voice normal.    Labs/Imaging:  Chest x-ray, [facility], 5/19/2020  Impression:  Chronic elevation of the " right hemidiaphragm. No new chest disease is seen.  I personally reviewed these images in the office today.    Assessment / Plan:  Mr. Saman Aguayo is a pleasant 65 y.o. male with a history of hypertension, COPD, tobacco abuse, lung cancer (T1b N0 M0 stage Ia non-small cell undifferentiated lung cancer in the middle lobe resected 11/18/2016 via VATS), and a right upper lobe lung nodule status post redo right VATS with conversion to right thoracotomy with right upper lobe wedge resection, right upper lobe completion lobectomy, and mediastinal lymph node dissection 3/13/2020 per Dr. Porter revealing stage I A2 adenocarcinoma who returns the office today for postoperative exam.  As the patient's pain is also located in his right upper quadrant, I will obtain a gallbladder ultrasound to rule out possible cholecystitis.  I will also repeat a CT scan of the chest with contrast for further valuation his x-ray was unremarkable.  I will send in the patient a prescription for gabapentin 600 mg 3 times per day as needed for pain.  I will plan to see the patient back in the office after these procedures have been performed.  Should he have any acutely worsening symptoms, he may contact the office.    Please note, this document was produced using voice recognition software.    SAMIRA Salomon  [facility] Cardiothoracic Surgery

## 2020-05-22 ENCOUNTER — HOSPITAL ENCOUNTER (OUTPATIENT)
Dept: ULTRASOUND IMAGING | Facility: HOSPITAL | Age: 65
Discharge: HOME | End: 2020-05-22
Admitting: PHYSICIAN ASSISTANT

## 2020-05-22 DIAGNOSIS — R10.11 RIGHT UPPER QUADRANT ABDOMINAL PAIN: ICD-10-CM

## 2020-05-22 PROCEDURE — 76705 ECHO EXAM OF ABDOMEN: CPT

## 2020-05-26 ENCOUNTER — OFFICE VISIT (OUTPATIENT)
Dept: SURGERY | Facility: CLINIC | Age: 65
End: 2020-05-26

## 2020-05-26 VITALS
BODY MASS INDEX: 22.84 KG/M2 | SYSTOLIC BLOOD PRESSURE: 122 MMHG | DIASTOLIC BLOOD PRESSURE: 84 MMHG | OXYGEN SATURATION: 95 % | HEIGHT: 72 IN | HEART RATE: 77 BPM | TEMPERATURE: 98.2 F | WEIGHT: 168.6 LBS

## 2020-05-26 DIAGNOSIS — R10.13 EPIGASTRIC PAIN: ICD-10-CM

## 2020-05-26 DIAGNOSIS — R10.11 RIGHT UPPER QUADRANT ABDOMINAL PAIN: Primary | ICD-10-CM

## 2020-05-26 DIAGNOSIS — R10.11 RIGHT UPPER QUADRANT ABDOMINAL PAIN: ICD-10-CM

## 2020-05-26 DIAGNOSIS — Z01.818 PRE-OP TESTING: Primary | ICD-10-CM

## 2020-05-26 PROCEDURE — 99214 OFFICE O/P EST MOD 30 MIN: CPT | Performed by: SURGERY

## 2020-05-26 NOTE — PROGRESS NOTES
Patient: Saman Aguayo    YOB: 1955    Date: 05/26/2020    Primary Care Provider: Vermeesch, Marilyn K, MD    Chief Complaint   Patient presents with   • Abdominal Pain       Subjective .     History of present illness:  I saw the patient in the office today as a consultation for evaluation and treatment of abdominal pain. Patient had a recent normal gallbladder ultrasound. He complains of RUQ pain, sharp in nature, radiated into his back, worse after meals. He complains of associated nausea and vomiting.     Apparently the patient did have a previous right thoracotomy performed 11 weeks ago for lung carcinoma.  He does complain of sharp right upper quadrant abdominal discomfort, present over the past several weeks, radiating into the back, associated with meals, associated with nausea, meals make it worse, nothing seems to make it better, increasing in severity.    The following portions of the patient's history were reviewed and updated as appropriate: allergies, current medications, past family history, past medical history, past social history, past surgical history and problem list.    Review of Systems   Constitutional: Negative for chills, fever and unexpected weight change.   HENT: Negative for trouble swallowing and voice change.    Eyes: Negative for visual disturbance.   Respiratory: Negative for apnea, cough, chest tightness, shortness of breath and wheezing.    Cardiovascular: Negative for chest pain, palpitations and leg swelling.   Gastrointestinal: Positive for abdominal pain, diarrhea and vomiting. Negative for abdominal distention, anal bleeding, blood in stool, constipation, nausea and rectal pain.   Endocrine: Negative for cold intolerance and heat intolerance.   Genitourinary: Negative for difficulty urinating, dysuria, flank pain, scrotal swelling and testicular pain.   Musculoskeletal: Negative for back pain, gait problem and joint swelling.   Skin: Negative for color change, rash  "and wound.   Neurological: Negative for dizziness, syncope, speech difficulty, weakness, numbness and headaches.   Hematological: Negative for adenopathy. Does not bruise/bleed easily.   Psychiatric/Behavioral: Negative for confusion. The patient is not nervous/anxious.        History:  Past Medical History:   Diagnosis Date   • Abnormal ECG    • Abscess of skin    • Arthritis     HANDS AND BACK    • Bradycardia    • Bunion     LEFT FOOT   • COPD (chronic obstructive pulmonary disease) (CMS/HCC)    • Full dentures    • History of echocardiogram 09/21/2016   • Hx of exercise stress test 09/27/2016    DR. BAKER \"IT WAS NORMAL\"   • Hypertension    • Low back pain    • Lung cancer, middle lobe (CMS/HCC) 11/8/2016    RIGHT MIDDLE LOBE   • Lung nodule     RUL-7MM.  PRESENTLY HAS, AND FOLLOWS WITH DR. HERNANDEZ.  STATES FOUND \"A COUPLE MONTHS AGO\"   • Premature atrial contraction    • Pulmonary hypertension (CMS/HCC)    • Skin cancer     BASAL CELL-NOSE, LIP   • Wears eyeglasses        Past Surgical History:   Procedure Laterality Date   • BASAL CELL CARCINOMA EXCISION      NOSE, LIP   • BRONCHOSCOPY  2016   • BRONCHOSCOPY N/A 3/13/2020    Procedure: BRONCHOSCOPY;  Surgeon: Chris Porter MD;  Location:  [facility];  Service: Cardiothoracic;  Laterality: N/A;   • BUNIONECTOMY Left 2013    GREAT TOE   • COLONOSCOPY  2018   • INGUINAL HERNIA REPAIR Left 2002    Dr. Singh/inguinal    • KNEE ARTHROSCOPY Right 12/12/2018    Procedure: Diagnostic arthroscopy right knee with partial medial meniscectomy;  Surgeon: Dinh Nova MD;  Location:  [facility];  Service: Orthopedics   • LUNG REMOVAL, PARTIAL  2016    RIGHT MIDDLE LOBE   • MOUTH SURGERY      FULL EXTRACTION OF TEETH   • SKIN BIOPSY     • THORACOSCOPY N/A 11/18/2016    Procedure: BRONCH THORACOSCOPY VIDEO ASSISTED  WITH LOBECTOMY, MEDIALSTINAL LYMPH NODE DISECTION;  Surgeon: Chris Porter MD;  Location:  [facility];  Service:    • THORACOSCOPY VIDEO ASSISTED WITH " "LOBECTOMY Right 3/13/2020    Procedure: Redo right THORACOSCOPY VIDEO ASSISTED with right upper lobe wedge resection and with conversion to open thorocotomy for right upper lobectomy with intercostal cryoablation;  Surgeon: Chris Porter MD;  Location: [facility];  Service: Cardiothoracic;  Laterality: Right;       Family History   Problem Relation Age of Onset   • Heart attack Father    • Heart disease Father    • Colon cancer Father    • Colon cancer Mother    • Liver cancer Mother        Social History     Tobacco Use   • Smoking status: Former Smoker     Packs/day: 3.00     Years: 50.00     Pack years: 150.00     Types: Cigarettes     Last attempt to quit: 11/4/2016     Years since quitting: 3.5   • Smokeless tobacco: Never Used   Substance Use Topics   • Alcohol use: Yes     Alcohol/week: 21.0 standard drinks     Types: 21 Cans of beer per week     Comment: 3 cans/daily   • Drug use: No       Medications:    Current Outpatient Medications:   •  gabapentin (NEURONTIN) 600 MG tablet, Take 1 tablet by mouth 3 (Three) Times a Day As Needed (moderate to severe pain)., Disp: 30 tablet, Rfl: 0  •  HYDROcodone-acetaminophen (NORCO) 7.5-325 MG per tablet, Take 1 tablet by mouth Every 6 (Six) Hours As Needed for Moderate Pain ., Disp: , Rfl:      Allergies:  Allergies   Allergen Reactions   • No Known Drug Allergy        Objective     Vital Signs:   Vitals:    05/26/20 1446   BP: 122/84   Pulse: 77   Temp: 98.2 °F (36.8 °C)   SpO2: 95%   Weight: 76.5 kg (168 lb 9.6 oz)   Height: 182.9 cm (72.01\")       Physical Exam:   General Appearance:    Alert, cooperative, in no acute distress   Head:    Normocephalic, without obvious abnormality, atraumatic   Eyes:            Lids and lashes normal, conjunctivae and sclerae normal, no   icterus, no pallor, corneas clear, PERRL   Ears:    Ears appear intact with no abnormalities noted   Throat:   No oral lesions, no thrush, oral mucosa moist   Neck:   No adenopathy, supple, trachea " midline, no thyromegaly,  no JVD   Lungs/respiratory:     Clear to auscultation,respirations regular, even and                  unlabored    Heart/cardiovascular:    Regular rhythm and normal rate, normal S1 and S2, no            murmur   Abdomen:     no masses, no organomegaly, soft non-tender, non-distended, no guarding, no peritoneal signs   Extremities:   Moves all extremities well, no edema, no cyanosis, no             redness   Pulses:   Pulses palpable and equal bilaterally   Skin:   No bleeding, bruising or rash   Lymph nodes:   No palpable adenopathy   Neurologic:   Cranial nerves 2 - 12 grossly intact, sensation intact   Psychiatric: No evidence of depression or anxiety      Results Review:   I reviewed the patient's new clinical results.  I reviewed the patient's new imaging results and agree with the interpretation.  I reviewed the patient's other test results and agree with the interpretation    Review of Systems was reviewed and confirmed as accurate as documented by the MA.    Assessment/Plan :    1. Right upper quadrant abdominal pain    2. Epigastric pain        I recommend a EGD for further evaluation.  The procedure as well as the risks were explained which include but are not limited to bleeding, infection, intestinal perforation, Aspiration etc. were explained and the patient understood all of the above and wishes to proceed with an EGD.    He did have a recent negative gallbladder ultrasound and I am going to order a HIDA scan on the patient.    Electronically signed by Shady Singh MD  05/26/20  2:36 PM      Portions of this note has been scribed for Shady Singh MD by Deisy Gonzalez. 5/26/2020  15:27

## 2020-05-28 PROBLEM — R10.11 RIGHT UPPER QUADRANT ABDOMINAL PAIN: Status: ACTIVE | Noted: 2020-05-28

## 2020-05-28 PROBLEM — R10.13 EPIGASTRIC PAIN: Status: ACTIVE | Noted: 2020-05-28

## 2020-05-29 ENCOUNTER — APPOINTMENT (OUTPATIENT)
Dept: ULTRASOUND IMAGING | Facility: HOSPITAL | Age: 65
End: 2020-05-29

## 2020-05-29 ENCOUNTER — HOSPITAL ENCOUNTER (OUTPATIENT)
Dept: CT IMAGING | Facility: HOSPITAL | Age: 65
Discharge: HOME | End: 2020-05-29
Admitting: PHYSICIAN ASSISTANT

## 2020-05-29 DIAGNOSIS — R91.1 RIGHT UPPER LOBE PULMONARY NODULE: ICD-10-CM

## 2020-05-29 LAB — CREAT BLDA-MCNC: 1 MG/DL (ref 0.6–1.3)

## 2020-05-29 PROCEDURE — 71260 CT THORAX DX C+: CPT

## 2020-05-29 PROCEDURE — 25010000002 IOPAMIDOL 61 % SOLUTION: Performed by: PHYSICIAN ASSISTANT

## 2020-05-29 PROCEDURE — 82565 ASSAY OF CREATININE: CPT

## 2020-05-29 RX ADMIN — IOPAMIDOL 100 ML: 612 INJECTION, SOLUTION INTRAVENOUS at 08:20

## 2020-06-01 ENCOUNTER — LAB (OUTPATIENT)
Dept: LAB | Facility: HOSPITAL | Age: 65
End: 2020-06-01

## 2020-06-01 DIAGNOSIS — Z01.818 PRE-OP TESTING: ICD-10-CM

## 2020-06-01 LAB
REF LAB TEST METHOD: NORMAL
SARS-COV-2 RNA RESP QL NAA+PROBE: NOT DETECTED

## 2020-06-01 PROCEDURE — U0002 COVID-19 LAB TEST NON-CDC: HCPCS

## 2020-06-01 PROCEDURE — U0004 COV-19 TEST NON-CDC HGH THRU: HCPCS

## 2020-06-01 PROCEDURE — C9803 HOPD COVID-19 SPEC COLLECT: HCPCS

## 2020-06-02 ENCOUNTER — TELEPHONE (OUTPATIENT)
Dept: SURGERY | Facility: CLINIC | Age: 65
End: 2020-06-02

## 2020-06-02 ENCOUNTER — HOSPITAL ENCOUNTER (OUTPATIENT)
Dept: NUCLEAR MEDICINE | Facility: HOSPITAL | Age: 65
Discharge: HOME | End: 2020-06-02

## 2020-06-02 ENCOUNTER — ANESTHESIA EVENT (OUTPATIENT)
Dept: GASTROENTEROLOGY | Facility: HOSPITAL | Age: 65
End: 2020-06-02

## 2020-06-02 DIAGNOSIS — R10.11 RIGHT UPPER QUADRANT ABDOMINAL PAIN: ICD-10-CM

## 2020-06-02 PROCEDURE — 25010000002 SINCALIDE PER 5 MCG: Performed by: SURGERY

## 2020-06-02 PROCEDURE — 0 TECHNETIUM TC 99M MEBROFENIN KIT: Performed by: SURGERY

## 2020-06-02 PROCEDURE — A9537 TC99M MEBROFENIN: HCPCS | Performed by: SURGERY

## 2020-06-02 PROCEDURE — 78227 HEPATOBIL SYST IMAGE W/DRUG: CPT

## 2020-06-02 RX ORDER — KIT FOR THE PREPARATION OF TECHNETIUM TC 99M MEBROFENIN 45 MG/10ML
1 INJECTION, POWDER, LYOPHILIZED, FOR SOLUTION INTRAVENOUS
Status: COMPLETED | OUTPATIENT
Start: 2020-06-02 | End: 2020-06-02

## 2020-06-02 RX ADMIN — MEBROFENIN 1 DOSE: 45 INJECTION, POWDER, LYOPHILIZED, FOR SOLUTION INTRAVENOUS at 09:33

## 2020-06-02 RX ADMIN — SINCALIDE 1.6 MCG: 5 INJECTION, POWDER, LYOPHILIZED, FOR SOLUTION INTRAVENOUS at 10:40

## 2020-06-02 NOTE — TELEPHONE ENCOUNTER
Called the patient to remind them of an upcoming appointment with general surgery. I was able  to reach the patient.

## 2020-06-03 ENCOUNTER — ANESTHESIA (OUTPATIENT)
Dept: GASTROENTEROLOGY | Facility: HOSPITAL | Age: 65
End: 2020-06-03

## 2020-06-03 ENCOUNTER — HOSPITAL ENCOUNTER (OUTPATIENT)
Facility: HOSPITAL | Age: 65
Setting detail: HOSPITAL OUTPATIENT SURGERY
Discharge: HOME | End: 2020-06-03
Attending: SURGERY | Admitting: SURGERY

## 2020-06-03 VITALS
SYSTOLIC BLOOD PRESSURE: 118 MMHG | HEIGHT: 72 IN | OXYGEN SATURATION: 91 % | BODY MASS INDEX: 24.38 KG/M2 | TEMPERATURE: 98 F | DIASTOLIC BLOOD PRESSURE: 61 MMHG | WEIGHT: 180 LBS | RESPIRATION RATE: 18 BRPM | HEART RATE: 72 BPM

## 2020-06-03 DIAGNOSIS — R10.13 EPIGASTRIC PAIN: ICD-10-CM

## 2020-06-03 DIAGNOSIS — R10.11 RIGHT UPPER QUADRANT ABDOMINAL PAIN: ICD-10-CM

## 2020-06-03 PROCEDURE — 25010000002 MIDAZOLAM PER 1MG: Performed by: NURSE ANESTHETIST, CERTIFIED REGISTERED

## 2020-06-03 PROCEDURE — 25010000003 MEPERIDINE PER 100 MG: Performed by: NURSE ANESTHETIST, CERTIFIED REGISTERED

## 2020-06-03 PROCEDURE — 88305 TISSUE EXAM BY PATHOLOGIST: CPT | Performed by: SURGERY

## 2020-06-03 PROCEDURE — 25010000002 PROPOFOL 10 MG/ML EMULSION: Performed by: NURSE ANESTHETIST, CERTIFIED REGISTERED

## 2020-06-03 RX ORDER — MEPERIDINE HYDROCHLORIDE 25 MG/ML
INJECTION INTRAMUSCULAR; INTRAVENOUS; SUBCUTANEOUS AS NEEDED
Status: DISCONTINUED | OUTPATIENT
Start: 2020-06-03 | End: 2020-06-03 | Stop reason: SURG

## 2020-06-03 RX ORDER — SODIUM CHLORIDE, SODIUM LACTATE, POTASSIUM CHLORIDE, CALCIUM CHLORIDE 600; 310; 30; 20 MG/100ML; MG/100ML; MG/100ML; MG/100ML
1000 INJECTION, SOLUTION INTRAVENOUS CONTINUOUS
Status: DISCONTINUED | OUTPATIENT
Start: 2020-06-03 | End: 2020-06-03 | Stop reason: HOSPADM

## 2020-06-03 RX ORDER — OMEPRAZOLE 40 MG/1
40 CAPSULE, DELAYED RELEASE ORAL DAILY
Qty: 30 CAPSULE | Refills: 5 | Status: SHIPPED | OUTPATIENT
Start: 2020-06-03 | End: 2020-08-03

## 2020-06-03 RX ORDER — PROPOFOL 10 MG/ML
VIAL (ML) INTRAVENOUS AS NEEDED
Status: DISCONTINUED | OUTPATIENT
Start: 2020-06-03 | End: 2020-06-03 | Stop reason: SURG

## 2020-06-03 RX ORDER — MIDAZOLAM HYDROCHLORIDE 2 MG/2ML
INJECTION, SOLUTION INTRAMUSCULAR; INTRAVENOUS AS NEEDED
Status: DISCONTINUED | OUTPATIENT
Start: 2020-06-03 | End: 2020-06-03 | Stop reason: SURG

## 2020-06-03 RX ORDER — SODIUM CHLORIDE 0.9 % (FLUSH) 0.9 %
10 SYRINGE (ML) INJECTION AS NEEDED
Status: DISCONTINUED | OUTPATIENT
Start: 2020-06-03 | End: 2020-06-03 | Stop reason: HOSPADM

## 2020-06-03 RX ORDER — LIDOCAINE HYDROCHLORIDE 20 MG/ML
INJECTION, SOLUTION INTRAVENOUS AS NEEDED
Status: DISCONTINUED | OUTPATIENT
Start: 2020-06-03 | End: 2020-06-03 | Stop reason: SURG

## 2020-06-03 RX ORDER — KETAMINE HCL IN NACL, ISO-OSM 100MG/10ML
SYRINGE (ML) INJECTION AS NEEDED
Status: DISCONTINUED | OUTPATIENT
Start: 2020-06-03 | End: 2020-06-03 | Stop reason: SURG

## 2020-06-03 RX ADMIN — MIDAZOLAM HYDROCHLORIDE 2 MG: 1 INJECTION, SOLUTION INTRAMUSCULAR; INTRAVENOUS at 08:29

## 2020-06-03 RX ADMIN — PROPOFOL 10 MG: 10 INJECTION, EMULSION INTRAVENOUS at 08:33

## 2020-06-03 RX ADMIN — PROPOFOL 10 MG: 10 INJECTION, EMULSION INTRAVENOUS at 08:34

## 2020-06-03 RX ADMIN — Medication 25 MG: at 08:32

## 2020-06-03 RX ADMIN — MEPERIDINE HYDROCHLORIDE 12.5 MG: 25 INJECTION, SOLUTION INTRAMUSCULAR; INTRAVENOUS; SUBCUTANEOUS at 08:33

## 2020-06-03 RX ADMIN — MEPERIDINE HYDROCHLORIDE 12.5 MG: 25 INJECTION, SOLUTION INTRAMUSCULAR; INTRAVENOUS; SUBCUTANEOUS at 08:29

## 2020-06-03 RX ADMIN — PROPOFOL 10 MG: 10 INJECTION, EMULSION INTRAVENOUS at 08:32

## 2020-06-03 RX ADMIN — LIDOCAINE HYDROCHLORIDE 40 MG: 20 INJECTION, SOLUTION INTRAVENOUS at 08:31

## 2020-06-03 RX ADMIN — SODIUM CHLORIDE, POTASSIUM CHLORIDE, SODIUM LACTATE AND CALCIUM CHLORIDE 1000 ML: 600; 310; 30; 20 INJECTION, SOLUTION INTRAVENOUS at 07:57

## 2020-06-03 NOTE — ANESTHESIA POSTPROCEDURE EVALUATION
Patient: Saman Aguayo    Procedure Summary     Date:  06/03/20 Room / Location:  [facility] ENDOSCOPY 3 / [facility] ENDOSCOPY    Anesthesia Start:  0827 Anesthesia Stop:      Procedure:  ESOPHAGOGASTRODUODENOSCOPY WITH BIOPSY (N/A Esophagus) Diagnosis:       Right upper quadrant abdominal pain      Epigastric pain      (Right upper quadrant abdominal pain [R10.11])      (Epigastric pain [R10.13])    Surgeon:  Shady Singh MD Provider:  Jett Villa CRNA    Anesthesia Type:  MAC ASA Status:  3          Anesthesia Type: MAC    Vitals  No vitals data found for the desired time range.          Post Anesthesia Care and Evaluation    Patient location during evaluation: PHASE II  Patient participation: complete - patient participated  Level of consciousness: awake  Pain score: 0  Pain management: adequate  Airway patency: patent  Anesthetic complications: No anesthetic complications  PONV Status: none  Cardiovascular status: acceptable  Respiratory status: acceptable and nasal cannula  Hydration status: acceptable    Comments: vsss resp spont, reflexes intact, responsive, report given to pacu nurse

## 2020-06-03 NOTE — ANESTHESIA PREPROCEDURE EVALUATION
Anesthesia Evaluation     Patient summary reviewed and Nursing notes reviewed   NPO Solid Status: > 8 hours  NPO Liquid Status: > 2 hours           Airway   Mallampati: II  TM distance: >3 FB  Neck ROM: full  No difficulty expected  Dental      Pulmonary    (+) a smoker (2016) Former, lung cancer, COPD moderate, shortness of breath, decreased breath sounds,   (-) asthma, recent URI    ROS comment: SP THOR LOBECTOMY   PULM HTN   RA sats 96%   Cardiovascular   Exercise tolerance: good (4-7 METS)    ECG reviewed    (+) hypertension, valvular problems/murmurs, dysrhythmias, BLAS,   (-) past MI, CAD, angina, cardiac stents    ROS comment: ECG SB c PAC's pattern of bigeminy    ECHO EF 45% Moderate to sev TVR RVSP(TR) 58     GXT EF = 52%.  normal myocardial perfusion -no evidence of ischemia.  Low risk study. Normal ECG stress test.  Stress negative for myocardial ischemia; Normal Yoon scan        Neuro/Psych  (-) seizures, CVA  GI/Hepatic/Renal/Endo    (-) liver disease, no renal disease, diabetes, no thyroid disorder    Musculoskeletal     Abdominal    Substance History   (+) alcohol use,      OB/GYN          Other   arthritis,    history of cancer (RML resection 2016 )        Phys Exam Other: Downey 2 grade IIa after cricoid pressure                   Anesthesia Plan    ASA 3     MAC   (Intercostals per surgeon)  intravenous induction     Anesthetic plan, all risks, benefits, and alternatives have been provided, discussed and informed consent has been obtained with: patient.    Plan discussed with CRNA.

## 2020-06-03 NOTE — DISCHARGE INSTRUCTIONS
Please follow all post op instructions and follow up appointment time from your physician's office included in your discharge packet.  .   No pushing, pulling, tugging,  heavy lifting, or strenuous activity.  No major decision making, driving, or drinking alcoholic beverages for 24 hours. ( due to the medications you have  received)  Always use good hand hygiene/washing techniques.  NO driving while taking pain medications.    * if you have an incision:  Check your incision area every day for signs of infection.   Check for:  * more redness, swelling, or pain  *more fluid or blood  *warmth  *pus or bad smell    To assist you in voiding:  Drink plenty of fluids  Listen to running water while attempting to void.    If you are unable to urinate and you have an uncomfortable urge to void or it has been   6 hours since you were discharged, return to the Emergency Room

## 2020-06-07 LAB
LAB AP CASE REPORT: NORMAL
PATH REPORT.FINAL DX SPEC: NORMAL

## 2020-06-08 NOTE — PROGRESS NOTES
Patient: Saman Aguayo    YOB: 1955    Date: 06/09/2020    Primary Care Provider: Vermeesch, Marilyn K, MD    Reason for Consultation: Follow-up EGD    Chief Complaint   Patient presents with   • Follow-up     egd and hida       History of present illness:  I saw the patient in the office today as a followup from their recent EGD with biopsy which was done 06/03/2020, the pathology report did show non specific chronic duodenitis, antral-type mucosa with reactive changes and squamocolumnar junctional mucosa with reactive changes.  Patient is also here for follow-up hida scan which was done 06/02/2020, it showed an ejection fraction of 61%.They state that they have done well and has no complaints.    He continues to complain of this abdominal discomfort, is located in the right upper quadrant but seems to be worse with pressure in the midline.  It does radiate into the upper quadrant, it is been present for several months, worse after heavy activity, relieved by lying down, not associated with any other symptomatology.    The following portions of the patient's history were reviewed and updated as appropriate: allergies, current medications, past family history, past medical history, past social history, past surgical history and problem list.    Review of Systems   Constitutional: Negative for chills, fever and unexpected weight change.   HENT: Negative for trouble swallowing and voice change.    Eyes: Negative for visual disturbance.   Respiratory: Negative for apnea, cough, chest tightness, shortness of breath and wheezing.    Cardiovascular: Negative for chest pain, palpitations and leg swelling.   Gastrointestinal: Negative for abdominal distention, abdominal pain, anal bleeding, blood in stool, constipation, diarrhea, nausea, rectal pain and vomiting.   Endocrine: Negative for cold intolerance and heat intolerance.   Genitourinary: Negative for difficulty urinating, dysuria, flank pain, scrotal  "swelling and testicular pain.   Musculoskeletal: Negative for back pain, gait problem and joint swelling.   Skin: Negative for color change, rash and wound.   Neurological: Negative for dizziness, syncope, speech difficulty, weakness, numbness and headaches.   Hematological: Negative for adenopathy. Does not bruise/bleed easily.   Psychiatric/Behavioral: Negative for confusion. The patient is not nervous/anxious.        Vital Signs:   Vitals:    06/09/20 1259   BP: 120/60   Pulse: 70   Temp: 98.1 °F (36.7 °C)   SpO2: 99%   Weight: 81.6 kg (180 lb)   Height: 182.9 cm (72\")       Allergies:  Allergies   Allergen Reactions   • No Known Drug Allergy        Medications:    Current Outpatient Medications:   •  gabapentin (NEURONTIN) 600 MG tablet, Take 1 tablet by mouth 3 (Three) Times a Day As Needed (moderate to severe pain). (Patient taking differently: Take 600 mg by mouth 3 (Three) Times a Day As Needed (moderate to severe pain). Has not started yet.), Disp: 30 tablet, Rfl: 0  •  omeprazole (PrilOSEC) 40 MG capsule, Take 1 capsule by mouth Daily., Disp: 30 capsule, Rfl: 5    Physical Exam:   General Appearance:    Alert, cooperative, in no acute distress   Abdomen:     no masses, no organomegaly, soft non-tender, non-distended, no guarding, wounds are well healed, no evidence of recurrent hernia, there is evidence of a midline bulge above the umbilicus that is tender   Chest:      Clear toausculation            Cor:  Regular rate and rhythm      Results Review:   I reviewed the patient's new clinical results.  I reviewed the patient's new imaging results and agree with the interpretation.  I reviewed the patient's other test results and agree with the interpretation    Review of Systems was reviewed and confirmed as accurate as documented by the MA.    Assessment / Plan:    1. Right upper quadrant abdominal pain    2. Family history of colon cancer    3. History of colon polyps    4. Incisional hernia with " obstruction but no gangrene        I did discuss the situation with the patient today in the office and they have done well from their recent EGD with biopsy. I have told the patient I need to get a CT scan of the abdomen and pelvis for further evaluation.  We did a gallbladder ultrasound today in the office and his gallbladder was very difficult to visualize, he does have a supraumbilical incisional hernia noted with incarcerated omentum and I really wonder if this is not causing some or all of his symptomatology.    He also has a personal history of colon polyps and I think prior to any operative intervention the patient needs to undergo colonoscopy.  Risk and benefits of operative versus nonoperative intervention have been discussed with the patient, he understands and agrees, and wishes to proceed.    Electronically signed by Shady Singh MD  06/09/20

## 2020-06-09 ENCOUNTER — OFFICE VISIT (OUTPATIENT)
Dept: SURGERY | Facility: CLINIC | Age: 65
End: 2020-06-09

## 2020-06-09 VITALS
HEIGHT: 72 IN | SYSTOLIC BLOOD PRESSURE: 120 MMHG | TEMPERATURE: 98.1 F | HEART RATE: 70 BPM | WEIGHT: 180 LBS | DIASTOLIC BLOOD PRESSURE: 60 MMHG | OXYGEN SATURATION: 99 % | BODY MASS INDEX: 24.38 KG/M2

## 2020-06-09 DIAGNOSIS — R10.11 RIGHT UPPER QUADRANT ABDOMINAL PAIN: Primary | ICD-10-CM

## 2020-06-09 DIAGNOSIS — R10.9 ABDOMINAL PAIN, UNSPECIFIED ABDOMINAL LOCATION: Primary | ICD-10-CM

## 2020-06-09 DIAGNOSIS — Z80.0 FAMILY HISTORY OF COLON CANCER: ICD-10-CM

## 2020-06-09 DIAGNOSIS — K43.0 INCISIONAL HERNIA WITH OBSTRUCTION BUT NO GANGRENE: ICD-10-CM

## 2020-06-09 DIAGNOSIS — Z86.010 HISTORY OF COLON POLYPS: ICD-10-CM

## 2020-06-09 PROCEDURE — 99213 OFFICE O/P EST LOW 20 MIN: CPT | Performed by: SURGERY

## 2020-06-11 ENCOUNTER — HOSPITAL ENCOUNTER (OUTPATIENT)
Facility: HOSPITAL | Age: 65
Setting detail: HOSPITAL OUTPATIENT SURGERY
End: 2020-06-11
Attending: SURGERY | Admitting: SURGERY

## 2020-06-11 PROBLEM — Z86.0100 HISTORY OF COLON POLYPS: Status: ACTIVE | Noted: 2020-06-11

## 2020-06-11 PROBLEM — Z86.010 HISTORY OF COLON POLYPS: Status: ACTIVE | Noted: 2020-06-11

## 2020-06-12 ENCOUNTER — HOSPITAL ENCOUNTER (OUTPATIENT)
Dept: CT IMAGING | Facility: HOSPITAL | Age: 65
Discharge: HOME | End: 2020-06-12
Admitting: SURGERY

## 2020-06-12 DIAGNOSIS — R10.9 ABDOMINAL PAIN, UNSPECIFIED ABDOMINAL LOCATION: ICD-10-CM

## 2020-06-12 PROCEDURE — 74177 CT ABD & PELVIS W/CONTRAST: CPT

## 2020-06-12 PROCEDURE — 25010000002 IOPAMIDOL 61 % SOLUTION: Performed by: SURGERY

## 2020-06-12 RX ADMIN — IOPAMIDOL 100 ML: 612 INJECTION, SOLUTION INTRAVENOUS at 16:22

## 2020-06-14 ENCOUNTER — RESULTS ENCOUNTER (OUTPATIENT)
Dept: SURGERY | Facility: CLINIC | Age: 65
End: 2020-06-14

## 2020-06-14 DIAGNOSIS — R10.9 ABDOMINAL PAIN, UNSPECIFIED ABDOMINAL LOCATION: ICD-10-CM

## 2020-06-17 ENCOUNTER — LAB (OUTPATIENT)
Dept: LAB | Facility: HOSPITAL | Age: 65
End: 2020-06-17

## 2020-06-17 ENCOUNTER — OFFICE VISIT (OUTPATIENT)
Dept: SURGERY | Facility: CLINIC | Age: 65
End: 2020-06-17

## 2020-06-17 VITALS
HEART RATE: 55 BPM | BODY MASS INDEX: 23.24 KG/M2 | SYSTOLIC BLOOD PRESSURE: 128 MMHG | HEIGHT: 72 IN | TEMPERATURE: 97.9 F | WEIGHT: 171.6 LBS | OXYGEN SATURATION: 98 % | DIASTOLIC BLOOD PRESSURE: 74 MMHG

## 2020-06-17 DIAGNOSIS — Z01.818 PREOP TESTING: ICD-10-CM

## 2020-06-17 DIAGNOSIS — Z01.818 PREOP TESTING: Primary | ICD-10-CM

## 2020-06-17 DIAGNOSIS — R10.10 PAIN OF UPPER ABDOMEN: Primary | ICD-10-CM

## 2020-06-17 PROCEDURE — U0004 COV-19 TEST NON-CDC HGH THRU: HCPCS

## 2020-06-17 PROCEDURE — U0002 COVID-19 LAB TEST NON-CDC: HCPCS

## 2020-06-17 PROCEDURE — C9803 HOPD COVID-19 SPEC COLLECT: HCPCS

## 2020-06-17 PROCEDURE — 99213 OFFICE O/P EST LOW 20 MIN: CPT | Performed by: SURGERY

## 2020-06-17 NOTE — PROGRESS NOTES
Patient: Saman Aguayo    YOB: 1955    Date: 06/17/2020    Primary Care Provider: Vermeesch, Marilyn K, MD    Chief Complaint   Patient presents with   • Follow-up     ct       SUBJECTIVE:    History of present illness:  I saw the patient in the office today as a follow-up consultation for hernia which is located at the umbilicus.  CT scan of the abdomen and pelvis was performed 06/12/2020, it showed a tiny fat-containing umbilical hernia.    The reading on the CT scan showed no acute processes, there was a tiny fat-containing umbilical hernia noted in the body of the report.  Ultrasound was repeated today that showed no evidence of ventral hernia above the umbilicus, there was a's questionable small defect at the umbilicus that was tender to palpation with the abdominal ultrasound probe.  The patient has had a previous history significant for colon polyps in the past and is due for colonoscopy.  His previous HIDA scan showed evidence of a 61% ejection fraction.    The following portions of the patient's history were reviewed and updated as appropriate: allergies, current medications, past family history, past medical history, past social history, past surgical history and problem list.    Review of Systems   Constitutional: Negative for chills, fever and unexpected weight change.   HENT: Negative for trouble swallowing and voice change.    Eyes: Negative for visual disturbance.   Respiratory: Negative for apnea, cough, chest tightness, shortness of breath and wheezing.    Cardiovascular: Negative for chest pain, palpitations and leg swelling.   Gastrointestinal: Negative for abdominal distention, abdominal pain, anal bleeding, blood in stool, constipation, diarrhea, nausea, rectal pain and vomiting.   Endocrine: Negative for cold intolerance and heat intolerance.   Genitourinary: Negative for difficulty urinating, dysuria, flank pain, scrotal swelling and testicular pain.   Musculoskeletal: Negative  "for back pain, gait problem and joint swelling.   Skin: Negative for color change, rash and wound.   Neurological: Negative for dizziness, syncope, speech difficulty, weakness, numbness and headaches.   Hematological: Negative for adenopathy. Does not bruise/bleed easily.   Psychiatric/Behavioral: Negative for confusion. The patient is not nervous/anxious.        History:  Past Medical History:   Diagnosis Date   • Abnormal ECG    • Abscess of skin    • Arthritis     HANDS AND BACK    • Bradycardia    • Bunion     LEFT FOOT   • COPD (chronic obstructive pulmonary disease) (CMS/HCC)    • Full dentures    • History of echocardiogram 09/21/2016   • Hx of exercise stress test 09/27/2016    DR. BAKER \"IT WAS NORMAL\"   • Low back pain    • Lung cancer, middle lobe (CMS/HCC) 11/8/2016    RIGHT MIDDLE LOBE   • Lung nodule     RUL-7MM.  PRESENTLY HAS, AND FOLLOWS WITH DR. HERNANDEZ.  STATES FOUND \"A COUPLE MONTHS AGO\"   • Premature atrial contraction    • Pulmonary hypertension (CMS/HCC)    • Skin cancer     BASAL CELL-NOSE, LIP   • Wears eyeglasses        Past Surgical History:   Procedure Laterality Date   • BASAL CELL CARCINOMA EXCISION      NOSE, LIP   • BRONCHOSCOPY  2016   • BRONCHOSCOPY N/A 3/13/2020    Procedure: BRONCHOSCOPY;  Surgeon: Chris Porter MD;  Location: [facility] OR;  Service: Cardiothoracic;  Laterality: N/A;   • BUNIONECTOMY Left 2013    GREAT TOE   • COLONOSCOPY  2018   • ENDOSCOPY N/A 6/3/2020    Procedure: ESOPHAGOGASTRODUODENOSCOPY WITH BIOPSY;  Surgeon: Shady Singh MD;  Location: [facility] ENDOSCOPY;  Service: Gastroenterology;  Laterality: N/A;   • INGUINAL HERNIA REPAIR Left 2002    Dr. Singh/inguinal    • KNEE ARTHROSCOPY Right 12/12/2018    Procedure: Diagnostic arthroscopy right knee with partial medial meniscectomy;  Surgeon: Dinh Nova MD;  Location: [facility] OR;  Service: Orthopedics   • LUNG REMOVAL, PARTIAL  2016    RIGHT MIDDLE LOBE   • MOUTH SURGERY      FULL EXTRACTION OF TEETH " "  • SKIN BIOPSY     • THORACOSCOPY N/A 11/18/2016    Procedure: BRONCH THORACOSCOPY VIDEO ASSISTED  WITH LOBECTOMY, MEDIALSTINAL LYMPH NODE DISECTION;  Surgeon: Chris Porter MD;  Location: [facility] OR;  Service:    • THORACOSCOPY VIDEO ASSISTED WITH LOBECTOMY Right 3/13/2020    Procedure: Redo right THORACOSCOPY VIDEO ASSISTED with right upper lobe wedge resection and with conversion to open thorocotomy for right upper lobectomy with intercostal cryoablation;  Surgeon: Chris Porter MD;  Location: [facility] OR;  Service: Cardiothoracic;  Laterality: Right;       Family History   Problem Relation Age of Onset   • Heart attack Father    • Heart disease Father    • Colon cancer Father    • Colon cancer Mother    • Liver cancer Mother        Social History     Tobacco Use   • Smoking status: Former Smoker     Packs/day: 3.00     Years: 50.00     Pack years: 150.00     Types: Cigarettes     Last attempt to quit: 11/4/2016     Years since quitting: 3.6   • Smokeless tobacco: Never Used   Substance Use Topics   • Alcohol use: Yes     Alcohol/week: 21.0 standard drinks     Types: 21 Cans of beer per week     Comment: 3 cans/daily   • Drug use: No       Allergies:  Allergies   Allergen Reactions   • No Known Drug Allergy        Medications:    Current Outpatient Medications:   •  gabapentin (NEURONTIN) 600 MG tablet, Take 1 tablet by mouth 3 (Three) Times a Day As Needed (moderate to severe pain). (Patient taking differently: Take 600 mg by mouth 3 (Three) Times a Day As Needed (moderate to severe pain). Has not started yet.), Disp: 30 tablet, Rfl: 0  •  omeprazole (PrilOSEC) 40 MG capsule, Take 1 capsule by mouth Daily., Disp: 30 capsule, Rfl: 5    OBJECTIVE:    Vital Signs:   Vitals:    06/17/20 1258   BP: 128/74   Pulse: 55   Temp: 97.9 °F (36.6 °C)   SpO2: 98%   Weight: 77.8 kg (171 lb 9.6 oz)   Height: 182.9 cm (72\")       Physical Exam:   General Appearance:    Alert, cooperative, in no acute distress   Head:    " Normocephalic, without obvious abnormality, atraumatic   Eyes:            Lids and lashes normal, conjunctivae and sclerae normal, no   icterus, no pallor, corneas clear, PERRLA   Ears:    Ears appear intact with no abnormalities noted   Throat:   No oral lesions, no thrush, oral mucosa moist   Neck:   No adenopathy, supple, trachea midline, no thyromegaly, no   carotid bruit, no JVD   Lungs:     Clear to auscultation,respirations regular, even and                  unlabored    Heart:    Regular rhythm and normal rate, normal S1 and S2, no            murmur   Abdomen:     no masses, no organomegaly, soft non-tender, non-distended, no guarding, there is evidence of a slight tenderness at the umbilicus   Extremities:   Moves all extremities well, no edema, no cyanosis, no             redness   Pulses:   Pulses palpable and equal bilaterally   Skin:   No bleeding, bruising or rash   Lymph nodes:   No palpable adenopathy   Neurologic:   Cranial nerves 2 - 12 grossly intact, sensation intact        Results Review:   I reviewed the patient's new clinical results.  I reviewed the patient's new imaging results and agree with the interpretation.  I reviewed the patient's other test results and agree with the interpretation    Review of Systems was reviewed and confirmed as accurate as documented by the MA.    ASSESSMENT/PLAN:    1. Pain of upper abdomen        I had a detailed and extensive discussion with the patient in the office and they understand that they need to undergo hernia repair with mesh.  Full risks and benefits of operative versus nonoperative intervention were discussed with the patient and these included things such as nonresolution of symptoms and possible worsening of symptoms without surgical intervention versus infection, bleeding, possible recurrent hernia, possible postoperative neuralgia from nerve damage or involvement with scar tissue, etc.  The patient understands, agrees, and had no questions for  me at the end of the office visit.     I discussed the patients findings and my recommendations with patient.    He has been scheduled for colonoscopy which was scheduled for next week, I am going to try to move this up to this coming Friday.  I do think that ultimately the patient will need to undergo umbilical herniorrhaphy and I think this may alleviate most of his symptomatology.    Electronically signed by Shady Singh MD  06/17/20

## 2020-06-18 LAB
REF LAB TEST METHOD: NORMAL
SARS-COV-2 RNA RESP QL NAA+PROBE: NOT DETECTED

## 2020-06-19 ENCOUNTER — OUTSIDE FACILITY SERVICE (OUTPATIENT)
Dept: SURGERY | Facility: CLINIC | Age: 65
End: 2020-06-19

## 2020-06-19 PROCEDURE — 45384 COLONOSCOPY W/LESION REMOVAL: CPT | Performed by: SURGERY

## 2020-06-23 ENCOUNTER — OFFICE VISIT (OUTPATIENT)
Dept: SURGERY | Facility: CLINIC | Age: 65
End: 2020-06-23

## 2020-06-23 VITALS
HEIGHT: 72 IN | TEMPERATURE: 98.4 F | OXYGEN SATURATION: 98 % | BODY MASS INDEX: 23.23 KG/M2 | WEIGHT: 171.52 LBS | HEART RATE: 49 BPM | DIASTOLIC BLOOD PRESSURE: 80 MMHG | SYSTOLIC BLOOD PRESSURE: 126 MMHG

## 2020-06-23 DIAGNOSIS — K43.0 INCISIONAL HERNIA WITH OBSTRUCTION BUT NO GANGRENE: Primary | ICD-10-CM

## 2020-06-23 DIAGNOSIS — Z01.818 PRE-OP TESTING: Primary | ICD-10-CM

## 2020-06-23 PROCEDURE — 99213 OFFICE O/P EST LOW 20 MIN: CPT | Performed by: SURGERY

## 2020-06-23 NOTE — PROGRESS NOTES
Patient: Saman Aguayo    YOB: 1955    Date: 06/23/2020    Primary Care Provider: Vermeesch, Marilyn K, MD    Chief Complaint   Patient presents with   • Follow-up     colonoscopy   • Hernia       SUBJECTIVE:    History of present illness:  I saw the patient in the office today as a follow-up consultation for a colonoscopy which was done 06/19/2020, patient had a biopsy of the sigmoid colon which showed a mucosal tag.  Patient is also here for follow-up umbilical hernia.  CT scan of the abdomen and pelvis done 06/12/2020 showed a tiny fat-containing umbilical hernia. He complains of sharp pain around the umbilicus, worsening with walking.     He does have an incisional hernia located at the umbilicus that does cause him pain to palpation.    The following portions of the patient's history were reviewed and updated as appropriate: allergies, current medications, past family history, past medical history, past social history, past surgical history and problem list.    Review of Systems   Constitutional: Negative for chills, fever and unexpected weight change.   HENT: Negative for trouble swallowing and voice change.    Eyes: Negative for visual disturbance.   Respiratory: Negative for apnea, cough, chest tightness, shortness of breath and wheezing.    Cardiovascular: Negative for chest pain, palpitations and leg swelling.   Gastrointestinal: Positive for abdominal pain. Negative for abdominal distention, anal bleeding, blood in stool, constipation, diarrhea, nausea, rectal pain and vomiting.   Endocrine: Negative for cold intolerance and heat intolerance.   Genitourinary: Negative for difficulty urinating, dysuria, flank pain, scrotal swelling and testicular pain.   Musculoskeletal: Negative for back pain, gait problem and joint swelling.   Skin: Negative for color change, rash and wound.   Neurological: Negative for dizziness, syncope, speech difficulty, weakness, numbness and headaches.   Hematological:  "Negative for adenopathy. Does not bruise/bleed easily.   Psychiatric/Behavioral: Negative for confusion. The patient is not nervous/anxious.        History:  Past Medical History:   Diagnosis Date   • Abnormal ECG    • Abscess of skin    • Arthritis     HANDS AND BACK    • Bradycardia    • Bunion     LEFT FOOT   • COPD (chronic obstructive pulmonary disease) (CMS/HCC)    • Full dentures    • History of echocardiogram 09/21/2016   • Hx of exercise stress test 09/27/2016    DR. BAKER \"IT WAS NORMAL\"   • Low back pain    • Lung cancer, middle lobe (CMS/HCC) 11/8/2016    RIGHT MIDDLE LOBE   • Lung nodule     RUL-7MM.  PRESENTLY HAS, AND FOLLOWS WITH DR. HERNANDEZ.  STATES FOUND \"A COUPLE MONTHS AGO\"   • Premature atrial contraction    • Pulmonary hypertension (CMS/HCC)    • Skin cancer     BASAL CELL-NOSE, LIP   • Wears eyeglasses        Past Surgical History:   Procedure Laterality Date   • BASAL CELL CARCINOMA EXCISION      NOSE, LIP   • BRONCHOSCOPY  2016   • BRONCHOSCOPY N/A 3/13/2020    Procedure: BRONCHOSCOPY;  Surgeon: Chris Porter MD;  [facility];  Service: Cardiothoracic;  Laterality: N/A;   • BUNIONECTOMY Left 2013    GREAT TOE   • COLONOSCOPY  2018   • ENDOSCOPY N/A 6/3/2020    Procedure: ESOPHAGOGASTRODUODENOSCOPY WITH BIOPSY;  Surgeon: Shady Singh MD;  Location: [facility] ENDOSCOPY;  Service: Gastroenterology;  Laterality: N/A;   • INGUINAL HERNIA REPAIR Left 2002    Dr. Singh/inguinal    • KNEE ARTHROSCOPY Right 12/12/2018    Procedure: Diagnostic arthroscopy right knee with partial medial meniscectomy;  Surgeon: Dinh Nova MD;  Location: [facility] OR;  Service: Orthopedics   • LUNG REMOVAL, PARTIAL  2016    RIGHT MIDDLE LOBE   • MOUTH SURGERY      FULL EXTRACTION OF TEETH   • SKIN BIOPSY     • THORACOSCOPY N/A 11/18/2016    Procedure: BRONCH THORACOSCOPY VIDEO ASSISTED  WITH LOBECTOMY, MEDIALSTINAL LYMPH NODE DISECTION;  Surgeon: Chris Porter MD;  [facility];  Service:    • " "THORACOSCOPY VIDEO ASSISTED WITH LOBECTOMY Right 3/13/2020    Procedure: Redo right THORACOSCOPY VIDEO ASSISTED with right upper lobe wedge resection and with conversion to open thorocotomy for right upper lobectomy with intercostal cryoablation;  Surgeon: Chris Porter MD;  Location: [facility];  Service: Cardiothoracic;  Laterality: Right;       Family History   Problem Relation Age of Onset   • Heart attack Father    • Heart disease Father    • Colon cancer Father    • Colon cancer Mother    • Liver cancer Mother        Social History     Tobacco Use   • Smoking status: Former Smoker     Packs/day: 3.00     Years: 50.00     Pack years: 150.00     Types: Cigarettes     Last attempt to quit: 11/4/2016     Years since quitting: 3.6   • Smokeless tobacco: Never Used   Substance Use Topics   • Alcohol use: Yes     Alcohol/week: 21.0 standard drinks     Types: 21 Cans of beer per week     Comment: 3 cans/daily   • Drug use: No       Allergies:  Allergies   Allergen Reactions   • No Known Drug Allergy        Medications:    Current Outpatient Medications:   •  gabapentin (NEURONTIN) 600 MG tablet, Take 1 tablet by mouth 3 (Three) Times a Day As Needed (moderate to severe pain). (Patient taking differently: Take 600 mg by mouth 3 (Three) Times a Day As Needed (moderate to severe pain). Has not started yet.), Disp: 30 tablet, Rfl: 0  •  omeprazole (PrilOSEC) 40 MG capsule, Take 1 capsule by mouth Daily., Disp: 30 capsule, Rfl: 5    OBJECTIVE:    Vital Signs:   Vitals:    06/23/20 1400   BP: 126/80   Pulse: (!) 49   Temp: 98.4 °F (36.9 °C)   SpO2: 98%   Weight: 77.8 kg (171 lb 8.3 oz)   Height: 182.9 cm (72.01\")       Physical Exam:   General Appearance:    Alert, cooperative, in no acute distress   Head:    Normocephalic, without obvious abnormality, atraumatic   Eyes:            Lids and lashes normal, conjunctivae and sclerae normal, no   icterus, no pallor, corneas clear, PERRLA   Ears:    Ears appear intact with no " abnormalities noted   Throat:   No oral lesions, no thrush, oral mucosa moist   Neck:   No adenopathy, supple, trachea midline, no thyromegaly, no   carotid bruit, no JVD   Lungs:     Clear to auscultation,respirations regular, even and                  unlabored    Heart:    Regular rhythm and normal rate, normal S1 and S2, no            murmur   Abdomen:     no masses, no organomegaly, soft non-tender, non-distended, no guarding, there is evidence of a incarcerated umbilical hernia with pain to palpation   Extremities:   Moves all extremities well, no edema, no cyanosis, no             redness   Pulses:   Pulses palpable and equal bilaterally   Skin:   No bleeding, bruising or rash   Lymph nodes:   No palpable adenopathy   Neurologic:   Cranial nerves 2 - 12 grossly intact, sensation intact        Results Review:   I reviewed the patient's new clinical results.  I reviewed the patient's new imaging results and agree with the interpretation.  I reviewed the patient's other test results and agree with the interpretation    Review of Systems was reviewed and confirmed as accurate as documented by the MA.    ASSESSMENT/PLAN:    1. Incisional hernia with obstruction but no gangrene        I had a detailed and extensive discussion with the patient in the office and they understand that they need to undergo hernia repair with mesh.  Full risks and benefits of operative versus nonoperative intervention were discussed with the patient and these included things such as nonresolution of symptoms and possible worsening of symptoms without surgical intervention versus infection, bleeding, possible recurrent hernia, possible postoperative neuralgia from nerve damage or involvement with scar tissue, etc.  The patient understands, agrees, and had no questions for me at the end of the office visit.     I discussed the patients findings and my recommendations with patient.    Electronically signed by Shady Singh  MD  06/23/20

## 2020-06-24 ENCOUNTER — LAB (OUTPATIENT)
Dept: LAB | Facility: HOSPITAL | Age: 65
End: 2020-06-24

## 2020-06-24 ENCOUNTER — OFFICE VISIT (OUTPATIENT)
Dept: PULMONOLOGY | Facility: CLINIC | Age: 65
End: 2020-06-24

## 2020-06-24 VITALS
WEIGHT: 172 LBS | HEIGHT: 72 IN | RESPIRATION RATE: 16 BRPM | OXYGEN SATURATION: 92 % | BODY MASS INDEX: 23.3 KG/M2 | DIASTOLIC BLOOD PRESSURE: 82 MMHG | SYSTOLIC BLOOD PRESSURE: 138 MMHG | HEART RATE: 81 BPM

## 2020-06-24 DIAGNOSIS — Z01.818 PRE-OP TESTING: ICD-10-CM

## 2020-06-24 DIAGNOSIS — R93.89 ABNORMAL CT OF THE CHEST: Primary | ICD-10-CM

## 2020-06-24 DIAGNOSIS — C34.91 NON-SMALL CELL CANCER OF RIGHT LUNG (HCC): ICD-10-CM

## 2020-06-24 LAB
REF LAB TEST METHOD: NORMAL
SARS-COV-2 RNA RESP QL NAA+PROBE: NOT DETECTED

## 2020-06-24 PROCEDURE — C9803 HOPD COVID-19 SPEC COLLECT: HCPCS

## 2020-06-24 PROCEDURE — U0002 COVID-19 LAB TEST NON-CDC: HCPCS

## 2020-06-24 PROCEDURE — U0004 COV-19 TEST NON-CDC HGH THRU: HCPCS

## 2020-06-24 PROCEDURE — 99214 OFFICE O/P EST MOD 30 MIN: CPT | Performed by: INTERNAL MEDICINE

## 2020-06-25 ENCOUNTER — OFFICE VISIT (OUTPATIENT)
Dept: INTERNAL MEDICINE | Facility: CLINIC | Age: 65
End: 2020-06-25

## 2020-06-25 VITALS
HEART RATE: 71 BPM | TEMPERATURE: 97.3 F | SYSTOLIC BLOOD PRESSURE: 150 MMHG | HEIGHT: 72 IN | WEIGHT: 171 LBS | BODY MASS INDEX: 23.16 KG/M2 | DIASTOLIC BLOOD PRESSURE: 82 MMHG | OXYGEN SATURATION: 94 %

## 2020-06-25 DIAGNOSIS — R10.13 EPIGASTRIC PAIN: ICD-10-CM

## 2020-06-25 DIAGNOSIS — J43.8 OTHER EMPHYSEMA (HCC): Primary | ICD-10-CM

## 2020-06-25 DIAGNOSIS — M15.9 PRIMARY OSTEOARTHRITIS INVOLVING MULTIPLE JOINTS: ICD-10-CM

## 2020-06-25 LAB
ALBUMIN SERPL-MCNC: 4.6 G/DL (ref 3.5–5.2)
ALBUMIN/GLOB SERPL: 1.4 G/DL
ALP SERPL-CCNC: 77 U/L (ref 39–117)
ALT SERPL-CCNC: 10 U/L (ref 1–41)
AST SERPL-CCNC: 9 U/L (ref 1–40)
BASOPHILS # BLD AUTO: 0.05 10*3/MM3 (ref 0–0.2)
BASOPHILS NFR BLD AUTO: 0.7 % (ref 0–1.5)
BILIRUB SERPL-MCNC: 0.5 MG/DL (ref 0.2–1.2)
BUN SERPL-MCNC: 16 MG/DL (ref 8–23)
BUN/CREAT SERPL: 16.3 (ref 7–25)
CALCIUM SERPL-MCNC: 9.9 MG/DL (ref 8.6–10.5)
CHLORIDE SERPL-SCNC: 99 MMOL/L (ref 98–107)
CO2 SERPL-SCNC: 29.2 MMOL/L (ref 22–29)
CREAT SERPL-MCNC: 0.98 MG/DL (ref 0.76–1.27)
EOSINOPHIL # BLD AUTO: 0.1 10*3/MM3 (ref 0–0.4)
EOSINOPHIL NFR BLD AUTO: 1.5 % (ref 0.3–6.2)
ERYTHROCYTE [DISTWIDTH] IN BLOOD BY AUTOMATED COUNT: 12.7 % (ref 12.3–15.4)
GLOBULIN SER CALC-MCNC: 3.3 GM/DL
GLUCOSE SERPL-MCNC: 96 MG/DL (ref 65–99)
HCT VFR BLD AUTO: 49.3 % (ref 37.5–51)
HGB BLD-MCNC: 17 G/DL (ref 13–17.7)
IMM GRANULOCYTES # BLD AUTO: 0.02 10*3/MM3 (ref 0–0.05)
IMM GRANULOCYTES NFR BLD AUTO: 0.3 % (ref 0–0.5)
LYMPHOCYTES # BLD AUTO: 1.39 10*3/MM3 (ref 0.7–3.1)
LYMPHOCYTES NFR BLD AUTO: 20.7 % (ref 19.6–45.3)
MCH RBC QN AUTO: 31.3 PG (ref 26.6–33)
MCHC RBC AUTO-ENTMCNC: 34.5 G/DL (ref 31.5–35.7)
MCV RBC AUTO: 90.8 FL (ref 79–97)
MONOCYTES # BLD AUTO: 0.47 10*3/MM3 (ref 0.1–0.9)
MONOCYTES NFR BLD AUTO: 7 % (ref 5–12)
NEUTROPHILS # BLD AUTO: 4.67 10*3/MM3 (ref 1.7–7)
NEUTROPHILS NFR BLD AUTO: 69.8 % (ref 42.7–76)
NRBC BLD AUTO-RTO: 0 /100 WBC (ref 0–0.2)
PLATELET # BLD AUTO: 209 10*3/MM3 (ref 140–450)
POTASSIUM SERPL-SCNC: 5.1 MMOL/L (ref 3.5–5.2)
PROT SERPL-MCNC: 7.9 G/DL (ref 6–8.5)
RBC # BLD AUTO: 5.43 10*6/MM3 (ref 4.14–5.8)
SODIUM SERPL-SCNC: 136 MMOL/L (ref 136–145)
WBC # BLD AUTO: 6.7 10*3/MM3 (ref 3.4–10.8)

## 2020-06-25 PROCEDURE — 99213 OFFICE O/P EST LOW 20 MIN: CPT | Performed by: INTERNAL MEDICINE

## 2020-06-25 RX ORDER — PROMETHAZINE HYDROCHLORIDE 12.5 MG/1
12.5 TABLET ORAL EVERY 8 HOURS PRN
Qty: 10 TABLET | Refills: 1 | Status: SHIPPED | OUTPATIENT
Start: 2020-06-25 | End: 2020-08-03

## 2020-06-25 NOTE — PROGRESS NOTES
"QUICK REFERENCE INFORMATION:  The ABCs of the Annual Wellness Visit    Initial Medicare Wellness Visit    HEALTH RISK ASSESSMENT    : 1955    Recent Hospitalizations:  {Hospitalization history:[id number]::\"No hospitalization(s) within the last year.\"}.  ccc      Current Medical Providers:  Patient Care Team:  Vermeesch, Marilyn K, MD as PCP - General (Internal Medicine)  Max Almonte MD as Consulting Physician (Cardiology)  Juan Alberto Carty MD as Surgeon (Cardiothoracic Surgery)  Shady Singh MD as Consulting Physician (General Surgery)  Dinh Nova MD as Consulting Physician (Orthopedic Surgery)        Smoking Status:  Social History     Tobacco Use   Smoking Status Former Smoker   • Packs/day: 3.00   • Years: 50.00   • Pack years: 150.00   • Types: Cigarettes   • Last attempt to quit: 2016   • Years since quitting: 3.6   Smokeless Tobacco Never Used       Alcohol Consumption:  Social History     Substance and Sexual Activity   Alcohol Use Yes   • Alcohol/week: 21.0 standard drinks   • Types: 21 Cans of beer per week    Comment: 3 cans/daily       Depression Screen:   PHQ-2/PHQ-9 Depression Screening 10/3/2019   Little interest or pleasure in doing things 0   Feeling down, depressed, or hopeless 0   Total Score 0       Health Habits and Functional and Cognitive Screening:  Functional & Cognitive Status 10/3/2019   Do you have difficulty preparing food and eating? No   Do you have difficulty bathing yourself, getting dressed or grooming yourself? No   Do you have difficulty using the toilet? No   Do you have difficulty moving around from place to place? No   Do you have trouble with steps or getting out of a bed or a chair? No   Current Diet Well Balanced Diet   Dental Exam Up to date   Eye Exam Up to date   Exercise (times per week) 7 times per week   Current Exercise Activities Include Walking   Do you need help using the phone?  No   Are you deaf or do you have serious difficulty " "hearing?  No   Do you need help with transportation? No   Do you need help shopping? No   Do you need help preparing meals?  No   Do you need help with housework?  No   Do you need help with laundry? No   Do you need help taking your medications? No   Do you need help managing money? No   Do you ever drive or ride in a car without wearing a seat belt? No   Have you felt unusual stress, anger or loneliness in the last month? No   Who do you live with? Spouse   If you need help, do you have trouble finding someone available to you? No   Have you been bothered in the last four weeks by sexual problems? No   Do you have difficulty concentrating, remembering or making decisions? No           Does the patient have evidence of cognitive impairment? {Yes/No w/ pre-defaulted No:86209::\"No\"}    Asiprin use counseling: {Aspirin :41273}      Recent Lab Results:    Lab Results   Component Value Date     (H) 07/11/2017     Lab Results   Component Value Date    HGBA1C 5.50 03/12/2020     Lab Results   Component Value Date    TRIG 55 09/14/2018    HDL 57 09/14/2018    VLDL 11 09/14/2018           Age-appropriate Screening Schedule:  Refer to the list below for future screening recommendations based on patient's age, sex and/or medical conditions. Orders for these recommended tests are listed in the plan section. The patient has been provided with a written plan.    Health Maintenance   Topic Date Due   • ZOSTER VACCINE (2 of 3) 09/05/2017   • INFLUENZA VACCINE  08/01/2020   • COLONOSCOPY  06/19/2023   • TDAP/TD VACCINES (2 - Td) 01/26/2027        Subjective   History of Present Illness    Saman Aguayo is a 65 y.o. male who presents for an Annual Wellness Visit.    The following portions of the patient's history were reviewed and updated as appropriate: {history reviewed:20406::\"allergies\",\"current medications\",\"past family history\",\"past medical history\",\"past social history\",\"past surgical history\",\"problem " "list\"}.    Outpatient Medications Prior to Visit   Medication Sig Dispense Refill   • omeprazole (PrilOSEC) 40 MG capsule Take 1 capsule by mouth Daily. 30 capsule 5   • gabapentin (NEURONTIN) 600 MG tablet Take 1 tablet by mouth 3 (Three) Times a Day As Needed (moderate to severe pain). (Patient taking differently: Take 600 mg by mouth 3 (Three) Times a Day As Needed (moderate to severe pain). Has not started yet.) 30 tablet 0     No facility-administered medications prior to visit.        Patient Active Problem List   Diagnosis   • Osteoarthritis   • History of tobacco use   • Pulmonary hypertension    • Lung cancer, middle lobe s/p right VATS with lobectomy   • COPD   • Right upper lobe pulmonary nodule   • Valvular heart disease   • HFrEF (EF 45%)   • Right upper quadrant abdominal pain   • Epigastric pain   • History of colon polyps       Advance Care Planning:  {Advance Directive Status:96189}    Identification of Risk Factors:  Risk factors include: {; WELLNESS RISK FACTORS:82513}.    Review of Systems    Compared to one year ago, the patient feels his physical health is {better worse same:09376}.  Compared to one year ago, the patient feels his mental health is {better worse same:36318}.    Objective     Physical Exam    Vitals:    06/25/20 0847   BP: 150/82   Pulse: 71   Temp: 97.3 °F (36.3 °C)   SpO2: 94%   Weight: 77.6 kg (171 lb)   Height: 182.9 cm (72\")       Patient's Body mass index is 23.19 kg/m². BMI is {BMI range:08290}.      Assessment/Plan   Patient Self-Management and Personalized Health Advice  The patient has been provided with information about: {; PERSONALIZED HEALTH ADVICE:52052} and preventive services including:   · {plan:57571}.    Visit Diagnoses:  No diagnosis found.    No orders of the defined types were placed in this encounter.      Outpatient Encounter Medications as of 6/25/2020   Medication Sig Dispense Refill   • omeprazole (PrilOSEC) 40 MG capsule Take 1 capsule by mouth " Daily. 30 capsule 5   • [DISCONTINUED] gabapentin (NEURONTIN) 600 MG tablet Take 1 tablet by mouth 3 (Three) Times a Day As Needed (moderate to severe pain). (Patient taking differently: Take 600 mg by mouth 3 (Three) Times a Day As Needed (moderate to severe pain). Has not started yet.) 30 tablet 0     No facility-administered encounter medications on file as of 6/25/2020.        Reviewed use of high risk medication in the elderly: {Response; yes/no/na:63}  Reviewed for potential of harmful drug interactions in the elderly: {Response; yes/no/na:63}    Follow Up:  No follow-ups on file.     An After Visit Summary and PPPS with all of these plans were given to the patient.

## 2020-06-25 NOTE — PROGRESS NOTES
"Chief Complaint   Patient presents with   • Follow-up     for COPD and HTN     Subjective   Saman Aguayo is a 65 y.o. male.     Here today for follow up of COPD, lung cancer status post RML lobectomy, pulmonary hypertension, bradycardia, DJD.  He has been having a lot of RUQ pain for several months and is seeing Dr Singh for this.  He is having surgery for a possible hernia.  His GB workup has been negative.  There is a strong FH of GB disease. He does have NV when he has pain.    His BP is elevated today, he is in pain.  If he lays down, he has less pain.    His breathing has been good unless he is in a hot shower or if he talks a lot.  He uses oxygen after showering at times.    He has omeprazole and uses it only as needed, has no GERD sxs.    No significant DJD pain.           The following portions of the patient's history were reviewed and updated as appropriate: allergies, current medications, past family history, past medical history, past social history, past surgical history and problem list.    Review of Systems   Constitutional: Negative for chills, fever and unexpected weight change.   HENT: Negative.    Eyes: Negative.    Respiratory: Positive for shortness of breath. Negative for cough.    Cardiovascular: Negative.    Gastrointestinal: Positive for abdominal pain and nausea.   Endocrine: Negative.    Genitourinary: Negative.    Musculoskeletal: Positive for arthralgias.   Skin: Negative.    Allergic/Immunologic: Negative.    Neurological: Positive for weakness.   Psychiatric/Behavioral: Positive for dysphoric mood. The patient is nervous/anxious.        Objective   /82   Pulse 71   Temp 97.3 °F (36.3 °C)   Ht 182.9 cm (72\")   Wt 77.6 kg (171 lb)   SpO2 94%   BMI 23.19 kg/m²   Body mass index is 23.19 kg/m².  Physical Exam   Constitutional: He is oriented to person, place, and time. He appears well-developed and well-nourished. No distress.   Very kind and pleasant man, he appears to be in " pain at times in right upper quadrant   HENT:   Head: Normocephalic and atraumatic.   Right Ear: External ear normal.   Left Ear: External ear normal.   Eyes: Pupils are equal, round, and reactive to light. Conjunctivae and EOM are normal.   Neck: Normal range of motion. Neck supple. No thyromegaly present.   Cardiovascular: Normal rate, regular rhythm, normal heart sounds and intact distal pulses.   No murmur heard.  No carotid bruits   Pulmonary/Chest: Effort normal. No respiratory distress. He has no wheezes.   Decreased breath sounds throughout right lung field   Abdominal: Soft. Bowel sounds are normal. He exhibits no distension and no mass. There is tenderness. There is guarding.   Exquisite tenderness to palpation throughout right upper quadrant and midepigastric area   Musculoskeletal: Normal range of motion. He exhibits no edema.   Lymphadenopathy:     He has no cervical adenopathy.   Neurological: He is alert and oriented to person, place, and time. No cranial nerve deficit. Coordination normal.   Psychiatric: He has a normal mood and affect. His behavior is normal. Judgment and thought content normal.   Patient appears somewhat anxious throughout our interview today   Nursing note and vitals reviewed.      Assessment/Plan   Saman Aguayo is here today and the following problems have been addressed:      Jack was seen today for follow-up.    Diagnoses and all orders for this visit:    Other emphysema (CMS/HCC)    Epigastric pain    Primary osteoarthritis involving multiple joints    Other orders  -     promethazine (PHENERGAN) 12.5 MG tablet; Take 1 tablet by mouth Every 8 (Eight) Hours As Needed for Nausea or Vomiting.      Continue PRN omeprazole for epigastric pain  He was given Phenergan to take as needed for right upper quadrant pain in case this is gallbladder spasm, he may also use this for nausea if needed  Patient states he will not likely use Phenergan until after surgery.  He will see if pain  continues after surgery for his hernia.  He is uncertain if this is hernia pain or not, if there is pain after surgery he will try Phenergan to see if it is helpful for possible gallbladder spasms and if it is helpful he will discuss this with surgeon to see if gallbladder possibly needs to be removed  Continue oxygen as needed for underlying COPD/status post lung cancer    Return in about 4 months (around 10/25/2020) for Medicare Wellness.      Marilyn K. Vermeesch, MD      Please note that portions of this note were completed with a voice recognition program.  Efforts were made to edit dictation, but occasionally words are mistranscribed.

## 2020-06-26 ENCOUNTER — OUTSIDE FACILITY SERVICE (OUTPATIENT)
Dept: SURGERY | Facility: CLINIC | Age: 65
End: 2020-06-26

## 2020-06-26 PROCEDURE — 49561 PR REPAIR INCISIONAL HERNIA,STRANG: CPT | Performed by: SURGERY

## 2020-06-26 PROCEDURE — 49568 PR IMPLANT MESH HERNIA REPAIR/DEBRIDEMENT CLOSURE: CPT | Performed by: SURGERY

## 2020-06-29 ENCOUNTER — APPOINTMENT (OUTPATIENT)
Dept: GENERAL RADIOLOGY | Facility: HOSPITAL | Age: 65
End: 2020-06-29

## 2020-06-29 ENCOUNTER — HOSPITAL ENCOUNTER (EMERGENCY)
Facility: HOSPITAL | Age: 65
Discharge: HOME | End: 2020-06-29
Attending: EMERGENCY MEDICINE | Admitting: SURGERY

## 2020-06-29 ENCOUNTER — ANESTHESIA (OUTPATIENT)
Dept: PERIOP | Facility: HOSPITAL | Age: 65
End: 2020-06-29

## 2020-06-29 ENCOUNTER — ANESTHESIA EVENT (OUTPATIENT)
Dept: PERIOP | Facility: HOSPITAL | Age: 65
End: 2020-06-29

## 2020-06-29 ENCOUNTER — OFFICE VISIT (OUTPATIENT)
Dept: SURGERY | Facility: CLINIC | Age: 65
End: 2020-06-29

## 2020-06-29 ENCOUNTER — APPOINTMENT (OUTPATIENT)
Dept: CT IMAGING | Facility: HOSPITAL | Age: 65
End: 2020-06-29

## 2020-06-29 VITALS
DIASTOLIC BLOOD PRESSURE: 72 MMHG | BODY MASS INDEX: 22.97 KG/M2 | OXYGEN SATURATION: 92 % | TEMPERATURE: 97.7 F | WEIGHT: 169.6 LBS | HEART RATE: 91 BPM | SYSTOLIC BLOOD PRESSURE: 124 MMHG | HEIGHT: 72 IN | RESPIRATION RATE: 18 BRPM

## 2020-06-29 VITALS
SYSTOLIC BLOOD PRESSURE: 120 MMHG | OXYGEN SATURATION: 94 % | BODY MASS INDEX: 23.27 KG/M2 | DIASTOLIC BLOOD PRESSURE: 80 MMHG | HEIGHT: 72 IN | TEMPERATURE: 97.9 F | HEART RATE: 107 BPM | WEIGHT: 171.8 LBS

## 2020-06-29 DIAGNOSIS — R10.11 RIGHT UPPER QUADRANT ABDOMINAL PAIN: ICD-10-CM

## 2020-06-29 DIAGNOSIS — R10.31 RIGHT LOWER QUADRANT ABDOMINAL PAIN: ICD-10-CM

## 2020-06-29 DIAGNOSIS — R10.13 EPIGASTRIC PAIN: ICD-10-CM

## 2020-06-29 DIAGNOSIS — R10.9 ABDOMINAL PAIN: ICD-10-CM

## 2020-06-29 DIAGNOSIS — R10.84 GENERALIZED ABDOMINAL PAIN: Primary | ICD-10-CM

## 2020-06-29 LAB
ALBUMIN SERPL-MCNC: 4 G/DL (ref 3.5–5.2)
ALBUMIN/GLOB SERPL: 1.1 G/DL
ALP SERPL-CCNC: 83 U/L (ref 39–117)
ALT SERPL W P-5'-P-CCNC: 14 U/L (ref 1–41)
ANION GAP SERPL CALCULATED.3IONS-SCNC: 10.4 MMOL/L (ref 5–15)
AST SERPL-CCNC: 15 U/L (ref 1–40)
BASOPHILS # BLD AUTO: 0.05 10*3/MM3 (ref 0–0.2)
BASOPHILS NFR BLD AUTO: 0.3 % (ref 0–1.5)
BILIRUB SERPL-MCNC: 0.7 MG/DL (ref 0.2–1.2)
BILIRUB UR QL STRIP: NEGATIVE
BUN BLD-MCNC: 16 MG/DL (ref 8–23)
BUN/CREAT SERPL: 16.5 (ref 7–25)
CALCIUM SPEC-SCNC: 9.7 MG/DL (ref 8.6–10.5)
CHLORIDE SERPL-SCNC: 95 MMOL/L (ref 98–107)
CLARITY UR: CLEAR
CO2 SERPL-SCNC: 28.6 MMOL/L (ref 22–29)
COLOR UR: YELLOW
CREAT BLD-MCNC: 0.97 MG/DL (ref 0.76–1.27)
DEPRECATED RDW RBC AUTO: 42.2 FL (ref 37–54)
EOSINOPHIL # BLD AUTO: 0.01 10*3/MM3 (ref 0–0.4)
EOSINOPHIL NFR BLD AUTO: 0.1 % (ref 0.3–6.2)
ERYTHROCYTE [DISTWIDTH] IN BLOOD BY AUTOMATED COUNT: 12.7 % (ref 12.3–15.4)
GFR SERPL CREATININE-BSD FRML MDRD: 77 ML/MIN/1.73
GLOBULIN UR ELPH-MCNC: 3.8 GM/DL
GLUCOSE BLD-MCNC: 141 MG/DL (ref 65–99)
GLUCOSE UR STRIP-MCNC: NEGATIVE MG/DL
HCT VFR BLD AUTO: 49.7 % (ref 37.5–51)
HGB BLD-MCNC: 17.3 G/DL (ref 13–17.7)
HGB UR QL STRIP.AUTO: NEGATIVE
IMM GRANULOCYTES # BLD AUTO: 0.06 10*3/MM3 (ref 0–0.05)
IMM GRANULOCYTES NFR BLD AUTO: 0.4 % (ref 0–0.5)
KETONES UR QL STRIP: NEGATIVE
LEUKOCYTE ESTERASE UR QL STRIP.AUTO: NEGATIVE
LIPASE SERPL-CCNC: 12 U/L (ref 13–60)
LYMPHOCYTES # BLD AUTO: 0.87 10*3/MM3 (ref 0.7–3.1)
LYMPHOCYTES NFR BLD AUTO: 5.7 % (ref 19.6–45.3)
MCH RBC QN AUTO: 31.5 PG (ref 26.6–33)
MCHC RBC AUTO-ENTMCNC: 34.8 G/DL (ref 31.5–35.7)
MCV RBC AUTO: 90.4 FL (ref 79–97)
MONOCYTES # BLD AUTO: 1.02 10*3/MM3 (ref 0.1–0.9)
MONOCYTES NFR BLD AUTO: 6.6 % (ref 5–12)
NEUTROPHILS # BLD AUTO: 13.37 10*3/MM3 (ref 1.7–7)
NEUTROPHILS NFR BLD AUTO: 86.9 % (ref 42.7–76)
NITRITE UR QL STRIP: NEGATIVE
NRBC BLD AUTO-RTO: 0 /100 WBC (ref 0–0.2)
PH UR STRIP.AUTO: <=5 [PH] (ref 5–8)
PLATELET # BLD AUTO: 191 10*3/MM3 (ref 140–450)
PMV BLD AUTO: 10.8 FL (ref 6–12)
POTASSIUM BLD-SCNC: 4.6 MMOL/L (ref 3.5–5.2)
PROT SERPL-MCNC: 7.8 G/DL (ref 6–8.5)
PROT UR QL STRIP: ABNORMAL
RBC # BLD AUTO: 5.5 10*6/MM3 (ref 4.14–5.8)
SARS-COV-2 RNA PNL SPEC NAA+PROBE: NOT DETECTED
SODIUM BLD-SCNC: 134 MMOL/L (ref 136–145)
SP GR UR STRIP: >1.03 (ref 1–1.03)
UROBILINOGEN UR QL STRIP: ABNORMAL
WBC NRBC COR # BLD: 15.38 10*3/MM3 (ref 3.4–10.8)

## 2020-06-29 PROCEDURE — 96374 THER/PROPH/DIAG INJ IV PUSH: CPT

## 2020-06-29 PROCEDURE — 25010000002 MORPHINE SULFATE (PF) 2 MG/ML SOLUTION: Performed by: EMERGENCY MEDICINE

## 2020-06-29 PROCEDURE — 44955 APPENDECTOMY ADD-ON: CPT | Performed by: SURGERY

## 2020-06-29 PROCEDURE — 25010000002 FENTANYL CITRATE (PF) 100 MCG/2ML SOLUTION: Performed by: NURSE ANESTHETIST, CERTIFIED REGISTERED

## 2020-06-29 PROCEDURE — 47563 LAPARO CHOLECYSTECTOMY/GRAPH: CPT | Performed by: SURGERY

## 2020-06-29 PROCEDURE — S0260 H&P FOR SURGERY: HCPCS | Performed by: SURGERY

## 2020-06-29 PROCEDURE — 85025 COMPLETE CBC W/AUTO DIFF WBC: CPT | Performed by: PHYSICIAN ASSISTANT

## 2020-06-29 PROCEDURE — 96375 TX/PRO/DX INJ NEW DRUG ADDON: CPT

## 2020-06-29 PROCEDURE — 71046 X-RAY EXAM CHEST 2 VIEWS: CPT

## 2020-06-29 PROCEDURE — 25010000003 AMPICILLIN-SULBACTAM PER 1.5 G: Performed by: PHYSICIAN ASSISTANT

## 2020-06-29 PROCEDURE — 99213 OFFICE O/P EST LOW 20 MIN: CPT | Performed by: SURGERY

## 2020-06-29 PROCEDURE — 25010000002 IOPAMIDOL 61 % SOLUTION: Performed by: EMERGENCY MEDICINE

## 2020-06-29 PROCEDURE — 87635 SARS-COV-2 COVID-19 AMP PRB: CPT | Performed by: PHYSICIAN ASSISTANT

## 2020-06-29 PROCEDURE — 83690 ASSAY OF LIPASE: CPT | Performed by: PHYSICIAN ASSISTANT

## 2020-06-29 PROCEDURE — 25010000002 HYDROMORPHONE 1 MG/ML SOLUTION

## 2020-06-29 PROCEDURE — 25010000002 PROPOFOL 200 MG/20ML EMULSION: Performed by: NURSE ANESTHETIST, CERTIFIED REGISTERED

## 2020-06-29 PROCEDURE — 25010000002 IOPAMIDOL 61 % SOLUTION: Performed by: SURGERY

## 2020-06-29 PROCEDURE — 93005 ELECTROCARDIOGRAM TRACING: CPT | Performed by: PHYSICIAN ASSISTANT

## 2020-06-29 PROCEDURE — 25010000002 DEXAMETHASONE PER 1 MG: Performed by: NURSE ANESTHETIST, CERTIFIED REGISTERED

## 2020-06-29 PROCEDURE — 88304 TISSUE EXAM BY PATHOLOGIST: CPT | Performed by: SURGERY

## 2020-06-29 PROCEDURE — 80053 COMPREHEN METABOLIC PANEL: CPT | Performed by: PHYSICIAN ASSISTANT

## 2020-06-29 PROCEDURE — 74177 CT ABD & PELVIS W/CONTRAST: CPT

## 2020-06-29 PROCEDURE — 81003 URINALYSIS AUTO W/O SCOPE: CPT | Performed by: PHYSICIAN ASSISTANT

## 2020-06-29 PROCEDURE — 25010000002 ONDANSETRON PER 1 MG: Performed by: PHYSICIAN ASSISTANT

## 2020-06-29 PROCEDURE — 25010000002 ONDANSETRON PER 1 MG: Performed by: NURSE ANESTHETIST, CERTIFIED REGISTERED

## 2020-06-29 PROCEDURE — 74300 X-RAY BILE DUCTS/PANCREAS: CPT

## 2020-06-29 PROCEDURE — 99284 EMERGENCY DEPT VISIT MOD MDM: CPT

## 2020-06-29 DEVICE — ENDOPATH ETS45 2.5MM RELOADS (VASCULAR/THIN)
Type: IMPLANTABLE DEVICE | Site: ABDOMEN | Status: FUNCTIONAL
Brand: ENDOPATH

## 2020-06-29 DEVICE — LIGACLIP 10-M/L, 10MM ENDOSCOPIC ROTATING MULTIPLE CLIP APPLIERS
Type: IMPLANTABLE DEVICE | Site: ABDOMEN | Status: FUNCTIONAL
Brand: LIGACLIP

## 2020-06-29 RX ORDER — ONDANSETRON 2 MG/ML
INJECTION INTRAMUSCULAR; INTRAVENOUS AS NEEDED
Status: DISCONTINUED | OUTPATIENT
Start: 2020-06-29 | End: 2020-06-29 | Stop reason: SURG

## 2020-06-29 RX ORDER — SODIUM CHLORIDE, SODIUM LACTATE, POTASSIUM CHLORIDE, CALCIUM CHLORIDE 600; 310; 30; 20 MG/100ML; MG/100ML; MG/100ML; MG/100ML
1000 INJECTION, SOLUTION INTRAVENOUS CONTINUOUS
Status: DISCONTINUED | OUTPATIENT
Start: 2020-06-29 | End: 2020-06-29 | Stop reason: HOSPADM

## 2020-06-29 RX ORDER — SODIUM CHLORIDE 0.9 % (FLUSH) 0.9 %
10 SYRINGE (ML) INJECTION AS NEEDED
Status: DISCONTINUED | OUTPATIENT
Start: 2020-06-29 | End: 2020-06-29 | Stop reason: HOSPADM

## 2020-06-29 RX ORDER — KETAMINE HCL IN NACL, ISO-OSM 100MG/10ML
SYRINGE (ML) INJECTION AS NEEDED
Status: DISCONTINUED | OUTPATIENT
Start: 2020-06-29 | End: 2020-06-29 | Stop reason: SURG

## 2020-06-29 RX ORDER — ONDANSETRON 2 MG/ML
4 INJECTION INTRAMUSCULAR; INTRAVENOUS ONCE AS NEEDED
Status: DISCONTINUED | OUTPATIENT
Start: 2020-06-29 | End: 2020-06-29 | Stop reason: HOSPADM

## 2020-06-29 RX ORDER — ONDANSETRON 2 MG/ML
4 INJECTION INTRAMUSCULAR; INTRAVENOUS ONCE
Status: COMPLETED | OUTPATIENT
Start: 2020-06-29 | End: 2020-06-29

## 2020-06-29 RX ORDER — AMOXICILLIN AND CLAVULANATE POTASSIUM 875; 125 MG/1; MG/1
1 TABLET, FILM COATED ORAL 2 TIMES DAILY
Qty: 10 TABLET | Refills: 0 | Status: SHIPPED | OUTPATIENT
Start: 2020-06-29 | End: 2020-07-04

## 2020-06-29 RX ORDER — IBUPROFEN 600 MG/1
600 TABLET ORAL EVERY 6 HOURS PRN
Status: DISCONTINUED | OUTPATIENT
Start: 2020-06-29 | End: 2020-06-29 | Stop reason: HOSPADM

## 2020-06-29 RX ORDER — BUPIVACAINE HYDROCHLORIDE 5 MG/ML
INJECTION, SOLUTION EPIDURAL; INTRACAUDAL
Status: COMPLETED | OUTPATIENT
Start: 2020-06-29 | End: 2020-06-29

## 2020-06-29 RX ORDER — MORPHINE SULFATE 2 MG/ML
2 INJECTION, SOLUTION INTRAMUSCULAR; INTRAVENOUS ONCE
Status: COMPLETED | OUTPATIENT
Start: 2020-06-29 | End: 2020-06-29

## 2020-06-29 RX ORDER — DEXAMETHASONE SODIUM PHOSPHATE 4 MG/ML
INJECTION, SOLUTION INTRA-ARTICULAR; INTRALESIONAL; INTRAMUSCULAR; INTRAVENOUS; SOFT TISSUE AS NEEDED
Status: DISCONTINUED | OUTPATIENT
Start: 2020-06-29 | End: 2020-06-29 | Stop reason: SURG

## 2020-06-29 RX ORDER — GLYCOPYRROLATE 0.2 MG/ML
INJECTION INTRAMUSCULAR; INTRAVENOUS AS NEEDED
Status: DISCONTINUED | OUTPATIENT
Start: 2020-06-29 | End: 2020-06-29 | Stop reason: SURG

## 2020-06-29 RX ORDER — ESMOLOL HYDROCHLORIDE 10 MG/ML
INJECTION INTRAVENOUS AS NEEDED
Status: DISCONTINUED | OUTPATIENT
Start: 2020-06-29 | End: 2020-06-29 | Stop reason: SURG

## 2020-06-29 RX ORDER — ROCURONIUM BROMIDE 10 MG/ML
INJECTION, SOLUTION INTRAVENOUS AS NEEDED
Status: DISCONTINUED | OUTPATIENT
Start: 2020-06-29 | End: 2020-06-29 | Stop reason: SURG

## 2020-06-29 RX ORDER — NEOSTIGMINE METHYLSULFATE 5 MG/5 ML
SYRINGE (ML) INTRAVENOUS AS NEEDED
Status: DISCONTINUED | OUTPATIENT
Start: 2020-06-29 | End: 2020-06-29 | Stop reason: SURG

## 2020-06-29 RX ORDER — LIDOCAINE HYDROCHLORIDE 20 MG/ML
INJECTION, SOLUTION INTRAVENOUS AS NEEDED
Status: DISCONTINUED | OUTPATIENT
Start: 2020-06-29 | End: 2020-06-29 | Stop reason: SURG

## 2020-06-29 RX ORDER — PROPOFOL 10 MG/ML
INJECTION, EMULSION INTRAVENOUS AS NEEDED
Status: DISCONTINUED | OUTPATIENT
Start: 2020-06-29 | End: 2020-06-29 | Stop reason: SURG

## 2020-06-29 RX ORDER — FENTANYL CITRATE 50 UG/ML
INJECTION, SOLUTION INTRAMUSCULAR; INTRAVENOUS AS NEEDED
Status: DISCONTINUED | OUTPATIENT
Start: 2020-06-29 | End: 2020-06-29 | Stop reason: SURG

## 2020-06-29 RX ORDER — BUPIVACAINE HCL/0.9 % NACL/PF 0.125 %
PLASTIC BAG, INJECTION (ML) EPIDURAL AS NEEDED
Status: DISCONTINUED | OUTPATIENT
Start: 2020-06-29 | End: 2020-06-29 | Stop reason: SURG

## 2020-06-29 RX ORDER — MEPERIDINE HYDROCHLORIDE 25 MG/ML
50 INJECTION INTRAMUSCULAR; INTRAVENOUS; SUBCUTANEOUS
Status: DISCONTINUED | OUTPATIENT
Start: 2020-06-29 | End: 2020-06-29 | Stop reason: HOSPADM

## 2020-06-29 RX ORDER — ONDANSETRON 4 MG/1
4 TABLET, FILM COATED ORAL ONCE AS NEEDED
Status: DISCONTINUED | OUTPATIENT
Start: 2020-06-29 | End: 2020-06-29 | Stop reason: HOSPADM

## 2020-06-29 RX ORDER — PROMETHAZINE HYDROCHLORIDE 25 MG/ML
12.5 INJECTION, SOLUTION INTRAMUSCULAR; INTRAVENOUS ONCE AS NEEDED
Status: DISCONTINUED | OUTPATIENT
Start: 2020-06-29 | End: 2020-06-29 | Stop reason: HOSPADM

## 2020-06-29 RX ORDER — MAGNESIUM HYDROXIDE 1200 MG/15ML
LIQUID ORAL AS NEEDED
Status: DISCONTINUED | OUTPATIENT
Start: 2020-06-29 | End: 2020-06-29 | Stop reason: HOSPADM

## 2020-06-29 RX ORDER — HYDROCODONE BITARTRATE AND ACETAMINOPHEN 7.5; 325 MG/1; MG/1
1 TABLET ORAL EVERY 6 HOURS PRN
Qty: 15 TABLET | Refills: 0 | Status: SHIPPED | OUTPATIENT
Start: 2020-06-29 | End: 2020-08-03

## 2020-06-29 RX ADMIN — EPHEDRINE SULFATE 10 MG: 50 INJECTION INTRAMUSCULAR; INTRAVENOUS; SUBCUTANEOUS at 14:35

## 2020-06-29 RX ADMIN — GLYCOPYRROLATE 0.2 MG: 0.2 INJECTION, SOLUTION INTRAMUSCULAR; INTRAVENOUS at 16:09

## 2020-06-29 RX ADMIN — SODIUM CHLORIDE, POTASSIUM CHLORIDE, SODIUM LACTATE AND CALCIUM CHLORIDE 1000 ML: 600; 310; 30; 20 INJECTION, SOLUTION INTRAVENOUS at 14:05

## 2020-06-29 RX ADMIN — MORPHINE SULFATE 2 MG: 2 INJECTION, SOLUTION INTRAMUSCULAR; INTRAVENOUS at 11:32

## 2020-06-29 RX ADMIN — SODIUM CHLORIDE, POTASSIUM CHLORIDE, SODIUM LACTATE AND CALCIUM CHLORIDE: 600; 310; 30; 20 INJECTION, SOLUTION INTRAVENOUS at 15:40

## 2020-06-29 RX ADMIN — Medication 50 MG: at 14:31

## 2020-06-29 RX ADMIN — Medication 200 MCG: at 14:34

## 2020-06-29 RX ADMIN — ONDANSETRON 4 MG: 2 INJECTION INTRAMUSCULAR; INTRAVENOUS at 10:27

## 2020-06-29 RX ADMIN — FENTANYL CITRATE 50 MCG: 50 INJECTION INTRAMUSCULAR; INTRAVENOUS at 14:31

## 2020-06-29 RX ADMIN — PROPOFOL 50 MG: 10 INJECTION, EMULSION INTRAVENOUS at 14:50

## 2020-06-29 RX ADMIN — HYDROMORPHONE HYDROCHLORIDE 0.5 MG: 1 INJECTION, SOLUTION INTRAMUSCULAR; INTRAVENOUS; SUBCUTANEOUS at 16:58

## 2020-06-29 RX ADMIN — ESMOLOL HYDROCHLORIDE 30 MG: 10 INJECTION, SOLUTION INTRAVENOUS at 14:55

## 2020-06-29 RX ADMIN — Medication 200 MCG: at 14:36

## 2020-06-29 RX ADMIN — SODIUM CHLORIDE 500 ML: 9 INJECTION, SOLUTION INTRAVENOUS at 10:26

## 2020-06-29 RX ADMIN — DEXAMETHASONE SODIUM PHOSPHATE 10 MG: 4 INJECTION, SOLUTION INTRAMUSCULAR; INTRAVENOUS at 14:31

## 2020-06-29 RX ADMIN — IOPAMIDOL 100 ML: 612 INJECTION, SOLUTION INTRAVENOUS at 11:41

## 2020-06-29 RX ADMIN — LIDOCAINE HYDROCHLORIDE 100 MG: 20 INJECTION, SOLUTION INTRAVENOUS at 14:31

## 2020-06-29 RX ADMIN — BUPIVACAINE HYDROCHLORIDE 30 ML: 5 INJECTION, SOLUTION EPIDURAL; INTRACAUDAL; PERINEURAL at 16:30

## 2020-06-29 RX ADMIN — Medication 2 MG: at 16:12

## 2020-06-29 RX ADMIN — ROCURONIUM BROMIDE 20 MG: 10 INJECTION INTRAVENOUS at 14:57

## 2020-06-29 RX ADMIN — PROPOFOL 150 MG: 10 INJECTION, EMULSION INTRAVENOUS at 14:31

## 2020-06-29 RX ADMIN — FENTANYL CITRATE 50 MCG: 50 INJECTION INTRAMUSCULAR; INTRAVENOUS at 14:50

## 2020-06-29 RX ADMIN — GLYCOPYRROLATE 0.4 MG: 0.2 INJECTION, SOLUTION INTRAMUSCULAR; INTRAVENOUS at 14:34

## 2020-06-29 RX ADMIN — SODIUM CHLORIDE 3 G: 900 INJECTION INTRAVENOUS at 13:17

## 2020-06-29 RX ADMIN — Medication 0.5 MG: at 16:58

## 2020-06-29 RX ADMIN — ROCURONIUM BROMIDE 30 MG: 10 INJECTION INTRAVENOUS at 14:31

## 2020-06-29 RX ADMIN — ONDANSETRON 4 MG: 2 INJECTION INTRAMUSCULAR; INTRAVENOUS at 14:31

## 2020-06-29 NOTE — ANESTHESIA POSTPROCEDURE EVALUATION
Patient: Saman Aguayo    Procedure Summary     Date:  06/29/20 Room / Location:  [facility] OR  /  [facility] OR    Anesthesia Start:  1424 Anesthesia Stop:  1640    Procedure:  DIAGNOSTIC LAPAROSCOPY, APPENDECTOMY, CHOLECYSTECOMY WITH CHOLANGIOGRAPHY (N/A Abdomen) Diagnosis:      Surgeon:  Shady Singh MD Provider:  Antonio Morrow CRNA    Anesthesia Type:  general ASA Status:  4          Anesthesia Type: general    Vitals  Vitals Value Taken Time   /78 6/29/2020  4:59 PM   Temp 98.6 °F (37 °C) 6/29/2020  4:44 PM   Pulse 83 6/29/2020  5:00 PM   Resp 22 6/29/2020  4:50 PM   SpO2 95 % 6/29/2020  5:00 PM   Vitals shown include unvalidated device data.          Post Anesthesia Care and Evaluation    Patient location during evaluation: PACU  Patient participation: complete - patient participated  Level of consciousness: awake  Pain score: 3  Pain management: adequate  Airway patency: patent  Anesthetic complications: No anesthetic complications  PONV Status: controlled  Cardiovascular status: acceptable and stable  Respiratory status: acceptable and face mask  Hydration status: acceptable

## 2020-06-29 NOTE — ANESTHESIA PREPROCEDURE EVALUATION
Anesthesia Evaluation     Patient summary reviewed and Nursing notes reviewed   no history of anesthetic complications:  NPO Solid Status: > 8 hours  NPO Liquid Status: > 8 hours           Airway   Mallampati: II  TM distance: >3 FB  Neck ROM: full  no difficulty expected  Dental - normal exam     Pulmonary - normal exam   (+) a smoker Former, lung cancer, COPD moderate,   Cardiovascular - negative cardio ROS and normal exam    ECG reviewed  Rhythm: regular  Rate: normal        Neuro/Psych- negative ROS  GI/Hepatic/Renal/Endo - negative ROS     Musculoskeletal     Abdominal    Substance History - negative use     OB/GYN negative ob/gyn ROS         Other   arthritis,    history of cancer active    ROS/Med Hx Other: Lung cancer                  Anesthesia Plan    ASA 4     general   (Risks and benefits discussed including risk of aspiration, recall and dental damage. All patient questions answered.    Patient told that a breathing tube will be used to manage the airway.    Will continue with plan of care.)  intravenous induction     Anesthetic plan, all risks, benefits, and alternatives have been provided, discussed and informed consent has been obtained with: patient.

## 2020-06-29 NOTE — ANESTHESIA PROCEDURE NOTES
Airway  Urgency: elective    Date/Time: 6/29/2020 2:32 PM  Airway not difficult    General Information and Staff    Patient location during procedure: OR  CRNA: Antonio Morrow CRNA    Indications and Patient Condition  Indications for airway management: airway protection    Preoxygenated: yes  Mask difficulty assessment: 1 - vent by mask    Final Airway Details  Final airway type: endotracheal airway      Successful airway: ETT  Cuffed: yes   Successful intubation technique: direct laryngoscopy  Facilitating devices/methods: intubating stylet  Endotracheal tube insertion site: oral  Blade: Baljeet  Blade size: 4  ETT size (mm): 7.5  Cormack-Lehane Classification: grade I - full view of glottis  Placement verified by: chest auscultation and capnometry   Measured from: lips  ETT/EBT  to lips (cm): 21  Number of attempts at approach: 1    Additional Comments  Dentition and Lips as preoperative assessment. Airway placed without complication. ETT cuff inflated to minimal occlusive pressure.

## 2020-06-29 NOTE — ANESTHESIA PROCEDURE NOTES
Peripheral Block-Bilateral TAPS      Patient reassessed immediately prior to procedure    Start time: 6/29/2020 4:20 PM  Stop time: 6/29/2020 4:30 PM  Reason for block: at surgeon's request and post-op pain management  Performed by  CRNA: Antonio Morrow CRNA  Preanesthetic Checklist  Completed: patient identified, site marked, surgical consent, pre-op evaluation, timeout performed, IV checked, risks and benefits discussed and monitors and equipment checked  Prep:  Pt Position: supine  Sterile barriers:gloves, cap, sterile barriers and mask  Prep: ChloraPrep  Patient monitoring: blood pressure monitoring, continuous pulse oximetry and EKG  Procedure  Performed under: general  Guidance:ultrasound guided  Images:still images obtained    Laterality:Bilateral  Block Type:TAP  Injection Technique:single-shot  Needle Type:echogenic  Needle Gauge:21 G  Resistance on Injection: none    Medications Used: bupivacaine PF (MARCAINE) injection 0.5%, 30 mL  Med admintered at 6/29/2020 4:30 PM      Medications  Preservative Free Saline:30ml  Comment:Block Injection: LA dose divided between Right and Left Block  Adjuncts per total volume of LA:          If required, intravenous sedation was given -- see meds on anesthesia record.    Post Assessment  Injection Assessment: negative aspiration for heme, no paresthesia on injection and incremental injection  Patient Tolerance:comfortable throughout block  Complications:no  Additional Notes  Procedure:      BILATERAL TAP BLOCKS                             Patient analgesia was achieved with General Anesthesia    The pt was placed in the Supine Position and under Ultrasound guidance, an echogenic or touhy needle was advanced with Normal Saline hydro dissection of tissue.  The Internal Oblique and Transversus Abdominus muscles were visualized.  At or before the aponeurosis of Internal Oblique, the local anesthetic spread was visualized in the Transversus Abdominus Plane. Injection was  made incrementally with aspiration every 5 mls.  There was no intravascular injection;  injection pressure was normal; there was no neural injection; and the procedure was completed without difficulty.

## 2020-06-29 NOTE — PROGRESS NOTES
"Patient: Saman Aguayo    YOB: 1955    Date: 06/29/2020    Primary Care Provider: Vermeesch, Marilyn K, MD    Chief Complaint   Patient presents with   • Post-op       History of present illness:  I saw the patient in the office today as a followup from their recent hernia repair on Friday. He complains of pain @ incision,nausea and difficulty urinating.     The patient continues to complain of right lower quadrant abdominal discomfort.  He did have incisional herniorrhaphy with mesh implantation at the umbilicus this past Friday, while he states he did have some postoperative pain he continues to have this right lower quadrant abdominal discomfort that is worsening.  He also has difficulty with urination today.    Vital Signs:   Vitals:    06/29/20 0900   BP: 120/80   Pulse: 107   Temp: 97.9 °F (36.6 °C)   SpO2: 94%   Weight: 77.9 kg (171 lb 12.8 oz)   Height: 182.9 cm (72\")       Physical Exam:   General Appearance:    Alert, cooperative, in no acute distress   Abdomen:     no masses, no organomegaly, soft non-tender, non-distended, no guarding, wounds are well healed, no evidence of recurrent hernia, there is some tenderness in the right lower quadrant and a course around the previous hernia incision at the umbilicus     Assessment / Plan:    1. Generalized abdominal pain    2. Right lower quadrant abdominal pain        I did discuss the situation with the patient today in the office and they have done well from their recent herniorraphy.    It is concerning that the patient continues to have this right lower quadrant abdominal discomfort.  I have reviewed all of his x-rays done previously with the radiologist today over the phone, previous gallbladder ultrasound was negative, HIDA scan showed evidence of a 60% ejection fraction, previous CT scan was reviewed and showed no evidence of appendicitis.    Have had a clear detailed and extensive discussion with the patient and his wife today in the " office.  He continues to worsen and I think the patient needs to go to the emergency room for repeat laboratory analysis and a repeat CT scan of the abdomen and pelvis.  I had a discussion with the emergency room provider over the phone today.    Electronically signed by Shady Singh MD  06/29/20

## 2020-07-01 LAB
LAB AP CASE REPORT: NORMAL
PATH REPORT.FINAL DX SPEC: NORMAL

## 2020-07-06 ENCOUNTER — OFFICE VISIT (OUTPATIENT)
Dept: SURGERY | Facility: CLINIC | Age: 65
End: 2020-07-06

## 2020-07-06 VITALS
OXYGEN SATURATION: 98 % | HEART RATE: 55 BPM | WEIGHT: 166.6 LBS | BODY MASS INDEX: 22.57 KG/M2 | DIASTOLIC BLOOD PRESSURE: 78 MMHG | TEMPERATURE: 98.4 F | SYSTOLIC BLOOD PRESSURE: 142 MMHG | HEIGHT: 72 IN

## 2020-07-06 DIAGNOSIS — Z48.89 POSTOPERATIVE VISIT: Primary | ICD-10-CM

## 2020-07-06 PROCEDURE — 99024 POSTOP FOLLOW-UP VISIT: CPT | Performed by: SURGERY

## 2020-07-06 NOTE — PROGRESS NOTES
"Patient: Saman Aguayo    YOB: 1955    Date: 07/06/2020    Primary Care Provider: Vermeesch, Marilyn K, MD    Chief Complaint   Patient presents with   • Post-op Follow-up     appy and ceci       History of present illness:  I saw the patient in the office today as a followup from their recent laparoscopic appendectomy and cholecystectomy.  They state that they have done well and are having no complaints.  The pathology report did show acute suppurative appendicitis with chronic cholecystitis.    Vital Signs:   Vitals:    07/06/20 1034   BP: 142/78   Pulse: 55   Temp: 98.4 °F (36.9 °C)   TempSrc: Temporal   SpO2: 98%   Weight: 75.6 kg (166 lb 9.6 oz)   Height: 182.9 cm (72\")       Physical Exam:   General Appearance:    Alert, cooperative, in no acute distress   Abdomen:     no masses, no organomegaly, soft non-tender, non-distended, no guarding, wounds are well healed     Assessment / Plan:    1. Postoperative visit        I did discuss the situation with the patient today in the office and they have done well from their recent laparoscopic appendectomy.  I have released the patient back to normal activity, they understand that they need to be careful about heavy lifting.  I need to see the patient back in the office only if they are having further problems, they know to call me if they are.    He does feel markedly better and notes that his preoperative symptomatology has resolved.      Electronically signed by Shady Singh MD  07/06/20    Portions of this note have been scribed for Shady Singh MD by Michelle Milligan. 7/6/2020  10:38                      "

## 2020-08-03 ENCOUNTER — OFFICE VISIT (OUTPATIENT)
Dept: INTERNAL MEDICINE | Facility: CLINIC | Age: 65
End: 2020-08-03

## 2020-08-03 VITALS
WEIGHT: 164 LBS | BODY MASS INDEX: 22.21 KG/M2 | TEMPERATURE: 97.3 F | HEIGHT: 72 IN | OXYGEN SATURATION: 96 % | RESPIRATION RATE: 16 BRPM | SYSTOLIC BLOOD PRESSURE: 118 MMHG | DIASTOLIC BLOOD PRESSURE: 68 MMHG | HEART RATE: 59 BPM

## 2020-08-03 DIAGNOSIS — R53.82 CHRONIC FATIGUE: Primary | ICD-10-CM

## 2020-08-03 PROBLEM — R10.13 EPIGASTRIC PAIN: Status: RESOLVED | Noted: 2020-05-28 | Resolved: 2020-08-03

## 2020-08-03 PROBLEM — R10.11 RIGHT UPPER QUADRANT ABDOMINAL PAIN: Status: RESOLVED | Noted: 2020-05-28 | Resolved: 2020-08-03

## 2020-08-03 PROCEDURE — 99213 OFFICE O/P EST LOW 20 MIN: CPT | Performed by: INTERNAL MEDICINE

## 2020-08-03 NOTE — PROGRESS NOTES
"Chief Complaint   Patient presents with   • Abstract     fatigue and weakness     Subjective   Saman Aguayo is a 65 y.o. male.     Pt is here today with complaints of fatigue and weakness.   He is S/P ceci and appy last month per Dr Singh.  He had a hernia repair in June.  He also underwent right upper lobectomy in March of this year due to lung cancer.  He is still having fatigue and weakness and has lost about 2-3 lbs more.   He is off all meds except a MVI.   All recent labs in past month are unremarkable except slightly low Na and CL.   He states he has a good appetite but fills up quickly.  He denies any GERD unless he eats peppers.   Denies any CP or SOA.    Has an appt with Dr Hendricks tomorrow.  Pt is uncertain if a CT of chest will be done tomorrow or not.    Denies any current stomach pain, black stools or blood in stool.    He does have some sinus drainage with cough.         The following portions of the patient's history were reviewed and updated as appropriate: allergies, current medications, past family history, past medical history, past social history, past surgical history and problem list.    Review of Systems   Constitutional: Positive for activity change and fatigue. Negative for appetite change.        2 to 3 pound weight loss   HENT: Positive for postnasal drip.    Eyes: Negative.    Respiratory: Positive for cough. Negative for shortness of breath.    Gastrointestinal: Negative for abdominal pain, constipation, diarrhea, nausea and vomiting.   Endocrine: Negative.    Genitourinary: Negative.    Musculoskeletal: Negative.    Skin: Negative.    Allergic/Immunologic: Positive for environmental allergies.   Neurological: Positive for weakness.   Psychiatric/Behavioral: Negative for dysphoric mood and sleep disturbance. The patient is not nervous/anxious.        Objective   /68   Pulse 59   Temp 97.3 °F (36.3 °C)   Resp 16   Ht 182.9 cm (72\")   Wt 74.4 kg (164 lb)   SpO2 96%   BMI " 22.24 kg/m²   Body mass index is 22.24 kg/m².  Physical Exam   Constitutional: He is oriented to person, place, and time. He appears well-developed and well-nourished. No distress.   Very kind and pleasant man, he appears his age, wearing a mask and in no distress today   HENT:   Head: Normocephalic and atraumatic.   Right Ear: External ear normal.   Left Ear: External ear normal.   Eyes: Pupils are equal, round, and reactive to light. Conjunctivae and EOM are normal.   Neck: Normal range of motion. Neck supple. No thyromegaly present.   Cardiovascular: Normal rate, regular rhythm, normal heart sounds and intact distal pulses.   No murmur heard.  No carotid bruits   Pulmonary/Chest: Effort normal. No respiratory distress. He has no wheezes.   Slightly diminished breath sounds in right middle and right upper lobes posteriorly   Abdominal: Soft. Bowel sounds are normal. He exhibits no distension and no mass. There is no tenderness.   Well-healed laparoscopic surgical incisions over abdomen   Musculoskeletal: Normal range of motion. He exhibits no edema.   Lymphadenopathy:     He has no cervical adenopathy.   Neurological: He is alert and oriented to person, place, and time. No cranial nerve deficit. Coordination normal.   Skin: Skin is warm. No rash noted.   Psychiatric: He has a normal mood and affect. His behavior is normal. Judgment and thought content normal.   Nursing note and vitals reviewed.      Assessment/Plan   Saman Aguayo is here today and the following problems have been addressed:      Saman was seen today for abstract.    Diagnoses and all orders for this visit:    Chronic fatigue  -     CBC & Differential  -     TSH  -     T4, Free  -     Vitamin B12  -     Basic Metabolic Panel    Recent labs from July as well as CT scans of chest and abdomen reviewed from past few months  No etiology for fatigue and weakness are found, except, patient has had multiple recent surgeries and he has a known history of  lung cancer with removal of right upper and middle lobe which will cause a significant reduction in lung function and reduced lung capacity with exertion  He has follow-up tomorrow with Dr. Hendricks his CT surgeon, possible repeat CT scan of lung or PET scan of lung will be obtained at that time  Labs as noted above  I have explained to patient that it will take time for his body to recuperate from multiple surgeries and this may be the cause of his fatigue    Return to clinic in 2 months for Medicare wellness and follow-up of fatigue    Please note that portions of this note were completed with a voice recognition program.  Efforts were made to edit dictation, but occasionally words are mistranscribed.

## 2020-08-04 ENCOUNTER — OFFICE VISIT (OUTPATIENT)
Dept: CARDIAC SURGERY | Facility: CLINIC | Age: 65
End: 2020-08-04

## 2020-08-04 VITALS
OXYGEN SATURATION: 96 % | SYSTOLIC BLOOD PRESSURE: 130 MMHG | TEMPERATURE: 97.7 F | DIASTOLIC BLOOD PRESSURE: 60 MMHG | HEART RATE: 55 BPM | WEIGHT: 169 LBS | BODY MASS INDEX: 22.89 KG/M2 | HEIGHT: 72 IN

## 2020-08-04 DIAGNOSIS — Z90.2 S/P LOBECTOMY OF LUNG: ICD-10-CM

## 2020-08-04 DIAGNOSIS — R91.1 RIGHT UPPER LOBE PULMONARY NODULE: Primary | ICD-10-CM

## 2020-08-04 DIAGNOSIS — Z98.890 S/P THORACOTOMY: ICD-10-CM

## 2020-08-04 LAB
BASOPHILS # BLD AUTO: 0.07 10*3/MM3 (ref 0–0.2)
BASOPHILS NFR BLD AUTO: 0.9 % (ref 0–1.5)
BUN SERPL-MCNC: 12 MG/DL (ref 8–23)
BUN/CREAT SERPL: 13 (ref 7–25)
CALCIUM SERPL-MCNC: 9.3 MG/DL (ref 8.6–10.5)
CHLORIDE SERPL-SCNC: 101 MMOL/L (ref 98–107)
CO2 SERPL-SCNC: 28.8 MMOL/L (ref 22–29)
CREAT SERPL-MCNC: 0.92 MG/DL (ref 0.76–1.27)
EOSINOPHIL # BLD AUTO: 0.2 10*3/MM3 (ref 0–0.4)
EOSINOPHIL NFR BLD AUTO: 2.6 % (ref 0.3–6.2)
ERYTHROCYTE [DISTWIDTH] IN BLOOD BY AUTOMATED COUNT: 12.7 % (ref 12.3–15.4)
GLUCOSE SERPL-MCNC: 82 MG/DL (ref 65–99)
HCT VFR BLD AUTO: 47.4 % (ref 37.5–51)
HGB BLD-MCNC: 16 G/DL (ref 13–17.7)
IMM GRANULOCYTES # BLD AUTO: 0.03 10*3/MM3 (ref 0–0.05)
IMM GRANULOCYTES NFR BLD AUTO: 0.4 % (ref 0–0.5)
LYMPHOCYTES # BLD AUTO: 1.91 10*3/MM3 (ref 0.7–3.1)
LYMPHOCYTES NFR BLD AUTO: 24.8 % (ref 19.6–45.3)
MCH RBC QN AUTO: 31.1 PG (ref 26.6–33)
MCHC RBC AUTO-ENTMCNC: 33.8 G/DL (ref 31.5–35.7)
MCV RBC AUTO: 92.2 FL (ref 79–97)
MONOCYTES # BLD AUTO: 0.65 10*3/MM3 (ref 0.1–0.9)
MONOCYTES NFR BLD AUTO: 8.4 % (ref 5–12)
NEUTROPHILS # BLD AUTO: 4.85 10*3/MM3 (ref 1.7–7)
NEUTROPHILS NFR BLD AUTO: 62.9 % (ref 42.7–76)
NRBC BLD AUTO-RTO: 0 /100 WBC (ref 0–0.2)
PLATELET # BLD AUTO: 163 10*3/MM3 (ref 140–450)
POTASSIUM SERPL-SCNC: 5.1 MMOL/L (ref 3.5–5.2)
RBC # BLD AUTO: 5.14 10*6/MM3 (ref 4.14–5.8)
SODIUM SERPL-SCNC: 139 MMOL/L (ref 136–145)
T4 FREE SERPL-MCNC: 1.37 NG/DL (ref 0.93–1.7)
TSH SERPL DL<=0.005 MIU/L-ACNC: 3.41 UIU/ML (ref 0.27–4.2)
VIT B12 SERPL-MCNC: 714 PG/ML (ref 211–946)
WBC # BLD AUTO: 7.71 10*3/MM3 (ref 3.4–10.8)

## 2020-08-04 PROCEDURE — 99213 OFFICE O/P EST LOW 20 MIN: CPT | Performed by: NURSE PRACTITIONER

## 2020-08-04 NOTE — PROGRESS NOTES
[facility] Follow-Up Note    Name:  Saman Aguayo  MRN Number:  [id number]  Date of Encounter:  08/04/2020    Referred By:  No ref. provider found  PCP:  Vermeesch, Marilyn K, MD    Chief Complaint:    Chief Complaint   Patient presents with   • Follow-up     FU With CT Chest for Right Lung Cancer-Roland Townsend 6/29/2020       History of Present Illness:    Mr. Saman Aguayo is a pleasant 65 y.o. male with a history of hypertension, COPD, tobacco abuse, lung cancer (T1b N0 M0 stage Ia non-small cell undifferentiated lung cancer in the middle lobe resected 11/18/2016 via VATS), and a right upper lobe lung nodule status post redo right VATS with conversion to right thoracotomy with right upper lobe wedge resection, right upper lobe completion lobectomy, and mediastinal lymph node dissection 3/13/2020 per Dr. Porter revealing stage I A2 adenocarcinoma who returns the office today for postoperative exam.  The patient was last seen in our office on 5/24/2020.  Since that time, he has undergone surgery for an incarcerated incisional hernia 6/26/2020 with subsequent return to the OR on 6/29/2020 for diagnostic laparoscopy, laparoscopic appendectomy and laparoscopic cholecystectomy. .  The patient continues to have some shortness of breath, fatigue and has experienced an approximately 20 pound weight loss since 2/20.    Review of Systems:  Review of Systems   Constitution: Positive for malaise/fatigue and weight loss (20 lbs since 2/2020). Negative for chills, fever and night sweats.   HENT: Negative for hearing loss, odynophagia and sore throat.    Cardiovascular: Positive for dyspnea on exertion. Negative for chest pain, leg swelling, orthopnea and palpitations.   Respiratory: Positive for shortness of breath. Negative for cough and hemoptysis.    Endocrine: Negative for cold intolerance, heat intolerance, polydipsia, polyphagia and polyuria.   Hematologic/Lymphatic: Bruises/bleeds  "easily.   Skin: Negative for itching and rash.   Musculoskeletal: Positive for arthritis, back pain and joint pain. Negative for joint swelling and myalgias.   Gastrointestinal: Negative for abdominal pain, constipation, diarrhea, hematemesis, hematochezia, melena, nausea and vomiting.   Genitourinary: Negative for dysuria, frequency and hematuria.   Neurological: Positive for dizziness. Negative for focal weakness, headaches, numbness and seizures.   Psychiatric/Behavioral: Negative for suicidal ideas.   All other systems reviewed and are negative.      Past Medical History:    Past Medical History:   Diagnosis Date   • Abnormal ECG    • Abscess of skin    • Arthritis     HANDS AND BACK    • Bradycardia    • Bunion     LEFT FOOT   • COPD (chronic obstructive pulmonary disease) (CMS/HCC)    • Full dentures    • History of echocardiogram 09/21/2016   • Hx of exercise stress test 09/27/2016    DR. BAKER \"IT WAS NORMAL\"   • Low back pain    • Lung cancer, middle lobe (CMS/HCC) 11/8/2016    RIGHT MIDDLE LOBE   • Lung nodule     RUL-7MM.  PRESENTLY HAS, AND FOLLOWS WITH DR. HERNANDEZ.  STATES FOUND \"A COUPLE MONTHS AGO\"   • Premature atrial contraction    • Pulmonary hypertension (CMS/HCC)    • Skin cancer     BASAL CELL-NOSE, LIP   • Wears eyeglasses        Past Surgical History:    Past Surgical History:   Procedure Laterality Date   • BASAL CELL CARCINOMA EXCISION      NOSE, LIP   • BRONCHOSCOPY  2016   • BRONCHOSCOPY N/A 3/13/2020    Procedure: BRONCHOSCOPY;  Surgeon: Chris Porter MD;  Location: [facility];  Service: Cardiothoracic;  Laterality: N/A;   • BUNIONECTOMY Left 2013    GREAT TOE   • COLONOSCOPY  2018   • DIAGNOSTIC LAPAROSCOPY N/A 6/29/2020    Procedure: DIAGNOSTIC LAPAROSCOPY, APPENDECTOMY, CHOLECYSTECOMY WITH CHOLANGIOGRAPHY;  Surgeon: Shady Singh MD;  Location: [facility];  Service: General;  Laterality: N/A;   • ENDOSCOPY N/A 6/3/2020    Procedure: ESOPHAGOGASTRODUODENOSCOPY WITH BIOPSY;  Surgeon: " Shady Singh MD;  Location[facility] ENDOSCOPY;  Service: Gastroenterology;  Laterality: N/A;   • INGUINAL HERNIA REPAIR Left 2002    Dr. Singh/inguinal    • KNEE ARTHROSCOPY Right 12/12/2018    Procedure: Diagnostic arthroscopy right knee with partial medial meniscectomy;  Surgeon: Dinh Nova MD;  Location:  [facility];  Service: Orthopedics   • LUNG REMOVAL, PARTIAL  2016    RIGHT MIDDLE LOBE   • MOUTH SURGERY      FULL EXTRACTION OF TEETH   • SKIN BIOPSY     • THORACOSCOPY N/A 11/18/2016    Procedure: BRONCH THORACOSCOPY VIDEO ASSISTED  WITH LOBECTOMY, MEDIALSTINAL LYMPH NODE DISECTION;  Surgeon: Chris Porter MD;  Location:  [facility];  Service:    • THORACOSCOPY VIDEO ASSISTED WITH LOBECTOMY Right 3/13/2020    Procedure: Redo right THORACOSCOPY VIDEO ASSISTED with right upper lobe wedge resection and with conversion to open thorocotomy for right upper lobectomy with intercostal cryoablation;  Surgeon: Chris Porter MD;  Location:  [facility];  Service: Cardiothoracic;  Laterality: Right;       Patient Active Problem List   Diagnosis   • Osteoarthritis   • History of tobacco use   • Pulmonary hypertension    • Lung cancer, middle lobe s/p right VATS with lobectomy   • COPD   • Right upper lobe pulmonary nodule   • Valvular heart disease   • HFrEF (EF 45%)   • History of colon polyps   • Chronic fatigue     Social History     Tobacco Use   • Smoking status: Former Smoker     Packs/day: 3.00     Years: 50.00     Pack years: 150.00     Types: Cigarettes     Last attempt to quit: 11/4/2016     Years since quitting: 3.7   • Smokeless tobacco: Never Used   Substance Use Topics   • Alcohol use: Yes     Alcohol/week: 21.0 standard drinks     Types: 21 Cans of beer per week     Comment: 3 cans/daily   • Drug use: No     Family History   Problem Relation Age of Onset   • Heart attack Father    • Heart disease Father    • Colon cancer Father    • Colon cancer Mother    • Liver cancer Mother        Medications:   "    Current Outpatient Medications:   •  Multiple Vitamins-Minerals (CENTRUM SILVER 50+MEN PO), Take  by mouth., Disp: , Rfl:     Allergies:  Allergies   Allergen Reactions   • No Known Drug Allergy        Physical Exam:  Vital Signs:    Vitals:    08/04/20 1356   BP: 130/60   BP Location: Left arm   Patient Position: Sitting   Pulse: 55   Temp: 97.7 °F (36.5 °C)   SpO2: 96%   Weight: 76.7 kg (169 lb)   Height: 182.9 cm (72\")       Physical Exam   Gen- NAD, pleasant, cooperative  CV- Regular rate and rhythm, no murmur, gallop or rub  Pulm- Clear to auscultation bilateral without wheeze or rhonchi  GI- Soft, normoactive bowel sounds, non-tender  Ext- Without edema,   Neuro- CN II- XII grossly intact, tongue midline, voice normal.    Labs/Imaging:  CT chest with contrast, [facility], 5/29/2020  Impression:  No evidence of recurrent or metastatic disease within the chest.    Assessment / Plan:  Mr. Saman Aguayo is a pleasant 65 y.o. male with a history of hypertension, COPD, tobacco abuse, lung cancer (T1b N0 M0 stage Ia non-small cell undifferentiated lung cancer in the middle lobe resected 11/18/2016 via VATS), and a right upper lobe lung nodule status post redo right VATS with conversion to right thoracotomy with right upper lobe wedge resection, right upper lobe completion lobectomy, and mediastinal lymph node dissection 3/13/2020 per Dr. Porter revealing stage I A2 adenocarcinoma who returns the office today for postoperative exam.    The patient was last seen in our office on 5/24/2020.  Since that time, he has undergone surgery for an incarcerated incisional hernia 6/26/2020 with subsequent return to the OR on 6/29/2020 for diagnostic laparoscopy, laparoscopic appendectomy and laparoscopic cholecystectomy.  The patient reports complete resolution of his abdominal pain.  I discussed the results with the patient CT scan of his chest with him in the office today which showed no evidence of recurrence " or metastatic disease.  At this time, the patient was scheduled by Dr. Lim for a repeat CT scan of the chest in November.  We will plan to see him back after the scan.  Should he have any questions or concerns, he will contact the office.    Please note, this document was produced using voice recognition software.    SAMIRA Salomon  [facility] Cardiothoracic Surgery

## 2020-08-04 NOTE — PROGRESS NOTES
Please call patient or wife and let him know that all labs are in completely normal range.  I have sent this in a letter also.

## 2020-08-10 ENCOUNTER — OFFICE VISIT (OUTPATIENT)
Dept: CARDIOLOGY | Facility: CLINIC | Age: 65
End: 2020-08-10

## 2020-08-10 VITALS
SYSTOLIC BLOOD PRESSURE: 120 MMHG | HEART RATE: 89 BPM | WEIGHT: 168 LBS | OXYGEN SATURATION: 96 % | DIASTOLIC BLOOD PRESSURE: 64 MMHG | BODY MASS INDEX: 22.75 KG/M2 | HEIGHT: 72 IN

## 2020-08-10 DIAGNOSIS — I50.22 CHRONIC SYSTOLIC CONGESTIVE HEART FAILURE (HCC): ICD-10-CM

## 2020-08-10 DIAGNOSIS — J43.2 CENTRILOBULAR EMPHYSEMA (HCC): ICD-10-CM

## 2020-08-10 DIAGNOSIS — C34.2 LUNG CANCER, MIDDLE LOBE (HCC): Primary | ICD-10-CM

## 2020-08-10 PROCEDURE — 99214 OFFICE O/P EST MOD 30 MIN: CPT | Performed by: INTERNAL MEDICINE

## 2020-08-10 NOTE — PROGRESS NOTES
Subjective:     Encounter Date:08/10/2020      Patient ID: Saman Aguayo is a 65 y.o. male.    Chief Complaint: Emphysema  HPI  This is a 65-year-old male patient with a history of lung cancer requiring right lobectomy.  The patient indicates that since his last cardiovascular visit he has had recurrent ipsilateral lung cancer which required further lung removal surgery.  Following his repeat thoracotomy, the patient developed symptomatic gallbladder disease which required urgent cholecystectomy.  He is also developed severe acute appendicitis with impending appendix rupture which required surgical removal as well.  The patient indicates that he has had difficulty regaining his strength after having 3 major surgeries in a short timeframe.  He no longer smokes.  His shortness of breath has improved significantly.  He reports significant shortness of breath with activity after further lung removal surgery.  He has had no orthopnea PND or lower extremity edema.  He denies having chest discomfort.  There is no dizziness palpitations or syncope.  He reports compliance with his medications.  The following portions of the patient's history were reviewed and updated as appropriate: allergies, current medications, past family history, past medical history, past social history, past surgical history and problem  Review of Systems   Constitution: Negative for chills, diaphoresis, fever, malaise/fatigue, weight gain and weight loss.   HENT: Negative for ear discharge, hearing loss, hoarse voice and nosebleeds.    Eyes: Negative for discharge, double vision, pain and photophobia.   Cardiovascular: Positive for dyspnea on exertion. Negative for chest pain, claudication, cyanosis, irregular heartbeat, leg swelling, near-syncope, orthopnea, palpitations, paroxysmal nocturnal dyspnea and syncope.   Respiratory: Negative for cough, hemoptysis, shortness of breath, sputum production and wheezing.    Endocrine: Negative for cold  intolerance, heat intolerance, polydipsia, polyphagia and polyuria.   Hematologic/Lymphatic: Negative for adenopathy and bleeding problem. Does not bruise/bleed easily.   Skin: Negative for color change, flushing, itching and rash.   Musculoskeletal: Negative for muscle cramps, muscle weakness, myalgias and stiffness.   Gastrointestinal: Negative for abdominal pain, diarrhea, hematemesis, hematochezia, nausea and vomiting.   Genitourinary: Negative for dysuria, frequency and nocturia.   Neurological: Negative for focal weakness, loss of balance, numbness, paresthesias and seizures.   Psychiatric/Behavioral: Negative for altered mental status, hallucinations and suicidal ideas.   Allergic/Immunologic: Negative for HIV exposure, hives and persistent infections.           Current Outpatient Medications:   •  Multiple Vitamins-Minerals (CENTRUM SILVER 50+MEN PO), Take  by mouth., Disp: , Rfl:     Objective:   Physical Exam   Constitutional: He is oriented to person, place, and time. He appears well-developed and well-nourished. No distress.   HENT:   Head: Normocephalic and atraumatic.   Mouth/Throat: Oropharynx is clear and moist.   Eyes: Pupils are equal, round, and reactive to light. Conjunctivae and EOM are normal. No scleral icterus.   Neck: Normal range of motion. Neck supple. No JVD present. No tracheal deviation present. No thyromegaly present.   Cardiovascular: Normal rate, regular rhythm, S1 normal, S2 normal, normal heart sounds, intact distal pulses and normal pulses. PMI is not displaced. Exam reveals no gallop and no friction rub.   No murmur heard.  Pulmonary/Chest: Effort normal and breath sounds normal. No stridor. No respiratory distress. He has no wheezes. He has no rales.   Abdominal: Soft. Bowel sounds are normal. He exhibits no distension and no mass. There is no tenderness. There is no rebound and no guarding.   Musculoskeletal: Normal range of motion. He exhibits no edema or deformity.  "  Neurological: He is alert and oriented to person, place, and time. He displays normal reflexes. No cranial nerve deficit. Coordination normal.   Skin: Skin is warm and dry. No rash noted. He is not diaphoretic. No erythema.   Psychiatric: He has a normal mood and affect. His behavior is normal. Thought content normal.     Blood pressure 120/64, pulse 89, height 182.9 cm (72\"), weight 76.2 kg (168 lb), SpO2 96 %.   Lab Review:     Assessment:       1. Lung cancer, middle lobe s/p right VATS with lobectomy  The patient has had a recurrence of his lung cancer on the same side which is required further thoracic surgery.    2. Centrilobular emphysema (CMS/HCC)  Fortunately he no longer smokes.    3. Chronic systolic congestive heart failure (CMS/HCC)  Heart failure with reduced ejection fraction.  Stage C.  New York Heart Association functional class I symptoms.  Euvolemic.  Left ventricular ejection fraction 45%.    Procedures    Plan:     No changes to his medication therapy have been made at today's visit.  No additional cardiovascular testing is warranted.  The patient has been congratulated on his smoking cessation and cautioned to never again resume cigarette smoking.      "

## 2020-10-05 ENCOUNTER — OFFICE VISIT (OUTPATIENT)
Dept: INTERNAL MEDICINE | Facility: CLINIC | Age: 65
End: 2020-10-05

## 2020-10-05 VITALS
OXYGEN SATURATION: 97 % | WEIGHT: 172 LBS | HEART RATE: 53 BPM | SYSTOLIC BLOOD PRESSURE: 118 MMHG | TEMPERATURE: 97 F | BODY MASS INDEX: 23.3 KG/M2 | HEIGHT: 72 IN | DIASTOLIC BLOOD PRESSURE: 80 MMHG

## 2020-10-05 DIAGNOSIS — I27.20 PULMONARY HYPERTENSION (HCC): ICD-10-CM

## 2020-10-05 DIAGNOSIS — Z00.00 ENCOUNTER FOR SUBSEQUENT ANNUAL WELLNESS VISIT (AWV) IN MEDICARE PATIENT: Primary | ICD-10-CM

## 2020-10-05 DIAGNOSIS — Z12.5 SCREENING PSA (PROSTATE SPECIFIC ANTIGEN): ICD-10-CM

## 2020-10-05 DIAGNOSIS — M15.9 PRIMARY OSTEOARTHRITIS INVOLVING MULTIPLE JOINTS: ICD-10-CM

## 2020-10-05 DIAGNOSIS — J43.2 CENTRILOBULAR EMPHYSEMA (HCC): ICD-10-CM

## 2020-10-05 DIAGNOSIS — I38 VALVULAR HEART DISEASE: ICD-10-CM

## 2020-10-05 LAB — PSA SERPL-MCNC: 1.66 NG/ML (ref 0–4)

## 2020-10-05 PROCEDURE — G0439 PPPS, SUBSEQ VISIT: HCPCS | Performed by: INTERNAL MEDICINE

## 2020-10-05 PROCEDURE — 99397 PER PM REEVAL EST PAT 65+ YR: CPT | Performed by: INTERNAL MEDICINE

## 2020-10-05 PROCEDURE — G0008 ADMIN INFLUENZA VIRUS VAC: HCPCS | Performed by: INTERNAL MEDICINE

## 2020-10-05 PROCEDURE — 90694 VACC AIIV4 NO PRSRV 0.5ML IM: CPT | Performed by: INTERNAL MEDICINE

## 2020-10-05 PROCEDURE — 96160 PT-FOCUSED HLTH RISK ASSMT: CPT | Performed by: INTERNAL MEDICINE

## 2020-10-05 NOTE — PROGRESS NOTES
The ABCs of the Annual Wellness Visit  Subsequent Medicare Wellness Visit    Chief Complaint   Patient presents with   • Medicare Wellness-subsequent       Subjective   History of Present Illness:  Saman Aguayo is a 65 y.o. male who presents for a Subsequent Medicare Wellness Visit.  PMH of COPD with pulmonary hypertension, history of chronic CHF, history of right middle lobe lung cancer status post lobectomy, DJD, status post cholecystectomy and appendectomy.  He does gets of breath with any humidity or excessive work.  He does have some allergies and zyrtec is helpful.  He does have a cough with mucous in the mornings or after sitting for a while.  He cannot breath when he lays on his right side.  Not much DJD pain at this time.  He does have a lot of sensitivity over right side of chest wall.     HEALTH RISK ASSESSMENT    Recent Hospitalizations:  Recently treated at the following:  [facility]    Current Medical Providers:  Patient Care Team:  Vermeesch, Marilyn K, MD as PCP - General (Internal Medicine)  Max Almonte MD as Consulting Physician (Cardiology)  Juan Alberto Carty MD as Surgeon (Cardiothoracic Surgery)  Shady Singh MD as Consulting Physician (General Surgery)  Dinh Nova MD as Consulting Physician (Orthopedic Surgery)    Smoking Status:  Social History     Tobacco Use   Smoking Status Former Smoker   • Packs/day: 3.00   • Years: 50.00   • Pack years: 150.00   • Types: Cigarettes   • Quit date: 11/4/2016   • Years since quitting: 3.9   Smokeless Tobacco Never Used       Alcohol Consumption:  Social History     Substance and Sexual Activity   Alcohol Use Yes   • Alcohol/week: 21.0 standard drinks   • Types: 21 Cans of beer per week    Comment: 3 cans/daily       Depression Screen:   PHQ-2/PHQ-9 Depression Screening 10/5/2020   Little interest or pleasure in doing things 0   Feeling down, depressed, or hopeless 0   Total Score 0       Fall Risk Screen:  STEADI Fall Risk  Assessment was completed, and patient is at HIGH risk for falls. Assessment completed on:10/5/2020    Health Habits and Functional and Cognitive Screening:  Functional & Cognitive Status 10/5/2020   Do you have difficulty preparing food and eating? No   Do you have difficulty bathing yourself, getting dressed or grooming yourself? No   Do you have difficulty using the toilet? No   Do you have difficulty moving around from place to place? No   Do you have trouble with steps or getting out of a bed or a chair? No   Current Diet Well Balanced Diet   Dental Exam Up to date   Eye Exam Not up to date        Eye Exam Comment -   Exercise (times per week) 0 times per week   Current Exercise Activities Include None   Do you need help using the phone?  No   Are you deaf or do you have serious difficulty hearing?  No   Do you need help with transportation? No   Do you need help shopping? No   Do you need help preparing meals?  No   Do you need help with housework?  No   Do you need help with laundry? No   Do you need help taking your medications? No   Do you need help managing money? No   Do you ever drive or ride in a car without wearing a seat belt? No   Have you felt unusual stress, anger or loneliness in the last month? No   Who do you live with? Spouse   If you need help, do you have trouble finding someone available to you? No   Have you been bothered in the last four weeks by sexual problems? -   Do you have difficulty concentrating, remembering or making decisions? No         Does the patient have evidence of cognitive impairment? No    Asprin use counseling:Does not need ASA (and currently is not on it)    Age-appropriate Screening Schedule:  Refer to the list below for future screening recommendations based on patient's age, sex and/or medical conditions. Orders for these recommended tests are listed in the plan section. The patient has been provided with a written plan.    Health Maintenance   Topic Date Due   •  ZOSTER VACCINE (2 of 3) 09/05/2017   • INFLUENZA VACCINE  08/01/2020   • COLONOSCOPY  06/19/2023   • TDAP/TD VACCINES (2 - Td) 01/26/2027          The following portions of the patient's history were reviewed and updated as appropriate: allergies, current medications, past family history, past medical history, past social history, past surgical history and problem list.    Outpatient Medications Prior to Visit   Medication Sig Dispense Refill   • Cetirizine HCl (ZYRTEC PO) Take  by mouth.     • Multiple Vitamins-Minerals (CENTRUM SILVER 50+MEN PO) Take  by mouth.       No facility-administered medications prior to visit.        Patient Active Problem List   Diagnosis   • Osteoarthritis   • History of tobacco use   • Pulmonary hypertension    • Lung cancer, middle lobe s/p right VATS with lobectomy   • COPD   • Right upper lobe pulmonary nodule   • Valvular heart disease   • Chronic systolic congestive heart failure (CMS/HCC)   • History of colon polyps   • Chronic fatigue   • Encounter for subsequent annual wellness visit (AWV) in Medicare patient   • Screening PSA (prostate specific antigen)       Advanced Care Planning:  ACP discussion was held with the patient during this visit. Patient has an advance directive (not in EMR), copy requested.    Review of Systems   Constitutional: Negative.    HENT: Negative.    Eyes: Negative.    Respiratory: Positive for cough and shortness of breath.    Cardiovascular: Negative.    Gastrointestinal: Negative.    Endocrine: Negative.    Genitourinary: Negative.    Musculoskeletal: Negative.    Skin: Negative.    Allergic/Immunologic: Positive for environmental allergies.   Neurological: Negative.    Hematological: Bruises/bleeds easily.   Psychiatric/Behavioral: Negative.        Compared to one year ago, the patient feels his physical health is better.  Compared to one year ago, the patient feels his mental health is the same.    Reviewed chart for potential of high risk  "medication in the elderly: yes  Reviewed chart for potential of harmful drug interactions in the elderly:yes    Objective         Vitals:    10/05/20 0858   BP: 118/80   Pulse: 53   Temp: 97 °F (36.1 °C)   SpO2: 97%   Weight: 78 kg (172 lb)   Height: 182.9 cm (72\")   PainSc:   5       Body mass index is 23.33 kg/m².  Discussed the patient's BMI with him. The BMI is in the acceptable range.    Physical Exam  Vitals signs and nursing note reviewed.   Constitutional:       General: He is not in acute distress.     Appearance: Normal appearance. He is well-developed and normal weight. He is not ill-appearing.      Comments: Very kind and pleasant gentleman, appears older than his stated age, wearing a mask and in no distress today   HENT:      Head: Normocephalic and atraumatic.      Right Ear: Tympanic membrane, ear canal and external ear normal.      Left Ear: Tympanic membrane, ear canal and external ear normal.   Eyes:      General:         Right eye: No discharge.         Left eye: No discharge.      Extraocular Movements: Extraocular movements intact.      Conjunctiva/sclera: Conjunctivae normal.      Pupils: Pupils are equal, round, and reactive to light.   Neck:      Musculoskeletal: Normal range of motion and neck supple.      Thyroid: No thyromegaly.   Cardiovascular:      Rate and Rhythm: Normal rate and regular rhythm.      Pulses: Normal pulses.      Heart sounds: Normal heart sounds. No murmur.      Comments: No carotid bruits  Pulmonary:      Effort: Pulmonary effort is normal. No respiratory distress.      Breath sounds: Normal breath sounds. No wheezing.   Abdominal:      General: Bowel sounds are normal. There is no distension.      Palpations: Abdomen is soft. There is no mass.      Tenderness: There is abdominal tenderness. There is no guarding.      Comments: Well-healing laparoscopic surgical scars noted, mild tenderness in upper abdomen to palpation   Musculoskeletal: Normal range of motion.      " Right lower leg: No edema.      Left lower leg: No edema.   Lymphadenopathy:      Cervical: No cervical adenopathy.   Skin:     Findings: No rash.      Comments: Right mid back with approximate 2 cm area of erythema and mid ulceration due to recent abscess, there is no current drainage or warmth, area is healing   Neurological:      General: No focal deficit present.      Mental Status: He is alert and oriented to person, place, and time.      Cranial Nerves: No cranial nerve deficit.      Coordination: Coordination normal.      Gait: Gait normal.   Psychiatric:         Mood and Affect: Mood normal.         Behavior: Behavior normal.         Thought Content: Thought content normal.         Judgment: Judgment normal.         Lab Results   Component Value Date    GLU 82 08/03/2020        Assessment/Plan   Medicare Risks and Personalized Health Plan  CMS Preventative Services Quick Reference  Cardiovascular risk  Diabetic Lab Screening   Immunizations Discussed/Encouraged (specific immunizations; Hepatitis A Vaccine/Series, Influenza and Shingrix )  Prostate Cancer Screening     The above risks/problems have been discussed with the patient.  Pertinent information has been shared with the patient in the After Visit Summary.  Follow up plans and orders are seen below in the Assessment/Plan Section.    Diagnoses and all orders for this visit:    1. Encounter for subsequent annual wellness visit (AWV) in Medicare patient (Primary)  -     PSA Screen    2. Centrilobular emphysema (CMS/HCC)    3. Primary osteoarthritis involving multiple joints    4. Valvular heart disease    5. Pulmonary hypertension     6. Screening PSA (prostate specific antigen)  -     PSA Screen    Continue Zyrtec for allergies  Flu shot given today  Recommend hepatitis A and shingles vaccine at local pharmacy  Follow-up with Dr. Lim and Dr. Hendricks for underlying COPD and history of lung cancer  Check PSA, all other labs are currently up-to-date and  were done approximately 2 months ago  Patient states he has minimal pain from underlying arthritis  Recommend salonpas patches or Aspercreme with lidocaine for current right chest wall pain  Follow heart healthy diet  Exercise for 30 minutes 5 days a week as tolerated    Follow Up:  Return in about 6 months (around 4/5/2021) for Next scheduled follow up.     An After Visit Summary and PPPS were given to the patient.

## 2020-10-09 ENCOUNTER — OFFICE VISIT (OUTPATIENT)
Dept: PULMONOLOGY | Facility: CLINIC | Age: 65
End: 2020-10-09

## 2020-10-09 VITALS
HEIGHT: 72 IN | DIASTOLIC BLOOD PRESSURE: 82 MMHG | HEART RATE: 51 BPM | OXYGEN SATURATION: 94 % | BODY MASS INDEX: 23.84 KG/M2 | RESPIRATION RATE: 16 BRPM | SYSTOLIC BLOOD PRESSURE: 122 MMHG | WEIGHT: 176 LBS | TEMPERATURE: 97.1 F

## 2020-10-09 DIAGNOSIS — R10.11 RIGHT UPPER QUADRANT ABDOMINAL PAIN: ICD-10-CM

## 2020-10-09 DIAGNOSIS — C34.91 NON-SMALL CELL CANCER OF RIGHT LUNG (HCC): Primary | ICD-10-CM

## 2020-10-09 DIAGNOSIS — R10.9 ACUTE RIGHT FLANK PAIN: ICD-10-CM

## 2020-10-09 DIAGNOSIS — R06.02 SHORTNESS OF BREATH: ICD-10-CM

## 2020-10-09 PROCEDURE — 99214 OFFICE O/P EST MOD 30 MIN: CPT | Performed by: NURSE PRACTITIONER

## 2020-10-09 NOTE — PROGRESS NOTES
"Chief Complaint   Patient presents with   • Follow-up     Abnormal CT         Subjective   Saman Aguayo is a 65 y.o. male.     History of Present Illness   The patient comes in to clinic today due to pain in the right rib area that has worsened over the last week.    Patient is complaining of soreness in the right side rib/flank area that is been worsening for the last week.    He saw his PCP about a week ago and was told that he may have some fluid in the lungs and he could take Mucinex.  This has not seemed to help.  He states the pain is a dull constant pain and the pain also worsens when the area is palpated.  He does admit to having some occasional shortness of breath that may have worsened some since the pain has increased and become more persistent.    He is coughing up some mucus for the last month which is white in color.  He states he has not really been coughing more than normal however.    The patient had a right upper lobe lobectomy in March 2020 secondary to lung cancer.    He has also had a laparoscopic appendectomy and cholecystectomy as well as an incarcerated hernia repair all in June 2020.  He followed up with Dr. Singh after the surgeries.    In looking at his weight trend he has had no weight loss.      The following portions of the patient's history were reviewed and updated as appropriate: allergies, current medications, past family history, past medical history, past social history, past surgical history and problem list.      Review of Systems   Constitutional: Negative for chills and fever.   HENT: Positive for rhinorrhea, sinus pressure and sneezing. Negative for sore throat.    Respiratory: Positive for cough, chest tightness, shortness of breath and wheezing.    Genitourinary: Positive for flank pain (right side).   Psychiatric/Behavioral: Negative for sleep disturbance.       Objective   Visit Vitals  /82   Pulse 51   Temp 97.1 °F (36.2 °C)   Resp 16   Ht 182.9 cm (72\")   Wt " 79.8 kg (176 lb)   SpO2 94%   BMI 23.87 kg/m²         Physical Exam  Vitals signs reviewed.   HENT:      Head: Atraumatic.      Mouth/Throat:      Comments: Dentures noted.  Eyes:      Extraocular Movements: Extraocular movements intact.   Neck:      Musculoskeletal: Neck supple.   Cardiovascular:      Rate and Rhythm: Normal rate and regular rhythm.   Pulmonary:      Effort: Pulmonary effort is normal. No respiratory distress.      Comments: Somewhat decreased air entry without wheezing.  No crackles/rales noted.  Right scar noted.  The patient is tender just under the scar into the right flank.  I do not see any redness or obvious swelling.  No trauma noted.  The area is tender to palpate.  Musculoskeletal:      Comments: Gait normal.   Skin:     General: Skin is warm.   Neurological:      Mental Status: He is alert and oriented to person, place, and time.         Assessment/Plan   Saman was seen today for follow-up.    Diagnoses and all orders for this visit:    Non-small cell cancer of right lung (CMS/HCC)  Comments:  s/p right upper lobe lobectomy 3/2020    Shortness of breath  -     CT Chest Without Contrast; Future    Right upper quadrant abdominal pain  -     CT Abdomen Pelvis Without Contrast; Future    Acute right flank pain           Return for keep appt in December with Doc..    DISCUSSION (if any):  Since the pain has worsened over the last week I will order a CT chest to be done sooner than what was originally planned in November.  I will also order a CT of the abdomen and pelvis since this pain is diffuse and radiates into the right flank and right upper quadrant.    Our office will call the patient regarding the results and any plan from there.    Dictated utilizing Dragon dictation.    This document was electronically signed by SAMIRA Wylie October 9, 2020  12:20 EDT

## 2020-10-15 ENCOUNTER — HOSPITAL ENCOUNTER (OUTPATIENT)
Dept: CT IMAGING | Facility: HOSPITAL | Age: 65
Discharge: HOME | End: 2020-10-15

## 2020-10-15 DIAGNOSIS — R10.11 RIGHT UPPER QUADRANT ABDOMINAL PAIN: ICD-10-CM

## 2020-10-15 DIAGNOSIS — R06.02 SHORTNESS OF BREATH: ICD-10-CM

## 2020-10-15 PROCEDURE — 74176 CT ABD & PELVIS W/O CONTRAST: CPT

## 2020-10-15 PROCEDURE — 71250 CT THORAX DX C-: CPT

## 2020-12-14 ENCOUNTER — TELEPHONE (OUTPATIENT)
Dept: CARDIAC SURGERY | Facility: CLINIC | Age: 65
End: 2020-12-14

## 2020-12-14 NOTE — TELEPHONE ENCOUNTER
PTS SPOUSE CALLED AND THEY ARE READY TO SCHEDULE 3 MO F/U WITH DR. MACHADO. PT HAD CT DONE IN OCTOBER. THEY WOULD LIKE F/U APPT BEFORE THE END OF THE YEAR IF POSSIBLE. VERIFIED DEMO.

## 2020-12-18 ENCOUNTER — OFFICE VISIT (OUTPATIENT)
Dept: PULMONOLOGY | Facility: CLINIC | Age: 65
End: 2020-12-18

## 2020-12-18 VITALS
TEMPERATURE: 97.1 F | DIASTOLIC BLOOD PRESSURE: 72 MMHG | HEIGHT: 72 IN | WEIGHT: 177 LBS | HEART RATE: 71 BPM | BODY MASS INDEX: 23.98 KG/M2 | RESPIRATION RATE: 16 BRPM | SYSTOLIC BLOOD PRESSURE: 122 MMHG | OXYGEN SATURATION: 97 %

## 2020-12-18 DIAGNOSIS — R06.02 SHORTNESS OF BREATH: Primary | ICD-10-CM

## 2020-12-18 DIAGNOSIS — J43.9 PULMONARY EMPHYSEMA, UNSPECIFIED EMPHYSEMA TYPE (HCC): ICD-10-CM

## 2020-12-18 DIAGNOSIS — C34.91 NON-SMALL CELL CANCER OF RIGHT LUNG (HCC): ICD-10-CM

## 2020-12-18 PROCEDURE — 99213 OFFICE O/P EST LOW 20 MIN: CPT | Performed by: INTERNAL MEDICINE

## 2021-01-22 ENCOUNTER — HOSPITAL ENCOUNTER (OUTPATIENT)
Dept: CT IMAGING | Facility: HOSPITAL | Age: 66
Discharge: HOME | End: 2021-01-22
Admitting: INTERNAL MEDICINE

## 2021-01-22 DIAGNOSIS — R06.02 SHORTNESS OF BREATH: ICD-10-CM

## 2021-01-22 DIAGNOSIS — C34.91 NON-SMALL CELL CANCER OF RIGHT LUNG (HCC): ICD-10-CM

## 2021-01-22 PROCEDURE — 71250 CT THORAX DX C-: CPT

## 2021-02-09 ENCOUNTER — OFFICE VISIT (OUTPATIENT)
Dept: CARDIAC SURGERY | Facility: CLINIC | Age: 66
End: 2021-02-09

## 2021-02-09 VITALS
OXYGEN SATURATION: 98 % | DIASTOLIC BLOOD PRESSURE: 67 MMHG | BODY MASS INDEX: 24.14 KG/M2 | WEIGHT: 178.2 LBS | TEMPERATURE: 97.8 F | SYSTOLIC BLOOD PRESSURE: 135 MMHG | HEIGHT: 72 IN | HEART RATE: 52 BPM

## 2021-02-09 DIAGNOSIS — C34.2 LUNG CANCER, MIDDLE LOBE (HCC): Primary | ICD-10-CM

## 2021-02-09 PROCEDURE — 99213 OFFICE O/P EST LOW 20 MIN: CPT | Performed by: NURSE PRACTITIONER

## 2021-02-09 NOTE — PROGRESS NOTES
[facility] Cardiothoracic Surgery Office Follow Up Note     Date of Encounter: 02/09/2021     MRN Number: [id number]  Name: Saman Aguayo  Phone Number: [phone]     Referred By: No ref. provider found  PCP: Vermeesch, Marilyn K, MD    Chief Complaint:    Chief Complaint   Patient presents with   • Follow-up     5 month follow-up with results from CT chest for lung cancer surveillence.    • Lung Cancer       History of Present Illness:    Saman Aguayo is a 65 y.o. male with a history of hypertension, COPD, former tobacco use, valvular heart disease/CM (moderate to severe TR with EF 45%), stage Ia non-small cell undifferentiated lung cancer right middle lobe status post right VATS with resection on 11/18/2016 and stage I A2 adenocarcinoma of the right upper lobe status post redo right VATS with conversion to right thoracotomy with right upper lobectomy and mediastinal lymph node dissection on 3/13/2020 with Dr. Porter.  Presents today for follow-up and discussion of CT chest.  Last seen in office on 8/4/2020.  He is followed by Dr. Almonte and Dr. Lim.  Patient denies any hemoptysis, shortness of breath or unexplained weight loss.  He does have baseline productive cough.    Review of Systems:  Review of Systems   Constitution: Negative for chills, decreased appetite, diaphoresis, fever, malaise/fatigue, night sweats and weight loss.   HENT: Negative for congestion, hoarse voice, sore throat and stridor.    Cardiovascular: Negative for chest pain, claudication, dyspnea on exertion, irregular heartbeat, leg swelling, near-syncope, orthopnea, palpitations, paroxysmal nocturnal dyspnea and syncope.   Respiratory: Positive for cough and sputum production. Negative for hemoptysis, shortness of breath, sleep disturbances due to breathing, snoring and wheezing.    Hematologic/Lymphatic: Negative for adenopathy and bleeding problem. Bruises/bleeds easily.   Skin: Negative for color change, dry skin, itching,  poor wound healing and rash.   Musculoskeletal: Positive for back pain and joint pain. Negative for arthritis, falls and muscle weakness.   Gastrointestinal: Negative for abdominal pain, anorexia, constipation, diarrhea, hematochezia, melena, nausea and vomiting.   Neurological: Negative for difficulty with concentration, disturbances in coordination, dizziness, loss of balance, numbness, seizures, vertigo and weakness.   Psychiatric/Behavioral: Negative for altered mental status, depression, memory loss and substance abuse. The patient does not have insomnia and is not nervous/anxious.    Allergic/Immunologic: Negative for persistent infections.       I have reviewed the review of systems as entered by my clinical support staff and have updated it as appropriate.       Allergies:  Allergies   Allergen Reactions   • No Known Drug Allergy        Medications:      Current Outpatient Medications:   •  Cetirizine HCl (ZYRTEC PO), Take  by mouth., Disp: , Rfl:   •  Multiple Vitamins-Minerals (CENTRUM SILVER 50+MEN PO), Take  by mouth., Disp: , Rfl:     Social History     Socioeconomic History   • Marital status:      Spouse name: Not on file   • Number of children: 1   • Years of education: Not on file   • Highest education level: Not on file   Occupational History   • Occupation: Runs a radiator shop   Tobacco Use   • Smoking status: Former Smoker     Packs/day: 3.00     Years: 50.00     Pack years: 150.00     Types: Cigarettes     Quit date: 2016     Years since quittin.2   • Smokeless tobacco: Never Used   Substance and Sexual Activity   • Alcohol use: Yes     Alcohol/week: 21.0 standard drinks     Types: 21 Cans of beer per week     Comment: 3 cans/daily   • Drug use: No   • Sexual activity: Defer   Social History Narrative    Lives in [place], [place] with spouse       Family History   Problem Relation Age of Onset   • Heart attack Father    • Heart disease Father    • Colon cancer Father    • Colon  "cancer Mother    • Liver cancer Mother        Past Medical History:   Diagnosis Date   • Abnormal ECG    • Abscess of skin    • Arthritis     HANDS AND BACK    • Bradycardia    • Bunion     LEFT FOOT   • COPD (chronic obstructive pulmonary disease) (CMS/HCC)    • Full dentures    • History of echocardiogram 09/21/2016   • Hx of exercise stress test 09/27/2016    DR. BAKER \"IT WAS NORMAL\"   • Low back pain    • Lung cancer, middle lobe (CMS/HCC) 11/8/2016    RIGHT MIDDLE LOBE   • Lung nodule     RUL-7MM.  PRESENTLY HAS, AND FOLLOWS WITH DR. HERNANDEZ.  STATES FOUND \"A COUPLE MONTHS AGO\"   • Premature atrial contraction    • Pulmonary hypertension (CMS/HCC)    • Skin cancer     BASAL CELL-NOSE, LIP   • Wears eyeglasses        Past Surgical History:   Procedure Laterality Date   • BASAL CELL CARCINOMA EXCISION      NOSE, LIP   • BRONCHOSCOPY  2016   • BRONCHOSCOPY N/A 3/13/2020    Procedure: BRONCHOSCOPY;  Surgeon: Chris Porter MD;  Location: [facility] OR;  Service: Cardiothoracic;  Laterality: N/A;   • BUNIONECTOMY Left 2013    GREAT TOE   • COLONOSCOPY  2018   • DIAGNOSTIC LAPAROSCOPY N/A 6/29/2020    Procedure: DIAGNOSTIC LAPAROSCOPY, APPENDECTOMY, CHOLECYSTECOMY WITH CHOLANGIOGRAPHY;  Surgeon: Shady Singh MD;  Location: [facility] OR;  Service: General;  Laterality: N/A;   • ENDOSCOPY N/A 6/3/2020    Procedure: ESOPHAGOGASTRODUODENOSCOPY WITH BIOPSY;  Surgeon: Shady Singh MD;  Location: [facility] ENDOSCOPY;  Service: Gastroenterology;  Laterality: N/A;   • INGUINAL HERNIA REPAIR Left 2002    Dr. Singh/inguinal    • KNEE ARTHROSCOPY Right 12/12/2018    Procedure: Diagnostic arthroscopy right knee with partial medial meniscectomy;  Surgeon: Dinh Nova MD;  Location: [facility] OR;  Service: Orthopedics   • LUNG REMOVAL, PARTIAL  2016    RIGHT MIDDLE LOBE   • MOUTH SURGERY      FULL EXTRACTION OF TEETH   • SKIN BIOPSY     • THORACOSCOPY N/A 11/18/2016    Procedure: BRONCH THORACOSCOPY VIDEO ASSISTED  WITH " "LOBECTOMY, MEDIALSTINAL LYMPH NODE DISECTION;  Surgeon: Chris Porter MD;  Location: [facility];  Service:    • THORACOSCOPY VIDEO ASSISTED WITH LOBECTOMY Right 3/13/2020    Procedure: Redo right THORACOSCOPY VIDEO ASSISTED with right upper lobe wedge resection and with conversion to open thorocotomy for right upper lobectomy with intercostal cryoablation;  Surgeon: Chris Porter MD;  Location: [facility] OR;  Service: Cardiothoracic;  Laterality: Right;       Physical Exam:  Vital Signs:    Vitals:    02/09/21 1039   BP: 135/67   BP Location: Right arm   Patient Position: Sitting   Pulse: 52   Temp: 97.8 °F (36.6 °C)   SpO2: 98%   Weight: 80.8 kg (178 lb 3.2 oz)   Height: 182.9 cm (72\")     Body mass index is 24.17 kg/m².     Physical Exam  Vitals signs and nursing note reviewed.   Constitutional:       Appearance: Normal appearance. He is well-developed and well-groomed.   HENT:      Head: Normocephalic and atraumatic.   Neck:      Musculoskeletal: Neck supple.   Cardiovascular:      Rate and Rhythm: Normal rate and regular rhythm.      Heart sounds: Normal heart sounds, S1 normal and S2 normal. No murmur. No friction rub.   Pulmonary:      Comments: Unlabored, Clear to auscultation bilaterally  Abdominal:      General: Bowel sounds are normal.      Palpations: Abdomen is soft.      Tenderness: There is no abdominal tenderness.   Musculoskeletal:      Right lower leg: No edema.      Left lower leg: No edema.   Lymphadenopathy:      Cervical: No cervical adenopathy.   Skin:     General: Skin is warm and dry.      Comments: Incisions: Right VATS/Thor site intact  No surrounding erythema, masses or induration   Neurological:      Mental Status: He is alert and oriented to person, place, and time.   Psychiatric:         Attention and Perception: Attention normal.         Mood and Affect: Mood normal.         Speech: Speech normal.         Behavior: Behavior is cooperative.         Labs/Imaging:    Ct Chest Without " Contrast Diagnostic    Result Date: 1/22/2021  No evidence of recurrent or metastatic disease within the chest.  450.23 mGy.cm   This study was performed with techniques to keep radiation doses as low as reasonably achievable (ALARA). Individualized dose reduction techniques using automated exposure control or adjustment of mA and/or kV according to the patient size were employed.  This report was finalized on 1/22/2021 8:23 AM by Filipe Licona M.D..     Personally reviewed  Assessment / Plan:  Diagnoses and all orders for this visit:    1. Lung cancer, middle lobe s/p right VATS with lobectomy (Primary)     Saman Aguayo is a 65 y.o. male with a history of hypertension, COPD, former tobacco use, valvular heart disease/CM (moderate to severe TR with EF 45%), stage Ia non-small cell undifferentiated lung cancer right middle lobe status post right VATS with resection on 11/18/2016 and stage I A2 adenocarcinoma of the right upper lobe status post redo right VATS with conversion to right thoracotomy with right upper lobectomy and mediastinal lymph node dissection on 3/13/2020 with Dr. Porter.  Discussed findings of CT chest with patient and wife with no evidence of recurrence or metastatic disease.  Patient follows closely with Dr. Lim.  We will follow-up with patient in 6 months with repeat CT chest or earlier as needed.    Rianna Fernandez, APRTUNG  [facility] Cardiothoracic Surgery    Please note that portions of this note may have been completed with a voice recognition program. Efforts were made to edit the dictations, but occasionally words are mistranscribed.

## 2021-03-08 ENCOUNTER — IMMUNIZATION (OUTPATIENT)
Dept: VACCINE CLINIC | Facility: HOSPITAL | Age: 66
End: 2021-03-08

## 2021-03-08 PROCEDURE — 0001A: CPT | Performed by: INTERNAL MEDICINE

## 2021-03-08 PROCEDURE — 91300 HC SARSCOV02 VAC 30MCG/0.3ML IM: CPT | Performed by: INTERNAL MEDICINE

## 2021-03-18 ENCOUNTER — OFFICE VISIT (OUTPATIENT)
Dept: PULMONOLOGY | Facility: CLINIC | Age: 66
End: 2021-03-18

## 2021-03-18 VITALS
BODY MASS INDEX: 24.11 KG/M2 | HEART RATE: 72 BPM | RESPIRATION RATE: 16 BRPM | DIASTOLIC BLOOD PRESSURE: 64 MMHG | OXYGEN SATURATION: 96 % | HEIGHT: 72 IN | WEIGHT: 178 LBS | SYSTOLIC BLOOD PRESSURE: 128 MMHG

## 2021-03-18 DIAGNOSIS — J30.89 OTHER ALLERGIC RHINITIS: ICD-10-CM

## 2021-03-18 DIAGNOSIS — R06.02 SHORTNESS OF BREATH: Primary | ICD-10-CM

## 2021-03-18 DIAGNOSIS — C34.91 NON-SMALL CELL CANCER OF RIGHT LUNG (HCC): ICD-10-CM

## 2021-03-18 DIAGNOSIS — G47.34 NOCTURNAL HYPOXIA: ICD-10-CM

## 2021-03-18 DIAGNOSIS — R93.89 ABNORMAL CT OF THE CHEST: ICD-10-CM

## 2021-03-18 PROCEDURE — 99214 OFFICE O/P EST MOD 30 MIN: CPT | Performed by: INTERNAL MEDICINE

## 2021-03-18 NOTE — PROGRESS NOTES
"  Chief Complaint   Patient presents with   • Follow-up   • Shortness of Breath       Subjective   Saman Aguayo is a 65 y.o. male.     History of Present Illness   Patient was evaluated today to discuss the results of CT scan, allergic rhinitis and nocturnal hypoxia.    The patient denies any night sweats, weight loss or hemoptysis.     He is beginning to notice slight decrease in exercise capacity although continues to be able to walk a mile without significant interruption.  He also notices mild wheezing and slight increase in cough on occasion.  He insists that it is due to \"drainage\"    Patient says that he has not used nasal sprays and is now noticing worsening nasal congestion as well as occasional runny nose.    Has not used oxygen in more than 3 months, at night. Denies any issues.     The following portions of the patient's history were reviewed and updated as appropriate: allergies, current medications, past family history, past medical history, past social history and past surgical history.    Review of Systems   Constitutional: Negative for chills and fever.   HENT: Positive for rhinorrhea, sinus pressure and sneezing. Negative for sore throat.    Respiratory: Positive for cough, shortness of breath and wheezing.    Psychiatric/Behavioral: Negative for sleep disturbance.       Objective   Visit Vitals  /64   Pulse 72   Resp 16   Ht 182.9 cm (72\")   Wt 80.7 kg (178 lb)   SpO2 96%   BMI 24.14 kg/m²       Physical Exam  Vitals reviewed.   Constitutional:       Appearance: He is well-developed.   HENT:      Head: Normocephalic and atraumatic.      Nose:      Comments: Nasal erythema noted.     Mouth/Throat:      Comments: Oropharynx was somewhat cobblestoned.  Eyes:      Extraocular Movements: Extraocular movements intact.   Cardiovascular:      Rate and Rhythm: Normal rate.   Pulmonary:      Comments: Somewhat decreased air entry.  No obvious wheezing noted.   Musculoskeletal:      Cervical back: Neck " supple.   Neurological:      Mental Status: He is alert.         Assessment/Plan   Diagnoses and all orders for this visit:    1. Shortness of breath (Primary)  -     CT Chest Without Contrast Diagnostic; Future    2. Non-small cell cancer of right lung (CMS/HCC)  -     CT Chest Without Contrast Diagnostic; Future    3. Abnormal CT of the chest    4. Other allergic rhinitis    5. Nocturnal hypoxia           Return in about 18 weeks (around 7/22/2021) for Recheck, Imaging.    DISCUSSION (if any):  Results for orders placed during the hospital encounter of 01/22/21    CT CHEST WITHOUT CONTRAST DIAGNOSTIC    Narrative  PROCEDURE: CT CHEST WO CONTRAST-    HISTORY: Non-small cell lung cancer, monitor; R06.02-Shortness of  breath; C34.91-Malignant neoplasm of unspecified part of right bronchus  or lung    COMPARISON: October 15, 2020.    PROCEDURE:  Multiple axial CT images were obtained from the thoracic  inlet through the upper abdomen without the use of contrast.    FINDINGS:  Soft tissue windows reveal no axillary, hilar or mediastinal adenopathy.  Heart size is normal. The aorta is normal in caliber. There are no  pleural or pericardial effusions. Lung windows reveal postsurgical  change in the right lung. There is no evidence of a new or enlarging  pulmonary nodule. The visualized upper abdomen is unremarkable. Bone  windows reveal no acute osseous abnormalities.    Impression  No evidence of recurrent or metastatic disease within the  chest.    450.23 mGy.cm      This study was performed with techniques to keep radiation doses as low  as reasonably achievable (ALARA). Individualized dose reduction  techniques using automated exposure control or adjustment of mA and/or  kV according to the patient size were employed.    This report was finalized on 1/22/2021 8:23 AM by Filipe Licona M.D..    Since his latest NSCLC was resected in March 2020, we will continue CT scan every 6 months for about 5 years.     Repeat  CT will be scheduled for July 2021.     Patient was asked to use nasal spray for symptoms which are definitely consistent with allergic rhinitis, but he wants to try non medicinal approach for now.    He was discharged on oxygen in March 2020 but has not needed it for 2-3 months.    Will recommend overnight pulse oximetry and if it is negative, we can discontinue oxygen.     I once again reminded the patient that he does have mild COPD, based on the lack spirometry.    I told him that his cough, phlegm production and shortness of breath worsens, he may need to be started on inhaler, at least on a trial basis.       Dictated utilizing Dragon dictation.    This document was electronically signed by Nannette Lim MD on 03/18/21 at 11:57 EDT

## 2021-03-29 ENCOUNTER — IMMUNIZATION (OUTPATIENT)
Dept: VACCINE CLINIC | Facility: HOSPITAL | Age: 66
End: 2021-03-29

## 2021-03-29 PROCEDURE — 0002A: CPT | Performed by: INTERNAL MEDICINE

## 2021-03-29 PROCEDURE — 91300 HC SARSCOV02 VAC 30MCG/0.3ML IM: CPT | Performed by: INTERNAL MEDICINE

## 2021-04-05 ENCOUNTER — OFFICE VISIT (OUTPATIENT)
Dept: INTERNAL MEDICINE | Facility: CLINIC | Age: 66
End: 2021-04-05

## 2021-04-05 VITALS
HEART RATE: 57 BPM | WEIGHT: 179 LBS | TEMPERATURE: 97 F | SYSTOLIC BLOOD PRESSURE: 144 MMHG | DIASTOLIC BLOOD PRESSURE: 80 MMHG | BODY MASS INDEX: 24.24 KG/M2 | HEIGHT: 72 IN | OXYGEN SATURATION: 98 %

## 2021-04-05 DIAGNOSIS — J43.2 CENTRILOBULAR EMPHYSEMA (HCC): Primary | ICD-10-CM

## 2021-04-05 DIAGNOSIS — M15.9 PRIMARY OSTEOARTHRITIS INVOLVING MULTIPLE JOINTS: ICD-10-CM

## 2021-04-05 PROCEDURE — 99213 OFFICE O/P EST LOW 20 MIN: CPT | Performed by: INTERNAL MEDICINE

## 2021-04-05 NOTE — PROGRESS NOTES
"Chief Complaint   Patient presents with   • Follow-up     for HTN. Pt states he's had left leg pain.      Subjective   Saamn Aguayo is a 65 y.o. male.     Here today for follow up of COPD, pulm. HTN, lung cancer, DJD.  He is complaining of left leg pain from hip down to ankle.  It did not involve the hip, but did involve the thigh, knee and calve, not ankle.  There was no redness or warmth.  No injury.  No back pain.  He took tylenol for pain.  It hurt all night, awoke with the pain.  He had COVID vaccine about 36 hours before that, second dose.  The pain lasted about 8 hours.  The pain is now almost completely gone.    He is SOA, more than usual.  His pulmonologist did say he would have to start him on inhaler soon.    He had some pain in right lateral side last week.  Lasted 30-45 minutes.  This pain comes and goes.  It hurts to push over this area. No pain with cough, move or breath.  CT chest negative in January.    He does have some DJD pain in hands.  He does not take any medications.         The following portions of the patient's history were reviewed and updated as appropriate: allergies, current medications, past family history, past medical history, past social history, past surgical history and problem list.    Review of Systems   Constitutional: Negative for activity change, appetite change and unexpected weight change.   Eyes: Negative for visual disturbance.   Respiratory: Positive for shortness of breath and wheezing.    Cardiovascular: Negative for chest pain, palpitations and leg swelling.   Gastrointestinal: Negative for abdominal pain.   Musculoskeletal:        Left leg pain   Neurological: Negative for headaches.   Psychiatric/Behavioral: Negative for dysphoric mood and sleep disturbance. The patient is not nervous/anxious.        Objective   /80   Pulse 57   Temp 97 °F (36.1 °C)   Ht 182.9 cm (72\")   Wt 81.2 kg (179 lb)   SpO2 98%   BMI 24.28 kg/m²   Body mass index is 24.28 " kg/m².  Physical Exam  Vitals and nursing note reviewed.   Constitutional:       General: He is not in acute distress.     Appearance: Normal appearance. He is well-developed. He is not ill-appearing.      Comments: Pleasant and kind man, NAD today   HENT:      Head: Normocephalic and atraumatic.      Right Ear: External ear normal.      Left Ear: External ear normal.   Eyes:      General:         Right eye: No discharge.         Left eye: No discharge.      Extraocular Movements: Extraocular movements intact.      Conjunctiva/sclera: Conjunctivae normal.      Pupils: Pupils are equal, round, and reactive to light.   Neck:      Thyroid: No thyromegaly.   Cardiovascular:      Rate and Rhythm: Normal rate and regular rhythm.      Pulses: Normal pulses.      Heart sounds: Normal heart sounds. No murmur heard.        Comments: No carotid bruits  Pulmonary:      Effort: Pulmonary effort is normal. No respiratory distress.      Breath sounds: Wheezing present.      Comments: Decreased BS, few scattered wheezed throughout all lung fields  Abdominal:      General: Bowel sounds are normal. There is no distension.      Palpations: Abdomen is soft.      Tenderness: There is no abdominal tenderness.   Musculoskeletal:         General: Normal range of motion.      Cervical back: Normal range of motion and neck supple.      Right lower leg: No edema.      Left lower leg: No edema.   Lymphadenopathy:      Cervical: No cervical adenopathy.   Skin:     General: Skin is warm.      Findings: No rash.   Neurological:      General: No focal deficit present.      Mental Status: He is alert and oriented to person, place, and time. Mental status is at baseline.      Cranial Nerves: No cranial nerve deficit.      Motor: No weakness.      Coordination: Coordination normal.      Gait: Gait normal.   Psychiatric:         Mood and Affect: Mood normal.         Behavior: Behavior normal.         Thought Content: Thought content normal.          Judgment: Judgment normal.         Assessment/Plan   Saman Aguayo is here today and the following problems have been addressed:      Diagnoses and all orders for this visit:    1. Centrilobular emphysema (CMS/HCC) (Primary)    2. Primary osteoarthritis involving multiple joints    Follow-up with pulmonologist regarding COPD  Patient declines need for inhalers at this time although he does have mild wheezing and decreased breath sounds on exam  Patient states left leg pain is improving, suspect this was side effect of Covid vaccine  Recommend Tylenol arthritis or over-the-counter Advil as needed for DJD symptoms    Return to clinic in 6 months for Medicare wellness exam with labs or as needed    Please note that portions of this note were completed with a voice recognition program.  Efforts were made to edit dictation, but occasionally words are mistranscribed.

## 2021-06-15 ENCOUNTER — HOSPITAL ENCOUNTER (OUTPATIENT)
Dept: CT IMAGING | Facility: HOSPITAL | Age: 66
Discharge: HOME | End: 2021-06-15
Admitting: INTERNAL MEDICINE

## 2021-06-15 DIAGNOSIS — C34.91 NON-SMALL CELL CANCER OF RIGHT LUNG (HCC): ICD-10-CM

## 2021-06-15 DIAGNOSIS — R06.02 SHORTNESS OF BREATH: ICD-10-CM

## 2021-06-15 PROCEDURE — 71250 CT THORAX DX C-: CPT

## 2021-06-22 ENCOUNTER — OFFICE VISIT (OUTPATIENT)
Dept: PULMONOLOGY | Facility: CLINIC | Age: 66
End: 2021-06-22

## 2021-06-22 VITALS
OXYGEN SATURATION: 98 % | RESPIRATION RATE: 20 BRPM | SYSTOLIC BLOOD PRESSURE: 130 MMHG | HEIGHT: 72 IN | HEART RATE: 53 BPM | WEIGHT: 175 LBS | DIASTOLIC BLOOD PRESSURE: 60 MMHG | BODY MASS INDEX: 23.7 KG/M2

## 2021-06-22 DIAGNOSIS — C34.91 NON-SMALL CELL CANCER OF RIGHT LUNG (HCC): ICD-10-CM

## 2021-06-22 DIAGNOSIS — R06.02 SHORTNESS OF BREATH: Primary | ICD-10-CM

## 2021-06-22 DIAGNOSIS — J43.9 PULMONARY EMPHYSEMA, UNSPECIFIED EMPHYSEMA TYPE (HCC): ICD-10-CM

## 2021-06-22 DIAGNOSIS — J44.9 CHRONIC OBSTRUCTIVE PULMONARY DISEASE, UNSPECIFIED COPD TYPE (HCC): ICD-10-CM

## 2021-06-22 PROCEDURE — 99214 OFFICE O/P EST MOD 30 MIN: CPT | Performed by: INTERNAL MEDICINE

## 2021-06-22 RX ORDER — ZOSTER VACCINE RECOMBINANT, ADJUVANTED 50 MCG/0.5
KIT INTRAMUSCULAR
COMMUNITY
Start: 2021-05-10 | End: 2021-07-27

## 2021-06-22 NOTE — PROGRESS NOTES
"  Chief Complaint   Patient presents with   • Follow-up   • Shortness of Breath       Subjective   Saman Aguayo is a 66 y.o. male.     History of Present Illness   Patient was evaluated today for follow up of CT results. He is complaining of shortness of breath. Patient says that his symptoms have been worsening since the last surgery.     Patient does not report any emergency room visits or recent exacerbations.  The patient says that he occasionally feels chest fullness.    Exercise tolerance has also progressively worsened.     Denies any weight loss or hemoptysis.     The following portions of the patient's history were reviewed and updated as appropriate: allergies, current medications, past family history, past medical history, past social history and past surgical history.    Review of Systems   HENT: Negative for sinus pressure, sinus pain, sore throat and voice change.    Respiratory: Negative for cough, shortness of breath and wheezing.    Cardiovascular: Negative for chest pain, palpitations and leg swelling.   Psychiatric/Behavioral: Negative for sleep disturbance.       Objective   Visit Vitals  /60   Pulse 53   Resp 20   Ht 182.9 cm (72.01\")   Wt 79.4 kg (175 lb)   SpO2 98%   BMI 23.73 kg/m²       Physical Exam  Vitals reviewed.   Constitutional:       Appearance: He is well-developed.   HENT:      Head: Normocephalic and atraumatic.   Eyes:      Extraocular Movements: Extraocular movements intact.   Cardiovascular:      Rate and Rhythm: Normal rate.   Pulmonary:      Comments: Somewhat hyperresonant to percussion.  Somewhat decreased air entry.  No obvious wheezing noted.   Musculoskeletal:      Cervical back: Neck supple.   Neurological:      Mental Status: He is alert.         Assessment/Plan   Diagnoses and all orders for this visit:    1. Shortness of breath (Primary)  -     Pulmonary Function Test; Future    2. Non-small cell cancer of right lung (CMS/HCC)  -     CT Chest Without Contrast " Diagnostic; Future    3. Pulmonary emphysema, unspecified emphysema type (CMS/HCC)  -     Pulmonary Function Test; Future    4. Chronic obstructive pulmonary disease, unspecified COPD type (CMS/HCC)  -     Pulmonary Function Test; Future    Other orders  -     tiotropium bromide-olodaterol (STIOLTO RESPIMAT) 2.5-2.5 MCG/ACT aerosol solution inhaler; Inhale 2 puffs Daily.  Dispense: 1 each; Refill: 5           Return in about 28 weeks (around 1/4/2022) for Recheck, PFT F/U, Imaging, For Hilda.    DISCUSSION (if any):  Last CT scan was reviewed in great detail with the patient. Images reviewed personally.   Results for orders placed during the hospital encounter of 06/15/21    CT Chest Without Contrast Diagnostic    Narrative  PROCEDURE: CT CHEST WO CONTRAST DIAGNOSTIC-    HISTORY: Non-small cell lung cancer, monitor; R06.02-Shortness of  breath; C34.91-Malignant neoplasm of unspecified part of right bronchus  or lung    COMPARISON: 1/22/2021.    PROCEDURE:  Multiple axial CT images were obtained from the thoracic  inlet through the upper abdomen without the use of contrast.    FINDINGS:  Soft tissue windows reveal no axillary, hilar or mediastinal adenopathy.  Heart size is normal. The aorta is normal in caliber. There are no  pleural or pericardial effusions. Lung windows demonstrate postsurgical  changes within the right lung. There are emphysematous changes. No new  or enlarging pulmonary nodules are identified. The visualized upper  abdomen is unremarkable. Bone windows reveal no lytic or destructive  lesions.    Impression  No evidence of recurrent or metastatic disease within the  chest.    360.91 mGy.cm      This study was performed with techniques to keep radiation doses as low  as reasonably achievable (ALARA). Individualized dose reduction  techniques using automated exposure control or adjustment of mA and/or  kV according to the patient size were employed.    This report was finalized on 6/15/2021 3:32  PM by Filipe Licona M.D..      Last PFTs showed mild COPD.  These were performed in 2020.    PFTs will be ordered to be done upon follow up.    Due to symptoms which are suggestive of poorly controlled obstructive lung disease, I have decided to start him on Stiolto.    Although the patient is hesitant, he is willing to try samples for the next 1 month.  These were provided.    Compliance with medications stressed.     Side effects of prescribed medications were discussed with the patient    He will need to continue 6 monthly CT scans for a total of 5 years, given 2 separate NSCLCs.    His latest surgery was in March 2020.     Last Pathology was reviewed. Consistent with Adenocarcinoma.       Dictated utilizing Dragon dictation.    This document was electronically signed by Nannette Lim MD on 06/22/21 at 13:23 EDT

## 2021-07-27 ENCOUNTER — OFFICE VISIT (OUTPATIENT)
Dept: CARDIOLOGY | Facility: CLINIC | Age: 66
End: 2021-07-27

## 2021-07-27 VITALS
BODY MASS INDEX: 24.65 KG/M2 | DIASTOLIC BLOOD PRESSURE: 62 MMHG | WEIGHT: 182 LBS | HEIGHT: 72 IN | OXYGEN SATURATION: 95 % | SYSTOLIC BLOOD PRESSURE: 124 MMHG | HEART RATE: 61 BPM

## 2021-07-27 DIAGNOSIS — J43.2 CENTRILOBULAR EMPHYSEMA (HCC): ICD-10-CM

## 2021-07-27 DIAGNOSIS — I50.22 CHRONIC SYSTOLIC CONGESTIVE HEART FAILURE (HCC): ICD-10-CM

## 2021-07-27 DIAGNOSIS — I73.9 INTERMITTENT CLAUDICATION (HCC): ICD-10-CM

## 2021-07-27 DIAGNOSIS — C34.2 LUNG CANCER, MIDDLE LOBE (HCC): Primary | ICD-10-CM

## 2021-07-27 PROCEDURE — 99213 OFFICE O/P EST LOW 20 MIN: CPT | Performed by: NURSE PRACTITIONER

## 2021-07-27 NOTE — PROGRESS NOTES
"             [facility] Cardiology Office Follow Up Note    Saman Aguayo  [id number]  2021    Primary Care Provider: Vermeesch, Marilyn K, MD   Referring Provider: No ref. provider found    Chief Complaint: Regular follow-up    History of Present Illness:   Mr. Saman Aguayo is a 66 y.o. male who presents to the Cardiology Clinic for regular follow-up.  Patient has a past medical history of valvular heart disease, chronic systolic congestive heart failure, lung cancer, pulmonary hypertension, and COPD.  Last year the patient underwent right lobectomy, repeat lung removal surgery, urgent cholecystectomy, and emergent appendectomy.  At his last cardiology clinic visit, the patient reported shortness of breath and generalized weakness after multiple surgeries within a short time span.  He presents today for regular follow-up.  The patient reports feeling well from a cardiac standpoint today.  He denies any chest pain or exertional chest pain.  He denies any shortness of breath.  Of note, he reports weakness in both legs.  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  He also reports pain with walking that is relieved with rest \"eventually\".  He notes that it feels like his legs sometimes \"just give out\".  The patient has a past medical history of remote tobacco abuse but discontinued cigarette smoking after 50 years.  He specifically denies any numbness, tingling, facial drooping, memory or cognitive issues.  He offers no other complaints or concerns at this time.    Past Cardiac Testin. Last Coronary Angio: None  2. Prior Stress Testin2019   1. EF =52%   2. Normal ECG stress test with no evidence of ischemia.  3. Last Echo: 2019   1. Right ventricular cavity is mildly dilated.   2. Mild mitral valve regurgitation is present   3. Moderate to severe tricuspid valve " regurgitation is present.   4. The left ventricular cavity is borderline dilated.   5. Estimated EF = 45%.   6. Left ventricular systolic function is mildly decreased.   7. Calculated right ventricular systolic pressure from tricuspid regurgitation is 58 mmHg.  4. Prior Holter Monitor: See scanned cardiology documents    Review of Systems:   Review of Systems   Constitutional: Negative for activity change, chills, diaphoresis, fatigue, fever and unexpected weight gain.   Eyes: Negative for blurred vision and visual disturbance.   Respiratory: Negative for apnea, cough, chest tightness, shortness of breath and wheezing.    Cardiovascular: Negative for chest pain, palpitations and leg swelling.   Gastrointestinal: Negative for abdominal distention, blood in stool, GERD and indigestion.   Endocrine: Negative for cold intolerance and heat intolerance.   Genitourinary: Negative for hematuria.   Musculoskeletal: Negative for gait problem, joint swelling and myalgias.        Leg pain with walking   Skin: Negative for color change, pallor and bruise.   Neurological: Positive for weakness. Negative for dizziness, seizures, syncope, light-headedness, numbness, headache and confusion.   Hematological: Does not bruise/bleed easily.   Psychiatric/Behavioral: Negative for behavioral problems, sleep disturbance, suicidal ideas and depressed mood.     I have reviewed and confirmed the accuracy of the ROS as documented by the MA/LPN/RN SAMIRA Montes    I have reviewed and/or updated the patient's past medical, past surgical, family, social history, problem list and allergies as appropriate.     Medications:     Current Outpatient Medications:   •  tiotropium bromide-olodaterol (STIOLTO RESPIMAT) 2.5-2.5 MCG/ACT aerosol solution inhaler, Inhale 2 puffs Daily., Disp: 1 each, Rfl: 5    Physical Exam:  Vital Signs:   Vitals:    07/27/21 0929   BP: 124/62   BP Location: Right arm   Patient Position: Sitting   Cuff Size: Adult  "  Pulse: 61   SpO2: 95%   Weight: 82.6 kg (182 lb)   Height: 182.9 cm (72.01\")     Body mass index is 24.68 kg/m².    Physical Exam  Vitals and nursing note reviewed.   Constitutional:       General: He is not in acute distress.     Appearance: Normal appearance. He is well-developed. He is not toxic-appearing or diaphoretic.   HENT:      Head: Normocephalic and atraumatic.   Eyes:      General: No scleral icterus.     Extraocular Movements: Extraocular movements intact.   Neck:      Trachea: Trachea normal.      Comments: Regular JVD  Cardiovascular:      Rate and Rhythm: Normal rate and regular rhythm.      Pulses: Normal pulses.      Heart sounds: Normal heart sounds. No murmur heard.   No friction rub. No gallop.    Pulmonary:      Effort: Pulmonary effort is normal.      Comments: Decreased breath sounds throughout.   Abdominal:      Palpations: Abdomen is soft.      Tenderness: There is no abdominal tenderness.   Musculoskeletal:         General: No swelling, tenderness, deformity or signs of injury. Normal range of motion.      Cervical back: Neck supple.      Right lower leg: No edema.      Left lower leg: No edema.   Skin:     General: Skin is warm and dry.      Coloration: Skin is not pale.      Findings: No bruising, erythema, lesion or rash.   Neurological:      Mental Status: He is alert and oriented to person, place, and time.      Motor: No weakness.      Gait: Gait normal.   Psychiatric:         Mood and Affect: Mood normal.         Behavior: Behavior normal. Behavior is cooperative.         Thought Content: Thought content does not include suicidal ideation.         Results Review:   I reviewed the patient's new clinical results.    Procedures    Assessment / Plan:   Diagnoses and all orders for this visit:    1. Lung cancer, middle lobe s/p right VATS with lobectomy (Primary)  --Status post complete right lobectomy    2. Centrilobular emphysema (CMS/HCC)  --Remote history of tobacco abuse x's 50 " years  --Followed by pulmonary    3. Chronic systolic congestive heart failure (CMS/HCC)  --AHA Heart Failure Stage C  --NYHA Functional Class I  --Euvolemic   --Follow-up with Dr. Almonte in 1 year or sooner if needed    4. Weakness of legs  --Pain with ambulation, relieved with rest  --Suspicious for PAD with multiple risk factors  --Plan for arterial duplex of bilateral lower extremities      Preventative Cardiology:   Tobacco Cessation: N/A   Advance Care Planning: ACP discussion was declined by the patient. Patient does not have an advance directive, declines further assistance.     Follow Up:   Return in about 1 year (around 7/27/2022) for Follow-up with Dr. Almonte.      Thank you for allowing me to participate in the care of your patient. Please to not hesitate to contact me with additional questions or concerns.     SAMIRA Varela

## 2021-08-04 ENCOUNTER — HOSPITAL ENCOUNTER (OUTPATIENT)
Dept: ULTRASOUND IMAGING | Facility: HOSPITAL | Age: 66
Discharge: HOME | End: 2021-08-04

## 2021-08-04 DIAGNOSIS — I73.9 INTERMITTENT CLAUDICATION (HCC): ICD-10-CM

## 2021-08-04 PROCEDURE — 93923 UPR/LXTR ART STDY 3+ LVLS: CPT

## 2021-08-10 ENCOUNTER — OFFICE VISIT (OUTPATIENT)
Dept: CARDIAC SURGERY | Facility: CLINIC | Age: 66
End: 2021-08-10

## 2021-08-10 VITALS
BODY MASS INDEX: 24.24 KG/M2 | WEIGHT: 179 LBS | OXYGEN SATURATION: 98 % | DIASTOLIC BLOOD PRESSURE: 83 MMHG | HEART RATE: 67 BPM | TEMPERATURE: 97.7 F | SYSTOLIC BLOOD PRESSURE: 149 MMHG | HEIGHT: 72 IN

## 2021-08-10 DIAGNOSIS — R29.898 WEAKNESS OF BOTH LOWER EXTREMITIES: ICD-10-CM

## 2021-08-10 DIAGNOSIS — C34.2 LUNG CANCER, MIDDLE LOBE (HCC): Primary | ICD-10-CM

## 2021-08-10 PROCEDURE — 99213 OFFICE O/P EST LOW 20 MIN: CPT | Performed by: NURSE PRACTITIONER

## 2021-08-10 NOTE — PROGRESS NOTES
[facility] Cardiothoracic Surgery Office Follow Up Note     Date of Encounter: 08/10/2021     YOB: 1955  Name: Saman Aguayo    PCP: Vermeesch, Marilyn K, MD    Chief Complaint:    Chief Complaint   Patient presents with   • Follow-up     6 month follow up for lung cancer,complains of having trouble walking and have no strength in his legs.   • Lung Cancer       History of Present Illness:    Saman Aguayo is a 66 y.o. male with a history of hypertension, COPD, former tobacco use, valvular heart disease/CM (moderate to severe TR with EF 45%), stage Ia non-small cell undifferentiated lung cancer right middle lobe status post right VATS with resection on 11/18/2016 and stage I A2 adenocarcinoma of the right upper lobe status post redo right VATS with conversion to right thoracotomy with right upper lobectomy and mediastinal lymph node dissection on 3/13/2020 with Dr. Porter.  Pt presents for 6 month follow up with CT chest.  Last seen in the office on 2/9/21.  Since last office visit, patient has followed up with Dr. Lim and has been started on a new inhaler.  He denies any unusual cough, hemoptysis or unexplained weight loss.  He has been undergoing vascular work-up with Dr. Almonte's office due to bilateral lower extremity weakness and cramping with standing/walking.  Arterial duplex was obtained which was negative.    Review of Systems:  Review of Systems   Constitutional: Negative. Negative for chills, decreased appetite, diaphoresis, fever, malaise/fatigue, night sweats and weight loss.   HENT: Negative.  Negative for congestion, hoarse voice, sore throat and stridor.    Cardiovascular: Positive for claudication and dyspnea on exertion. Negative for chest pain, irregular heartbeat, leg swelling, near-syncope, orthopnea, palpitations, paroxysmal nocturnal dyspnea and syncope.   Respiratory: Positive for wheezing. Negative for cough, hemoptysis, shortness of breath, sleep disturbances due  to breathing, snoring and sputum production.    Hematologic/Lymphatic: Negative for adenopathy and bleeding problem. Bruises/bleeds easily.   Skin: Negative for color change, dry skin, itching, poor wound healing and rash.   Musculoskeletal: Positive for joint pain, muscle cramps and muscle weakness. Negative for arthritis, back pain and falls.   Gastrointestinal: Negative.  Negative for abdominal pain, anorexia, constipation, diarrhea, hematochezia, melena, nausea and vomiting.   Neurological: Positive for loss of balance. Negative for difficulty with concentration, disturbances in coordination, dizziness, numbness, seizures, vertigo and weakness.   Psychiatric/Behavioral: Negative.  Negative for altered mental status, depression, memory loss and substance abuse. The patient does not have insomnia and is not nervous/anxious.    Allergic/Immunologic: Negative for persistent infections.       Allergies:  Allergies   Allergen Reactions   • No Known Drug Allergy        Medications:      Current Outpatient Medications:   •  tiotropium bromide-olodaterol (STIOLTO RESPIMAT) 2.5-2.5 MCG/ACT aerosol solution inhaler, Inhale 2 puffs Daily., Disp: 1 each, Rfl: 5    Social History     Socioeconomic History   • Marital status:      Spouse name: Not on file   • Number of children: 1   • Years of education: Not on file   • Highest education level: Not on file   Tobacco Use   • Smoking status: Former Smoker     Packs/day: 3.00     Years: 50.00     Pack years: 150.00     Types: Cigarettes     Quit date: 2016     Years since quittin.7   • Smokeless tobacco: Never Used   Vaping Use   • Vaping Use: Never used   Substance and Sexual Activity   • Alcohol use: Yes     Alcohol/week: 21.0 standard drinks     Types: 21 Cans of beer per week     Comment: 3 cans/daily   • Drug use: No   • Sexual activity: Defer       Family History   Problem Relation Age of Onset   • Heart attack Father    • Heart disease Father    • Colon  "cancer Father    • Colon cancer Mother    • Liver cancer Mother        Past Medical History:   Diagnosis Date   • Abnormal ECG    • Abscess of skin    • Arthritis     HANDS AND BACK    • Bradycardia    • Bunion     LEFT FOOT   • COPD (chronic obstructive pulmonary disease) (CMS/HCC)    • Full dentures    • History of echocardiogram 09/21/2016   • Hx of exercise stress test 09/27/2016    DR. BAKER \"IT WAS NORMAL\"   • Low back pain    • Lung cancer, middle lobe (CMS/HCC) 11/8/2016    RIGHT MIDDLE LOBE   • Lung nodule     RUL-7MM.  PRESENTLY HAS, AND FOLLOWS WITH DR. HERNANDEZ.  STATES FOUND \"A COUPLE MONTHS AGO\"   • Premature atrial contraction    • Pulmonary hypertension (CMS/HCC)    • Skin cancer     BASAL CELL-NOSE, LIP   • Wears eyeglasses        Past Surgical History:   Procedure Laterality Date   • BASAL CELL CARCINOMA EXCISION      NOSE, LIP   • BRONCHOSCOPY  2016   • BRONCHOSCOPY N/A 3/13/2020    Procedure: BRONCHOSCOPY;  Surgeon: Chris Porter MD;  Location: [facility] OR;  Service: Cardiothoracic;  Laterality: N/A;   • BUNIONECTOMY Left 2013    GREAT TOE   • COLONOSCOPY  2018   • DIAGNOSTIC LAPAROSCOPY N/A 6/29/2020    Procedure: DIAGNOSTIC LAPAROSCOPY, APPENDECTOMY, CHOLECYSTECOMY WITH CHOLANGIOGRAPHY;  Surgeon: Shady Singh MD;  Location: [facility] OR;  Service: General;  Laterality: N/A;   • ENDOSCOPY N/A 6/3/2020    Procedure: ESOPHAGOGASTRODUODENOSCOPY WITH BIOPSY;  Surgeon: Shady Singh MD;  Location: [facility] ENDOSCOPY;  Service: Gastroenterology;  Laterality: N/A;   • INGUINAL HERNIA REPAIR Left 2002    Dr. Singh/inguinal    • KNEE ARTHROSCOPY Right 12/12/2018    Procedure: Diagnostic arthroscopy right knee with partial medial meniscectomy;  Surgeon: Dinh Nova MD;  Location: [facility] OR;  Service: Orthopedics   • LUNG REMOVAL, PARTIAL  2016    RIGHT MIDDLE LOBE   • MOUTH SURGERY      FULL EXTRACTION OF TEETH   • SKIN BIOPSY     • THORACOSCOPY N/A 11/18/2016    Procedure: BRONCH THORACOSCOPY " "VIDEO ASSISTED  WITH LOBECTOMY, MEDIALSTINAL LYMPH NODE DISECTION;  Surgeon: Chris Porter MD;  Location: [facility];  Service:    • THORACOSCOPY VIDEO ASSISTED WITH LOBECTOMY Right 3/13/2020    Procedure: Redo right THORACOSCOPY VIDEO ASSISTED with right upper lobe wedge resection and with conversion to open thorocotomy for right upper lobectomy with intercostal cryoablation;  Surgeon: Chris Porter MD;  Location: [facility];  Service: Cardiothoracic;  Laterality: Right;       I have reviewed the following portions of the patient's history: allergies, current medications, past family history, past medical history, past social history, past surgical history and problem list and confirm it's accurate.    Physical Exam:  Vital Signs:    Vitals:    08/10/21 1000   BP: 149/83   BP Location: Right arm   Patient Position: Sitting   Pulse: 67   Temp: 97.7 °F (36.5 °C)   SpO2: 98%   Weight: 81.2 kg (179 lb)   Height: 182.9 cm (72\")     Body mass index is 24.28 kg/m².     Physical Exam  Vitals and nursing note reviewed.   Constitutional:       Appearance: Normal appearance. He is well-developed and well-groomed.   HENT:      Head: Normocephalic and atraumatic.   Cardiovascular:      Rate and Rhythm: Normal rate and regular rhythm.      Pulses:           Dorsalis pedis pulses are 2+ on the right side and 2+ on the left side.        Posterior tibial pulses are 2+ on the right side and 2+ on the left side.      Heart sounds: Normal heart sounds, S1 normal and S2 normal. No murmur heard.   No friction rub.   Pulmonary:      Comments: Unlabored, Clear to auscultation bilaterally  Abdominal:      General: Bowel sounds are normal.      Palpations: Abdomen is soft.      Tenderness: There is no abdominal tenderness.   Musculoskeletal:      Cervical back: Neck supple.      Right lower leg: No edema.      Left lower leg: No edema.   Skin:     General: Skin is warm and dry.   Neurological:      Mental Status: He is alert and oriented " to person, place, and time.   Psychiatric:         Attention and Perception: Attention normal.         Mood and Affect: Mood normal.         Speech: Speech normal.         Behavior: Behavior is cooperative.         Labs/Imaging:    US Arterial Doppler Lower Extremity Bilateral    Result Date: 8/4/2021  No significant stenosis identified.  This report was finalized on 8/4/2021 9:40 AM by Filipe Licona M.D..     CT Chest Without Contrast Diagnostic    Result Date: 6/15/2021  No evidence of recurrent or metastatic disease within the chest.  360.91 mGy.cm   This study was performed with techniques to keep radiation doses as low as reasonably achievable (ALARA). Individualized dose reduction techniques using automated exposure control or adjustment of mA and/or kV according to the patient size were employed.  This report was finalized on 6/15/2021 3:32 PM by Filipe Licona M.D..    Personally reviewed  Time Spent: I spent 21 minutes caring for Saman on this date of service. This time includes time spent by me in the following activities: preparing for the visit, reviewing tests, obtaining and/or reviewing a separately obtained history, performing a medically appropriate examination and/or evaluation, ordering medications, tests, or procedures, documenting information in the medical record and independently interpreting results and communicating that information with the patient/family/caregiver.     Assessment / Plan:  Diagnoses and all orders for this visit:    1. Lung cancer, middle lobe s/p right VATS with lobectomy (Primary)    2. Weakness of both lower extremities     Saman Aguayo is a 65 y.o. male with a history of hypertension, COPD, former tobacco use, valvular heart disease/CM (moderate to severe TR with EF 45%), stage Ia non-small cell undifferentiated lung cancer right middle lobe status post right VATS with resection on 11/18/2016 and stage I A2 adenocarcinoma of the right upper lobe status post redo  right VATS with conversion to right thoracotomy with right upper lobectomy and mediastinal lymph node dissection on 3/13/2020 with Dr. Porter.  Discussed findings of stable CT chest with no recurrence or metastatic disease.  Patient would like us to continue following along with Dr. Lim with serial CT chest imaging which we will do.  Patient with ongoing bilateral lower extremity weakness and numbness with palpable DP/PT pulses and recent normal lower extremity arterial duplex.  Advised patient to follow-up with primary care, Dr. Vermeesch to be evaluated and may be originating from his lumbar spine/lab abnormalities.  We will plan to follow up in 6 months with repeat CT chest if not already performed by Dr. Lim.      Rianna Fernandez, APRTUNG   [facility] Cardiothoracic Surgery

## 2021-10-08 ENCOUNTER — OFFICE VISIT (OUTPATIENT)
Dept: INTERNAL MEDICINE | Facility: CLINIC | Age: 66
End: 2021-10-08

## 2021-10-08 VITALS
TEMPERATURE: 97 F | HEIGHT: 72 IN | WEIGHT: 179 LBS | DIASTOLIC BLOOD PRESSURE: 64 MMHG | SYSTOLIC BLOOD PRESSURE: 128 MMHG | HEART RATE: 74 BPM | OXYGEN SATURATION: 94 % | BODY MASS INDEX: 24.24 KG/M2

## 2021-10-08 DIAGNOSIS — J43.2 CENTRILOBULAR EMPHYSEMA (HCC): ICD-10-CM

## 2021-10-08 DIAGNOSIS — I27.20 PULMONARY HYPERTENSION (HCC): ICD-10-CM

## 2021-10-08 DIAGNOSIS — Z12.5 SCREENING PSA (PROSTATE SPECIFIC ANTIGEN): ICD-10-CM

## 2021-10-08 DIAGNOSIS — M15.9 PRIMARY OSTEOARTHRITIS INVOLVING MULTIPLE JOINTS: ICD-10-CM

## 2021-10-08 DIAGNOSIS — I50.22 CHRONIC SYSTOLIC CONGESTIVE HEART FAILURE (HCC): ICD-10-CM

## 2021-10-08 DIAGNOSIS — R53.82 CHRONIC FATIGUE: ICD-10-CM

## 2021-10-08 DIAGNOSIS — Z00.00 ENCOUNTER FOR SUBSEQUENT ANNUAL WELLNESS VISIT (AWV) IN MEDICARE PATIENT: Primary | ICD-10-CM

## 2021-10-08 LAB
ALBUMIN SERPL-MCNC: 4.7 G/DL (ref 3.5–5.2)
ALBUMIN/GLOB SERPL: 1.6 G/DL
ALP SERPL-CCNC: 87 U/L (ref 39–117)
ALT SERPL-CCNC: 19 U/L (ref 1–41)
AST SERPL-CCNC: 16 U/L (ref 1–40)
BASOPHILS # BLD AUTO: 0.05 10*3/MM3 (ref 0–0.2)
BASOPHILS NFR BLD AUTO: 0.7 % (ref 0–1.5)
BILIRUB SERPL-MCNC: 0.5 MG/DL (ref 0–1.2)
BUN SERPL-MCNC: 16 MG/DL (ref 8–23)
BUN/CREAT SERPL: 16.3 (ref 7–25)
CALCIUM SERPL-MCNC: 9.3 MG/DL (ref 8.6–10.5)
CHLORIDE SERPL-SCNC: 99 MMOL/L (ref 98–107)
CHOLEST SERPL-MCNC: 173 MG/DL (ref 0–200)
CHOLEST/HDLC SERPL: 3.04 {RATIO}
CO2 SERPL-SCNC: 24.7 MMOL/L (ref 22–29)
CREAT SERPL-MCNC: 0.98 MG/DL (ref 0.76–1.27)
EOSINOPHIL # BLD AUTO: 0.11 10*3/MM3 (ref 0–0.4)
EOSINOPHIL NFR BLD AUTO: 1.6 % (ref 0.3–6.2)
ERYTHROCYTE [DISTWIDTH] IN BLOOD BY AUTOMATED COUNT: 12.6 % (ref 12.3–15.4)
GLOBULIN SER CALC-MCNC: 3 GM/DL
GLUCOSE SERPL-MCNC: 111 MG/DL (ref 65–99)
HCT VFR BLD AUTO: 52.7 % (ref 37.5–51)
HDLC SERPL-MCNC: 57 MG/DL (ref 40–60)
HGB BLD-MCNC: 18.1 G/DL (ref 13–17.7)
IMM GRANULOCYTES # BLD AUTO: 0.03 10*3/MM3 (ref 0–0.05)
IMM GRANULOCYTES NFR BLD AUTO: 0.4 % (ref 0–0.5)
LDLC SERPL CALC-MCNC: 105 MG/DL (ref 0–100)
LYMPHOCYTES # BLD AUTO: 1.23 10*3/MM3 (ref 0.7–3.1)
LYMPHOCYTES NFR BLD AUTO: 18.1 % (ref 19.6–45.3)
MCH RBC QN AUTO: 31.6 PG (ref 26.6–33)
MCHC RBC AUTO-ENTMCNC: 34.3 G/DL (ref 31.5–35.7)
MCV RBC AUTO: 92.1 FL (ref 79–97)
MONOCYTES # BLD AUTO: 0.53 10*3/MM3 (ref 0.1–0.9)
MONOCYTES NFR BLD AUTO: 7.8 % (ref 5–12)
NEUTROPHILS # BLD AUTO: 4.84 10*3/MM3 (ref 1.7–7)
NEUTROPHILS NFR BLD AUTO: 71.4 % (ref 42.7–76)
NRBC BLD AUTO-RTO: 0 /100 WBC (ref 0–0.2)
PLATELET # BLD AUTO: 169 10*3/MM3 (ref 140–450)
POTASSIUM SERPL-SCNC: 4.9 MMOL/L (ref 3.5–5.2)
PROT SERPL-MCNC: 7.7 G/DL (ref 6–8.5)
PSA SERPL-MCNC: 2.29 NG/ML (ref 0–4)
RBC # BLD AUTO: 5.72 10*6/MM3 (ref 4.14–5.8)
SODIUM SERPL-SCNC: 136 MMOL/L (ref 136–145)
TRIGL SERPL-MCNC: 55 MG/DL (ref 0–150)
VLDLC SERPL CALC-MCNC: 11 MG/DL (ref 5–40)
WBC # BLD AUTO: 6.79 10*3/MM3 (ref 3.4–10.8)

## 2021-10-08 PROCEDURE — 99397 PER PM REEVAL EST PAT 65+ YR: CPT | Performed by: INTERNAL MEDICINE

## 2021-10-08 PROCEDURE — G0008 ADMIN INFLUENZA VIRUS VAC: HCPCS | Performed by: INTERNAL MEDICINE

## 2021-10-08 PROCEDURE — 1170F FXNL STATUS ASSESSED: CPT | Performed by: INTERNAL MEDICINE

## 2021-10-08 PROCEDURE — 1159F MED LIST DOCD IN RCRD: CPT | Performed by: INTERNAL MEDICINE

## 2021-10-08 PROCEDURE — 1126F AMNT PAIN NOTED NONE PRSNT: CPT | Performed by: INTERNAL MEDICINE

## 2021-10-08 PROCEDURE — G0439 PPPS, SUBSEQ VISIT: HCPCS | Performed by: INTERNAL MEDICINE

## 2021-10-08 PROCEDURE — 96160 PT-FOCUSED HLTH RISK ASSMT: CPT | Performed by: INTERNAL MEDICINE

## 2021-10-08 PROCEDURE — 90662 IIV NO PRSV INCREASED AG IM: CPT | Performed by: INTERNAL MEDICINE

## 2021-10-08 NOTE — PROGRESS NOTES
The ABCs of the Annual Wellness Visit  Subsequent Medicare Wellness Visit    Chief Complaint   Patient presents with   • Medicare Wellness-subsequent      Subjective    History of Present Illness:  Saman Aguayo is a 66 y.o. male who presents for a Subsequent Medicare Wellness Visit.  PMH of right middle lobe lung cancer, pulmonary hypertension, COPD, tricuspid regurgitation with mild chronic systolic congestive heart failure.  He states he runs out of energy fast.  He has cramps in legs at night.  He has been taking potassium and it helps a little bit. He does drink a lot of water thru the day.  He uses his inhaler once a day.  He has some wheezing.  He has no tightness or SOA.  He sleeps 6-8 hours a night.  Goes to work at 6 AM and is home at noon and does not work after that as he is too tired.     The following portions of the patient's history were reviewed and   updated as appropriate: allergies, current medications, past family history, past medical history, past social history, past surgical history and problem list.    Compared to one year ago, the patient feels his physical   health is better.    Compared to one year ago, the patient feels his mental   health is the same.    Recent Hospitalizations:  He was not admitted to the hospital during the last year.       Current Medical Providers:  Patient Care Team:  Vermeesch, Marilyn K, MD as PCP - General (Internal Medicine)  Max Almonte MD as Consulting Physician (Cardiology)  Juan Alberto Carty MD as Surgeon (Cardiothoracic Surgery)  Shady Singh MD as Consulting Physician (General Surgery)  Dinh Nova MD as Consulting Physician (Orthopedic Surgery)  Erin Soas APRN as Nurse Practitioner (Cardiothoracic Surgery)  Hilda Smyth APRN as Nurse Practitioner (Pulmonary Disease)  Rianna Fernandez APRN as Nurse Practitioner (Cardiothoracic Surgery)  Janina Mojica APRN as Nurse Practitioner (Nurse Practitioner)  Angel  Jose Michelle MD as Consulting Physician (Otolaryngology)    Outpatient Medications Prior to Visit   Medication Sig Dispense Refill   • POTASSIUM PO Take  by mouth.     • tiotropium bromide-olodaterol (STIOLTO RESPIMAT) 2.5-2.5 MCG/ACT aerosol solution inhaler Inhale 2 puffs Daily. 1 each 5     No facility-administered medications prior to visit.       No opioid medication identified on active medication list. I have reviewed chart for other potential  high risk medication/s and harmful drug interactions in the elderly.          Aspirin is not on active medication list.  Aspirin use is not indicated based on review of current medical condition/s. Risk of harm outweighs potential benefits.  .    Patient Active Problem List   Diagnosis   • Osteoarthritis   • History of tobacco use   • Pulmonary hypertension    • Lung cancer, middle lobe s/p right VATS with lobectomy   • COPD   • Right upper lobe pulmonary nodule   • Valvular heart disease   • Chronic systolic congestive heart failure (HCC)   • History of colon polyps   • Chronic fatigue   • Encounter for subsequent annual wellness visit (AWV) in Medicare patient   • Screening PSA (prostate specific antigen)     Advance Care Planning  Advance Directive is not on file.  ACP discussion was declined by the patient. Patient does not have an advance directive, declines further assistance.    Review of Systems   Constitutional: Positive for fatigue.   HENT: Negative.    Eyes: Negative.    Respiratory: Positive for wheezing.    Cardiovascular: Negative.    Gastrointestinal: Negative.    Endocrine: Negative.    Genitourinary: Negative.    Musculoskeletal: Positive for arthralgias.        Hand pain   Skin: Negative.    Allergic/Immunologic: Positive for environmental allergies.   Neurological: Negative.    Hematological: Bruises/bleeds easily.   Psychiatric/Behavioral: Negative.         Objective    Vitals:    10/08/21 0800   BP: 128/64   Pulse: 74   Temp: 97 °F (36.1 °C)  "  SpO2: 94%   Weight: 81.2 kg (179 lb)   Height: 182.9 cm (72\")   PainSc: 0-No pain     BMI Readings from Last 1 Encounters:   10/08/21 24.28 kg/m²   BMI is within normal parameters. No follow-up required.    Does the patient have evidence of cognitive impairment? No    Physical Exam  Vitals and nursing note reviewed.   Constitutional:       General: He is not in acute distress.     Appearance: Normal appearance. He is well-developed. He is not ill-appearing.      Comments: Kind and pleasant man, appears stated age and in NAD today   HENT:      Head: Normocephalic and atraumatic.      Right Ear: Tympanic membrane, ear canal and external ear normal.      Left Ear: Tympanic membrane, ear canal and external ear normal.   Eyes:      General:         Right eye: No discharge.         Left eye: No discharge.      Extraocular Movements: Extraocular movements intact.      Conjunctiva/sclera: Conjunctivae normal.      Pupils: Pupils are equal, round, and reactive to light.   Neck:      Thyroid: No thyromegaly.      Vascular: No carotid bruit.      Comments: No thyromegaly or mass  Cardiovascular:      Rate and Rhythm: Normal rate and regular rhythm.      Pulses: Normal pulses.      Heart sounds: Normal heart sounds. No murmur heard.     Pulmonary:      Effort: Pulmonary effort is normal. No respiratory distress.      Breath sounds: Normal breath sounds. No wheezing.   Abdominal:      General: Bowel sounds are normal. There is no distension.      Palpations: Abdomen is soft.      Tenderness: There is abdominal tenderness.      Comments: Periumbilical tenderness   Musculoskeletal:         General: Normal range of motion.      Cervical back: Normal range of motion and neck supple.      Right lower leg: No edema.      Left lower leg: No edema.   Lymphadenopathy:      Cervical: No cervical adenopathy.   Skin:     General: Skin is warm.      Findings: No rash.   Neurological:      General: No focal deficit present.      Mental " Status: He is alert and oriented to person, place, and time. Mental status is at baseline.      Cranial Nerves: No cranial nerve deficit.      Motor: No weakness.      Coordination: Coordination normal.      Gait: Gait normal.   Psychiatric:         Mood and Affect: Mood normal.         Behavior: Behavior normal.         Thought Content: Thought content normal.         Judgment: Judgment normal.                 HEALTH RISK ASSESSMENT    Smoking Status:  Social History     Tobacco Use   Smoking Status Former Smoker   • Packs/day: 3.00   • Years: 50.00   • Pack years: 150.00   • Types: Cigarettes   • Quit date: 2016   • Years since quittin.9   Smokeless Tobacco Never Used     Alcohol Consumption:  Social History     Substance and Sexual Activity   Alcohol Use Yes   • Alcohol/week: 21.0 standard drinks   • Types: 21 Cans of beer per week    Comment: 3 cans/daily     Fall Risk Screen:    DENIS Fall Risk Assessment was completed, and patient is at LOW risk for falls.Assessment completed on:10/8/2021    Depression Screening:  PHQ-2/PHQ-9 Depression Screening 10/8/2021   Little interest or pleasure in doing things 0   Feeling down, depressed, or hopeless 0   Total Score 0       Health Habits and Functional and Cognitive Screening:  Functional & Cognitive Status 10/8/2021   Do you have difficulty preparing food and eating? No   Do you have difficulty bathing yourself, getting dressed or grooming yourself? No   Do you have difficulty using the toilet? No   Do you have difficulty moving around from place to place? No   Do you have trouble with steps or getting out of a bed or a chair? No   Current Diet Well Balanced Diet   Dental Exam Not up to date   Eye Exam Not up to date        Eye Exam Comment -   Exercise (times per week) 0 times per week   Current Exercises Include No Regular Exercise   Current Exercise Activities Include -   Do you need help using the phone?  No   Are you deaf or do you have serious  difficulty hearing?  No   Do you need help with transportation? No   Do you need help shopping? No   Do you need help preparing meals?  No   Do you need help with housework?  No   Do you need help with laundry? No   Do you need help taking your medications? No   Do you need help managing money? No   Do you ever drive or ride in a car without wearing a seat belt? No   Have you felt unusual stress, anger or loneliness in the last month? No   Who do you live with? Spouse   If you need help, do you have trouble finding someone available to you? No   Have you been bothered in the last four weeks by sexual problems? No   Do you have difficulty concentrating, remembering or making decisions? -       Age-appropriate Screening Schedule:  Refer to the list below for future screening recommendations based on patient's age, sex and/or medical conditions. Orders for these recommended tests are listed in the plan section. The patient has been provided with a written plan.    Health Maintenance   Topic Date Due   • TDAP/TD VACCINES (2 - Td or Tdap) 01/26/2027   • INFLUENZA VACCINE  Completed   • ZOSTER VACCINE  Completed              Assessment/Plan   CMS Preventative Services Quick Reference  Risk Factors Identified During Encounter  Immunizations Discussed/Encouraged (specific Immunizations; Influenza  The above risks/problems have been discussed with the patient.  Follow up actions/plans if indicated are seen below in the Assessment/Plan Section.  Pertinent information has been shared with the patient in the After Visit Summary.    Diagnoses and all orders for this visit:    1. Encounter for subsequent annual wellness visit (AWV) in Medicare patient (Primary)  -     CBC & Differential  -     Comprehensive Metabolic Panel  -     Lipid Panel With / Chol / HDL Ratio  -     PSA Screen  -     Vitamin B12  -     Testosterone, Free, Total  -     TSH  Patient declines information on living well  High-dose flu vaccine provided  today  Recommend COVID booster shot in 2 to 3 weeks    2. Centrilobular emphysema (HCC)  -     CBC & Differential  -     Comprehensive Metabolic Panel  -     Lipid Panel With / Chol / HDL Ratio  Continue stioloto inhaler every day    3. Primary osteoarthritis involving multiple joints  Take Tylenol as needed for joint pain    4. Screening PSA (prostate specific antigen)  -     PSA Screen    5. Chronic systolic congestive heart failure (HCC)  -     CBC & Differential  -     Comprehensive Metabolic Panel  -     Lipid Panel With / Chol / HDL Ratio    6. Pulmonary hypertension   -     CBC & Differential  -     Comprehensive Metabolic Panel  -     Lipid Panel With / Chol / HDL Ratio    7. Chronic fatigue  -     Vitamin B12  -     Testosterone, Free, Total  -     TSH    Other orders  -     Fluzone High-Dose 65+yrs (3228-0175)    Patient complains of leg cramps, I recommend magnesium sulfate 250 mg daily.  I also recommend he drink Pedialyte once daily while at work in combination with water throughout the day to maintain good hydration    Follow Up:   Return in about 6 months (around 4/8/2022) for Next scheduled follow up.     An After Visit Summary and PPPS were made available to the patient.

## 2021-10-13 LAB
TESTOST FREE SERPL-MCNC: 8.3 PG/ML (ref 6.6–18.1)
TESTOST SERPL-MCNC: 542 NG/DL (ref 264–916)
TSH SERPL DL<=0.005 MIU/L-ACNC: 1.55 UIU/ML (ref 0.27–4.2)
VIT B12 SERPL-MCNC: 687 PG/ML (ref 211–946)

## 2021-10-13 NOTE — PROGRESS NOTES
Please tell Saman that his labs came back several days ago except free testosterone.  B12 level is normal.  Thyroid function test normal.  Prostate screening cancer test in normal range.  Cholesterol panel in good range.  Kidney and liver function normal.  Blood sugar elevated at 114.  It is important that he avoid excessive amounts of sweets.  His complete blood count reveals elevated hemoglobin and hematocrit.  This may be due to his underlying lung disease.  I will repeat his CBC to determine if these numbers increase or decrease on next office visit.  His total testosterone level is in normal range and I am awaiting free testosterone results.  I suspect these will also be normal given level of his total testosterone result.

## 2021-12-09 ENCOUNTER — HOSPITAL ENCOUNTER (OUTPATIENT)
Dept: CT IMAGING | Facility: HOSPITAL | Age: 66
Discharge: HOME | End: 2021-12-09
Admitting: INTERNAL MEDICINE

## 2021-12-09 DIAGNOSIS — C34.91 NON-SMALL CELL CANCER OF RIGHT LUNG (HCC): ICD-10-CM

## 2021-12-09 PROCEDURE — 71250 CT THORAX DX C-: CPT

## 2021-12-27 DIAGNOSIS — C34.2 LUNG CANCER, MIDDLE LOBE (HCC): Primary | ICD-10-CM

## 2022-01-04 ENCOUNTER — OFFICE VISIT (OUTPATIENT)
Dept: PULMONOLOGY | Facility: CLINIC | Age: 67
End: 2022-01-04

## 2022-01-04 VITALS
OXYGEN SATURATION: 95 % | BODY MASS INDEX: 24.52 KG/M2 | RESPIRATION RATE: 16 BRPM | DIASTOLIC BLOOD PRESSURE: 72 MMHG | HEIGHT: 72 IN | HEART RATE: 63 BPM | SYSTOLIC BLOOD PRESSURE: 118 MMHG | WEIGHT: 181 LBS

## 2022-01-04 DIAGNOSIS — J44.9 CHRONIC OBSTRUCTIVE PULMONARY DISEASE, UNSPECIFIED COPD TYPE: ICD-10-CM

## 2022-01-04 DIAGNOSIS — R06.02 SHORTNESS OF BREATH: Primary | ICD-10-CM

## 2022-01-04 DIAGNOSIS — C34.91 NON-SMALL CELL CANCER OF RIGHT LUNG: ICD-10-CM

## 2022-01-04 DIAGNOSIS — R06.02 SHORTNESS OF BREATH: ICD-10-CM

## 2022-01-04 DIAGNOSIS — J43.9 PULMONARY EMPHYSEMA, UNSPECIFIED EMPHYSEMA TYPE: ICD-10-CM

## 2022-01-04 PROCEDURE — 94726 PLETHYSMOGRAPHY LUNG VOLUMES: CPT | Performed by: INTERNAL MEDICINE

## 2022-01-04 PROCEDURE — 94060 EVALUATION OF WHEEZING: CPT | Performed by: INTERNAL MEDICINE

## 2022-01-04 PROCEDURE — 94729 DIFFUSING CAPACITY: CPT | Performed by: INTERNAL MEDICINE

## 2022-01-04 PROCEDURE — 99213 OFFICE O/P EST LOW 20 MIN: CPT | Performed by: NURSE PRACTITIONER

## 2022-01-04 NOTE — PROGRESS NOTES
"Chief Complaint   Patient presents with   • Follow-up   • Breathing Problem         Subjective   Saman Aguayo is a 66 y.o. male.     History of Present Illness   Patient comes today for follow up of shortness of breath and COPD.     Symptoms have been stable since the last clinic visit. Patient reports no recent exacerbations.     Patient is using medications, as prescribed. He uses Stiolto daily. He does not use a rescue inhaler.    Exercise tolerance has also remained stable. He reports shortness of breath if he is doing a lot of activity.    He does not use oxygen.    Quit smoking 4-5 years ago.    The following portions of the patient's history were reviewed and updated as appropriate: allergies, current medications, past family history, past medical history, past social history and past surgical history.      Review of Systems   HENT: Positive for sinus pressure. Negative for sneezing and sore throat.    Respiratory: Negative for cough, chest tightness, shortness of breath and wheezing.        Objective   Visit Vitals  /72   Pulse 63   Resp 16   Ht 182.9 cm (72.01\")   Wt 82.1 kg (181 lb)   SpO2 95%   BMI 24.54 kg/m²       Physical Exam  Vitals reviewed.   HENT:      Head: Atraumatic.      Mouth/Throat:      Mouth: Mucous membranes are moist.      Comments: Crowded oropharynx. Mild drainage noted. Dentures.  Eyes:      Extraocular Movements: Extraocular movements intact.   Cardiovascular:      Rate and Rhythm: Normal rate and regular rhythm.   Pulmonary:      Effort: Pulmonary effort is normal. No respiratory distress.      Comments: Somewhat decreased A/E without wheezing.   Musculoskeletal:      Cervical back: Neck supple.      Comments: Gait normal.   Skin:     General: Skin is warm.   Neurological:      Mental Status: He is alert and oriented to person, place, and time.             Assessment/Plan   Diagnoses and all orders for this visit:    1. Shortness of breath (Primary)    2. Chronic obstructive " pulmonary disease, unspecified COPD type (HCC)    3. Non-small cell cancer of right lung (HCC)  -     CT Chest Without Contrast Diagnostic; Future    Other orders  -     tiotropium bromide-olodaterol (STIOLTO RESPIMAT) 2.5-2.5 MCG/ACT aerosol solution inhaler; Inhale 2 puffs Daily.  Dispense: 1 each; Refill: 5           Return in about 3 months (around 4/4/2022) for Recheck, For Dr. Lim.    DISCUSSION (if any):  No change to the current medications has been made.  Overall, his COPD symptoms are stable.  He should continue using Stiolto on a daily basis.    Compliance with medications stressed.     Side effects of prescribed medications discussed with the patient.    He will need to continue CT surveillance for recurrent lung cancer every 6 months.  The last CT was December 9, 2021 which shows no recurrence of cancer.  He will be due for the next CT in June 2022 and this has been ordered today.    Study Result    Narrative & Impression   PROCEDURE: CT CHEST WO CONTRAST DIAGNOSTIC-     HISTORY: Non-small cell lung cancer, monitor; C34.91-Malignant neoplasm  of unspecified part of right bronchus or lung     COMPARISON: 06/15/2021.     PROCEDURE:  Multiple axial CT images were obtained from the thoracic  inlet through the upper abdomen without the use of contrast.      FINDINGS:    Heart size is normal. The aorta is normal in caliber.  There are  postoperative changes of the right lung. There is emphysema. There is no  mass, adenopathy, or pleural effusion. The visualized upper abdomen is  unremarkable. Bone windows reveal no acute osseous abnormalities.     IMPRESSION:  Stable exam without evidence of local tumor recurrence.     359.91 mGy.cm                Dictated utilizing Dragon dictation.    This document was electronically signed by SAMIRA Wylie January 4, 2022  09:37 EST

## 2022-01-25 ENCOUNTER — APPOINTMENT (OUTPATIENT)
Dept: CT IMAGING | Facility: HOSPITAL | Age: 67
End: 2022-01-25

## 2022-02-04 ENCOUNTER — TELEPHONE (OUTPATIENT)
Dept: INTERNAL MEDICINE | Facility: CLINIC | Age: 67
End: 2022-02-04

## 2022-02-04 RX ORDER — ALBUTEROL SULFATE 90 UG/1
2 AEROSOL, METERED RESPIRATORY (INHALATION) EVERY 6 HOURS PRN
Qty: 18 G | Refills: 0 | Status: SHIPPED | OUTPATIENT
Start: 2022-02-04 | End: 2022-10-14 | Stop reason: SDUPTHER

## 2022-02-06 ENCOUNTER — HOSPITAL ENCOUNTER (EMERGENCY)
Facility: HOSPITAL | Age: 67
Discharge: HOME | End: 2022-02-07
Attending: EMERGENCY MEDICINE | Admitting: EMERGENCY MEDICINE

## 2022-02-06 ENCOUNTER — APPOINTMENT (OUTPATIENT)
Dept: GENERAL RADIOLOGY | Facility: HOSPITAL | Age: 67
End: 2022-02-06

## 2022-02-06 ENCOUNTER — APPOINTMENT (OUTPATIENT)
Dept: CT IMAGING | Facility: HOSPITAL | Age: 67
End: 2022-02-06

## 2022-02-06 DIAGNOSIS — J12.82 PNEUMONIA DUE TO COVID-19 VIRUS: Primary | ICD-10-CM

## 2022-02-06 DIAGNOSIS — U07.1 PNEUMONIA DUE TO COVID-19 VIRUS: Primary | ICD-10-CM

## 2022-02-06 LAB
ALBUMIN SERPL-MCNC: 3.9 G/DL (ref 3.5–5.2)
ALBUMIN/GLOB SERPL: 1.2 G/DL
ALP SERPL-CCNC: 70 U/L (ref 39–117)
ALT SERPL W P-5'-P-CCNC: 25 U/L (ref 1–41)
ANION GAP SERPL CALCULATED.3IONS-SCNC: 14.5 MMOL/L (ref 5–15)
AST SERPL-CCNC: 28 U/L (ref 1–40)
BASOPHILS # BLD AUTO: 0.01 10*3/MM3 (ref 0–0.2)
BASOPHILS NFR BLD AUTO: 0.2 % (ref 0–1.5)
BILIRUB SERPL-MCNC: 0.4 MG/DL (ref 0–1.2)
BUN SERPL-MCNC: 12 MG/DL (ref 8–23)
BUN/CREAT SERPL: 14.6 (ref 7–25)
CALCIUM SPEC-SCNC: 8.5 MG/DL (ref 8.6–10.5)
CHLORIDE SERPL-SCNC: 97 MMOL/L (ref 98–107)
CO2 SERPL-SCNC: 20.5 MMOL/L (ref 22–29)
CREAT SERPL-MCNC: 0.82 MG/DL (ref 0.76–1.27)
DEPRECATED RDW RBC AUTO: 45.1 FL (ref 37–54)
EOSINOPHIL # BLD AUTO: 0.01 10*3/MM3 (ref 0–0.4)
EOSINOPHIL NFR BLD AUTO: 0.2 % (ref 0.3–6.2)
ERYTHROCYTE [DISTWIDTH] IN BLOOD BY AUTOMATED COUNT: 13.2 % (ref 12.3–15.4)
GFR SERPL CREATININE-BSD FRML MDRD: 93 ML/MIN/1.73
GLOBULIN UR ELPH-MCNC: 3.2 GM/DL
GLUCOSE SERPL-MCNC: 110 MG/DL (ref 65–99)
HCT VFR BLD AUTO: 48.9 % (ref 37.5–51)
HGB BLD-MCNC: 16.8 G/DL (ref 13–17.7)
IMM GRANULOCYTES # BLD AUTO: 0.02 10*3/MM3 (ref 0–0.05)
IMM GRANULOCYTES NFR BLD AUTO: 0.5 % (ref 0–0.5)
LYMPHOCYTES # BLD AUTO: 0.63 10*3/MM3 (ref 0.7–3.1)
LYMPHOCYTES NFR BLD AUTO: 15 % (ref 19.6–45.3)
MCH RBC QN AUTO: 31.3 PG (ref 26.6–33)
MCHC RBC AUTO-ENTMCNC: 34.4 G/DL (ref 31.5–35.7)
MCV RBC AUTO: 91.1 FL (ref 79–97)
MONOCYTES # BLD AUTO: 0.46 10*3/MM3 (ref 0.1–0.9)
MONOCYTES NFR BLD AUTO: 10.9 % (ref 5–12)
NEUTROPHILS NFR BLD AUTO: 3.08 10*3/MM3 (ref 1.7–7)
NEUTROPHILS NFR BLD AUTO: 73.2 % (ref 42.7–76)
NRBC BLD AUTO-RTO: 0 /100 WBC (ref 0–0.2)
PLATELET # BLD AUTO: 130 10*3/MM3 (ref 140–450)
PMV BLD AUTO: 11 FL (ref 6–12)
POTASSIUM SERPL-SCNC: 4.3 MMOL/L (ref 3.5–5.2)
PROCALCITONIN SERPL-MCNC: 0.07 NG/ML (ref 0–0.25)
PROT SERPL-MCNC: 7.1 G/DL (ref 6–8.5)
RBC # BLD AUTO: 5.37 10*6/MM3 (ref 4.14–5.8)
SARS-COV-2 RNA PNL SPEC NAA+PROBE: DETECTED
SODIUM SERPL-SCNC: 132 MMOL/L (ref 136–145)
WBC NRBC COR # BLD: 4.21 10*3/MM3 (ref 3.4–10.8)

## 2022-02-06 PROCEDURE — 25010000002 DEXAMETHASONE PER 1 MG: Performed by: EMERGENCY MEDICINE

## 2022-02-06 PROCEDURE — 99283 EMERGENCY DEPT VISIT LOW MDM: CPT

## 2022-02-06 PROCEDURE — 96374 THER/PROPH/DIAG INJ IV PUSH: CPT

## 2022-02-06 PROCEDURE — 80053 COMPREHEN METABOLIC PANEL: CPT | Performed by: EMERGENCY MEDICINE

## 2022-02-06 PROCEDURE — 87040 BLOOD CULTURE FOR BACTERIA: CPT | Performed by: EMERGENCY MEDICINE

## 2022-02-06 PROCEDURE — 87635 SARS-COV-2 COVID-19 AMP PRB: CPT | Performed by: EMERGENCY MEDICINE

## 2022-02-06 PROCEDURE — 84145 PROCALCITONIN (PCT): CPT | Performed by: EMERGENCY MEDICINE

## 2022-02-06 PROCEDURE — 85025 COMPLETE CBC W/AUTO DIFF WBC: CPT | Performed by: EMERGENCY MEDICINE

## 2022-02-06 PROCEDURE — 93005 ELECTROCARDIOGRAM TRACING: CPT | Performed by: EMERGENCY MEDICINE

## 2022-02-06 PROCEDURE — 71045 X-RAY EXAM CHEST 1 VIEW: CPT

## 2022-02-06 RX ORDER — DEXAMETHASONE SODIUM PHOSPHATE 4 MG/ML
6 INJECTION, SOLUTION INTRA-ARTICULAR; INTRALESIONAL; INTRAMUSCULAR; INTRAVENOUS; SOFT TISSUE ONCE
Status: COMPLETED | OUTPATIENT
Start: 2022-02-06 | End: 2022-02-06

## 2022-02-06 RX ORDER — ALBUTEROL SULFATE 90 UG/1
2 AEROSOL, METERED RESPIRATORY (INHALATION) ONCE
Status: COMPLETED | OUTPATIENT
Start: 2022-02-06 | End: 2022-02-06

## 2022-02-06 RX ORDER — ACETAMINOPHEN 325 MG/1
975 TABLET ORAL ONCE
Status: COMPLETED | OUTPATIENT
Start: 2022-02-06 | End: 2022-02-06

## 2022-02-06 RX ADMIN — ACETAMINOPHEN 975 MG: 325 TABLET ORAL at 22:15

## 2022-02-06 RX ADMIN — DEXAMETHASONE SODIUM PHOSPHATE 6 MG: 4 INJECTION, SOLUTION INTRA-ARTICULAR; INTRALESIONAL; INTRAMUSCULAR; INTRAVENOUS; SOFT TISSUE at 23:32

## 2022-02-06 RX ADMIN — SODIUM CHLORIDE 1000 ML: 9 INJECTION, SOLUTION INTRAVENOUS at 22:15

## 2022-02-06 RX ADMIN — ALBUTEROL SULFATE 2 PUFF: 90 AEROSOL, METERED RESPIRATORY (INHALATION) at 22:15

## 2022-02-07 ENCOUNTER — APPOINTMENT (OUTPATIENT)
Dept: CT IMAGING | Facility: HOSPITAL | Age: 67
End: 2022-02-07

## 2022-02-07 ENCOUNTER — READMISSION MANAGEMENT (OUTPATIENT)
Dept: CALL CENTER | Facility: HOSPITAL | Age: 67
End: 2022-02-07

## 2022-02-07 VITALS
WEIGHT: 180 LBS | TEMPERATURE: 99.7 F | RESPIRATION RATE: 16 BRPM | HEART RATE: 55 BPM | SYSTOLIC BLOOD PRESSURE: 124 MMHG | HEIGHT: 72 IN | BODY MASS INDEX: 24.38 KG/M2 | DIASTOLIC BLOOD PRESSURE: 87 MMHG | OXYGEN SATURATION: 96 %

## 2022-02-07 LAB — D-LACTATE SERPL-SCNC: 0.6 MMOL/L (ref 0.5–2)

## 2022-02-07 PROCEDURE — 83605 ASSAY OF LACTIC ACID: CPT | Performed by: EMERGENCY MEDICINE

## 2022-02-07 PROCEDURE — 25010000002 IOPAMIDOL 61 % SOLUTION: Performed by: EMERGENCY MEDICINE

## 2022-02-07 PROCEDURE — 71275 CT ANGIOGRAPHY CHEST: CPT

## 2022-02-07 RX ORDER — DEXAMETHASONE 6 MG/1
6 TABLET ORAL
Qty: 7 TABLET | Refills: 0 | Status: SHIPPED | OUTPATIENT
Start: 2022-02-07 | End: 2022-02-22

## 2022-02-07 RX ORDER — AZITHROMYCIN 250 MG/1
TABLET, FILM COATED ORAL
Qty: 6 TABLET | Refills: 0 | Status: SHIPPED | OUTPATIENT
Start: 2022-02-07 | End: 2022-02-22

## 2022-02-07 RX ADMIN — IOPAMIDOL 100 ML: 612 INJECTION, SOLUTION INTRAVENOUS at 01:19

## 2022-02-07 NOTE — ED PROVIDER NOTES
"TRIAGE CHIEF COMPLAINT:     Nursing and triage notes reviewed    Chief Complaint   Patient presents with   • Shortness of Breath   • Cough      HPI: Saman Aguayo is a 66 y.o. male who presents to the emergency department complaining of a 4-day history of cough, fever, chills, congestion, body aches, generalized malaise, and fatigue.  Patient states he has become short of breath over the past few days.  Patient states he had a positive home Covid test on Thursday, 4 days previously.  He denies abdominal pain, nausea, vomiting.  Patient does have a history of multiple medical problems and states that he only has 1 lung as one had been removed previously due to cancer.    REVIEW OF SYSTEMS: All other systems reviewed and are negative     PAST MEDICAL HISTORY:   Past Medical History:   Diagnosis Date   • Abnormal ECG    • Abscess of skin    • Arthritis     HANDS AND BACK    • Bradycardia    • Bunion     LEFT FOOT   • COPD (chronic obstructive pulmonary disease) (HCC)    • Full dentures    • History of echocardiogram 2016   • Hx of exercise stress test 2016    DR. BAKER \"IT WAS NORMAL\"   • Low back pain    • Lung cancer, middle lobe (HCC) 2016    RIGHT MIDDLE LOBE   • Lung nodule     RUL-7MM.  PRESENTLY HAS, AND FOLLOWS WITH DR. HERNANDEZ.  STATES FOUND \"A COUPLE MONTHS AGO\"   • Premature atrial contraction    • Pulmonary hypertension (HCC)    • Skin cancer     BASAL CELL-NOSE, LIP   • Wears eyeglasses         FAMILY HISTORY:   Family History   Problem Relation Age of Onset   • Heart attack Father    • Heart disease Father    • Colon cancer Father    • Colon cancer Mother    • Liver cancer Mother         SOCIAL HISTORY:   Social History     Socioeconomic History   • Marital status:    • Number of children: 1   Tobacco Use   • Smoking status: Former Smoker     Packs/day: 3.00     Years: 50.00     Pack years: 150.00     Types: Cigarettes     Quit date: 2016     Years since quittin.2   • " Smokeless tobacco: Never Used   Vaping Use   • Vaping Use: Never used   Substance and Sexual Activity   • Alcohol use: Yes     Alcohol/week: 21.0 standard drinks     Types: 21 Cans of beer per week     Comment: 3 cans/daily   • Drug use: No   • Sexual activity: Defer        SURGICAL HISTORY:   Past Surgical History:   Procedure Laterality Date   • BASAL CELL CARCINOMA EXCISION      NOSE, LIP   • BRONCHOSCOPY  2016   • BRONCHOSCOPY N/A 3/13/2020    Procedure: BRONCHOSCOPY;  Surgeon: Chris Porter MD;  Location: [facility] OR;  Service: Cardiothoracic;  Laterality: N/A;   • BUNIONECTOMY Left 2013    GREAT TOE   • COLONOSCOPY  2018   • DIAGNOSTIC LAPAROSCOPY N/A 6/29/2020    Procedure: DIAGNOSTIC LAPAROSCOPY, APPENDECTOMY, CHOLECYSTECOMY WITH CHOLANGIOGRAPHY;  Surgeon: Shady Singh MD;  Location: [facility] OR;  Service: General;  Laterality: N/A;   • ENDOSCOPY N/A 6/3/2020    Procedure: ESOPHAGOGASTRODUODENOSCOPY WITH BIOPSY;  Surgeon: Shady Singh MD;  Location: [facility] ENDOSCOPY;  Service: Gastroenterology;  Laterality: N/A;   • INGUINAL HERNIA REPAIR Left 2002    Dr. Singh/inguinal    • KNEE ARTHROSCOPY Right 12/12/2018    Procedure: Diagnostic arthroscopy right knee with partial medial meniscectomy;  Surgeon: Dinh Nova MD;  Location: [facility] OR;  Service: Orthopedics   • LUNG REMOVAL, PARTIAL  2016    RIGHT MIDDLE LOBE   • MOUTH SURGERY      FULL EXTRACTION OF TEETH   • SKIN BIOPSY     • THORACOSCOPY N/A 11/18/2016    Procedure: BRONCH THORACOSCOPY VIDEO ASSISTED  WITH LOBECTOMY, MEDIALSTINAL LYMPH NODE DISECTION;  Surgeon: Chris Porter MD;  Location:  [facility] OR;  Service:    • THORACOSCOPY VIDEO ASSISTED WITH LOBECTOMY Right 3/13/2020    Procedure: Redo right THORACOSCOPY VIDEO ASSISTED with right upper lobe wedge resection and with conversion to open thorocotomy for right upper lobectomy with intercostal cryoablation;  Surgeon: Chris Porter MD;  Location: [facility] OR;  Service: Cardiothoracic;   Laterality: Right;        CURRENT MEDICATIONS:      Medication List      ASK your doctor about these medications    albuterol sulfate  (90 Base) MCG/ACT inhaler  Commonly known as: PROVENTIL HFA;VENTOLIN HFA;PROAIR HFA  Inhale 2 puffs Every 6 (Six) Hours As Needed for Wheezing or Shortness of Air.     POTASSIUM PO     tiotropium bromide-olodaterol 2.5-2.5 MCG/ACT aerosol solution inhaler  Commonly known as: STIOLTO RESPIMAT  Inhale 2 puffs Daily.             ALLERGIES: No known drug allergy     PHYSICAL EXAM:   VITAL SIGNS:   Vitals:    02/06/22 2202   BP:    Pulse: 71   Resp:    Temp:    SpO2: 93%      CONSTITUTIONAL: Awake, oriented, appears nontoxic   HENT: Atraumatic, normocephalic, oral mucosa pink and moist, airway patent. Nares patent without drainage. External ears normal.   EYES: Conjunctivae clear   NECK: Trachea midline, nontender, supple   CARDIOVASCULAR: Normal heart rate, Normal rhythm, No murmurs, rubs, gallops   PULMONARY/CHEST: Few scattered wheezes but bilateral lung sounds are present.  Overall good air movement.  ABDOMINAL: Nondistended, soft, nontender - no rebound or guarding.   NEUROLOGIC: Nonfocal, moving all four extremities, no gross sensory or motor deficits.   EXTREMITIES: No clubbing, cyanosis, or edema   SKIN: Warm, Dry, No erythema, No rash     ED COURSE / MEDICAL DECISION MAKING:   Saman Aguayo is a 66 y.o. male who presents to the emergency department for evaluation of shortness of breath, fever, chills, symptoms of COVID-19.  Patient is nondistressed on arrival in the emergency department.  His vital signs are stable on arrival and he is breathing comfortably on room air.  He does have some scattered wheezes.  Exam otherwise largely unremarkable.    EKG on arrival interpreted by me reveals sinus rhythm with sinus arrhythmia.  There is a rate of 52 bpm.  No acute ischemic findings.  This is an atypical appearing EKG.    Chest x-ray per my interpretation reveals possible  developing left-sided infiltrate.    Laboratory tests do confirm COVID-19 diagnosis.  Patient has normal procalcitonin, lactic acid, white blood cell count.    Patient was given albuterol and steroids in the emergency department.  He did report improvement in his symptoms.  I also obtain a CT pulmonary angiogram which revealed per radiology interpretation no evidence of pulmonary embolus and possible developing left lower lobe pneumonia.  This is likely secondary to the COVID-19 virus.  We will treat patient symptomatically with return precautions given that his oxygen has remained stable in the emergency department.  Will provide portable pulse ox monitoring to help with determine need for him to return to the emergency department.  Patient was comfortable with this plan and discharged in good condition.    DECISION TO DISCHARGE/ADMIT: see ED care timeline     FINAL IMPRESSION:   1 --COVID-19 pneumonia  2 --   3 --     Electronically signed by: Clara Norwood MD, 2/6/2022 22:09 Clara Peter MD  02/07/22 0250

## 2022-02-07 NOTE — DISCHARGE INSTRUCTIONS
You are developing pneumonia secondary to the COVID-19 virus.  Currently your vital signs are stable, and sending you home with a method to monitor your oxygen saturation.  If this number begins to consistently drop below 88% you need to return to the emergency department immediately.

## 2022-02-07 NOTE — OUTREACH NOTE
Prep Survey      Responses   [facility] patient discharged from? [facility]   Is LACE score < 7 ? No   Emergency Room discharge w/ pulse ox? Yes   Eligibility Readm Mgmt   Discharge diagnosis shortness of breath,  PN r/t Covid   Does the patient have one of the following disease processes/diagnoses(primary or secondary)? COVID-19   Does the patient have Home health ordered? No   Is there a DME ordered? Yes   What DME was ordered? Sent home w/ pulse ox   General alerts for this patient ED Discharge - call x 3 only   Prep survey completed? Yes          Diya Pace RN

## 2022-02-08 ENCOUNTER — READMISSION MANAGEMENT (OUTPATIENT)
Dept: CALL CENTER | Facility: HOSPITAL | Age: 67
End: 2022-02-08

## 2022-02-08 NOTE — OUTREACH NOTE
COVID-19 Week 1 Survey      Responses   [facility] patient discharged from? [facility]   Does the patient have one of the following disease processes/diagnoses(primary or secondary)? COVID-19   COVID-19 underlying condition? None   Call Number Call 1   Week 1 Call successful? No   Discharge diagnosis shortness of breath,  PN r/t Jazmine Murphy RN

## 2022-02-09 ENCOUNTER — TELEPHONE (OUTPATIENT)
Dept: INTERNAL MEDICINE | Facility: CLINIC | Age: 67
End: 2022-02-09

## 2022-02-09 ENCOUNTER — READMISSION MANAGEMENT (OUTPATIENT)
Dept: CALL CENTER | Facility: HOSPITAL | Age: 67
End: 2022-02-09

## 2022-02-09 DIAGNOSIS — J43.2 CENTRILOBULAR EMPHYSEMA: ICD-10-CM

## 2022-02-09 DIAGNOSIS — U07.1 COVID-19: Primary | ICD-10-CM

## 2022-02-09 RX ORDER — ALBUTEROL SULFATE 1.25 MG/3ML
1 SOLUTION RESPIRATORY (INHALATION) EVERY 4 HOURS PRN
Qty: 50 EACH | Refills: 2 | Status: SHIPPED | OUTPATIENT
Start: 2022-02-09 | End: 2022-12-09 | Stop reason: HOSPADM

## 2022-02-09 RX ORDER — DEXTROMETHORPHAN HYDROBROMIDE AND PROMETHAZINE HYDROCHLORIDE 15; 6.25 MG/5ML; MG/5ML
SYRUP ORAL
Qty: 118 ML | Refills: 0 | Status: SHIPPED | OUTPATIENT
Start: 2022-02-09 | End: 2022-02-22

## 2022-02-09 NOTE — OUTREACH NOTE
COVID-19 Week 1 Survey      Responses   [facility] patient discharged from? [facility]   Does the patient have one of the following disease processes/diagnoses(primary or secondary)? COVID-19   COVID-19 underlying condition? None   Call Number Call 2   Week 1 Call successful? No   Discharge diagnosis shortness of breath,  PN r/t Jazmine Lo RN

## 2022-02-09 NOTE — TELEPHONE ENCOUNTER
Pt notified, states he's using inhalers twice daily and still feels short of breath. Pt states his wife has a neb machine. Advised him albuterol will be sent to pharmacy.

## 2022-02-09 NOTE — TELEPHONE ENCOUNTER
Patient wife called and states patient took at home covid test on 2/4/22 and was positive. Went to the er on 2/6/22 for covid and pcr was also positive. Patient was told at er that he has pneumonia. They sent him home with a pulse ox and he ranges 88-91%. She states he has not done the mucinex for a few days. They wanted to schedule a visit but have no capability to do a video visit. She would like a call back.

## 2022-02-09 NOTE — TELEPHONE ENCOUNTER
Pt is already on abx, inhalers, and looks like a steroid, is there anything else Pt can/should do?

## 2022-02-09 NOTE — TELEPHONE ENCOUNTER
Encourage patient to take plain Mucinex expectorant once a day in the morning.  If he has an incentive spirometer at home from previous hospital stay he needs to use this every 1-2 hours 10 breaths.  I have sent in promethazine cough syrup to use twice daily as needed.  Please explained to them there is nothing that I can do more than encouraged him to use his Stilito inhaler as well as albuterol inhaler.  If he is too short of breath to use inhaler, please let me know and I will send in nebulizer and albuterol solution for that.

## 2022-02-10 ENCOUNTER — PATIENT OUTREACH (OUTPATIENT)
Dept: CASE MANAGEMENT | Facility: OTHER | Age: 67
End: 2022-02-10

## 2022-02-10 ENCOUNTER — DOCUMENTATION (OUTPATIENT)
Dept: PULMONOLOGY | Facility: CLINIC | Age: 67
End: 2022-02-10

## 2022-02-10 ENCOUNTER — READMISSION MANAGEMENT (OUTPATIENT)
Dept: CALL CENTER | Facility: HOSPITAL | Age: 67
End: 2022-02-10

## 2022-02-10 DIAGNOSIS — J44.9 CHRONIC OBSTRUCTIVE PULMONARY DISEASE, UNSPECIFIED COPD TYPE: Primary | ICD-10-CM

## 2022-02-10 DIAGNOSIS — Z85.118 HISTORY OF LUNG CANCER: ICD-10-CM

## 2022-02-10 RX ORDER — PREDNISONE 20 MG/1
TABLET ORAL
Qty: 10 TABLET | Refills: 0 | Status: SHIPPED | OUTPATIENT
Start: 2022-02-10 | End: 2022-02-22

## 2022-02-10 NOTE — PROGRESS NOTES
The patient's wife called and stated the patient was seen in the ER on February 6 and was diagnosed with Covid related pneumonia.  I called the patient's wife back and discussed his symptoms.  She states he has been short of breath the last few days and he was prescribed azithromycin and dexamethasone in the ER and has 1 day of medication left for each of these.  His primary care ordered a nebulizer with medication which his wife has picked up today.  His oxygen saturation per his wife is dropping to 88% and sometimes to 82%.  He does not have oxygen at home.  I have asked her to start the nebulizer treatments and do them at least 3 times a day and monitor his oxygen levels.  If his oxygen levels continue to be 88% or less I have asked her to call our office as he may need to have oxygen arranged for home.  I have prescribed 5 days of prednisone that he should start after finishing dexamethasone.

## 2022-02-10 NOTE — OUTREACH NOTE
Ambulatory Case Management Note    Patient Outreach    RN-ACM patient outreach.  [facility] ED visit 2/6/2022. Impression: pneumonia due to Covid 19 virus.  Patient remains short of breath,non productive cough has completed  nebulizer treatment without relief.  States oxygen saturations ranging between 87-92%. No oxygen in home. ACM outreach to pulmonary provider for advisement.    Veronika Barry RN  Ambulatory Case Management    2/10/2022, 11:28 EST

## 2022-02-10 NOTE — PROGRESS NOTES
I received a message from the oncology ambulatory  regarding the patient's oxygen saturations and requesting oxygen for the patient.  I called the patient's wife back as I had already spoken to her earlier this morning and ask if there is been any change in the patient's condition.  The patient's wife states there has not been a change.  The patient continues to have increased shortness of breath and increased work of breathing and intermittently has decreased oxygen saturation.  Upon speaking with the wife, she feels it may be good to go ahead and order oxygen for the patient.    I have explained that it is sometimes tricky getting oxygen set up at home during acute illness when he has not been seen in the office or ER. He was seen in ER on 2/6/21 and at that time oxygen saturation level was documented at 93% on room air.  I have ordered oxygen with a diagnosis of COPD, Covid related pneumonia, and history of lung cancer.    I explained to the patient's wife that he will need to finish all of the azithromycin and dexamethasone which per our conversation he only had 1 tablet left of each medication.  After he finishes the dexamethasone, he should start the prednisone which I faxed to the pharmacy this morning.    He should use the nebulizer every 4 hours or at least 3 times a day.    If his condition/breathing deteriorates, he will need to be evaluated in the emergency room.    I have responded in a message to the oncology ambulatory  with the above information.

## 2022-02-10 NOTE — OUTREACH NOTE
COVID-19 Week 1 Survey      Responses   [facility] patient discharged from? [facility]   Does the patient have one of the following disease processes/diagnoses(primary or secondary)? COVID-19   COVID-19 underlying condition? None   Call Number Call 3   Week 1 Call successful? Yes   Call start time 1121   Call end time [id number]   General alerts for this patient ED Discharge - call x 3 only   Discharge diagnosis shortness of breath,  PN r/t Covid   Meds reviewed with patient/caregiver? Yes   Is the patient having any side effects they believe may be caused by any medication additions or changes? No   Does the patient have all medications ordered at discharge? Yes   Is the patient taking all medications as directed (includes completed medication regime)? Yes   Medication comments new RX's from PCP this morning, 2/10/22   Does the patient have a primary care provider?  Yes   Does the patient have an appointment with their PCP or specialist within 7 days of discharge? No   What is preventing the patient from scheduling follow up appointments within 7 days of discharge? Haven't had time  [pt stated PCP waiting for quarantine to be over]   Nursing Interventions Educated patient on importance of making appointment   Has the patient kept scheduled appointments due by today? N/A   Has home health visited the patient within 72 hours of discharge? N/A   What DME was ordered? Sent home w/ pulse ox   Has all DME been delivered? Yes   Psychosocial issues? No   Did the patient receive a copy of their discharge instructions? Yes   Did the patient receive a copy of COVID-19 specific instructions? Yes   Nursing interventions Reviewed instructions with patient   What is the patient's perception of their health status since discharge? Improving   Does the patient have any of the following symptoms? Shortness of breath,  Cough,  Loss of taste/smell  [occasional cough]   Nursing Interventions Nurse provided patient education   Pulse Ox  monitoring Intermittent   Pulse Ox device source Hospital   O2 Sat comments 88-92% on RA   O2 Sat: education provided Sat levels,  Monitoring frequency   Is the patient/caregiver able to teach back steps to recovery at home? Set small, achievable goals for return to baseline health,  Rest and rebuild strength, gradually increase activity,  Eat a well-balance diet   If the patient is a current smoker, are they able to teach back resources for cessation? Not a smoker   Is the patient/caregiver able to teach back the hierarchy of who to call/visit for symptoms/problems? PCP, Specialist, Home health nurse, Urgent Care, ED, 911 Yes   COVID-19 call completed? Yes   Revoked No further contact(revokes)-requires comment   Graduated/Revoked comments ed visit-only 3 calls   Wrap up additional comments Pt's wife called PCP office regarding pt having increase sob. PCP ordered new RX's.  No other questions/concerns at this time          BRANDIN CAROLINA RN

## 2022-02-11 ENCOUNTER — TELEPHONE (OUTPATIENT)
Dept: INTERNAL MEDICINE | Facility: CLINIC | Age: 67
End: 2022-02-11

## 2022-02-11 NOTE — TELEPHONE ENCOUNTER
Wife called stating Pt's O2 has been around 88% and occasionally drops to 76%.     I discussed this with Dr. Vermeesch, had me advise wife that Pt needs to go to the ER.

## 2022-02-12 LAB
BACTERIA SPEC AEROBE CULT: NORMAL
BACTERIA SPEC AEROBE CULT: NORMAL

## 2022-02-15 ENCOUNTER — TELEPHONE (OUTPATIENT)
Dept: INTERNAL MEDICINE | Facility: CLINIC | Age: 67
End: 2022-02-15

## 2022-02-15 ENCOUNTER — PATIENT OUTREACH (OUTPATIENT)
Dept: CASE MANAGEMENT | Facility: OTHER | Age: 67
End: 2022-02-15

## 2022-02-15 NOTE — OUTREACH NOTE
Ambulatory Case Management Note    Patient Outreach    RN-ACM patient outreach.  Spoke with patient wife, states patient has returned to work today.  Reports that patient is regaining is strength, maintaining oxygen saturation in the 90's, wearing oxygen occassionally. Eating and hydrating well although sense of taste and smell has not returned.      Veronika Barry RN  Ambulatory Case Management    2/15/2022, 10:55 EST

## 2022-02-22 ENCOUNTER — OFFICE VISIT (OUTPATIENT)
Dept: CARDIAC SURGERY | Facility: CLINIC | Age: 67
End: 2022-02-22

## 2022-02-22 VITALS
BODY MASS INDEX: 23.24 KG/M2 | SYSTOLIC BLOOD PRESSURE: 128 MMHG | TEMPERATURE: 98.4 F | HEIGHT: 72 IN | WEIGHT: 171.6 LBS | OXYGEN SATURATION: 97 % | DIASTOLIC BLOOD PRESSURE: 78 MMHG | HEART RATE: 87 BPM

## 2022-02-22 DIAGNOSIS — C34.2 LUNG CANCER, MIDDLE LOBE: Primary | ICD-10-CM

## 2022-02-22 PROCEDURE — 99213 OFFICE O/P EST LOW 20 MIN: CPT | Performed by: NURSE PRACTITIONER

## 2022-02-22 NOTE — PROGRESS NOTES
[facility] Cardiothoracic Surgery Office Follow Up Note     Date of Encounter: 2022     Name: Saman Aguayo  : 1955     Referred By: No ref. provider found  PCP: Vermeesch, Marilyn K, MD    Chief Complaint:    Chief Complaint   Patient presents with   • Follow-up     6 month f/u w/ CT chest. Pt states that he had COVID less then 3 weeks ago, he is still fatigued and very SOB w/ exertion. Using O2 on @ liters PRN since having covid.        Subjective      History of Present Illness:    Saman Aguayo is a 66 y.o. 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  Patient had recurrence early  requiring redo right VATS with right upper wedge resection conversion to thoracotomy for completion lobectomy with mediastinal lymph node dissection 3/2020, both by Dr. Porter. Pathology consistent with stage IA2 non-small cell carcinoma most consistent with adenocarcinoma ([id number]).  Patient was last seen in clinic 2021 Rianna HOGAN with stable 6-month surveillance imaging. Pt arrives today with new imaging. Unfortunately, he was recently diagnosed with COVID beginning of Feb and is still recovering. He has required supplemental oxygen and has had weight loss due to his loss of taste and smell. He does report he is slowly starting to feel better this week. He was using mucinex for productive cough but this has subsided. He continues to be followed by his pulmonologist Dr Lim as well as cardiology Dr. Almonte.     Review of Systems:  Review of Systems   Constitutional: Positive for decreased appetite (no taste no smell since COVID), malaise/fatigue and weight gain (15 pounds). Negative for chills, diaphoresis, fever, night sweats and weight loss.   HENT: Positive for congestion. Negative for  hoarse voice.    Eyes: Negative for blurred vision, double vision and visual disturbance.   Cardiovascular: Positive for dyspnea on exertion and palpitations (some fluttering with exertion). Negative for chest pain, claudication, irregular heartbeat, leg swelling, near-syncope, orthopnea, paroxysmal nocturnal dyspnea and syncope.   Respiratory: Positive for cough, shortness of breath, sputum production and wheezing. Negative for hemoptysis.    Hematologic/Lymphatic: Negative for adenopathy and bleeding problem. Bruises/bleeds easily.   Skin: Negative for color change, nail changes, poor wound healing and rash.   Musculoskeletal: Positive for arthritis and joint pain (bilateral hands). Negative for back pain, falls and muscle cramps.   Gastrointestinal: Negative for abdominal pain, dysphagia and heartburn.   Genitourinary: Negative for flank pain.   Neurological: Positive for dizziness (when being on hands and knees, working  and standing up to fast). Negative for brief paralysis, disturbances in coordination, focal weakness, headaches, light-headedness, loss of balance, numbness, paresthesias, sensory change, vertigo and weakness.   Psychiatric/Behavioral: Negative for depression and suicidal ideas.   Allergic/Immunologic: Negative for persistent infections.       I have reviewed the following portions of the patient's history: allergies, current medications, past family history, past medical history, past social history, past surgical history and problem list and confirm it's accurate.    Allergies:  Allergies   Allergen Reactions   • No Known Drug Allergy        Medications:      Current Outpatient Medications:   •  albuterol (ACCUNEB) 1.25 MG/3ML nebulizer solution, Take 3 mL by nebulization Every 4 (Four) Hours As Needed for Wheezing., Disp: 50 each, Rfl: 2  •  albuterol sulfate  (90 Base) MCG/ACT inhaler, Inhale 2 puffs Every 6 (Six) Hours As Needed for Wheezing or Shortness of Air., Disp: 18 g, Rfl:  "0  •  tiotropium bromide-olodaterol (STIOLTO RESPIMAT) 2.5-2.5 MCG/ACT aerosol solution inhaler, Inhale 2 puffs Daily., Disp: 1 each, Rfl: 5    History:   Past Medical History:   Diagnosis Date   • Abnormal ECG    • Abscess of skin    • Arthritis     HANDS AND BACK    • Bradycardia    • Bunion     LEFT FOOT   • COPD (chronic obstructive pulmonary disease) (HCC)    • Full dentures    • History of echocardiogram 09/21/2016   • Hx of exercise stress test 09/27/2016    DR. BAKER \"IT WAS NORMAL\"   • Low back pain    • Lung cancer, middle lobe (HCC) 11/8/2016    RIGHT MIDDLE LOBE   • Lung nodule     RUL-7MM.  PRESENTLY HAS, AND FOLLOWS WITH DR. HERNANDEZ.  STATES FOUND \"A COUPLE MONTHS AGO\"   • Premature atrial contraction    • Pulmonary hypertension (HCC)    • Skin cancer     BASAL CELL-NOSE, LIP   • Wears eyeglasses        Past Surgical History:   Procedure Laterality Date   • BASAL CELL CARCINOMA EXCISION      NOSE, LIP   • BRONCHOSCOPY  2016   • BRONCHOSCOPY N/A 3/13/2020    Procedure: BRONCHOSCOPY;  Surgeon: Chris Porter MD;  Location: [facility] OR;  Service: Cardiothoracic;  Laterality: N/A;   • BUNIONECTOMY Left 2013    GREAT TOE   • COLONOSCOPY  2018   • DIAGNOSTIC LAPAROSCOPY N/A 6/29/2020    Procedure: DIAGNOSTIC LAPAROSCOPY, APPENDECTOMY, CHOLECYSTECOMY WITH CHOLANGIOGRAPHY;  Surgeon: Shady Singh MD;  Location: [facility] OR;  Service: General;  Laterality: N/A;   • ENDOSCOPY N/A 6/3/2020    Procedure: ESOPHAGOGASTRODUODENOSCOPY WITH BIOPSY;  Surgeon: Shady Singh MD;  Location: [facility] ENDOSCOPY;  Service: Gastroenterology;  Laterality: N/A;   • INGUINAL HERNIA REPAIR Left 2002    Dr. Singh/inguinal    • KNEE ARTHROSCOPY Right 12/12/2018    Procedure: Diagnostic arthroscopy right knee with partial medial meniscectomy;  Surgeon: Dinh Nova MD;  Location: [facility] OR;  Service: Orthopedics   • LUNG REMOVAL, PARTIAL  2016    RIGHT MIDDLE LOBE   • MOUTH SURGERY      FULL EXTRACTION OF TEETH   • SKIN " "BIOPSY     • THORACOSCOPY N/A 2016    Procedure: BRONCH THORACOSCOPY VIDEO ASSISTED  WITH LOBECTOMY, MEDIALSTINAL LYMPH NODE DISECTION;  Surgeon: Chris Porter MD;  Location: [facility] OR;  Service:    • THORACOSCOPY VIDEO ASSISTED WITH LOBECTOMY Right 3/13/2020    Procedure: Redo right THORACOSCOPY VIDEO ASSISTED with right upper lobe wedge resection and with conversion to open thorocotomy for right upper lobectomy with intercostal cryoablation;  Surgeon: Chris Porter MD;  Location: [facility] OR;  Service: Cardiothoracic;  Laterality: Right;       Social History     Socioeconomic History   • Marital status:    • Number of children: 1   Tobacco Use   • Smoking status: Former Smoker     Packs/day: 3.00     Years: 50.00     Pack years: 150.00     Types: Cigarettes     Quit date: 2016     Years since quittin.3   • Smokeless tobacco: Never Used   Vaping Use   • Vaping Use: Never used   Substance and Sexual Activity   • Alcohol use: Yes     Alcohol/week: 21.0 standard drinks     Types: 21 Cans of beer per week     Comment: 3 cans/daily   • Drug use: No   • Sexual activity: Defer        Family History   Problem Relation Age of Onset   • Heart attack Father    • Heart disease Father    • Colon cancer Father    • Colon cancer Mother    • Liver cancer Mother        Objective   Physical Exam:  Vitals:    22 1022   BP: 128/78   Pulse: 87   Temp: 98.4 °F (36.9 °C)   SpO2: 97%   Weight: 77.8 kg (171 lb 9.6 oz)   Height: 182.9 cm (72\")      Body mass index is 23.27 kg/m².    Physical Exam  Vitals and nursing note reviewed.   Constitutional:       Appearance: Normal appearance. He is well-developed.   HENT:      Head: Normocephalic and atraumatic.   Eyes:      Pupils: Pupils are equal, round, and reactive to light.   Neck:      Vascular: No carotid bruit.   Cardiovascular:      Rate and Rhythm: Normal rate and regular rhythm.      Pulses: Normal pulses.      Heart sounds: Normal heart sounds, S1 normal " and S2 normal. No murmur heard.      Pulmonary:      Effort: Pulmonary effort is normal.      Breath sounds: Examination of the right-upper field reveals decreased breath sounds. Examination of the right-lower field reveals decreased breath sounds. Examination of the left-lower field reveals decreased breath sounds. Decreased breath sounds present.   Abdominal:      Palpations: Abdomen is soft.   Musculoskeletal:         General: No swelling.      Cervical back: Neck supple.      Right lower leg: No edema.      Left lower leg: No edema.   Skin:     General: Skin is warm and dry.      Capillary Refill: Capillary refill takes less than 2 seconds.      Findings: No bruising.   Neurological:      General: No focal deficit present.      Mental Status: He is alert and oriented to person, place, and time. Mental status is at baseline.      GCS: GCS eye subscore is 4. GCS verbal subscore is 5. GCS motor subscore is 6.      Motor: Motor function is intact.      Coordination: Coordination is intact.      Gait: Gait is intact.   Psychiatric:         Mood and Affect: Mood normal.         Speech: Speech normal.         Behavior: Behavior normal. Behavior is cooperative.         Cognition and Memory: Cognition normal.         Imaging/Labs:  CT Chest Pulmonary Embolism-Result Date: 2/7/2022  IMPRESSION: 1. No acute pulmonary embolus. 2. There may be developing left lower lobe pneumonia. Follow-up to clearing recommended. Authenticated by Otis Lai MD on 02/07/2022 02:24:56 AM     CT Chest Without Contrast-12/9/2021  Stable exam without evidence of local tumor recurrence.    CT Chest Without Contrast-6/15/2021  No evidence of recurrent or metastatic disease within the chest.    CT Chest Without Contrast-1/22/2021  No evidence of recurrent or metastatic disease within the chest.    CT Chest Without Contrast-10/15/2020  No evidence of acute process involving the chest, abdomen or pelvis.    CT Chest With  Contrast-5/29/2020  No evidence of recurrent or metastatic disease within the chest.    NM PET-2/6/2020  1. Small but increasing right apical lung nodule, now with hypermetabolic activity. This is concerning either for metastasis or new primary.  2. Negative PET scan for malignancy elsewhere.      Assessment / Plan      Assessment / Plan:  Diagnoses and all orders for this visit:    1. 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    · Recurrence early 2020 requiring redo right VATS with right upper wedge resection conversion to thoracotomy for completion lobectomy with mediastinal lymph node dissection 3/2020, both by Dr. Porter. Pathology consistent with stage IA2 non-small cell carcinoma most consistent with adenocarcinoma ([id number]).    · Last seen in clinic 8/2021 Rianna HOGAN with stable 6-month surveillance imaging.   · Unfortunately, he was recently diagnosed with COVID beginning of Feb and is still recovering. He has required supplemental oxygen and has had weight loss due to his loss of taste and smell. He does report he is slowly starting to feel better this week. He was using mucinex for productive cough but this has subsided.   · CT imaging from December personally reviewed with no evidence of disease recurrence or new pleural-based nodule  · Most recent CT imaging from 2/2022 consistent with Covid infection  · He continues to be followed by his pulmonologist Dr Lim as well as cardiology Dr. Almonte.   · We will continue with every 6 month imaging per NCCN guidelines and follow along with Dr. Lim      Follow Up:   Return in about 6 months (around 8/22/2022) for Imaging next visit.   Or sooner for any further concerns or  worsening sign and symptoms. If unable to reach us in the office please dial 911 or go to the nearest emergency department.      Erin HOGAN  [facility] Cardiothoracic Surgery    Time Spent: I spent 30 minutes caring for Saman on this date of service. This time includes time spent by me in the following activities: preparing for the visit, reviewing tests, obtaining and/or reviewing a separately obtained history, performing a medically appropriate examination and/or evaluation, counseling and educating the patient/family/caregiver, ordering medications, tests, or procedures, referring and communicating with other health care professionals, documenting information in the medical record, independently interpreting results and communicating that information with the patient/family/caregiver and care coordination.

## 2022-04-07 ENCOUNTER — OFFICE VISIT (OUTPATIENT)
Dept: CARDIOLOGY | Facility: CLINIC | Age: 67
End: 2022-04-07

## 2022-04-07 VITALS
DIASTOLIC BLOOD PRESSURE: 62 MMHG | WEIGHT: 171 LBS | OXYGEN SATURATION: 98 % | SYSTOLIC BLOOD PRESSURE: 120 MMHG | HEART RATE: 69 BPM | HEIGHT: 72 IN | BODY MASS INDEX: 23.16 KG/M2

## 2022-04-07 DIAGNOSIS — R42 DIZZINESS: ICD-10-CM

## 2022-04-07 DIAGNOSIS — R06.09 COVID-19 LONG HAULER MANIFESTING CHRONIC DYSPNEA: ICD-10-CM

## 2022-04-07 DIAGNOSIS — I50.22 CHRONIC SYSTOLIC CONGESTIVE HEART FAILURE: Primary | ICD-10-CM

## 2022-04-07 DIAGNOSIS — U09.9 COVID-19 LONG HAULER MANIFESTING CHRONIC DYSPNEA: ICD-10-CM

## 2022-04-07 DIAGNOSIS — J43.2 CENTRILOBULAR EMPHYSEMA: ICD-10-CM

## 2022-04-07 DIAGNOSIS — I73.9 INTERMITTENT CLAUDICATION: ICD-10-CM

## 2022-04-07 DIAGNOSIS — Z87.891 HISTORY OF TOBACCO USE: ICD-10-CM

## 2022-04-07 DIAGNOSIS — I27.20 PULMONARY HYPERTENSION: ICD-10-CM

## 2022-04-07 DIAGNOSIS — C34.2 LUNG CANCER, MIDDLE LOBE: ICD-10-CM

## 2022-04-07 PROCEDURE — 99214 OFFICE O/P EST MOD 30 MIN: CPT | Performed by: INTERNAL MEDICINE

## 2022-04-07 NOTE — PROGRESS NOTES
Subjective:     Encounter Date:04/07/2022      Patient ID: Saman Aguayo is a 67 y.o. male.    Chief Complaint: Dizziness  HPI  This is a 67-year-old male patient who presents to cardiology clinic with complaints of positional dizziness.  The patient reports that if he bends over to tie his shoes when he raises back up he will be dizzy.  He has had no palpitations or syncope.  The patient has a history of advanced emphysema and is a former smoker.  He has a history of multilobar pneumonia with prior surgical resection of the right lower lobe and right middle lobe.  He is currently under the care of both cardiothoracic surgery in [facility] as well as a local pulmonologist.  The patient indicates that he was recently diagnosed with COVID-19 associated bilateral pneumonia.  He was not hospitalized.  The patient indicates that he required 1 month of oxygen therapy.  The following portions of the patient's history were reviewed and updated as appropriate: allergies, current medications, past family history, past medical history, past social history, past surgical history and problem  Review of Systems   Constitutional: Negative for chills, diaphoresis, fever, malaise/fatigue, weight gain and weight loss.   HENT: Negative for ear discharge, hearing loss, hoarse voice and nosebleeds.    Eyes: Negative for discharge, double vision, pain and photophobia.   Cardiovascular: Negative for chest pain, claudication, cyanosis, irregular heartbeat, leg swelling, near-syncope, orthopnea, palpitations, paroxysmal nocturnal dyspnea and syncope.   Respiratory: Positive for shortness of breath. Negative for cough, hemoptysis, sputum production and wheezing.    Endocrine: Negative for cold intolerance, heat intolerance, polydipsia, polyphagia and polyuria.   Hematologic/Lymphatic: Negative for adenopathy and bleeding problem. Does not bruise/bleed easily.   Skin: Negative for color change, flushing, itching and rash.    Musculoskeletal: Negative for muscle cramps, muscle weakness, myalgias and stiffness.   Gastrointestinal: Negative for abdominal pain, diarrhea, hematemesis, hematochezia, nausea and vomiting.   Genitourinary: Negative for dysuria, frequency and nocturia.   Neurological: Positive for dizziness. Negative for focal weakness, loss of balance, numbness, paresthesias and seizures.   Psychiatric/Behavioral: Negative for altered mental status, hallucinations and suicidal ideas.   Allergic/Immunologic: Negative for HIV exposure, hives and persistent infections.           Current Outpatient Medications:   •  albuterol (ACCUNEB) 1.25 MG/3ML nebulizer solution, Take 3 mL by nebulization Every 4 (Four) Hours As Needed for Wheezing., Disp: 50 each, Rfl: 2  •  albuterol sulfate  (90 Base) MCG/ACT inhaler, Inhale 2 puffs Every 6 (Six) Hours As Needed for Wheezing or Shortness of Air., Disp: 18 g, Rfl: 0  •  tiotropium bromide-olodaterol (STIOLTO RESPIMAT) 2.5-2.5 MCG/ACT aerosol solution inhaler, Inhale 2 puffs Daily., Disp: 1 each, Rfl: 5    Objective:   Vitals and nursing note reviewed.   Constitutional:       Appearance: Healthy appearance. Not in distress.   Neck:      Vascular: No JVR. JVD normal.   Pulmonary:      Effort: Pulmonary effort is normal.      Breath sounds: Normal breath sounds. No wheezing. No rhonchi. No rales.   Chest:      Chest wall: Not tender to palpatation.   Cardiovascular:      PMI at left midclavicular line. Normal rate. Regular rhythm. Normal S1. Normal S2.      Murmurs: There is no murmur.      No gallop. No click. No rub.   Pulses:     Intact distal pulses.   Edema:     Peripheral edema absent.   Abdominal:      General: Bowel sounds are normal.      Palpations: Abdomen is soft.      Tenderness: There is no abdominal tenderness.   Musculoskeletal: Normal range of motion.         General: No tenderness. Skin:     General: Skin is warm and dry.   Neurological:      General: No focal deficit  "present.      Mental Status: Alert and oriented to person, place and time.       Blood pressure 120/62, pulse 69, height 182.9 cm (72\"), weight 77.6 kg (171 lb), SpO2 98 %.   Lab Review:     Assessment:       1. Chronic systolic congestive heart failure (HCC)  Previous echocardiogram has suggested a resting left ventricular ejection fraction of 45%.  However, vasodilator nuclear stress testing on gated analysis demonstrated a calculated ejection fraction of 52%.  His symptoms are not typical of chronic congestive heart failure.  - Adult Transthoracic Echo Complete W/ Cont if Necessary Per Protocol    2. Lung cancer, middle lobe s/p right VATS with lobectomy  Advanced lung cancer with 2 separate thoracotomies first being a resection of the right middle lobe and subsequent resection of the right lower lobe.    3. History of tobacco use  Fortunately the patient no longer smokes.    4. Centrilobular emphysema (HCC)  Typical tobacco related lung disease.    5. COVID-19 long hauler manifesting chronic dyspnea  The patient indicates that he is due to have a follow-up CT scan of the chest.  There is concern that he has developed significant post Covid \"scarring\".    6. Pulmonary hypertension   The patient is demonstrated to have severe secondary pulmonary hypertension (WHO group 3 etiology).  He has not manifest overt clinical right heart failure.  - Adult Transthoracic Echo Complete W/ Cont if Necessary Per Protocol    7. Intermittent claudication (HCC)  The patient recently underwent bilateral lower extremity arterial ultrasound with duplex Doppler demonstrating no evidence of obstructive peripheral arterial occlusive disease.  Waveforms were biphasic and triphasic throughout each arterial segment in the lower extremities bilaterally.  No lifestyle limiting claudication or evidence of chronic critical limb ischemia.    8. Dizziness  This appears to be primarily positional in nature.  No offending medications.  - Mobile " Cardiac Outpatient Telemetry    Procedures    Plan:     Advance Care Planning   ACP discussion was held with the patient during this visit. Patient has an advance directive (not in EMR), copy requested.     I have recommended a repeat echocardiogram.  I have recommended an outpatient cardiac monitor.  The patient has been cautioned to avoid abrupt posture change.

## 2022-04-11 ENCOUNTER — OFFICE VISIT (OUTPATIENT)
Dept: INTERNAL MEDICINE | Facility: CLINIC | Age: 67
End: 2022-04-11

## 2022-04-11 VITALS
BODY MASS INDEX: 23.3 KG/M2 | TEMPERATURE: 97 F | OXYGEN SATURATION: 99 % | DIASTOLIC BLOOD PRESSURE: 62 MMHG | HEIGHT: 72 IN | HEART RATE: 67 BPM | WEIGHT: 172 LBS | SYSTOLIC BLOOD PRESSURE: 138 MMHG

## 2022-04-11 DIAGNOSIS — R06.09 COVID-19 LONG HAULER MANIFESTING CHRONIC DYSPNEA: ICD-10-CM

## 2022-04-11 DIAGNOSIS — U09.9 COVID-19 LONG HAULER MANIFESTING CHRONIC DYSPNEA: ICD-10-CM

## 2022-04-11 DIAGNOSIS — R42 DIZZINESS: Primary | ICD-10-CM

## 2022-04-11 DIAGNOSIS — J43.2 CENTRILOBULAR EMPHYSEMA: ICD-10-CM

## 2022-04-11 PROCEDURE — 99213 OFFICE O/P EST LOW 20 MIN: CPT | Performed by: INTERNAL MEDICINE

## 2022-04-11 NOTE — PROGRESS NOTES
Chief Complaint   Patient presents with   • Follow-up     For HTN     Subjective   Saman Aguayo is a 67 y.o. male.     Here today for follow-up of pulmonary hypertension, CHF, COPD, history of right middle lobe lung cancer.  Pulmonary hypertension/COPD-patient is seen by Dr. Lim for underlying COPD and pulmonary hypertension.  He remains on stiolto daily with albuterol inhaler as needed.  He does not use his albuterol very often.  History of CHF-recently seen by Dr. Almonte.  Denies any chest pain.  No current edema.  He does have some positional dizziness and palpitations when he bends over and stands back up. At night he feels some heaviness in chest. Not on any current medication for CHF.  Echocardiogram reveals EF of 45%.  He has no energy.  This all started after he had COVID.  Cardiologist has ordered an event recorder for later this week as well as an echocardiogram for next month for further evaluation of symptoms of dizziness and chest pressure.  History of non-small cell lung cancer of right middle lobe-patient underwent right middle thoracotomy initially followed by right upper lobe thoracotomy.    He has also had COVID pneumonia had chronic shortness of breath requiring oxygen for quite some time.  He still does not have taste back.  Above symptoms of dizziness and chest pressure started after Covid pneumonia.  He has had to retire since Covid pneumonia.  Last week he had some LA in his neck.  He did not have any illness or than seasonal allergies, these are better this week.        The following portions of the patient's history were reviewed and updated as appropriate: allergies, current medications, past family history, past medical history, past social history, past surgical history and problem list.    Review of Systems   Constitutional: Positive for fatigue. Negative for activity change, appetite change and unexpected weight change.   Eyes: Negative for visual disturbance.   Respiratory:  "Positive for chest tightness and shortness of breath.    Cardiovascular: Positive for palpitations. Negative for chest pain and leg swelling.   Gastrointestinal: Negative for abdominal pain.   Genitourinary: Negative for hematuria.   Musculoskeletal: Negative for back pain.   Neurological: Positive for dizziness and weakness. Negative for headaches.   Psychiatric/Behavioral: Negative for dysphoric mood and sleep disturbance. The patient is not nervous/anxious.        Objective   /62   Pulse 67   Temp 97 °F (36.1 °C)   Ht 182.9 cm (72\")   Wt 78 kg (172 lb)   SpO2 99%   BMI 23.33 kg/m²   Body mass index is 23.33 kg/m².  Physical Exam  Vitals and nursing note reviewed.   Constitutional:       General: He is not in acute distress.     Appearance: Normal appearance. He is well-developed. He is not ill-appearing.      Comments: Kind and pleasant man, appears stated age and in NAD today   HENT:      Head: Normocephalic and atraumatic.      Right Ear: External ear normal.      Left Ear: External ear normal.   Eyes:      General:         Right eye: No discharge.         Left eye: No discharge.      Extraocular Movements: Extraocular movements intact.      Conjunctiva/sclera: Conjunctivae normal.   Neck:      Thyroid: No thyromegaly.      Vascular: No carotid bruit.      Comments: No thyromegaly or mass  Cardiovascular:      Rate and Rhythm: Normal rate and regular rhythm.      Pulses: Normal pulses.      Heart sounds: Normal heart sounds. No murmur heard.  Pulmonary:      Effort: Pulmonary effort is normal. No respiratory distress.      Breath sounds: No wheezing.      Comments: Decreased BS over right mid/upper lobes  Abdominal:      General: Bowel sounds are normal. There is no distension.      Palpations: Abdomen is soft.      Tenderness: There is no abdominal tenderness.   Musculoskeletal:         General: Normal range of motion.      Cervical back: Normal range of motion and neck supple.      Right lower " leg: No edema.      Left lower leg: No edema.   Lymphadenopathy:      Cervical: No cervical adenopathy.   Skin:     General: Skin is warm.      Findings: No rash.   Neurological:      General: No focal deficit present.      Mental Status: He is alert and oriented to person, place, and time. Mental status is at baseline.      Cranial Nerves: No cranial nerve deficit.      Motor: Weakness present.      Coordination: Coordination normal.      Gait: Gait normal.      Comments: Dizziness with changes in position noted   Psychiatric:         Mood and Affect: Mood normal.         Behavior: Behavior normal.         Thought Content: Thought content normal.         Judgment: Judgment normal.         Assessment/Plan   Saman Aguayo is here today and the following problems have been addressed:      Diagnoses and all orders for this visit:    1. Dizziness (Primary)    2. COVID-19 long hauler manifesting chronic dyspnea    3. Centrilobular emphysema (HCC)    Continue stiolto and use albuterol prn  Encouraged pt to use nebulizer once a day to see if this helps his chronic SOA  He has event recorder and ECHO ordered per cardiologist  Follow up appt with pulmonary soon  He has had to retire since COVID in early Feb due to chronic SOA, fatigue and dizziness  Encouragement and reassurance given to pt today-he denies any depression    RTC 6 mo for medicare wellness exam and full labs    Please note that portions of this note were completed with a voice recognition program.  Efforts were made to edit dictation, but occasionally words are mistranscribed.

## 2022-04-14 ENCOUNTER — OFFICE VISIT (OUTPATIENT)
Dept: PULMONOLOGY | Facility: CLINIC | Age: 67
End: 2022-04-14

## 2022-04-14 VITALS
DIASTOLIC BLOOD PRESSURE: 80 MMHG | OXYGEN SATURATION: 97 % | HEART RATE: 52 BPM | TEMPERATURE: 97.3 F | BODY MASS INDEX: 23.19 KG/M2 | WEIGHT: 171 LBS | RESPIRATION RATE: 12 BRPM | SYSTOLIC BLOOD PRESSURE: 128 MMHG

## 2022-04-14 DIAGNOSIS — Z85.118 HISTORY OF LUNG CANCER: ICD-10-CM

## 2022-04-14 DIAGNOSIS — J44.9 CHRONIC OBSTRUCTIVE PULMONARY DISEASE, UNSPECIFIED COPD TYPE: Primary | ICD-10-CM

## 2022-04-14 DIAGNOSIS — C34.91 NON-SMALL CELL CANCER OF RIGHT LUNG: ICD-10-CM

## 2022-04-14 DIAGNOSIS — Z86.16 HISTORY OF COVID-19: ICD-10-CM

## 2022-04-14 PROCEDURE — 99214 OFFICE O/P EST MOD 30 MIN: CPT | Performed by: INTERNAL MEDICINE

## 2022-04-14 NOTE — PROGRESS NOTES
Chief Complaint   Patient presents with   • Shortness of Breath     Hx of lung cancer       Subjective   Saman Aguayo is a 67 y.o. male.     History of Present Illness   Patient was evaluated today for follow up of shortness of breath, lung cancer, recent CoVid and COPD.     Patient says that his symptoms have been stable since the last clinic visit. he reports no recent exacerbations apart from recent CoVid in Feb 2022.     Patient is using Stiolto, as prescribed. Exercise tolerance has also remained stable.     He had Covid in Feb 2022 and had to come to ER. He also bought oxygen for 1 month as he felt he needed it.    He is not on oxygen anymore.       The following portions of the patient's history were reviewed and updated as appropriate: allergies, current medications, past family history, past medical history, past social history and past surgical history.    Review of Systems   Constitutional: Negative for fatigue.   HENT: Negative for sinus pressure.    Respiratory: Negative for wheezing.    Psychiatric/Behavioral: Negative for sleep disturbance.       Objective   Visit Vitals  /80 (BP Location: Right arm, Patient Position: Sitting, Cuff Size: Adult)   Pulse 52   Temp 97.3 °F (36.3 °C)   Resp 12   Wt 77.6 kg (171 lb)   SpO2 97%   BMI 23.19 kg/m²       Physical Exam  Vitals reviewed.   Constitutional:       Appearance: He is well-developed.   HENT:      Head: Normocephalic and atraumatic.   Eyes:      Extraocular Movements: Extraocular movements intact.   Cardiovascular:      Rate and Rhythm: Normal rate.   Pulmonary:      Comments: Somewhat hyperresonant to percussion.  Somewhat decreased air entry.  No obvious wheezing noted.   Musculoskeletal:      Cervical back: Neck supple.   Neurological:      Mental Status: He is alert.         Assessment/Plan   Diagnoses and all orders for this visit:    1. Chronic obstructive pulmonary disease, unspecified COPD type (HCC) (Primary)    2. History of lung  cancer    3. Non-small cell cancer of right lung (HCC)    4. History of COVID-19           Return in about 3 months (around 7/14/2022) for Recheck, Imaging, For Adriane Paul), ...Also 9-10 mths w/Hilda.    DISCUSSION (if any):  Last CT scan was reviewed in great detail with the patient. Images reviewed personally.   Results for orders placed during the hospital encounter of 02/06/22    CT Chest Pulmonary Embolism    Narrative  FINAL REPORT    TECHNIQUE:  null    CLINICAL HISTORY:  soa.    COMPARISON:  null    FINDINGS:  CT angiography chest with contrast. 3D Postprocessing.    Comparison: None    Findings:    The heart is normal size. RV/LV ratio is normal.    The thoracic aorta is normal caliber without dissection. The great vessels are patent.    Enlarged pulmonary artery. This can be seen with pulmonary artery hypertension.    The thyroid and mediastinum are unremarkable.    Emphysema.    Patchy opacity left lower lobe.    No pleural effusion.    The visualized upper abdomen is unremarkable.    The bones are intact.    Impression  IMPRESSION:    1. No acute pulmonary embolus.    2. There may be developing left lower lobe pneumonia. Follow-up to clearing recommended.    Authenticated by Otis Lai MD on 02/07/2022 02:24:56 AM      I have also reviewed his ER note that mentions COVID-19 pneumonia.  Prescription of azithromycin and dexamethasone was given.    I also reviewed his last echocardiogram and shared the results with him.   Results for orders placed in visit on 05/13/19    Adult Transthoracic Echo Complete W/ Cont if Necessary Per Protocol    Interpretation Summary  · Right ventricular cavity is mildly dilated.  · Mild mitral valve regurgitation is present  · Moderate to severe tricuspid valve regurgitation is present.  · The left ventricular cavity is borderline dilated.  · Estimated EF = 45%.  · Left ventricular systolic function is mildly decreased.  · Calculated right ventricular  systolic pressure from tricuspid regurgitation is 58 mmHg.    ===========================  ===========================    PFTs were reviewed.  Moderate obstruction.    Laboratory workup was also reviewed which showed   Lab Results   Component Value Date    HGB 16.8 02/06/2022    HGB 18.1 (H) 10/08/2021    HGB 16.0 08/03/2020   ,    Lab Results   Component Value Date    EOSABS 0.01 02/06/2022    EOSABS 0.11 10/08/2021    EOSABS 0.20 08/03/2020    & Laboratory workup also showed   Lab Results   Component Value Date    CO2 20.5 (L) 02/06/2022     ===========================  ===========================    He will need to continue 6 monthly CT scans for a total of 5 years, given 2 separate NSCLCs.     His latest surgery was in March 2020.      Last Pathology was reviewed. Consistent with RUL Adenocarcinoma.    After the next CT in June 2022, his next CT will need to be in December 2022.      We have reviewed his pulmonary medications in great detail.    Compliance with medications stressed.     Side effects of prescribed medications discussed with the patient    Will consider overnight pulse oximetry upon follow up if needed.    He is following Dr. Almonte for bradycardia?, which could explain some of his symptoms of dizziness with exertion.    He may benefit from repeat echocardiogram since his last echocardiogram suggested an EF of 45%.        Dictated utilizing Dragon dictation.    This document was electronically signed by Nannette Lim MD on 04/14/22 at 11:59 EDT

## 2022-05-19 ENCOUNTER — OFFICE VISIT (OUTPATIENT)
Dept: INTERNAL MEDICINE | Facility: CLINIC | Age: 67
End: 2022-05-19

## 2022-05-19 VITALS
OXYGEN SATURATION: 99 % | BODY MASS INDEX: 23.84 KG/M2 | HEART RATE: 47 BPM | DIASTOLIC BLOOD PRESSURE: 68 MMHG | HEIGHT: 72 IN | SYSTOLIC BLOOD PRESSURE: 120 MMHG | TEMPERATURE: 97.1 F | WEIGHT: 176 LBS

## 2022-05-19 DIAGNOSIS — B02.9 HERPES ZOSTER WITHOUT COMPLICATION: Primary | ICD-10-CM

## 2022-05-19 PROCEDURE — 99213 OFFICE O/P EST LOW 20 MIN: CPT | Performed by: INTERNAL MEDICINE

## 2022-05-19 RX ORDER — FAMCICLOVIR 500 MG/1
500 TABLET ORAL 3 TIMES DAILY
Qty: 21 TABLET | Refills: 0 | Status: SHIPPED | OUTPATIENT
Start: 2022-05-19 | End: 2022-06-02

## 2022-05-19 RX ORDER — AMITRIPTYLINE HYDROCHLORIDE 10 MG/1
TABLET, FILM COATED ORAL
Qty: 60 TABLET | Refills: 2 | Status: SHIPPED | OUTPATIENT
Start: 2022-05-19 | End: 2022-06-02

## 2022-05-19 NOTE — PROGRESS NOTES
"Chief Complaint   Patient presents with   • Abstract     Pt states he's had red itchy/burning rash on lower abdomen X couple weeks     Subjective   Saman Aguayo is a 67 y.o. male.     Patient here today with complaints of a red, itchy and burning rash on lower abdomen for past few weeks.  Pain radiates bilaterally, however not all the way around to the back.  There are no other lesions following line of pain.  This started as blisters and they have all ruptured.  Most bothersome complaint is the burning pain.  Patient states that burning pain awoke him last night.  There was clear watery discharge this AM.  He has been applying neosporin.  He did have the shingrix vaccination.        The following portions of the patient's history were reviewed and updated as appropriate: allergies, current medications, past family history, past medical history, past social history, past surgical history and problem list.    Review of Systems   Constitutional: Negative for chills and fever.   Skin: Positive for rash.   Neurological:        Burning pain in lower abdomen associated with rash       Objective   /68   Pulse (!) 47   Temp 97.1 °F (36.2 °C)   Ht 182.9 cm (72\")   Wt 79.8 kg (176 lb)   SpO2 99%   BMI 23.87 kg/m²   Body mass index is 23.87 kg/m².  Physical Exam  Vitals and nursing note reviewed.   Constitutional:       General: He is not in acute distress.     Appearance: Normal appearance. He is not ill-appearing.      Comments: Very kind and pleasant man in no distress today   HENT:      Right Ear: External ear normal.      Left Ear: External ear normal.   Eyes:      General:         Right eye: No discharge.         Left eye: No discharge.      Extraocular Movements: Extraocular movements intact.   Pulmonary:      Effort: Pulmonary effort is normal. No respiratory distress.   Skin:     Findings: Rash present.      Comments: Approximate 4 cm cluster of ruptured blisters on erythematous base located over lower mid " abdomen   Neurological:      General: No focal deficit present.      Mental Status: He is alert and oriented to person, place, and time.   Psychiatric:         Mood and Affect: Mood normal.         Behavior: Behavior normal.         Thought Content: Thought content normal.         Judgment: Judgment normal.         Assessment & Plan   Saman Aguayo is here today and the following problems have been addressed:      Diagnoses and all orders for this visit:    1. Herpes zoster without complication (Primary)    Other orders  -     famciclovir (FAMVIR) 500 MG tablet; Take 1 tablet by mouth 3 (Three) Times a Day.  Dispense: 21 tablet; Refill: 0  -     amitriptyline (ELAVIL) 10 MG tablet; 1-2 tabs one hour before sleep  Dispense: 60 tablet; Refill: 2    Start Famvir 500 mg 3 times daily for 1 week  Patient provided with amitriptyline 10 mg to take 1 hour before sleep every night, if pain is not well controlled may increase to 20 mg every night  Explained to him it is important to allow lesions to dry and scab over  Avoid any females who are pregnant and have not had varicella, encouraged him to stay home and away from others until all lesions are scabbed    Return to clinic as needed if symptoms worsen or persist    Please note that portions of this note were completed with a voice recognition program.  Efforts were made to edit dictation, but occasionally words are mistranscribed.

## 2022-05-20 ENCOUNTER — HOSPITAL ENCOUNTER (OUTPATIENT)
Dept: CT IMAGING | Facility: HOSPITAL | Age: 67
Discharge: HOME | End: 2022-05-20
Admitting: NURSE PRACTITIONER

## 2022-05-20 DIAGNOSIS — C34.91 NON-SMALL CELL CANCER OF RIGHT LUNG: ICD-10-CM

## 2022-05-20 PROCEDURE — 71250 CT THORAX DX C-: CPT

## 2022-05-26 LAB
BH CV ECHO MEAS - AO MAX PG: 3.9 MMHG
BH CV ECHO MEAS - AO MEAN PG: 3 MMHG
BH CV ECHO MEAS - AO ROOT DIAM: 2.1 CM
BH CV ECHO MEAS - AO V2 MAX: 98.3 CM/SEC
BH CV ECHO MEAS - AO V2 VTI: 22.2 CM
BH CV ECHO MEAS - AVA(I,D): 4.4 CM2
BH CV ECHO MEAS - EDV(CUBED): 166.8 ML
BH CV ECHO MEAS - EDV(MOD-SP4): 108 ML
BH CV ECHO MEAS - EF(MOD-SP4): 54.6 %
BH CV ECHO MEAS - ESV(CUBED): 53.8 ML
BH CV ECHO MEAS - ESV(MOD-SP4): 49 ML
BH CV ECHO MEAS - FS: 31.4 %
BH CV ECHO MEAS - IVS/LVPW: 0.78 CM
BH CV ECHO MEAS - IVSD: 0.65 CM
BH CV ECHO MEAS - LA DIMENSION: 3.7 CM
BH CV ECHO MEAS - LAT PEAK E' VEL: 10.3 CM/SEC
BH CV ECHO MEAS - LV DIASTOLIC VOL/BSA (35-75): 54.2 CM2
BH CV ECHO MEAS - LV MASS(C)D: 146.1 GRAMS
BH CV ECHO MEAS - LV MAX PG: 3.5 MMHG
BH CV ECHO MEAS - LV MEAN PG: 1 MMHG
BH CV ECHO MEAS - LV SYSTOLIC VOL/BSA (12-30): 24.6 CM2
BH CV ECHO MEAS - LV V1 MAX: 93.4 CM/SEC
BH CV ECHO MEAS - LV V1 VTI: 23.4 CM
BH CV ECHO MEAS - LVIDD: 5.5 CM
BH CV ECHO MEAS - LVIDS: 3.8 CM
BH CV ECHO MEAS - LVOT AREA: 4.2 CM2
BH CV ECHO MEAS - LVOT DIAM: 2.3 CM
BH CV ECHO MEAS - LVPWD: 0.83 CM
BH CV ECHO MEAS - MED PEAK E' VEL: 9.4 CM/SEC
BH CV ECHO MEAS - MR MAX PG: 66.8 MMHG
BH CV ECHO MEAS - MR MAX VEL: 408.5 CM/SEC
BH CV ECHO MEAS - MR MEAN PG: 20 MMHG
BH CV ECHO MEAS - MR MEAN VEL: 160 CM/SEC
BH CV ECHO MEAS - MR VTI: 54.4 CM
BH CV ECHO MEAS - MV A MAX VEL: 36.2 CM/SEC
BH CV ECHO MEAS - MV DEC SLOPE: 293 CM/SEC2
BH CV ECHO MEAS - MV DEC TIME: 0.31 MSEC
BH CV ECHO MEAS - MV E MAX VEL: 89.9 CM/SEC
BH CV ECHO MEAS - MV E/A: 2.48
BH CV ECHO MEAS - MV MAX PG: 2.5 MMHG
BH CV ECHO MEAS - MV MEAN PG: 1 MMHG
BH CV ECHO MEAS - MV V2 VTI: 38.5 CM
BH CV ECHO MEAS - MVA(VTI): 2.5 CM2
BH CV ECHO MEAS - PA V2 MAX: 72.3 CM/SEC
BH CV ECHO MEAS - RAP SYSTOLE: 10 MMHG
BH CV ECHO MEAS - RVSP: 55.9 MMHG
BH CV ECHO MEAS - SI(MOD-SP4): 29.6 ML/M2
BH CV ECHO MEAS - SV(LVOT): 97.2 ML
BH CV ECHO MEAS - SV(MOD-SP4): 59 ML
BH CV ECHO MEAS - TR MAX PG: 45.9 MMHG
BH CV ECHO MEAS - TR MAX VEL: 338.7 CM/SEC
BH CV ECHO MEASUREMENTS AVERAGE E/E' RATIO: 9.13
BH CV XLRA - TDI S': 10.2 CM/SEC
LEFT ATRIUM VOLUME INDEX: 28.9 ML/M2
LV EF 2D ECHO EST: 56 %
MAXIMAL PREDICTED HEART RATE: 151 BPM
STRESS TARGET HR: 128 BPM

## 2022-06-02 ENCOUNTER — OFFICE VISIT (OUTPATIENT)
Dept: CARDIOLOGY | Facility: CLINIC | Age: 67
End: 2022-06-02

## 2022-06-02 VITALS
DIASTOLIC BLOOD PRESSURE: 60 MMHG | SYSTOLIC BLOOD PRESSURE: 112 MMHG | OXYGEN SATURATION: 94 % | WEIGHT: 176 LBS | BODY MASS INDEX: 23.84 KG/M2 | HEART RATE: 74 BPM | HEIGHT: 72 IN

## 2022-06-02 DIAGNOSIS — I27.81 COR PULMONALE (CHRONIC): ICD-10-CM

## 2022-06-02 DIAGNOSIS — I27.20 PULMONARY HYPERTENSION: ICD-10-CM

## 2022-06-02 DIAGNOSIS — C34.2 LUNG CANCER, MIDDLE LOBE: ICD-10-CM

## 2022-06-02 DIAGNOSIS — R00.2 PALPITATIONS: Primary | ICD-10-CM

## 2022-06-02 DIAGNOSIS — Z87.891 HISTORY OF TOBACCO USE: ICD-10-CM

## 2022-06-02 PROCEDURE — 99213 OFFICE O/P EST LOW 20 MIN: CPT | Performed by: INTERNAL MEDICINE

## 2022-06-02 NOTE — PROGRESS NOTES
"    Subjective:     Encounter Date:06/02/2022      Patient ID: Saman Aguayo is a 67 y.o. male.    Chief Complaint: Palpitations  HPI  This is a 67-year-old male patient with advanced tobacco related lung disease who underwent outpatient testing for dizziness and palpitations.  The patient had an echocardiogram demonstrating normal left ventricular chamber dimensions and muscle thickness.  The left ventricular ejection fraction was normal with no regional wall motion abnormalities.  Left ventricular diastolic function was normal.  There was no evidence of left-sided valvular pathology.  There is no evidence of pericardial disease.  The patient was demonstrated to have significant right atrial and right ventricular enlargement with moderate severity secondary pulmonary hypertension and moderate-severe secondary tricuspid regurgitation.  This is felt to be due to his underlying lung disease and represents chronic cor pulmonale.  His dyspnea has remained stable at baseline.  He does report palpitations with a sense that his heart is \"fluttering\".  The patient wore a cardiac monitor demonstrating a 4% PAC burden and a 4% PVC burden.  This correlated to his symptoms of palpitations.  The patient had no sustained supraventricular ventricular arrhythmia.  There was no clinically significant bradycardia during waking hours.  Fortunately the patient no longer smokes.  He is under the care of a local pulmonologist and continues to follow-up with his thoracic surgeon who performed his lung resection for lung cancer.  To date he has had no evidence of recurrence or metastatic disease.  The following portions of the patient's history were reviewed and updated as appropriate: allergies, current medications, past family history, past medical history, past social history, past surgical history and problem  Review of Systems   Constitutional: Negative for chills, diaphoresis, fever, malaise/fatigue, weight gain and weight loss. "   HENT: Negative for ear discharge, hearing loss, hoarse voice and nosebleeds.    Eyes: Negative for discharge, double vision, pain and photophobia.   Cardiovascular: Positive for dyspnea on exertion, irregular heartbeat and palpitations. Negative for chest pain, claudication, cyanosis, leg swelling, near-syncope, orthopnea, paroxysmal nocturnal dyspnea and syncope.   Respiratory: Positive for shortness of breath. Negative for cough, hemoptysis, sputum production and wheezing.    Endocrine: Negative for cold intolerance, heat intolerance, polydipsia, polyphagia and polyuria.   Hematologic/Lymphatic: Negative for adenopathy and bleeding problem. Does not bruise/bleed easily.   Skin: Negative for color change, flushing, itching and rash.   Musculoskeletal: Negative for muscle cramps, muscle weakness, myalgias and stiffness.   Gastrointestinal: Negative for abdominal pain, diarrhea, hematemesis, hematochezia, nausea and vomiting.   Genitourinary: Negative for dysuria, frequency and nocturia.   Neurological: Positive for dizziness. Negative for focal weakness, loss of balance, numbness, paresthesias and seizures.   Psychiatric/Behavioral: Negative for altered mental status, hallucinations and suicidal ideas.   Allergic/Immunologic: Negative for HIV exposure, hives and persistent infections.           Current Outpatient Medications:   •  albuterol (ACCUNEB) 1.25 MG/3ML nebulizer solution, Take 3 mL by nebulization Every 4 (Four) Hours As Needed for Wheezing., Disp: 50 each, Rfl: 2  •  albuterol sulfate  (90 Base) MCG/ACT inhaler, Inhale 2 puffs Every 6 (Six) Hours As Needed for Wheezing or Shortness of Air., Disp: 18 g, Rfl: 0  •  tiotropium bromide-olodaterol (STIOLTO RESPIMAT) 2.5-2.5 MCG/ACT aerosol solution inhaler, Inhale 2 puffs Daily., Disp: 1 each, Rfl: 5    Objective:   Vitals and nursing note reviewed.   Constitutional:       Appearance: Healthy appearance. Not in distress.   Neck:      Vascular: No JVR.  "JVD normal.   Pulmonary:      Effort: Pulmonary effort is normal.      Breath sounds: Normal breath sounds. No wheezing. No rhonchi. No rales.   Chest:      Chest wall: Not tender to palpatation.   Cardiovascular:      PMI at left midclavicular line. Normal rate. Regular rhythm. Normal S1. Normal S2.      Murmurs: There is no murmur.      No gallop. No click. No rub.   Pulses:     Intact distal pulses.   Edema:     Peripheral edema absent.   Abdominal:      General: Bowel sounds are normal.      Palpations: Abdomen is soft.      Tenderness: There is no abdominal tenderness.   Musculoskeletal: Normal range of motion.         General: No tenderness. Skin:     General: Skin is warm and dry.   Neurological:      General: No focal deficit present.      Mental Status: Alert and oriented to person, place and time.       Blood pressure 112/60, pulse 74, height 182.9 cm (72\"), weight 79.8 kg (176 lb), SpO2 94 %.   Lab Review:     Assessment:       1. Palpitations  Symptomatic atrial and ventricular ectopy.  No sustained supraventricular ventricular tachycardia.  No significant bradycardia, conduction disturbance, heart block or pauses greater than 2.0 seconds during waking hours.  PAC burden: 4%.  PVC burden: 4%.    2. Cor pulmonale (chronic) (HCC)  Evidence of significant right-sided cardiac chamber enlargement.  Moderate secondary pulmonary hypertension.  No clinical evidence of overt right heart failure (no edema or jugular venous distention).    3. History of tobacco use  Fortunately the patient no longer smokes.    4. Pulmonary hypertension   WHO group 3 etiology.  Advanced emphysema and prior lung cancer.    5. Lung cancer, middle lobe s/p right VATS with lobectomy  No evidence of recurrence.    Procedures    Plan:     Advance Care Planning   ACP discussion was held with the patient during this visit. Patient has an advance directive (not in EMR), copy requested.     Antiarrhythmic drug therapy is not indicated.  No " changes in medications have been made at today's visit.  No further cardiovascular testing is warranted.  The patient has been reassured regarding the benign nature of PACs/PVCs.  Pulmonary hypertension (WHO group 3 etiology) deferred to his established pulmonary care specialist.  The patient has been congratulated on his smoking cessation and cautioned to never again resume cigarette smoking.

## 2022-06-07 DIAGNOSIS — R91.1 RIGHT UPPER LOBE PULMONARY NODULE: Primary | ICD-10-CM

## 2022-06-14 LAB
Lab: 113
MAXIMAL PREDICTED HEART RATE: 151 BPM
STRESS TARGET HR: 128 BPM
TOAL ENROLLMENT DAYS: 30

## 2022-07-15 ENCOUNTER — OFFICE VISIT (OUTPATIENT)
Dept: PULMONOLOGY | Facility: CLINIC | Age: 67
End: 2022-07-15

## 2022-07-15 VITALS
HEIGHT: 72 IN | OXYGEN SATURATION: 95 % | SYSTOLIC BLOOD PRESSURE: 132 MMHG | DIASTOLIC BLOOD PRESSURE: 74 MMHG | BODY MASS INDEX: 23.43 KG/M2 | HEART RATE: 52 BPM | WEIGHT: 173 LBS

## 2022-07-15 DIAGNOSIS — C34.91 NON-SMALL CELL CANCER OF RIGHT LUNG: ICD-10-CM

## 2022-07-15 DIAGNOSIS — J43.9 PULMONARY EMPHYSEMA, UNSPECIFIED EMPHYSEMA TYPE: ICD-10-CM

## 2022-07-15 DIAGNOSIS — Z87.891 HISTORY OF SMOKING: ICD-10-CM

## 2022-07-15 DIAGNOSIS — J44.9 CHRONIC OBSTRUCTIVE PULMONARY DISEASE, UNSPECIFIED COPD TYPE: Primary | ICD-10-CM

## 2022-07-15 PROCEDURE — 99214 OFFICE O/P EST MOD 30 MIN: CPT | Performed by: NURSE PRACTITIONER

## 2022-07-15 NOTE — PROGRESS NOTES
"     Follow Up Office Visit      Patient Name: Saman Aguayo    Chief Complaint:    Chief Complaint   Patient presents with   • Follow-up   • Shortness of Breath       History of Present Illness: Saman Aguayo is a 67 y.o. male who is here today for follow up of COPD and history of lung cancer.  Since last visit, his symptoms have been well controlled with use of Stiolto.  No exacerbations.  He has been playing golf and is able to walk 9 holes with difficulty.  He does note seasonal allergies though declines use of allergy medications. His recent chest CT scan was stable without concern for recurrent tumor or metastasis.  Continues to follow with CT surgery, Dr. Porter.    Associated Symptoms: Occasional cough, no current wheezing, no fevers, no hemoptysis, no unintended weight loss  Supplemental Oxygen: No    Subjective      Review of Systems:  Review of Systems   Constitutional: Negative for fever and unexpected weight change.   Respiratory: Positive for cough and shortness of breath. Negative for wheezing.    Cardiovascular: Negative for chest pain.   Allergic/Immunologic: Positive for environmental allergies.        Past Medical History:   Past Medical History:   Diagnosis Date   • Abnormal ECG    • Abscess of skin    • Arthritis     HANDS AND BACK    • Bunion     LEFT FOOT   • COPD (chronic obstructive pulmonary disease) (HCC)    • Full dentures    • History of echocardiogram 09/21/2016   • Hx of exercise stress test 09/27/2016    DR. BAKER \"IT WAS NORMAL\"   • Low back pain    • Lung cancer, middle lobe (HCC) 11/8/2016    RIGHT MIDDLE LOBE   • Lung nodule     RUL-7MM.  PRESENTLY HAS, AND FOLLOWS WITH DR. HERNANDEZ.  STATES FOUND \"A COUPLE MONTHS AGO\"   • Premature atrial contraction    • Pulmonary hypertension (HCC)    • Skin cancer     BASAL CELL-NOSE, LIP   • Wears eyeglasses        Past Surgical History:   Past Surgical History:   Procedure Laterality Date   • BASAL CELL CARCINOMA EXCISION      NOSE, LIP   • " BRONCHOSCOPY  2016   • BRONCHOSCOPY N/A 3/13/2020    Procedure: BRONCHOSCOPY;  Surgeon: Chris Porter MD;  Location:  [facility];  Service: Cardiothoracic;  Laterality: N/A;   • BUNIONECTOMY Left 2013    GREAT TOE   • COLONOSCOPY  2018   • DIAGNOSTIC LAPAROSCOPY N/A 6/29/2020    Procedure: DIAGNOSTIC LAPAROSCOPY, APPENDECTOMY, CHOLECYSTECOMY WITH CHOLANGIOGRAPHY;  Surgeon: Shady Singh MD;  Location:  [facility];  Service: General;  Laterality: N/A;   • ENDOSCOPY N/A 6/3/2020    Procedure: ESOPHAGOGASTRODUODENOSCOPY WITH BIOPSY;  Surgeon: Shady Singh MD;  Location:  [facility];  Service: Gastroenterology;  Laterality: N/A;   • INGUINAL HERNIA REPAIR Left 2002    Dr. Singh/inguinal    • KNEE ARTHROSCOPY Right 12/12/2018    Procedure: Diagnostic arthroscopy right knee with partial medial meniscectomy;  Surgeon: Dinh Nova MD;  Location:  [facility];  Service: Orthopedics   • LUNG REMOVAL, PARTIAL  2016    RIGHT MIDDLE LOBE   • MOUTH SURGERY      FULL EXTRACTION OF TEETH   • SKIN BIOPSY     • THORACOSCOPY N/A 11/18/2016    Procedure: BRONCH THORACOSCOPY VIDEO ASSISTED  WITH LOBECTOMY, MEDIALSTINAL LYMPH NODE DISECTION;  Surgeon: Chris Porter MD;  Location:  [facility];  Service:    • THORACOSCOPY VIDEO ASSISTED WITH LOBECTOMY Right 3/13/2020    Procedure: Redo right THORACOSCOPY VIDEO ASSISTED with right upper lobe wedge resection and with conversion to open thorocotomy for right upper lobectomy with intercostal cryoablation;  Surgeon: Chris Porter MD;  Location:  [facility];  Service: Cardiothoracic;  Laterality: Right;       Family History:   Family History   Problem Relation Age of Onset   • Heart attack Father    • Heart disease Father    • Colon cancer Father    • Colon cancer Mother    • Liver cancer Mother        Social History:   Social History     Socioeconomic History   • Marital status:    • Number of children: 1   Tobacco Use   • Smoking status: Former Smoker     Packs/day: 3.00     " Years: 50.00     Pack years: 150.00     Types: Cigarettes     Quit date: 2016     Years since quittin.6   • Smokeless tobacco: Never Used   Vaping Use   • Vaping Use: Never used   Substance and Sexual Activity   • Alcohol use: Yes     Alcohol/week: 21.0 standard drinks     Types: 21 Cans of beer per week     Comment: 3 cans/daily   • Drug use: No   • Sexual activity: Defer       Current Medications:     Current Outpatient Medications:   •  albuterol (ACCUNEB) 1.25 MG/3ML nebulizer solution, Take 3 mL by nebulization Every 4 (Four) Hours As Needed for Wheezing., Disp: 50 each, Rfl: 2  •  albuterol sulfate  (90 Base) MCG/ACT inhaler, Inhale 2 puffs Every 6 (Six) Hours As Needed for Wheezing or Shortness of Air., Disp: 18 g, Rfl: 0  •  tiotropium bromide-olodaterol (STIOLTO RESPIMAT) 2.5-2.5 MCG/ACT aerosol solution inhaler, Inhale 2 puffs Daily., Disp: 1 each, Rfl: 5     Allergies:   Allergies   Allergen Reactions   • No Known Drug Allergy        Objective     Physical Exam:  Vital Signs:   Vitals:    07/15/22 1006   BP: 132/74   Pulse: 52   SpO2: 95%   Weight: 78.5 kg (173 lb)   Height: 182.9 cm (72\")     Body mass index is 23.46 kg/m².    Physical Exam  Constitutional:       General: He is not in acute distress.     Appearance: He is not toxic-appearing.   HENT:      Head: Normocephalic and atraumatic.   Eyes:      Extraocular Movements: Extraocular movements intact.   Cardiovascular:      Rate and Rhythm: Regular rhythm.      Heart sounds: Normal heart sounds.   Pulmonary:      Effort: Pulmonary effort is normal.      Breath sounds: Normal breath sounds.   Abdominal:      General: There is no distension.   Musculoskeletal:         General: No swelling.      Cervical back: Neck supple.   Skin:     General: Skin is warm and dry.      Findings: No rash.   Neurological:      General: No focal deficit present.      Mental Status: He is alert and oriented to person, place, and time.   Psychiatric:        "  Mood and Affect: Mood normal.         Behavior: Behavior normal.       Results Review:   January 2022 PFTs showed moderate obstruction, without significant bronchodilator response. Post BD FEV1: 58%, FEV1/FVC ratio was 54. No restriction ,but air-trapping suggested. Moderately decreased diffusion capacity. DLCO/VA: 60%    May 2022 chest CT scan showed stable exam without evidence of local tumor recurrence or metastatic disease. Emphysematous changes noted.    Assessment / Plan      Assessment/Plan:   Diagnoses and all orders for this visit:    1. Chronic obstructive pulmonary disease, unspecified COPD type (HCC) (Primary)  -     tiotropium bromide-olodaterol (STIOLTO RESPIMAT) 2.5-2.5 MCG/ACT aerosol solution inhaler; Inhale 2 puffs Daily.  Dispense: 1 each; Refill: 5  Stable symptoms on current regimen.  Continue use of Stiolto daily as prescribed.    2. Non-small cell cancer of right lung (HCC)  -     CT Chest Without Contrast Diagnostic; Future  Status post resection on 2 occasions, last in 2020.  Continues to follow with Dr. Porter.  Recent chest CT scan results reviewed and discussed with patient.  Schedule a repeat 6-month follow-up chest CT scan.    3. Pulmonary emphysema, unspecified emphysema type (HCC)  Noted on chest imaging.  Inhaled regimen as noted above.    4. History of smoking  Patient maintains cessation.       Follow Up:   Keep pending February 2023 pulmonary clinic appointment  The patient was counseled on diagnostic results, risks and benefits of treatment options, risk factor modifications and the importance of treatment compliance. The patient was advised to contact the clinic with concerns or worsening symptoms.     SAMIRA Cid   Pulmonary Medicine [facility]     Please note that portions of this note may have been completed with a voice recognition program. Efforts were made to edit the dictations, but occasionally words are mistranscribed

## 2022-07-26 ENCOUNTER — OFFICE VISIT (OUTPATIENT)
Dept: CARDIAC SURGERY | Facility: CLINIC | Age: 67
End: 2022-07-26

## 2022-07-26 VITALS
HEART RATE: 60 BPM | TEMPERATURE: 97.8 F | HEIGHT: 72 IN | DIASTOLIC BLOOD PRESSURE: 65 MMHG | SYSTOLIC BLOOD PRESSURE: 129 MMHG | OXYGEN SATURATION: 100 % | BODY MASS INDEX: 22.75 KG/M2 | WEIGHT: 168 LBS

## 2022-07-26 DIAGNOSIS — C34.2 LUNG CANCER, MIDDLE LOBE: Primary | ICD-10-CM

## 2022-07-26 PROCEDURE — 99213 OFFICE O/P EST LOW 20 MIN: CPT | Performed by: NURSE PRACTITIONER

## 2022-07-26 NOTE — PROGRESS NOTES
[facility] Cardiothoracic Surgery Office Follow Up Note     Date of Encounter: 2022     Name: Saman Aguayo  : 1955     Referred By: No ref. provider found  PCP: Vermeesch, Marilyn K, MD    Chief Complaint:    Chief Complaint   Patient presents with   • Lung Cancer     6 month follow-up with CT chest. Patient had CT chest in May ordered by pulmonologist. Patient is still weak since having covid earlier this year        Subjective      History of Present Illness:    Saman Aguayo is a 67 y.o. 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  Patient had recurrence early  requiring redo right VATS with right upper wedge resection conversion to thoracotomy with completion lobectomy and mediastinal lymph node dissection 2020 by Dr. Porter.  Pathology consistent with stage I A2 non-small cell carcinoma most consistent with adenocarcinoma ([id number]).  Patient has been following with our office and pulmonology Dr. Lim for surveillance imaging.  Patient was last seen in clinic 2022.  At that time, patient had recently been diagnosed with COVID and was requiring supplemental oxygen with weight loss and was still recovering.  CT imaging was reviewed from December with no evidence of disease recurrence and 2022 consistent with COVID infection.  Recommendations to continue following with pulmonology Dr. Lim and our office for 6-month imaging per NCCN guidelines.  Patient presents today for follow-up.  He had CT chest performed May 2022.  Patient feels he is recovering slowly, still has issues with fatigue and lack of strength secondary to COVID infection.  He feels he is progressing slowly.  He denies any further unintentional weight loss, hemoptysis,  lymphadenopathy, fevers, chills, or body aches.  He was recently seen by pulmonology 7/15/2022, doing well.    Review of Systems:  Review of Systems   Constitutional: Positive for malaise/fatigue. Negative for chills, decreased appetite, diaphoresis, fever, night sweats, weight gain and weight loss.   HENT: Negative for hoarse voice.    Eyes: Negative for blurred vision, double vision and visual disturbance.   Cardiovascular: Positive for dyspnea on exertion. Negative for chest pain, claudication, irregular heartbeat, leg swelling, near-syncope, orthopnea, palpitations, paroxysmal nocturnal dyspnea and syncope.   Respiratory: Positive for cough and shortness of breath. Negative for hemoptysis, sputum production and wheezing.    Hematologic/Lymphatic: Negative for adenopathy and bleeding problem. Does not bruise/bleed easily.   Skin: Negative for color change, nail changes, poor wound healing and rash.   Musculoskeletal: Positive for arthritis and joint pain. Negative for back pain, falls and muscle cramps.   Gastrointestinal: Negative for abdominal pain, dysphagia and heartburn.   Genitourinary: Negative for flank pain.   Neurological: Positive for dizziness. Negative for brief paralysis, disturbances in coordination, focal weakness, headaches, light-headedness, loss of balance, numbness, paresthesias, sensory change, vertigo and weakness.   Psychiatric/Behavioral: Negative for depression and suicidal ideas. The patient is not nervous/anxious.    Allergic/Immunologic: Negative for persistent infections.       I have reviewed the following portions of the patient's history: allergies, current medications, past family history, past medical history, past social history, past surgical history, problem list and ROS and confirm it's accurate.    Allergies:  Allergies   Allergen Reactions   • No Known Drug Allergy        Medications:      Current Outpatient Medications:   •  albuterol (ACCUNEB) 1.25 MG/3ML nebulizer  "solution, Take 3 mL by nebulization Every 4 (Four) Hours As Needed for Wheezing., Disp: 50 each, Rfl: 2  •  tiotropium bromide-olodaterol (STIOLTO RESPIMAT) 2.5-2.5 MCG/ACT aerosol solution inhaler, Inhale 2 puffs Daily., Disp: 1 each, Rfl: 5  •  albuterol sulfate  (90 Base) MCG/ACT inhaler, Inhale 2 puffs Every 6 (Six) Hours As Needed for Wheezing or Shortness of Air. (Patient not taking: Reported on 7/26/2022), Disp: 18 g, Rfl: 0    History:   Past Medical History:   Diagnosis Date   • Abnormal ECG    • Abscess of skin    • Arthritis     HANDS AND BACK    • Bunion     LEFT FOOT   • COPD (chronic obstructive pulmonary disease) (HCC)    • Full dentures    • History of echocardiogram 09/21/2016   • Hx of exercise stress test 09/27/2016    DR. BAKER \"IT WAS NORMAL\"   • Low back pain    • Lung cancer, middle lobe (HCC) 11/8/2016    RIGHT MIDDLE LOBE   • Lung nodule     RUL-7MM.  PRESENTLY HAS, AND FOLLOWS WITH DR. HERNANDEZ.  STATES FOUND \"A COUPLE MONTHS AGO\"   • Premature atrial contraction    • Pulmonary hypertension (HCC)    • Skin cancer     BASAL CELL-NOSE, LIP   • Wears eyeglasses        Past Surgical History:   Procedure Laterality Date   • BASAL CELL CARCINOMA EXCISION      NOSE, LIP   • BRONCHOSCOPY  2016   • BRONCHOSCOPY N/A 3/13/2020    Procedure: BRONCHOSCOPY;  Surgeon: Chris Porter MD;  Location: [facility] OR;  Service: Cardiothoracic;  Laterality: N/A;   • BUNIONECTOMY Left 2013    GREAT TOE   • COLONOSCOPY  2018   • DIAGNOSTIC LAPAROSCOPY N/A 6/29/2020    Procedure: DIAGNOSTIC LAPAROSCOPY, APPENDECTOMY, CHOLECYSTECOMY WITH CHOLANGIOGRAPHY;  Surgeon: Shady Singh MD;  Location: [facility] OR;  Service: General;  Laterality: N/A;   • ENDOSCOPY N/A 6/3/2020    Procedure: ESOPHAGOGASTRODUODENOSCOPY WITH BIOPSY;  Surgeon: Shady Singh MD;  Location: [facility] ENDOSCOPY;  Service: Gastroenterology;  Laterality: N/A;   • INGUINAL HERNIA REPAIR Left 2002    Dr. Singh/inguinal    • KNEE ARTHROSCOPY " "Right 2018    Procedure: Diagnostic arthroscopy right knee with partial medial meniscectomy;  Surgeon: Dinh Nova MD;  Location: [facility] OR;  Service: Orthopedics   • LUNG REMOVAL, PARTIAL  2016    RIGHT MIDDLE LOBE   • MOUTH SURGERY      FULL EXTRACTION OF TEETH   • SKIN BIOPSY     • THORACOSCOPY N/A 2016    Procedure: BRONCH THORACOSCOPY VIDEO ASSISTED  WITH LOBECTOMY, MEDIALSTINAL LYMPH NODE DISECTION;  Surgeon: Chris Porter MD;  Location: [facility] OR;  Service:    • THORACOSCOPY VIDEO ASSISTED WITH LOBECTOMY Right 3/13/2020    Procedure: Redo right THORACOSCOPY VIDEO ASSISTED with right upper lobe wedge resection and with conversion to open thorocotomy for right upper lobectomy with intercostal cryoablation;  Surgeon: Chris Porter MD;  Location: [facility] OR;  Service: Cardiothoracic;  Laterality: Right;       Social History     Socioeconomic History   • Marital status:    • Number of children: 1   Tobacco Use   • Smoking status: Former Smoker     Packs/day: 3.00     Years: 50.00     Pack years: 150.00     Types: Cigarettes     Quit date: 2016     Years since quittin.7   • Smokeless tobacco: Never Used   Vaping Use   • Vaping Use: Never used   Substance and Sexual Activity   • Alcohol use: Yes     Alcohol/week: 21.0 standard drinks     Types: 21 Cans of beer per week     Comment: 3 cans/daily   • Drug use: No   • Sexual activity: Defer        Family History   Problem Relation Age of Onset   • Heart attack Father    • Heart disease Father    • Colon cancer Father    • Colon cancer Mother    • Liver cancer Mother        Objective     Physical Exam:  Vitals:    22 0848   BP: 129/65   BP Location: Right arm   Patient Position: Sitting   Pulse: 60   Temp: 97.8 °F (36.6 °C)   SpO2: 100%   Weight: 76.2 kg (168 lb)   Height: 182.9 cm (72\")      Body mass index is 22.78 kg/m².    Physical Exam  Vitals and nursing note reviewed.   Constitutional:       Appearance: Normal appearance. " He is well-developed.   HENT:      Head: Normocephalic and atraumatic.   Eyes:      Pupils: Pupils are equal, round, and reactive to light.   Neck:      Vascular: No carotid bruit.   Cardiovascular:      Rate and Rhythm: Normal rate and regular rhythm.      Pulses: Normal pulses.      Heart sounds: Normal heart sounds, S1 normal and S2 normal. No murmur heard.  Pulmonary:      Effort: Pulmonary effort is normal.      Breath sounds: Normal breath sounds.   Abdominal:      Palpations: Abdomen is soft.   Musculoskeletal:         General: No swelling.      Cervical back: Neck supple.      Right lower leg: No edema.      Left lower leg: No edema.   Skin:     General: Skin is warm and dry.      Capillary Refill: Capillary refill takes less than 2 seconds.      Findings: No bruising.   Neurological:      General: No focal deficit present.      Mental Status: He is alert and oriented to person, place, and time. Mental status is at baseline.      GCS: GCS eye subscore is 4. GCS verbal subscore is 5. GCS motor subscore is 6.      Motor: Motor function is intact.      Coordination: Coordination is intact.      Gait: Gait is intact.   Psychiatric:         Mood and Affect: Mood normal.         Speech: Speech normal.         Behavior: Behavior normal. Behavior is cooperative.         Cognition and Memory: Cognition normal.         Imaging/Labs:    CT chest without contrast 5/20/22:   IMPRESSION:  Stable exam without evidence of local tumor recurrence or  metastatic disease.       Assessment / Plan      Assessment / Plan:  Diagnoses and all orders for this visit:    1. Lung cancer, middle lobe s/p right VATS with lobectomy (Primary)       1. Stage Ia infiltrating non-small cell undifferentiated carcinoma s/p right VATS with right middle lobectomy and mediastinal lymph node dissection in 2016 ([id number]) with lymphovascular invasion present and recurrence early 2020 requiring redo right VATS with right upper wedge resection  conversion to thoracotomy with completion lobectomy and mediastinal lymph node dissection March 2020 by Dr. Porter.  Pathology consistent with stage I A2 non-small cell carcinoma most consistent with adenocarcinoma ([id number]): He had CT chest performed May 2022.  Patient feels he is recovering slowly, still has issues with fatigue and lack of strength secondary to COVID infection.  He feels he is progressing slowly.  He denies any further unintentional weight loss, hemoptysis, lymphadenopathy, fevers, chills, or body aches.  He was recently seen by pulmonology 7/15/2022, doing well. Discussed findings of CT Chest which did not reveal any evidence of local tumor recurrence or metastatic disease and appears improved from February 2022 CT.  Patient would like to continue following with our office as well as Dr. Lim's office for lung cancer surveillance.  6-month CT chest is scheduled for November 2022, we will plan to see the patient back around December 2022 to review this image.      Follow Up:   Return in about 5 months (around 12/26/2022) for F/u with imaging.   Or sooner for any further concerns or worsening sign and symptoms. If unable to reach us in the office please dial 911 or go to the nearest emergency department.      Heather Londono APRTUNG  [facility] Cardiothoracic Surgery    Time Spent: I spent 23 minutes caring for Saman on this date of service. This time includes time spent by me in the following activities: preparing for the visit, reviewing tests, obtaining and/or reviewing a separately obtained history, performing a medically appropriate examination and/or evaluation, counseling and educating the patient/family/caregiver, documenting information in the medical record, independently interpreting results and communicating that information with the patient/family/caregiver and care coordination.

## 2022-10-14 ENCOUNTER — OFFICE VISIT (OUTPATIENT)
Dept: INTERNAL MEDICINE | Facility: CLINIC | Age: 67
End: 2022-10-14

## 2022-10-14 VITALS
HEIGHT: 72 IN | DIASTOLIC BLOOD PRESSURE: 72 MMHG | OXYGEN SATURATION: 97 % | HEART RATE: 57 BPM | BODY MASS INDEX: 21.94 KG/M2 | TEMPERATURE: 97.5 F | WEIGHT: 162 LBS | SYSTOLIC BLOOD PRESSURE: 128 MMHG

## 2022-10-14 DIAGNOSIS — Z23 NEED FOR INFLUENZA VACCINATION: ICD-10-CM

## 2022-10-14 DIAGNOSIS — Z00.00 ENCOUNTER FOR SUBSEQUENT ANNUAL WELLNESS VISIT (AWV) IN MEDICARE PATIENT: Primary | ICD-10-CM

## 2022-10-14 DIAGNOSIS — Z12.5 SCREENING PSA (PROSTATE SPECIFIC ANTIGEN): ICD-10-CM

## 2022-10-14 PROCEDURE — 1159F MED LIST DOCD IN RCRD: CPT | Performed by: INTERNAL MEDICINE

## 2022-10-14 PROCEDURE — 96160 PT-FOCUSED HLTH RISK ASSMT: CPT | Performed by: INTERNAL MEDICINE

## 2022-10-14 PROCEDURE — G0008 ADMIN INFLUENZA VIRUS VAC: HCPCS | Performed by: INTERNAL MEDICINE

## 2022-10-14 PROCEDURE — 90662 IIV NO PRSV INCREASED AG IM: CPT | Performed by: INTERNAL MEDICINE

## 2022-10-14 PROCEDURE — 1170F FXNL STATUS ASSESSED: CPT | Performed by: INTERNAL MEDICINE

## 2022-10-14 PROCEDURE — 99397 PER PM REEVAL EST PAT 65+ YR: CPT | Performed by: INTERNAL MEDICINE

## 2022-10-14 PROCEDURE — G0439 PPPS, SUBSEQ VISIT: HCPCS | Performed by: INTERNAL MEDICINE

## 2022-10-14 RX ORDER — ALBUTEROL SULFATE 90 UG/1
2 AEROSOL, METERED RESPIRATORY (INHALATION) EVERY 6 HOURS PRN
Qty: 18 G | Refills: 3 | Status: SHIPPED | OUTPATIENT
Start: 2022-10-14 | End: 2022-12-09 | Stop reason: HOSPADM

## 2022-10-14 NOTE — PROGRESS NOTES
The ABCs of the Annual Wellness Visit  Subsequent Medicare Wellness Visit    Chief Complaint   Patient presents with   • Medicare Wellness-subsequent   • Annual Exam      Subjective    History of Present Illness:  Saman Aguayo is a 67 y.o. male who presents for a Subsequent Medicare Wellness Visit and annual physical exam.  PMH of lung cancer status post right middle lobe VATS lobectomy, intermittent claudication, cor pulmonale/pulmonary hypertension, DJD, COPD. He had COVID in early spring and since then is SOA and has fatigue.   He does have some wheezing.  He uses the stioloto every AM, but does not use albuterol.  He had some mid chest pain last mo that went thru to right scapula.  It lasted for 4 days straight, no associated sxs.   No bothersome DJD right now.     The following portions of the patient's history were reviewed and   updated as appropriate: allergies, current medications, past family history, past medical history, past social history, past surgical history and problem list.    Compared to one year ago, the patient feels his physical   health is worse.  Due to post COVID sxs    Compared to one year ago, the patient feels his mental   health is the same.    Recent Hospitalizations:  He was not admitted to the hospital during the last year.       Current Medical Providers:  Patient Care Team:  Vermeesch, Marilyn K, MD as PCP - General (Internal Medicine & Pediatrics)  Max Almonte MD as Consulting Physician (Cardiology)  Juan Alberto Carty MD as Surgeon (Cardiothoracic Surgery)  Shady Singh MD as Consulting Physician (General Surgery)  Dinh Nova MD (Inactive) as Consulting Physician (Orthopedic Surgery)  Erin Sosa APRN as Nurse Practitioner (Cardiothoracic Surgery)  Hilda Smyth APRN as Nurse Practitioner (Pulmonary Disease)  Rianna Fernandez APRN as Nurse Practitioner (Cardiothoracic Surgery)  Janina Mojica APRN as Nurse Practitioner (Nurse  Practitioner)  Jose Lee MD as Consulting Physician (Otolaryngology)  Veronika Barry, RN as Ambulatory  (Oncology) (Population Health)    Outpatient Medications Prior to Visit   Medication Sig Dispense Refill   • albuterol (ACCUNEB) 1.25 MG/3ML nebulizer solution Take 3 mL by nebulization Every 4 (Four) Hours As Needed for Wheezing. 50 each 2   • tiotropium bromide-olodaterol (STIOLTO RESPIMAT) 2.5-2.5 MCG/ACT aerosol solution inhaler Inhale 2 puffs Daily. 1 each 5   • albuterol sulfate  (90 Base) MCG/ACT inhaler Inhale 2 puffs Every 6 (Six) Hours As Needed for Wheezing or Shortness of Air. (Patient not taking: Reported on 7/26/2022) 18 g 0     No facility-administered medications prior to visit.       No opioid medication identified on active medication list. I have reviewed chart for other potential  high risk medication/s and harmful drug interactions in the elderly.          Aspirin is not on active medication list.  Aspirin use is not indicated based on review of current medical condition/s. Risk of harm outweighs potential benefits.  .    Patient Active Problem List   Diagnosis   • Osteoarthritis   • History of tobacco use   • Pulmonary hypertension    • Lung cancer, middle lobe s/p right VATS with lobectomy   • COPD   • Right upper lobe pulmonary nodule   • History of colon polyps   • Chronic fatigue   • Encounter for subsequent annual wellness visit (AWV) in Medicare patient   • Screening PSA (prostate specific antigen)   • COVID-19 long hauler manifesting chronic dyspnea   • Intermittent claudication (HCC)   • Dizziness   • Herpes zoster without complication   • Palpitations   • Cor pulmonale (chronic) (HCC)     Advance Care Planning  Advance Directive is not on file.  ACP discussion was held with the patient during this visit. Patient does not have an advance directive, information provided.    Review of Systems   Constitutional: Positive for fatigue.   HENT: Positive for  "hearing loss.    Eyes:        Will have cataract surgery next mo   Respiratory: Positive for shortness of breath.    Cardiovascular: Positive for chest pain.   Gastrointestinal: Negative.    Genitourinary: Negative.    Musculoskeletal: Positive for neck pain.   Skin: Negative.    Allergic/Immunologic: Positive for environmental allergies.   Neurological: Positive for dizziness.        Dizzy with changes in position   Hematological: Bruises/bleeds easily.   Psychiatric/Behavioral: Negative.         Objective    Vitals:    10/14/22 0912   BP: 128/72   Pulse: 57   Temp: 97.5 °F (36.4 °C)   SpO2: 97%   Weight: 73.5 kg (162 lb)   Height: 182.9 cm (72\")   PainSc: 0-No pain     Estimated body mass index is 21.97 kg/m² as calculated from the following:    Height as of this encounter: 182.9 cm (72\").    Weight as of this encounter: 73.5 kg (162 lb).    BMI is within normal parameters. No other follow-up for BMI required.      Does the patient have evidence of cognitive impairment? No    Physical Exam  Vitals and nursing note reviewed.   Constitutional:       General: He is not in acute distress.     Appearance: Normal appearance. He is well-developed. He is not ill-appearing.      Comments: Kind and pleasant man, appears stated age and in NAD today   HENT:      Head: Normocephalic and atraumatic.      Right Ear: Tympanic membrane, ear canal and external ear normal.      Left Ear: Tympanic membrane, ear canal and external ear normal.      Nose: No congestion.      Mouth/Throat:      Pharynx: No posterior oropharyngeal erythema.   Eyes:      General:         Right eye: No discharge.         Left eye: No discharge.      Extraocular Movements: Extraocular movements intact.      Conjunctiva/sclera: Conjunctivae normal.      Pupils: Pupils are equal, round, and reactive to light.   Neck:      Thyroid: No thyromegaly.      Vascular: No carotid bruit.      Comments: No thyromegaly or mass  Cardiovascular:      Rate and Rhythm: " Normal rate and regular rhythm.      Pulses: Normal pulses.      Heart sounds: Normal heart sounds. No murmur heard.  Pulmonary:      Effort: Pulmonary effort is normal. No respiratory distress.      Breath sounds: No wheezing.      Comments: Slightly diminished breath sounds over upper lobes bilaterally  Chest:      Chest wall: Tenderness present.          Comments: Tenderness over right lower costochondral joint  Abdominal:      General: Bowel sounds are normal. There is no distension.      Palpations: Abdomen is soft.      Tenderness: There is no abdominal tenderness.   Musculoskeletal:         General: Normal range of motion.      Cervical back: Normal range of motion and neck supple.      Right lower leg: No edema.      Left lower leg: No edema.   Lymphadenopathy:      Cervical: No cervical adenopathy.   Skin:     General: Skin is warm.      Findings: No rash.   Neurological:      General: No focal deficit present.      Mental Status: He is alert and oriented to person, place, and time. Mental status is at baseline.      Cranial Nerves: No cranial nerve deficit.      Motor: No weakness.      Coordination: Coordination normal.      Gait: Gait normal.   Psychiatric:         Mood and Affect: Mood normal.         Behavior: Behavior normal.         Thought Content: Thought content normal.         Judgment: Judgment normal.                 HEALTH RISK ASSESSMENT    Smoking Status:  Social History     Tobacco Use   Smoking Status Former   • Packs/day: 3.00   • Years: 50.00   • Pack years: 150.00   • Types: Cigarettes   • Quit date: 2016   • Years since quittin.9   Smokeless Tobacco Never     Alcohol Consumption:  Social History     Substance and Sexual Activity   Alcohol Use Yes   • Alcohol/week: 21.0 standard drinks   • Types: 21 Cans of beer per week    Comment: 3 cans/daily     Fall Risk Screen:    [facility] Fall Risk Assessment was completed, and patient is at LOW risk for falls.Assessment completed  on:10/14/2022    Depression Screening:  PHQ-2/PHQ-9 Depression Screening 10/14/2022   Retired PHQ-9 Total Score -   Retired Total Score -   Little Interest or Pleasure in Doing Things 0-->not at all   Feeling Down, Depressed or Hopeless 0-->not at all   PHQ-9: Brief Depression Severity Measure Score 0       Health Habits and Functional and Cognitive Screening:  Functional & Cognitive Status 10/14/2022   Do you have difficulty preparing food and eating? No   Do you have difficulty bathing yourself, getting dressed or grooming yourself? No   Do you have difficulty using the toilet? No   Do you have difficulty moving around from place to place? No   Do you have trouble with steps or getting out of a bed or a chair? Yes   Current Diet Well Balanced Diet   Dental Exam Not up to date   Eye Exam Up to date        Eye Exam Comment -   Exercise (times per week) 0 times per week   Current Exercises Include No Regular Exercise   Current Exercise Activities Include -   Do you need help using the phone?  No   Are you deaf or do you have serious difficulty hearing?  No   Do you need help with transportation? No   Do you need help shopping? No   Do you need help preparing meals?  No   Do you need help with housework?  No   Do you need help with laundry? No   Do you need help taking your medications? No   Do you need help managing money? No   Do you ever drive or ride in a car without wearing a seat belt? No   Have you felt unusual stress, anger or loneliness in the last month? No   Who do you live with? Spouse   If you need help, do you have trouble finding someone available to you? No   Have you been bothered in the last four weeks by sexual problems? No   Do you have difficulty concentrating, remembering or making decisions? No       Age-appropriate Screening Schedule:  Refer to the list below for future screening recommendations based on patient's age, sex and/or medical conditions. Orders for these recommended tests are listed  in the plan section. The patient has been provided with a written plan.    Health Maintenance   Topic Date Due   • TDAP/TD VACCINES (2 - Td or Tdap) 01/26/2027   • INFLUENZA VACCINE  Completed   • ZOSTER VACCINE  Completed              Assessment & Plan   CMS Preventative Services Quick Reference  Risk Factors Identified During Encounter  Immunizations Discussed/Encouraged (specific Immunizations; Influenza  The above risks/problems have been discussed with the patient.  Follow up actions/plans if indicated are seen below in the Assessment/Plan Section.  Pertinent information has been shared with the patient in the After Visit Summary.    Diagnoses and all orders for this visit:    1. Encounter for subsequent annual wellness visit (AWV) in Medicare patient (Primary)  -     CBC & Differential  -     Comprehensive Metabolic Panel  -     Lipid Panel With / Chol / HDL Ratio  -     PSA Screen    2. Screening PSA (prostate specific antigen)  -     PSA Screen    3. Need for influenza vaccination  -     Fluzone High-Dose 65+yrs    Other orders  -     albuterol sulfate  (90 Base) MCG/ACT inhaler; Inhale 2 puffs Every 6 (Six) Hours As Needed for Wheezing or Shortness of Air.  Dispense: 18 g; Refill: 3    Continue current Stioloto inhaler  Encourage patient to use albuterol before any activity to help prevent shortness of breath and wheezing  Labs as noted  Flu vaccine provided today  Patient will discuss new COVID-vaccine with pulmonologist on follow-up visit soon  Encouraged him to try co-Q10 200 mg daily to help with chronic fatigue symptoms from COVID  Mild right lower chest wall pain with palpation consistent with costochondritis.  Encourage patient to apply heat to area and avoid lifting or overuse of chest wall to prevent ongoing chest wall pain    Follow Up:   Return in about 6 months (around 4/14/2023) for Next scheduled follow up.     An After Visit Summary and PPPS were made available to the  patient.

## 2022-11-21 ENCOUNTER — HOSPITAL ENCOUNTER (OUTPATIENT)
Dept: CT IMAGING | Facility: HOSPITAL | Age: 67
Discharge: HOME | End: 2022-11-21
Admitting: NURSE PRACTITIONER

## 2022-11-21 DIAGNOSIS — C34.91 NON-SMALL CELL CANCER OF RIGHT LUNG: ICD-10-CM

## 2022-11-21 PROCEDURE — 71250 CT THORAX DX C-: CPT

## 2022-11-29 ENCOUNTER — PREP FOR SURGERY (OUTPATIENT)
Dept: OTHER | Facility: HOSPITAL | Age: 67
End: 2022-11-29

## 2022-11-29 DIAGNOSIS — H25.11 NUCLEAR SCLEROTIC CATARACT OF RIGHT EYE: Primary | ICD-10-CM

## 2022-11-29 RX ORDER — CYCLOPENT/TROPIC/PHEN/KETR/WAT 1%-1%-2.5%
1 DROPS (EA) OPHTHALMIC (EYE)
Status: CANCELLED | OUTPATIENT
Start: 2022-11-29 | End: 2022-11-29

## 2022-11-29 RX ORDER — TETRACAINE HYDROCHLORIDE 5 MG/ML
1 SOLUTION OPHTHALMIC SEE ADMIN INSTRUCTIONS
Status: CANCELLED | OUTPATIENT
Start: 2022-11-29

## 2022-11-29 RX ORDER — SODIUM CHLORIDE 9 MG/ML
40 INJECTION, SOLUTION INTRAVENOUS AS NEEDED
Status: CANCELLED | OUTPATIENT
Start: 2022-11-29

## 2022-11-29 RX ORDER — SODIUM CHLORIDE 0.9 % (FLUSH) 0.9 %
10 SYRINGE (ML) INJECTION EVERY 12 HOURS SCHEDULED
Status: CANCELLED | OUTPATIENT
Start: 2022-11-29

## 2022-11-29 RX ORDER — PREDNISOLONE ACETATE 10 MG/ML
1 SUSPENSION/ DROPS OPHTHALMIC SEE ADMIN INSTRUCTIONS
Status: CANCELLED | OUTPATIENT
Start: 2022-11-29

## 2022-11-29 RX ORDER — SODIUM CHLORIDE 0.9 % (FLUSH) 0.9 %
10 SYRINGE (ML) INJECTION AS NEEDED
Status: CANCELLED | OUTPATIENT
Start: 2022-11-29

## 2022-12-05 PROBLEM — H25.11 NUCLEAR SCLEROTIC CATARACT OF RIGHT EYE: Status: ACTIVE | Noted: 2022-12-05

## 2022-12-06 ENCOUNTER — OFFICE VISIT (OUTPATIENT)
Dept: CARDIAC SURGERY | Facility: CLINIC | Age: 67
End: 2022-12-06

## 2022-12-06 VITALS
SYSTOLIC BLOOD PRESSURE: 122 MMHG | OXYGEN SATURATION: 98 % | DIASTOLIC BLOOD PRESSURE: 62 MMHG | WEIGHT: 159.4 LBS | HEART RATE: 58 BPM | HEIGHT: 72 IN | BODY MASS INDEX: 21.59 KG/M2 | TEMPERATURE: 98.8 F

## 2022-12-06 DIAGNOSIS — R06.09 COVID-19 LONG HAULER MANIFESTING CHRONIC DYSPNEA: ICD-10-CM

## 2022-12-06 DIAGNOSIS — J43.2 CENTRILOBULAR EMPHYSEMA: ICD-10-CM

## 2022-12-06 DIAGNOSIS — C34.2 LUNG CANCER, MIDDLE LOBE: Primary | ICD-10-CM

## 2022-12-06 DIAGNOSIS — U09.9 COVID-19 LONG HAULER MANIFESTING CHRONIC DYSPNEA: ICD-10-CM

## 2022-12-06 PROCEDURE — 99214 OFFICE O/P EST MOD 30 MIN: CPT | Performed by: NURSE PRACTITIONER

## 2022-12-06 NOTE — PROGRESS NOTES
"     [facility] Cardiothoracic Surgery Office Follow Up Note     Date of Encounter: 2022     Name: Saman Aguayo  : 1955     Referred By: No ref. provider found  PCP: Vermeesch, Marilyn K, MD    Chief Complaint:    Chief Complaint   Patient presents with   • Follow-up     Follow up w/ CT chest lung- lung cancer. Pt states that he is very SOB and fatigued otehn \"I just don't have any energy\". Also complains of arthritic pain in his hands.         Subjective      History of Present Illness:    Saman Aguayo is a 67 y.o. 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  Patient had recurrence early  requiring redo right VATS with right upper wedge resection conversion to thoracotomy with completion lobectomy and mediastinal lymph node dissection 2020 by Dr. Porter.  Pathology consistent with stage I A2 non-small cell carcinoma most consistent with adenocarcinoma ([id number]).  Patient has been following with our office and pulmonology Dr. Lim for surveillance imaging.  Patient was last seen in clinic 2022.    Patient had diagnosed with COVID 2022 and was requiring supplemental oxygen with weight loss and is still recovering.  Recommendations to continue following with pulmonology Dr. Lim and our office for 6-month imaging per NCCN guidelines.  Patient presents today for follow-up to review CT chest 22.  Patient states he has never regained his sense of taste since COVID and that is the main  for his poor appetite.  He states he has tried multiple supplements, but he cannot drink them regularly.  He has best luck with nightly Alexis's cup and milk.  He anticipates cataract surgery later this year. No supplemental oxygen or CPAP use.      Review of " Systems:  Review of Systems   Constitutional: Positive for decreased appetite, malaise/fatigue and weight loss (pt states that he is continuing to loose weight. Lost a total  23 pounds ). Negative for chills, diaphoresis, fever, night sweats and weight gain.   HENT: Positive for congestion (sinus/ allergies). Negative for hoarse voice.    Eyes: Positive for blurred vision (pt has eye surgery scheduled for right cataract sugery ). Negative for double vision and visual disturbance.   Cardiovascular: Positive for chest pain, dyspnea on exertion, irregular heartbeat, leg swelling (bliateral hand swelling, some swelling left foot) and palpitations. Negative for claudication, near-syncope, orthopnea, paroxysmal nocturnal dyspnea and syncope.   Respiratory: Positive for sputum production. Negative for cough, hemoptysis, shortness of breath and wheezing.    Hematologic/Lymphatic: Positive for bleeding problem. Negative for adenopathy. Bruises/bleeds easily.   Skin: Negative for color change, nail changes, poor wound healing and rash.   Musculoskeletal: Positive for joint pain (bilateral hand swelling/ pain ), muscle cramps (m[place] horses ) and muscle weakness. Negative for back pain and falls.   Gastrointestinal: Negative for abdominal pain, dysphagia and heartburn.   Genitourinary: Negative for flank pain.   Neurological: Positive for dizziness (when rising always), light-headedness, loss of balance (heart rate drops on standing pt gets unsteady on his feet), numbness (bilateral hand numbness) and weakness. Negative for brief paralysis, disturbances in coordination, focal weakness, headaches, paresthesias, sensory change and vertigo.   Psychiatric/Behavioral: Negative for depression and suicidal ideas.   Allergic/Immunologic: Positive for environmental allergies. Negative for persistent infections.       I have reviewed the following portions of the patient's history: allergies, current medications, past family history,  "past medical history, past social history, past surgical history, problem list and ROS and confirm it's accurate.    Allergies:  Allergies   Allergen Reactions   • No Known Drug Allergy        Medications:      Current Outpatient Medications:   •  albuterol sulfate  (90 Base) MCG/ACT inhaler, Inhale 2 puffs Every 6 (Six) Hours As Needed for Wheezing or Shortness of Air., Disp: 18 g, Rfl: 3  •  tiotropium bromide-olodaterol (STIOLTO RESPIMAT) 2.5-2.5 MCG/ACT aerosol solution inhaler, Inhale 2 puffs Daily., Disp: 1 each, Rfl: 5  •  albuterol (ACCUNEB) 1.25 MG/3ML nebulizer solution, Take 3 mL by nebulization Every 4 (Four) Hours As Needed for Wheezing., Disp: 50 each, Rfl: 2    History:   Past Medical History:   Diagnosis Date   • Abnormal ECG    • Abscess of skin    • Arthritis     HANDS AND BACK    • Bunion     LEFT FOOT   • COPD (chronic obstructive pulmonary disease) (HCC)    • Full dentures    • History of echocardiogram 09/21/2016   • Hx of exercise stress test 09/27/2016    DR. BAKER \"IT WAS NORMAL\"   • Low back pain    • Lung cancer, middle lobe (HCC) 11/8/2016    RIGHT MIDDLE LOBE   • Lung nodule     RUL-7MM.  PRESENTLY HAS, AND FOLLOWS WITH DR. HERNANDEZ.  STATES FOUND \"A COUPLE MONTHS AGO\"   • Premature atrial contraction    • Pulmonary hypertension (HCC)    • Skin cancer     BASAL CELL-NOSE, LIP   • Wears eyeglasses        Past Surgical History:   Procedure Laterality Date   • APPENDECTOMY     • BASAL CELL CARCINOMA EXCISION      NOSE, LIP   • BRONCHOSCOPY  2016   • BRONCHOSCOPY N/A 03/13/2020    Procedure: BRONCHOSCOPY;  Surgeon: Chris Porter MD;  [facility]  Service: Cardiothoracic;  Laterality: N/A;   • BUNIONECTOMY Left 2013    GREAT TOE   • COLONOSCOPY  2018   • DIAGNOSTIC LAPAROSCOPY N/A 06/29/2020    Procedure: DIAGNOSTIC LAPAROSCOPY, APPENDECTOMY, CHOLECYSTECOMY WITH CHOLANGIOGRAPHY;  Surgeon: Shady Singh MD;  Location: [facility];  Service: General;  Laterality: N/A;   • " ENDOSCOPY N/A 2020    Procedure: ESOPHAGOGASTRODUODENOSCOPY WITH BIOPSY;  Surgeon: Shady Singh MD;  Location: [facility] ENDOSCOPY;  Service: Gastroenterology;  Laterality: N/A;   • GALLBLADDER SURGERY  2022   • INGUINAL HERNIA REPAIR Left     Dr. Singh/inguinal    • KNEE ARTHROSCOPY Right 2018    Procedure: Diagnostic arthroscopy right knee with partial medial meniscectomy;  Surgeon: Dinh Nova MD;  Location:  [facility];  Service: Orthopedics   • LUNG REMOVAL, PARTIAL  2016    RIGHT MIDDLE LOBE   • MOUTH SURGERY      FULL EXTRACTION OF TEETH   • SKIN BIOPSY     • THORACOSCOPY N/A 2016    Procedure: BRONCH THORACOSCOPY VIDEO ASSISTED  WITH LOBECTOMY, MEDIALSTINAL LYMPH NODE DISECTION;  Surgeon: Chris Porter MD;  Location:  [facility];  Service:    • THORACOSCOPY VIDEO ASSISTED WITH LOBECTOMY Right 2020    Procedure: Redo right THORACOSCOPY VIDEO ASSISTED with right upper lobe wedge resection and with conversion to open thorocotomy for right upper lobectomy with intercostal cryoablation;  Surgeon: Chris Porter MD;  Location:  [facility];  Service: Cardiothoracic;  Laterality: Right;       Social History     Socioeconomic History   • Marital status:    • Number of children: 1   Tobacco Use   • Smoking status: Former     Packs/day: 3.00     Years: 50.00     Pack years: 150.00     Types: Cigarettes     Quit date: 2016     Years since quittin.0   • Smokeless tobacco: Never   Vaping Use   • Vaping Use: Never used   Substance and Sexual Activity   • Alcohol use: Yes     Alcohol/week: 21.0 standard drinks     Types: 21 Cans of beer per week     Comment: 3 cans/daily   • Drug use: Never   • Sexual activity: Defer        Family History   Problem Relation Age of Onset   • Heart attack Father    • Heart disease Father    • Colon cancer Father    • Colon cancer Mother    • Liver cancer Mother        Objective     Physical Exam:  Vitals:    22 1327   BP: 122/62  "  Pulse: 58   Temp: 98.8 °F (37.1 °C)   SpO2: 98%   Weight: 72.3 kg (159 lb 6.4 oz)   Height: 182.9 cm (72\")      Body mass index is 21.62 kg/m².    Physical Exam  Vitals reviewed.   Constitutional:       General: He is not in acute distress.     Appearance: He is well-groomed. He is not toxic-appearing.      Comments: Chronically ill appearing   HENT:      Head: Normocephalic and atraumatic.   Neck:      Thyroid: No thyromegaly.      Vascular: No carotid bruit.   Cardiovascular:      Rate and Rhythm: Regular rhythm. Bradycardia present.      Pulses:           Dorsalis pedis pulses are 2+ on the right side and 2+ on the left side.        Posterior tibial pulses are 2+ on the right side and 2+ on the left side.      Heart sounds: S1 normal and S2 normal.   Pulmonary:      Effort: Pulmonary effort is normal.      Breath sounds: Examination of the right-upper field reveals decreased breath sounds. Examination of the left-upper field reveals decreased breath sounds. Examination of the right-middle field reveals decreased breath sounds. Examination of the left-middle field reveals decreased breath sounds. Examination of the right-lower field reveals decreased breath sounds. Examination of the left-lower field reveals decreased breath sounds. Decreased breath sounds present. No wheezing, rhonchi or rales.   Chest:      Comments: Well healed thoracotomy scars  Musculoskeletal:      Cervical back: Normal range of motion and neck supple.      Right lower leg: No edema.      Left lower leg: No edema.   Lymphadenopathy:      Cervical: No cervical adenopathy.   Skin:     General: Skin is warm and dry.      Capillary Refill: Capillary refill takes less than 2 seconds.   Neurological:      Mental Status: He is alert and oriented to person, place, and time.      GCS: GCS eye subscore is 4. GCS verbal subscore is 5. GCS motor subscore is 6.   Psychiatric:         Attention and Perception: Attention normal.         Mood and Affect: " Mood and affect normal.         Speech: Speech normal.         Behavior: Behavior is cooperative.       Imaging/Labs:  CT Chest Without Contrast Diagnostic    Result Date: 11/21/2022.  Personally reviewed.  Agree w/ Radiologist interpretation of no new nodules.  Post operative changes in right noted.      No acute findings in the chest. No evidence of disease progression or recurrence. No significant interval change.     Images personally reviewed, interpreted and dictated by VERN Coyle..          This report was signed and finalized on 11/21/2022 4:23 PM by VERN Coyle.     CT CHEST WO CONTRAST DIAGNOSTIC-5/20/22   HISTORY: Non-small cell lung cancer (NSCLC), monitor; C34.91-Malignant  neoplasm of unspecified part of right bronchus or lung     COMPARISON: 02/07/2022  FINDINGS:  Emphysematous changes are noted. Left lung is clear. Postoperative  changes of right thorax are seen.   No pleural or pericardial effusion is seen. No adenopathy or mass lesion  is present.     IMPRESSION:  Stable exam without evidence of local tumor recurrence or  metastatic disease.  This report was signed and finalized on 5/20/2022 2:14 PM by Juan F Saba MD.      Assessment / Plan      Assessment / Plan:  1. Lung cancer, middle lobe s/p right VATS with lobectomy (Primary)  - Stage Ia infiltrating non-small cell undifferentiated carcinoma s/p right VATS with right middle lobectomy and mediastinal lymph node dissection in 2016 ([id number]) with lymphovascular invasion present and recurrence early 2020 requiring redo right VATS with right upper wedge resection conversion to thoracotomy with completion lobectomy and mediastinal lymph node dissection March 2020 by Dr. Porter.    - Pathology consistent with stage I A2 non-small cell carcinoma most consistent with adenocarcinoma ([id number]).  - Still has issues with fatigue and lack of strength secondary to COVID infection May 2022.  He feels he is progressing slowly.  He  denies any further unintentional weight loss, hemoptysis, lymphadenopathy, fevers, chills, or body aches.    - Continue to follow with Pulmonology 7/15/2022, doing well.   - Cardiology eval per Dr. Almonte 6/2/22 which included echo, cardiac rhythm monitor.  Cor pulmonale noted on echo  - Discussed findings of CT Chest 11/21/22 which did not reveal any evidence of local tumor recurrence or metastatic disease.  Patient would like to continue following with our office as well as Dr. Lim's office for lung cancer surveillance.  6-month CT chest will be scheduled with clinic visit to follow.    Follow Up:   Return in about 6 months (around 6/6/2023) for Lung cancer surveillance w/ CT chest.   Or sooner for any further concerns or worsening sign and symptoms. If unable to reach us in the office please dial 911 or go to the nearest emergency department.      Jackie Romero APRTUNG  [facility] Cardiothoracic Surgery    Time Spent: I spent 38 minutes caring for Saman on this date of service. This time includes time spent by me in the following activities: preparing for the visit, reviewing tests, obtaining and/or reviewing a separately obtained history, performing a medically appropriate examination and/or evaluation, counseling and educating the patient/family/caregiver, ordering medications, tests, or procedures, referring and communicating with other health care professionals, documenting information in the medical record, independently interpreting results and communicating that information with the patient/family/caregiver and care coordination.

## 2022-12-07 NOTE — PRE-PROCEDURE INSTRUCTIONS
PAT phone history completed with pt for upcoming procedure on 12/9/22, with Dr. Haynes.     PAT PASS GIVEN/REVIEWED WITH PT.  VERBALIZED UNDERSTANDING OF THE FOLLOWING:  DO NOT EAT, DRINK, SMOKE, USE SMOKELESS TOBACCO OR CHEW GUM AFTER MIDNIGHT THE NIGHT BEFORE SURGERY.  THIS ALSO INCLUDES HARD CANDIES AND MINTS.    DO NOT SHAVE THE AREA TO BE OPERATED ON AT LEAST 48 HOURS PRIOR TO THE PROCEDURE.  DO NOT WEAR MAKE UP OR NAIL POLISH.  DO NOT LEAVE IN ANY PIERCING OR WEAR JEWELRY THE DAY OF SURGERY.      DO NOT USE ADHESIVES IF YOU WEAR DENTURES.    DO NOT WEAR EYE CONTACTS; BRING IN YOUR GLASSES.    ONLY TAKE MEDICATION THE MORNING OF YOUR PROCEDURE IF INSTRUCTED BY YOUR SURGEON WITH ENOUGH WATER TO SWALLOW THE MEDICATION.  IF YOUR SURGEON DID NOT SPECIFY WHICH MEDICATIONS TO TAKE, YOU WILL NEED TO CALL THEIR OFFICE FOR FURTHER INSTRUCTIONS AND DO AS THEY INSTRUCT.    LEAVE ANYTHING YOU CONSIDER VALUABLE AT HOME.    YOU WILL NEED TO ARRANGE FOR SOMEONE TO DRIVE YOU HOME AFTER SURGERY.  IT IS RECOMMENDED THAT YOU DO NOT DRIVE, WORK, DRINK ALCOHOL OR MAKE MAJOR DECISIONS FOR AT LEAST 24 HOURS AFTER YOUR PROCEDURE IS COMPLETE.      THE DAY OF YOUR PROCEDURE, BRING IN THE FOLLOWING IF APPLICABLE:   PICTURE ID AND INSURANCE/MEDICARE OR MEDICAID CARDS/ANY CO-PAY THAT MAY BE DUE   COPY OF ADVANCED DIRECTIVE/LIVING WILL/POWER OR    CPAP/BIPAP/INHALERS   SKIN PREP SHEET   YOUR PREADMISSION TESTING PASS (IF NOT A PHONE HISTORY)

## 2022-12-09 ENCOUNTER — ANESTHESIA EVENT (OUTPATIENT)
Dept: PERIOP | Facility: HOSPITAL | Age: 67
End: 2022-12-09

## 2022-12-09 ENCOUNTER — HOSPITAL ENCOUNTER (OUTPATIENT)
Facility: HOSPITAL | Age: 67
Setting detail: HOSPITAL OUTPATIENT SURGERY
Discharge: HOME | End: 2022-12-09
Attending: OPHTHALMOLOGY | Admitting: OPHTHALMOLOGY

## 2022-12-09 ENCOUNTER — ANESTHESIA (OUTPATIENT)
Dept: PERIOP | Facility: HOSPITAL | Age: 67
End: 2022-12-09

## 2022-12-09 VITALS
BODY MASS INDEX: 21.54 KG/M2 | RESPIRATION RATE: 18 BRPM | HEART RATE: 63 BPM | TEMPERATURE: 97.3 F | WEIGHT: 159 LBS | DIASTOLIC BLOOD PRESSURE: 65 MMHG | OXYGEN SATURATION: 95 % | SYSTOLIC BLOOD PRESSURE: 118 MMHG | HEIGHT: 72 IN

## 2022-12-09 DIAGNOSIS — H25.11 NUCLEAR SCLEROTIC CATARACT OF RIGHT EYE: ICD-10-CM

## 2022-12-09 PROCEDURE — V2632 POST CHMBR INTRAOCULAR LENS: HCPCS | Performed by: OPHTHALMOLOGY

## 2022-12-09 PROCEDURE — 25010000002 FENTANYL CITRATE (PF) 50 MCG/ML SOLUTION: Performed by: NURSE ANESTHETIST, CERTIFIED REGISTERED

## 2022-12-09 PROCEDURE — 25010000002 MIDAZOLAM PER 1MG: Performed by: NURSE ANESTHETIST, CERTIFIED REGISTERED

## 2022-12-09 DEVICE — LENS MONOFOCAL IQ CC60WF225: Type: IMPLANTABLE DEVICE | Site: POSTERIOR CHAMBER | Status: FUNCTIONAL

## 2022-12-09 RX ORDER — SODIUM CHLORIDE 0.9 % (FLUSH) 0.9 %
10 SYRINGE (ML) INJECTION AS NEEDED
Status: DISCONTINUED | OUTPATIENT
Start: 2022-12-09 | End: 2022-12-09 | Stop reason: HOSPADM

## 2022-12-09 RX ORDER — SODIUM CHLORIDE 0.9 % (FLUSH) 0.9 %
10 SYRINGE (ML) INJECTION EVERY 12 HOURS SCHEDULED
Status: DISCONTINUED | OUTPATIENT
Start: 2022-12-09 | End: 2022-12-09 | Stop reason: HOSPADM

## 2022-12-09 RX ORDER — CYCLOPENT/TROPIC/PHEN/KETR/WAT 1%-1%-2.5%
1 DROPS (EA) OPHTHALMIC (EYE)
Status: COMPLETED | OUTPATIENT
Start: 2022-12-09 | End: 2022-12-09

## 2022-12-09 RX ORDER — SODIUM CHLORIDE, SODIUM LACTATE, POTASSIUM CHLORIDE, CALCIUM CHLORIDE 600; 310; 30; 20 MG/100ML; MG/100ML; MG/100ML; MG/100ML
1000 INJECTION, SOLUTION INTRAVENOUS CONTINUOUS
Status: DISCONTINUED | OUTPATIENT
Start: 2022-12-09 | End: 2022-12-09 | Stop reason: HOSPADM

## 2022-12-09 RX ORDER — SODIUM CHLORIDE 9 MG/ML
40 INJECTION, SOLUTION INTRAVENOUS AS NEEDED
Status: DISCONTINUED | OUTPATIENT
Start: 2022-12-09 | End: 2022-12-09 | Stop reason: HOSPADM

## 2022-12-09 RX ORDER — TETRACAINE HYDROCHLORIDE 5 MG/ML
SOLUTION OPHTHALMIC AS NEEDED
Status: DISCONTINUED | OUTPATIENT
Start: 2022-12-09 | End: 2022-12-09 | Stop reason: HOSPADM

## 2022-12-09 RX ORDER — BALANCED SALT SOLUTION 6.4; .75; .48; .3; 3.9; 1.7 MG/ML; MG/ML; MG/ML; MG/ML; MG/ML; MG/ML
SOLUTION OPHTHALMIC AS NEEDED
Status: DISCONTINUED | OUTPATIENT
Start: 2022-12-09 | End: 2022-12-09 | Stop reason: HOSPADM

## 2022-12-09 RX ORDER — PREDNISOLONE ACETATE 10 MG/ML
SUSPENSION/ DROPS OPHTHALMIC AS NEEDED
Status: DISCONTINUED | OUTPATIENT
Start: 2022-12-09 | End: 2022-12-09 | Stop reason: HOSPADM

## 2022-12-09 RX ORDER — KETAMINE HYDROCHLORIDE 50 MG/ML
INJECTION, SOLUTION, CONCENTRATE INTRAMUSCULAR; INTRAVENOUS AS NEEDED
Status: DISCONTINUED | OUTPATIENT
Start: 2022-12-09 | End: 2022-12-09 | Stop reason: SURG

## 2022-12-09 RX ORDER — TETRACAINE HYDROCHLORIDE 5 MG/ML
1 SOLUTION OPHTHALMIC SEE ADMIN INSTRUCTIONS
Status: DISCONTINUED | OUTPATIENT
Start: 2022-12-09 | End: 2022-12-09 | Stop reason: HOSPADM

## 2022-12-09 RX ORDER — MIDAZOLAM HYDROCHLORIDE 2 MG/2ML
INJECTION, SOLUTION INTRAMUSCULAR; INTRAVENOUS AS NEEDED
Status: DISCONTINUED | OUTPATIENT
Start: 2022-12-09 | End: 2022-12-09 | Stop reason: SURG

## 2022-12-09 RX ORDER — LIDOCAINE HYDROCHLORIDE 10 MG/ML
INJECTION, SOLUTION EPIDURAL; INFILTRATION; INTRACAUDAL; PERINEURAL AS NEEDED
Status: DISCONTINUED | OUTPATIENT
Start: 2022-12-09 | End: 2022-12-09 | Stop reason: HOSPADM

## 2022-12-09 RX ORDER — PREDNISOLONE ACETATE 10 MG/ML
1 SUSPENSION/ DROPS OPHTHALMIC SEE ADMIN INSTRUCTIONS
Status: DISCONTINUED | OUTPATIENT
Start: 2022-12-09 | End: 2022-12-09 | Stop reason: HOSPADM

## 2022-12-09 RX ORDER — FENTANYL CITRATE 50 UG/ML
INJECTION, SOLUTION INTRAMUSCULAR; INTRAVENOUS AS NEEDED
Status: DISCONTINUED | OUTPATIENT
Start: 2022-12-09 | End: 2022-12-09 | Stop reason: SURG

## 2022-12-09 RX ADMIN — KETAMINE HYDROCHLORIDE 15 MG: 50 INJECTION, SOLUTION INTRAMUSCULAR; INTRAVENOUS at 10:25

## 2022-12-09 RX ADMIN — Medication 1 DROP: at 08:57

## 2022-12-09 RX ADMIN — TETRACAINE HYDROCHLORIDE 1 DROP: 5 SOLUTION OPHTHALMIC at 08:45

## 2022-12-09 RX ADMIN — SODIUM CHLORIDE, POTASSIUM CHLORIDE, SODIUM LACTATE AND CALCIUM CHLORIDE 1000 ML: 600; 310; 30; 20 INJECTION, SOLUTION INTRAVENOUS at 08:52

## 2022-12-09 RX ADMIN — Medication 1 DROP: at 08:53

## 2022-12-09 RX ADMIN — MIDAZOLAM HYDROCHLORIDE 2 MG: 1 INJECTION, SOLUTION INTRAMUSCULAR; INTRAVENOUS at 10:25

## 2022-12-09 RX ADMIN — FENTANYL CITRATE 50 MCG: 50 INJECTION, SOLUTION INTRAMUSCULAR; INTRAVENOUS at 10:25

## 2022-12-09 RX ADMIN — Medication 1 DROP: at 08:47

## 2022-12-09 RX ADMIN — TETRACAINE HYDROCHLORIDE 1 DROP: 5 SOLUTION OPHTHALMIC at 08:46

## 2022-12-09 NOTE — ANESTHESIA POSTPROCEDURE EVALUATION
Patient: Saman Aguayo    Procedure Summary     Date: 12/09/22 Room / Location: [facility] OR 1 /  [facility]    Anesthesia Start: 1023 Anesthesia Stop: 1045    Procedure: CATARACT PHACO EXTRACTION WITH INTRAOCULAR LENS IMPLANT RIGHT COMPLICATED WITH MALYUGIN RING (Right: Eye) Diagnosis:       Nuclear sclerotic cataract of right eye      (Nuclear sclerotic cataract of right eye [H25.11])    Surgeons: Christiane Haynes DO Provider: Yaya Tillman CRNA    Anesthesia Type: MAC ASA Status: 3          Anesthesia Type: MAC    Vitals  HR 54  Sat 99  Resp 20  Temp 98  /51        Post Anesthesia Care and Evaluation    Patient location during evaluation: bedside  Patient participation: complete - patient participated  Level of consciousness: awake and alert  Pain score: 0  Pain management: adequate    Airway patency: patent  Anesthetic complications: No anesthetic complications  PONV Status: none  Cardiovascular status: acceptable  Respiratory status: acceptable  Hydration status: acceptable

## 2022-12-09 NOTE — OP NOTE
OPERATIVE NOTE    Date of Procedure: 12/9/2022  Patient Name: Saman Aguayo  Patient MRN: [id number]  YOB: 1955     Preoperative Diagnosis: Right nuclear sclerotic cataract.     Postoperative Diagnosis: Right nuclear sclerotic cataract.     Procedure Performed: Phacoemulsification with implantation of a  foldable posterior chamber intraocular lens, Right eye.     Surgeon: Christiane Haynes DO     Anesthesia:  Monitored Anesthesia Care (MAC)      Brief History and Indication: The patient presents with a history of past progressive loss of vision.  Patient was diagnosed with cataract and requests removal for increased ability to read and see.     Operation Description: The patient was taken to the OR and prepped and draped in the usual sterile ophthalmic fashion. A lid speculum was placed in the eye.  A 0.8 mm blade was then used to make a stab incision two o’clock hours from the intended temporal clear cornea groove. The anterior chamber was then inflated with a Viscoelastic. A metal microkeratome blade was then used to enter the anterior chamber at the temporal clear cornea site. A three level tunnel incision was made. A curvilinear capsulorrhexis was then performed with a bent cystotome needle and capsulorrhexis forceps.  BSS on a 30 gauge bent cannula was used to hydro-dissect the lens. Good fluid waves were noted. Phacoemulsification was then used to remove nuclear material without complications. The residual cortical and lenticular material was then removed with irrigation and aspiration. Viscoelastics were then used to inflate the bag in a soft shell technique. A PCIOL was injected into the bag. Post-implantation, there were no rents or tears in the bag and the lens was noted to be stable and centered. The residual Viscoelastic was then removed with irrigation and aspiration.  The wound was checked and found to be without leaks. One drop of a Prednisilone was placed in the eye.     Implant  Information:   Implant Name Type Inv. Item Serial No.  Lot No. LRB No. Used Action   LENS MONOFOCAL IQ [id number] - [id number] - [id number] Implant LENS MONOFOCAL IQ [id number] TRI  Right 1 Implanted       Complications: None    Estimated Blood Loss:  Less than 1 cc.      Discharge and Condition  The patient was transported to same day surgery in excellent condition and scheduled for follow-up appointment. The patient was given instructions on use of eye drops for the operative eye and was specifically instructed to call for any concerns.    Christiane Haynes DO  12/9/2022

## 2022-12-09 NOTE — DISCHARGE INSTRUCTIONS
Post Operative Cataract Instructions     Right Eye  POST OPERATIVE INSTRUCTIONS  You have received anesthesia today. DO NOT drive, drink alcohol, sign legal documents.   After surgery, your eye will not hurt. It may feel scratchy, sticky or uncomfortable. Your eye will be sensitive to light.  Most people see better 1-3 days after the procedure, but it could take 3 weeks to get the full benefits and reach your visual potential. If your vision is blurry for a few days it is normal, and means you may have swelling outside or inside the eye. For some patients, a bubble is placed and there will be blurriness.   You should receive a post-op kit with tape and an eye shield. Wear the shield for the first 3 nights after surgery to keep you from rubbing the eye.  Most people are able to return to their normal routine 1-3 days after surgery, however, due to the brain adjusting to your new vision you may have trouble judging distances and want to be careful when driving and going up and downstairs.   You can shower and wash your hair the day after surgery. Keep water, shampoo, hair spray and shaving lotion out of the eye, especially for the first week.  During the first week, you should AVOID:   Rubbing or putting pressure on your eye.  Eye make-up, face cream or lotion, hair coloring or perms  Strenuous activities, such as running or lifting weights, as to avoid sweat from getting in the eye. Avoid swimming, hot tubs, fumes or nessa conditions.   Keep your head above your heart (no hanging the head down for periods of time).    Some discomfort and blurred vision after surgery are normal, but if you have any unusual pain, swelling, bleeding or sudden decrease in vision, contact our office immediately.     Emergency assistance is available at any time by calling:    Dr.Mark Ivy Haynes  [phone]    If unable to reach call [facility] @  [phone]    You have been prescribed an eye drop to use after surgery, please follow these instructions:    Durezol Shake well before use    USE 1 DROP 4 (FOUR) TIMES A DAY FOR 1 WEEK, WEEK 2: USE 3 (THREE) TIMES A DAY FOR 1 WEEK, WEEK 3: USE 2 (TWO) TIMES A DAY FOR 1 WEEK, WEEK 4: USE 1 (ONE) TIME A DAY FOR 1 WEEK THEN STOP.    PLACE A POLINA IN THE DAY COLUMN EACH TIME YOU USE TO KEEP ON SCHEDULE    WEEK 1-USE 4  (FOUR) TIMES A DAY DAY 1   DAY 2 DAY 3 DAY 4 DAY 5 DAY 6 DAY 7     WEEK 2-USE 3 (THREE)  TIMES A DAY DAY 1 DAY 2 DAY 3 DAY 4 DAY 5 DAY 6 DAY 7     WEEK 3-USE 2 (TWO) TIMES A DAY DAY 1 DAY 2 DAY 3 DAY 4 DAY 5 DAY 6 DAY 7     WEEK 4-USE 1 (ONE)TIME A DAY DAY 1 DAY 2 DAY 3 DAY 4 DAY 5 DAY 6 DAY 7      Please follow all post op instructions and follow up appointment time from your physician's office included in your discharge packet.  .  No pushing, pulling, tugging,  heavy lifting, or strenuous activity.  No major decision making, driving, or drinking alcoholic beverages for 24 hours. ( due to the medications you have  received)  Always use good hand hygiene/washing techniques.  NO driving while taking pain medications.    * if you have an incision:  Check your incision area every day for signs of infection.   Check for:  * more redness, swelling, or pain  *more fluid or blood  *warmth  *pus or bad smell   To assist you in voiding:  Drink plenty of fluids  Listen to running water while attempting to void.    If you are unable to urinate and you have an uncomfortable urge to void or it has been   6 hours since you were discharged, return to the Emergency Room

## 2022-12-09 NOTE — ANESTHESIA PREPROCEDURE EVALUATION
Anesthesia Evaluation     Patient summary reviewed and Nursing notes reviewed   no history of anesthetic complications:  NPO Solid Status: > 8 hours  NPO Liquid Status: > 8 hours           Airway   Mallampati: II  TM distance: >3 FB  Neck ROM: full  no difficulty expected  Dental - normal exam     Pulmonary - normal exam   (+) a smoker Former, lung cancer, COPD moderate, shortness of breath,     ROS comment: Pulmonary HTN    Post right upper lobe wedge resection  Cardiovascular - normal exam    ECG reviewed  Rhythm: regular  Rate: normal    (+) valvular problems/murmurs TI,     ROS comment: Echo   Interpretation Summary    ? Estimated left ventricular EF = 56% Left ventricular ejection fraction appears to be 56 - 60%. Left ventricular systolic function is normal.  ? Left ventricular diastolic function was normal.  ? Estimated right ventricular systolic pressure from tricuspid regurgitation is moderately elevated (45-55 mmHg).  ? Moderate to severe tricuspid valve regurgitation is present.  ? Moderate pulmonary hypertension is present.      Neuro/Psych  (+) dizziness/light headedness,    GI/Hepatic/Renal/Endo - negative ROS     Musculoskeletal     Abdominal    Substance History - negative use     OB/GYN negative ob/gyn ROS         Other   arthritis,    history of cancer active    ROS/Med Hx Other: Lung Cancer in remission post surgery with serial followup chest CTs                     Anesthesia Plan    ASA 3     MAC     (Risks and benefits discussed including risk of aspiration, recall and dental damage. All patient questions answered. Will continue with POC.)  intravenous induction     Anesthetic plan, risks, benefits, and alternatives have been provided, discussed and informed consent has been obtained with: patient.

## 2022-12-09 NOTE — H&P
"      [facility]         History and Physical    Patient Name: Saman Aguayo  MRN: [id number]  : 1955  Gender: male     HPI: Patient complaint of PPLOV Right eye diagnosed with cataract. Patient requests PHACO PCIOL for Increase of VA/ADL.    History:    Past Medical History:   Diagnosis Date   • Abnormal ECG    • Abscess of skin    • Arthritis     HANDS AND BACK    • Bradycardia    • Bunion     LEFT FOOT   • COPD (chronic obstructive pulmonary disease) (HCC)    • Full dentures    • History of echocardiogram 2016   • Hx of exercise stress test 2016    DR. BAKER \"IT WAS NORMAL\"   • Low back pain    • Lung cancer, middle lobe (HCC) 2016    RIGHT MIDDLE LOBE   • Lung nodule     RUL-7MM.  PRESENTLY HAS, AND FOLLOWS WITH DR. HERNANDEZ.  STATES FOUND \"A COUPLE MONTHS AGO\"   • Premature atrial contraction    • Pulmonary hypertension (HCC)    • Skin cancer     BASAL CELL-NOSE, LIP   • Wears eyeglasses        Past Surgical History:   Procedure Laterality Date   • APPENDECTOMY     • BASAL CELL CARCINOMA EXCISION      NOSE, LIP   • BRONCHOSCOPY     • BRONCHOSCOPY N/A 2020    Procedure: BRONCHOSCOPY;  Surgeon: Chris Porter MD;  Location: [facility] OR;  Service: Cardiothoracic;  Laterality: N/A;   • BUNIONECTOMY Left     GREAT TOE   • COLONOSCOPY     • DIAGNOSTIC LAPAROSCOPY N/A 2020    Procedure: DIAGNOSTIC LAPAROSCOPY, APPENDECTOMY, CHOLECYSTECOMY WITH CHOLANGIOGRAPHY;  Surgeon: Shady Singh MD;  Location: [facility] OR;  Service: General;  Laterality: N/A;   • ENDOSCOPY N/A 2020    Procedure: ESOPHAGOGASTRODUODENOSCOPY WITH BIOPSY;  Surgeon: Shady Singh MD;  Location: [facility] ENDOSCOPY;  Service: Gastroenterology;  Laterality: N/A;   • GALLBLADDER SURGERY  2022   • INGUINAL HERNIA REPAIR Left     Dr. Singh/inguinal    • KNEE ARTHROSCOPY Right 2018    Procedure: Diagnostic arthroscopy right knee with partial medial meniscectomy; "  Surgeon: Dinh Nova MD;  Location:  [facility] OR;  Service: Orthopedics   • LUNG REMOVAL, PARTIAL  2016    RIGHT MIDDLE LOBE   • MOUTH SURGERY      FULL EXTRACTION OF TEETH   • SKIN BIOPSY     • THORACOSCOPY N/A 2016    Procedure: BRONCH THORACOSCOPY VIDEO ASSISTED  WITH LOBECTOMY, MEDIALSTINAL LYMPH NODE DISECTION;  Surgeon: Chris Porter MD;  Location:  [facility] OR;  Service:    • THORACOSCOPY VIDEO ASSISTED WITH LOBECTOMY Right 2020    Procedure: Redo right THORACOSCOPY VIDEO ASSISTED with right upper lobe wedge resection and with conversion to open thorocotomy for right upper lobectomy with intercostal cryoablation;  Surgeon: Chris Porter MD;  Location:  [facility] OR;  Service: Cardiothoracic;  Laterality: Right;       Social History     Socioeconomic History   • Marital status:    • Number of children: 1   Tobacco Use   • Smoking status: Former     Packs/day: 3.00     Years: 50.00     Pack years: 150.00     Types: Cigarettes     Quit date: 2016     Years since quittin.0   • Smokeless tobacco: Never   Vaping Use   • Vaping Use: Never used   Substance and Sexual Activity   • Alcohol use: Yes     Alcohol/week: 21.0 standard drinks     Types: 21 Cans of beer per week     Comment: 3 cans/daily   • Drug use: Never   • Sexual activity: Defer       Family History   Problem Relation Age of Onset   • Heart attack Father    • Heart disease Father    • Colon cancer Father    • Colon cancer Mother    • Liver cancer Mother        Prior to Admission Medications:  Medications Prior to Admission   Medication Sig Dispense Refill Last Dose   • albuterol sulfate  (90 Base) MCG/ACT inhaler Inhale 2 puffs Every 6 (Six) Hours As Needed for Wheezing or Shortness of Air. 18 g 3 2022   • tiotropium bromide-olodaterol (STIOLTO RESPIMAT) 2.5-2.5 MCG/ACT aerosol solution inhaler Inhale 2 puffs Daily. 1 each 5 2022   • albuterol (ACCUNEB) 1.25 MG/3ML nebulizer solution Take 3 mL by nebulization  Every 4 (Four) Hours As Needed for Wheezing. 50 each 2        Allergies:  Allergies   Allergen Reactions   • No Known Drug Allergy         Vitals:      Review of Systems:   Within Normal Limits Abnormal   HEENT [x]    []     Cardiovascular [x]   []     Gastrointestinal [x]   []     Genitourinary [x]   []     Neurologic [x]   []     Pulmonary [x]   []       Physical Exam:   Within Normal Limits Abnormal   HEENT [x]    []     Heart [x]   []     Lungs [x]   []     Abdomen [x]   []     Extremities [x]   []       Impression: Right nuclear sclerotic cataract.     Plan: CATARACT PHACO EXTRACTION WITH INTRAOCULAR LENS IMPLANT RIGHT (Right)     Christiane Haynes,   12/9/2022

## 2022-12-19 ENCOUNTER — OFFICE VISIT (OUTPATIENT)
Dept: INTERNAL MEDICINE | Facility: CLINIC | Age: 67
End: 2022-12-19

## 2022-12-19 VITALS
HEART RATE: 72 BPM | DIASTOLIC BLOOD PRESSURE: 62 MMHG | TEMPERATURE: 98.4 F | SYSTOLIC BLOOD PRESSURE: 110 MMHG | OXYGEN SATURATION: 97 %

## 2022-12-19 DIAGNOSIS — J40 BRONCHITIS: ICD-10-CM

## 2022-12-19 DIAGNOSIS — J01.40 ACUTE NON-RECURRENT PANSINUSITIS: Primary | ICD-10-CM

## 2022-12-19 PROCEDURE — 99213 OFFICE O/P EST LOW 20 MIN: CPT | Performed by: INTERNAL MEDICINE

## 2022-12-19 RX ORDER — DOXYCYCLINE HYCLATE 100 MG/1
100 CAPSULE ORAL 2 TIMES DAILY WITH MEALS
Qty: 20 CAPSULE | Refills: 0 | Status: SHIPPED | OUTPATIENT
Start: 2022-12-19 | End: 2023-01-24 | Stop reason: HOSPADM

## 2022-12-19 NOTE — PROGRESS NOTES
Chief Complaint   Patient presents with   • Abstract     Pt's had runny nose, sneezing, chest pain, cough, SOA, wheezing, and headache X yesterday.     Subjective   Saman Aguayo is a 67 y.o. male.     History of Present Illness  No recent COVID booster, has had the flu vaccine.   No ill contacts.   URI   This is a new problem. The current episode started in the past 7 days. The problem has been gradually worsening. There has been no fever. Associated symptoms include chest pain, congestion, coughing, headaches, rhinorrhea, sinus pain, sneezing and wheezing. Pertinent negatives include no abdominal pain, ear pain, nausea, sore throat or vomiting. Associated symptoms comments: PND. He has tried acetaminophen, antihistamine, decongestant and inhaler use for the symptoms. The treatment provided mild relief.        The following portions of the patient's history were reviewed and updated as appropriate: allergies, current medications, past family history, past medical history, past social history, past surgical history and problem list.    Review of Systems   Constitutional: Negative for appetite change and fever.   HENT: Positive for congestion, rhinorrhea, sinus pain and sneezing. Negative for ear pain and sore throat.    Respiratory: Positive for cough, shortness of breath and wheezing.    Cardiovascular: Positive for chest pain.   Gastrointestinal: Negative for abdominal pain, nausea and vomiting.   Neurological: Positive for headaches.       Objective   /62   Pulse 72   Temp 98.4 °F (36.9 °C)   SpO2 97%   There is no height or weight on file to calculate BMI.  Physical Exam  Vitals and nursing note reviewed.   Constitutional:       General: He is not in acute distress.     Appearance: Normal appearance. He is not ill-appearing.      Comments: Kind and pleasant man in no distress today, eyes are red and watery though   HENT:      Head: Normocephalic and atraumatic.      Right Ear: Tympanic membrane, ear  canal and external ear normal.      Left Ear: Tympanic membrane, ear canal and external ear normal.      Nose: Congestion and rhinorrhea present.      Comments: Thick white rhinorrhea, frontal sinus tenderness     Mouth/Throat:      Pharynx: No posterior oropharyngeal erythema.      Comments: White postnasal drip  Eyes:      Extraocular Movements: Extraocular movements intact.      Comments: Conjunctiva slightly erythematous with watery eyes   Cardiovascular:      Rate and Rhythm: Normal rate and regular rhythm.      Heart sounds: Normal heart sounds. No murmur heard.  Pulmonary:      Effort: Pulmonary effort is normal. No respiratory distress.      Breath sounds: Wheezing present.      Comments: Decreased breath sounds throughout all lung fields with few faint end expiratory wheezes throughout  Musculoskeletal:      Right lower leg: No edema.      Left lower leg: No edema.   Lymphadenopathy:      Cervical: No cervical adenopathy.   Neurological:      Mental Status: He is alert and oriented to person, place, and time. Mental status is at baseline.      Cranial Nerves: No cranial nerve deficit.   Psychiatric:         Mood and Affect: Mood normal.         Assessment & Plan   Saman Aguayo is here today and the following problems have been addressed:      Diagnoses and all orders for this visit:    1. Acute non-recurrent pansinusitis (Primary)    2. Bronchitis    Other orders  -     doxycycline (VIBRAMYCIN) 100 MG capsule; Take 1 capsule by mouth 2 (Two) Times a Day With Meals.  Dispense: 20 capsule; Refill: 0    COVID, flu a and B all negative  Patient with symptoms of sinusitis and bronchitis on exam  Treated with doxycycline 100 mg twice daily for 10 days  Recommend plain Mucinex expectorant twice daily  Increase fluids and rest    Call or return to clinic if symptoms worsen or persist, seek care at emergency room for any severe respiratory distress due to underlying lung disease    Please note that portions of this  note were completed with a voice recognition program.  Efforts were made to edit dictation, but occasionally words are mistranscribed.

## 2023-01-12 ENCOUNTER — PREP FOR SURGERY (OUTPATIENT)
Dept: OTHER | Facility: HOSPITAL | Age: 68
End: 2023-01-12
Payer: MEDICARE

## 2023-01-12 DIAGNOSIS — H25.12 NUCLEAR SCLEROTIC CATARACT OF LEFT EYE: Primary | ICD-10-CM

## 2023-01-12 RX ORDER — CYCLOPENT/TROPIC/PHEN/KETR/WAT 1%-1%-2.5%
1 DROPS (EA) OPHTHALMIC (EYE)
Status: CANCELLED | OUTPATIENT
Start: 2023-01-12 | End: 2023-01-12

## 2023-01-12 RX ORDER — PREDNISOLONE ACETATE 10 MG/ML
1 SUSPENSION/ DROPS OPHTHALMIC SEE ADMIN INSTRUCTIONS
Status: CANCELLED | OUTPATIENT
Start: 2023-01-12

## 2023-01-12 RX ORDER — SODIUM CHLORIDE 9 MG/ML
40 INJECTION, SOLUTION INTRAVENOUS AS NEEDED
Status: CANCELLED | OUTPATIENT
Start: 2023-01-12

## 2023-01-12 RX ORDER — SODIUM CHLORIDE 0.9 % (FLUSH) 0.9 %
10 SYRINGE (ML) INJECTION EVERY 12 HOURS SCHEDULED
Status: CANCELLED | OUTPATIENT
Start: 2023-01-12

## 2023-01-12 RX ORDER — SODIUM CHLORIDE 0.9 % (FLUSH) 0.9 %
10 SYRINGE (ML) INJECTION AS NEEDED
Status: CANCELLED | OUTPATIENT
Start: 2023-01-12

## 2023-01-12 RX ORDER — TETRACAINE HYDROCHLORIDE 5 MG/ML
1 SOLUTION OPHTHALMIC SEE ADMIN INSTRUCTIONS
Status: CANCELLED | OUTPATIENT
Start: 2023-01-12

## 2023-01-18 PROBLEM — H25.12 NUCLEAR SCLEROTIC CATARACT OF LEFT EYE: Status: ACTIVE | Noted: 2023-01-18

## 2023-01-18 RX ORDER — TIOTROPIUM BROMIDE AND OLODATEROL 3.124; 2.736 UG/1; UG/1
SPRAY, METERED RESPIRATORY (INHALATION)
COMMUNITY

## 2023-01-18 RX ORDER — ALBUTEROL SULFATE 90 UG/1
2 AEROSOL, METERED RESPIRATORY (INHALATION) EVERY 4 HOURS PRN
COMMUNITY

## 2023-01-24 ENCOUNTER — ANESTHESIA (OUTPATIENT)
Dept: PERIOP | Facility: HOSPITAL | Age: 68
End: 2023-01-24
Payer: MEDICARE

## 2023-01-24 ENCOUNTER — ANESTHESIA EVENT (OUTPATIENT)
Dept: PERIOP | Facility: HOSPITAL | Age: 68
End: 2023-01-24
Payer: MEDICARE

## 2023-01-24 ENCOUNTER — HOSPITAL ENCOUNTER (OUTPATIENT)
Facility: HOSPITAL | Age: 68
Setting detail: HOSPITAL OUTPATIENT SURGERY
Discharge: HOME | End: 2023-01-24
Attending: OPHTHALMOLOGY | Admitting: OPHTHALMOLOGY
Payer: MEDICARE

## 2023-01-24 VITALS
BODY MASS INDEX: 21.67 KG/M2 | RESPIRATION RATE: 18 BRPM | HEIGHT: 72 IN | OXYGEN SATURATION: 94 % | DIASTOLIC BLOOD PRESSURE: 68 MMHG | SYSTOLIC BLOOD PRESSURE: 126 MMHG | TEMPERATURE: 97.2 F | HEART RATE: 54 BPM | WEIGHT: 160 LBS

## 2023-01-24 DIAGNOSIS — H25.12 NUCLEAR SCLEROTIC CATARACT OF LEFT EYE: ICD-10-CM

## 2023-01-24 PROCEDURE — 25010000002 MIDAZOLAM PER 1MG: Performed by: NURSE ANESTHETIST, CERTIFIED REGISTERED

## 2023-01-24 PROCEDURE — V2632 POST CHMBR INTRAOCULAR LENS: HCPCS | Performed by: OPHTHALMOLOGY

## 2023-01-24 PROCEDURE — 25010000002 FENTANYL CITRATE (PF) 50 MCG/ML SOLUTION: Performed by: NURSE ANESTHETIST, CERTIFIED REGISTERED

## 2023-01-24 DEVICE — LENS MONOFOCAL IQ CC60WF225: Type: IMPLANTABLE DEVICE | Site: POSTERIOR CHAMBER | Status: FUNCTIONAL

## 2023-01-24 RX ORDER — MIDAZOLAM HYDROCHLORIDE 2 MG/2ML
INJECTION, SOLUTION INTRAMUSCULAR; INTRAVENOUS AS NEEDED
Status: DISCONTINUED | OUTPATIENT
Start: 2023-01-24 | End: 2023-01-24 | Stop reason: SURG

## 2023-01-24 RX ORDER — BALANCED SALT SOLUTION 6.4; .75; .48; .3; 3.9; 1.7 MG/ML; MG/ML; MG/ML; MG/ML; MG/ML; MG/ML
SOLUTION OPHTHALMIC AS NEEDED
Status: DISCONTINUED | OUTPATIENT
Start: 2023-01-24 | End: 2023-01-24 | Stop reason: HOSPADM

## 2023-01-24 RX ORDER — CYCLOPENT/TROPIC/PHEN/KETR/WAT 1%-1%-2.5%
1 DROPS (EA) OPHTHALMIC (EYE)
Status: COMPLETED | OUTPATIENT
Start: 2023-01-24 | End: 2023-01-24

## 2023-01-24 RX ORDER — PREDNISOLONE ACETATE 10 MG/ML
1 SUSPENSION/ DROPS OPHTHALMIC SEE ADMIN INSTRUCTIONS
Status: DISCONTINUED | OUTPATIENT
Start: 2023-01-24 | End: 2023-01-24 | Stop reason: HOSPADM

## 2023-01-24 RX ORDER — SODIUM CHLORIDE, SODIUM LACTATE, POTASSIUM CHLORIDE, CALCIUM CHLORIDE 600; 310; 30; 20 MG/100ML; MG/100ML; MG/100ML; MG/100ML
INJECTION, SOLUTION INTRAVENOUS CONTINUOUS PRN
Status: DISCONTINUED | OUTPATIENT
Start: 2023-01-24 | End: 2023-01-24 | Stop reason: SURG

## 2023-01-24 RX ORDER — TETRACAINE HYDROCHLORIDE 5 MG/ML
SOLUTION OPHTHALMIC AS NEEDED
Status: DISCONTINUED | OUTPATIENT
Start: 2023-01-24 | End: 2023-01-24 | Stop reason: HOSPADM

## 2023-01-24 RX ORDER — PREDNISOLONE ACETATE 10 MG/ML
SUSPENSION/ DROPS OPHTHALMIC AS NEEDED
Status: DISCONTINUED | OUTPATIENT
Start: 2023-01-24 | End: 2023-01-24 | Stop reason: HOSPADM

## 2023-01-24 RX ORDER — TETRACAINE HYDROCHLORIDE 5 MG/ML
1 SOLUTION OPHTHALMIC SEE ADMIN INSTRUCTIONS
Status: COMPLETED | OUTPATIENT
Start: 2023-01-24 | End: 2023-01-24

## 2023-01-24 RX ORDER — FENTANYL CITRATE 50 UG/ML
INJECTION, SOLUTION INTRAMUSCULAR; INTRAVENOUS AS NEEDED
Status: DISCONTINUED | OUTPATIENT
Start: 2023-01-24 | End: 2023-01-24 | Stop reason: SURG

## 2023-01-24 RX ORDER — LIDOCAINE HYDROCHLORIDE 10 MG/ML
INJECTION, SOLUTION EPIDURAL; INFILTRATION; INTRACAUDAL; PERINEURAL AS NEEDED
Status: DISCONTINUED | OUTPATIENT
Start: 2023-01-24 | End: 2023-01-24 | Stop reason: HOSPADM

## 2023-01-24 RX ORDER — SODIUM CHLORIDE, SODIUM LACTATE, POTASSIUM CHLORIDE, CALCIUM CHLORIDE 600; 310; 30; 20 MG/100ML; MG/100ML; MG/100ML; MG/100ML
1000 INJECTION, SOLUTION INTRAVENOUS CONTINUOUS
Status: DISCONTINUED | OUTPATIENT
Start: 2023-01-24 | End: 2023-01-24 | Stop reason: HOSPADM

## 2023-01-24 RX ORDER — SODIUM CHLORIDE 0.9 % (FLUSH) 0.9 %
10 SYRINGE (ML) INJECTION EVERY 12 HOURS SCHEDULED
Status: DISCONTINUED | OUTPATIENT
Start: 2023-01-24 | End: 2023-01-24 | Stop reason: HOSPADM

## 2023-01-24 RX ORDER — SODIUM CHLORIDE 9 MG/ML
40 INJECTION, SOLUTION INTRAVENOUS AS NEEDED
Status: DISCONTINUED | OUTPATIENT
Start: 2023-01-24 | End: 2023-01-24 | Stop reason: HOSPADM

## 2023-01-24 RX ORDER — SODIUM CHLORIDE 0.9 % (FLUSH) 0.9 %
10 SYRINGE (ML) INJECTION AS NEEDED
Status: DISCONTINUED | OUTPATIENT
Start: 2023-01-24 | End: 2023-01-24 | Stop reason: HOSPADM

## 2023-01-24 RX ADMIN — Medication 1 DROP: at 09:00

## 2023-01-24 RX ADMIN — FENTANYL CITRATE 25 MCG: 50 INJECTION INTRAMUSCULAR; INTRAVENOUS at 10:50

## 2023-01-24 RX ADMIN — SODIUM CHLORIDE, POTASSIUM CHLORIDE, SODIUM LACTATE AND CALCIUM CHLORIDE: 600; 310; 30; 20 INJECTION, SOLUTION INTRAVENOUS at 09:24

## 2023-01-24 RX ADMIN — MIDAZOLAM HYDROCHLORIDE 2 MG: 1 INJECTION, SOLUTION INTRAMUSCULAR; INTRAVENOUS at 10:53

## 2023-01-24 RX ADMIN — FENTANYL CITRATE 25 MCG: 50 INJECTION INTRAMUSCULAR; INTRAVENOUS at 11:00

## 2023-01-24 RX ADMIN — TETRACAINE HYDROCHLORIDE 1 DROP: 5 SOLUTION OPHTHALMIC at 08:49

## 2023-01-24 RX ADMIN — TETRACAINE HYDROCHLORIDE 1 DROP: 5 SOLUTION OPHTHALMIC at 08:48

## 2023-01-24 RX ADMIN — FENTANYL CITRATE 25 MCG: 50 INJECTION INTRAMUSCULAR; INTRAVENOUS at 10:44

## 2023-01-24 RX ADMIN — Medication 1 DROP: at 08:50

## 2023-01-24 RX ADMIN — FENTANYL CITRATE 25 MCG: 50 INJECTION INTRAMUSCULAR; INTRAVENOUS at 10:54

## 2023-01-24 RX ADMIN — Medication 1 DROP: at 08:55

## 2023-01-24 RX ADMIN — SODIUM CHLORIDE, POTASSIUM CHLORIDE, SODIUM LACTATE AND CALCIUM CHLORIDE 1000 ML: 600; 310; 30; 20 INJECTION, SOLUTION INTRAVENOUS at 08:58

## 2023-01-24 NOTE — OP NOTE
OPERATIVE NOTE    Date of Procedure: 1/24/2023  Patient Name: Saman Aguayo  Patient MRN: [id number]  YOB: 1955     Preoperative Diagnosis: Left nuclear sclerotic cataract.     Postoperative Diagnosis: Left nuclear sclerotic cataract.     Procedure Performed: Phacoemulsification with implantation of a  foldable posterior chamber intraocular lens, Left eye.     Surgeon: Christiane Haynes DO     Anesthesia:  Monitored Anesthesia Care (MAC)      Brief History and Indication: The patient presents with a history of past progressive loss of vision.  Patient was diagnosed with cataract and requests removal for increased ability to read and see.     Operation Description: The patient was taken to the OR and prepped and draped in the usual sterile ophthalmic fashion. A lid speculum was placed in the eye.  A 0.8 mm blade was then used to make a stab incision two o’clock hours from the intended temporal clear cornea groove. The anterior chamber was then inflated with a Viscoelastic. A metal microkeratome blade was then used to enter the anterior chamber at the temporal clear cornea site. A three level tunnel incision was made. A curvilinear capsulorrhexis was then performed with a bent cystotome needle and capsulorrhexis forceps.  BSS on a 30 gauge bent cannula was used to hydro-dissect the lens. Good fluid waves were noted. Phacoemulsification was then used to remove nuclear material without complications. The residual cortical and lenticular material was then removed with irrigation and aspiration. Viscoelastics were then used to inflate the bag in a soft shell technique. A PCIOL was injected into the bag. Post-implantation, there were no rents or tears in the bag and the lens was noted to be stable and centered. The residual Viscoelastic was then removed with irrigation and aspiration.  The wound was checked and found to be without leaks. One drop of a Prednisilone was placed in the eye.     Implant Information:    Implant Name Type Inv. Item Serial No.  Lot No. LRB No. Used Action   LENS MONOFOCAL IQ [id number] - [id number] - [id number] Implant LENS MONOFOCAL IQ [id number]  Left 1 Implanted       Complications: None    Estimated Blood Loss:  Less than 1 cc.      Discharge and Condition  The patient was transported to same day surgery in excellent condition and scheduled for follow-up appointment. The patient was given instructions on use of eye drops for the operative eye and was specifically instructed to call for any concerns.    Christiane Haynes,   1/24/2023

## 2023-01-24 NOTE — ANESTHESIA PREPROCEDURE EVALUATION
Anesthesia Evaluation     Patient summary reviewed and Nursing notes reviewed   no history of anesthetic complications:  NPO Solid Status: > 8 hours  NPO Liquid Status: > 8 hours           Airway   Mallampati: II  TM distance: >3 FB  Neck ROM: full  no difficulty expected  Dental - normal exam     Pulmonary    (+) a smoker Former, lung cancer, COPD moderate, shortness of breath, decreased breath sounds,     ROS comment: Pulmonary HTN    Post right upper lobe wedge resection  Cardiovascular - normal exam    ECG reviewed  Rhythm: regular  Rate: normal    (+) hypertension, valvular problems/murmurs murmur, CAD, BLAS, PVD, hyperlipidemia,     ROS comment: Echo   Interpretation Summary    ? Estimated left ventricular EF = 56% Left ventricular ejection fraction appears to be 56 - 60%. Left ventricular systolic function is normal.  ? Left ventricular diastolic function was normal.  ? Estimated right ventricular systolic pressure from tricuspid regurgitation is moderately elevated (45-55 mmHg).  ? Moderate to severe tricuspid valve regurgitation is present.  ? Moderate pulmonary hypertension is present.      Neuro/Psych  (+) dizziness/light headedness,    GI/Hepatic/Renal/Endo - negative ROS     Musculoskeletal     (+) arthralgias, myalgias,   Abdominal    Substance History - negative use     OB/GYN negative ob/gyn ROS         Other   arthritis,    history of cancer active    ROS/Med Hx Other: Lung Cancer in remission post surgery with serial followup chest CTs                   Anesthesia Plan    ASA 4     MAC     (Risks and benefits discussed including risk of aspiration, recall and dental damage. All patient questions answered. Will continue with POC.)  intravenous induction     Anesthetic plan, risks, benefits, and alternatives have been provided, discussed and informed consent has been obtained with: patient.

## 2023-01-24 NOTE — DISCHARGE INSTRUCTIONS
Post Operative Cataract Instructions     Left Eye  POST OPERATIVE INSTRUCTIONS  You have received anesthesia today. DO NOT drive, drink alcohol, sign legal documents.   After surgery, your eye will not hurt. It may feel scratchy, sticky or uncomfortable. Your eye will be sensitive to light.  Most people see better 1-3 days after the procedure, but it could take 3 weeks to get the full benefits and reach your visual potential. If your vision is blurry for a few days it is normal, and means you may have swelling outside or inside the eye. For some patients, a bubble is placed and there will be blurriness.   You should receive a post-op kit with tape and an eye shield. Wear the shield for the first 3 nights after surgery to keep you from rubbing the eye.  Most people are able to return to their normal routine 1-3 days after surgery, however, due to the brain adjusting to your new vision you may have trouble judging distances and want to be careful when driving and going up and downstairs.   You can shower and wash your hair the day after surgery. Keep water, shampoo, hair spray and shaving lotion out of the eye, especially for the first week.  During the first week, you should AVOID:   Rubbing or putting pressure on your eye.  Eye make-up, face cream or lotion, hair coloring or perms  Strenuous activities, such as running or lifting weights, as to avoid sweat from getting in the eye. Avoid swimming, hot tubs, fumes or nessa conditions.   Keep your head above your heart (no hanging the head down for periods of time).    Some discomfort and blurred vision after surgery are normal, but if you have any unusual pain, swelling, bleeding or sudden decrease in vision, contact our office immediately.     Emergency assistance is available at any time by calling:    Dr.Mark Ivy Haynes  [phone]    If unable to reach call [facility] @  [phone]    You have been prescribed an eye drop to use after surgery, please follow these instructions:    Durezol Shake well before use    USE 1 DROP 4 (FOUR) TIMES A DAY FOR 1 WEEK, WEEK 2: USE 3 (THREE) TIMES A DAY FOR 1 WEEK, WEEK 3: USE 2 (TWO) TIMES A DAY FOR 1 WEEK, WEEK 4: USE 1 (ONE) TIME A DAY FOR 1 WEEK THEN STOP.    PLACE A POLINA IN THE DAY COLUMN EACH TIME YOU USE TO KEEP ON SCHEDULE    WEEK 1-USE 4  (FOUR) TIMES A DAY DAY 1   DAY 2 DAY 3 DAY 4 DAY 5 DAY 6 DAY 7     WEEK 2-USE 3 (THREE)  TIMES A DAY DAY 1 DAY 2 DAY 3 DAY 4 DAY 5 DAY 6 DAY 7     WEEK 3-USE 2 (TWO) TIMES A DAY DAY 1 DAY 2 DAY 3 DAY 4 DAY 5 DAY 6 DAY 7     WEEK 4-USE 1 (ONE)TIME A DAY DAY 1 DAY 2 DAY 3 DAY 4 DAY 5 DAY 6 DAY 7      Please follow all post op instructions and follow up appointment time from your physician's office included in your discharge packet.  .  No pushing, pulling, tugging,  heavy lifting, or strenuous activity.  No major decision making, driving, or drinking alcoholic beverages for 24 hours. ( due to the medications you have  received)  Always use good hand hygiene/washing techniques.  NO driving while taking pain medications.    * if you have an incision:  Check your incision area every day for signs of infection.   Check for:  * more redness, swelling, or pain  *more fluid or blood  *warmth  *pus or bad smell     To assist you in voiding:  Drink plenty of fluids  Listen to running water while attempting to void.    If you are unable to urinate and you have an uncomfortable urge to void or it has been   6 hours since you were discharged, return to the Emergency Room

## 2023-01-24 NOTE — H&P
"      [facility]         History and Physical    Patient Name: Saman Aguayo  MRN: [id number]  : 1955  Gender: male     HPI: Patient complaint of PPLOV Left eye diagnosed with cataract. Patient requests PHACO PCIOL for Increase of VA/ADL.    History:    Past Medical History:   Diagnosis Date   • Abnormal ECG    • Abscess of skin    • Arthritis     HANDS AND BACK    • Bradycardia    • Bunion     LEFT FOOT   • COPD (chronic obstructive pulmonary disease) (HCC)    • Full dentures    • History of echocardiogram 2016   • Hx of exercise stress test 2016    DR. BAKER \"IT WAS NORMAL\"   • Low back pain    • Lung cancer, middle lobe (HCC) 2016    RIGHT MIDDLE LOBE   • Lung nodule     RUL-7MM.  PRESENTLY HAS, AND FOLLOWS WITH DR. HERNANDEZ.  STATES FOUND \"A COUPLE MONTHS AGO\"   • Premature atrial contraction    • Pulmonary hypertension (HCC)    • Skin cancer     BASAL CELL-NOSE, LIP   • Wears dentures 2023   • Wears eyeglasses        Past Surgical History:   Procedure Laterality Date   • APPENDECTOMY     • BASAL CELL CARCINOMA EXCISION      NOSE, LIP   • BRONCHOSCOPY  2016   • BRONCHOSCOPY N/A 2020    Procedure: BRONCHOSCOPY;  Surgeon: Chris Porter MD;  Location[facility] OR;  Service: Cardiothoracic;  Laterality: N/A;   • BUNIONECTOMY Left     GREAT TOE   • CATARACT EXTRACTION W/ INTRAOCULAR LENS IMPLANT Right 2022    Procedure: CATARACT PHACO EXTRACTION WITH INTRAOCULAR LENS IMPLANT RIGHT COMPLICATED WITH MALYUGIN RING;  Surgeon: Christiane Haynes DO;  Location: [facility] OR;  Service: Ophthalmology;  Laterality: Right;   • COLONOSCOPY     • DIAGNOSTIC LAPAROSCOPY N/A 2020    Procedure: DIAGNOSTIC LAPAROSCOPY, APPENDECTOMY, CHOLECYSTECOMY WITH CHOLANGIOGRAPHY;  Surgeon: Shady Singh MD;  Location: [facility] OR;  Service: General;  Laterality: N/A;   • ENDOSCOPY N/A 2020    Procedure: ESOPHAGOGASTRODUODENOSCOPY WITH BIOPSY;  Surgeon: Francisco" Shady CARTAGENA MD;  Location: [facility] ENDOSCOPY;  Service: Gastroenterology;  Laterality: N/A;   • GALLBLADDER SURGERY  2022   • INGUINAL HERNIA REPAIR Left     Dr. Singh/inguinal    • KNEE ARTHROSCOPY Right 2018    Procedure: Diagnostic arthroscopy right knee with partial medial meniscectomy;  Surgeon: Dinh Nova MD;  Location:  [facility] OR;  Service: Orthopedics   • LUNG REMOVAL, PARTIAL  2016    RIGHT MIDDLE LOBE   • MOUTH SURGERY      FULL EXTRACTION OF TEETH   • SKIN BIOPSY     • THORACOSCOPY N/A 2016    Procedure: BRONCH THORACOSCOPY VIDEO ASSISTED  WITH LOBECTOMY, MEDIALSTINAL LYMPH NODE DISECTION;  Surgeon: Chris Porter MD;  Location:  [facility] OR;  Service:    • THORACOSCOPY VIDEO ASSISTED WITH LOBECTOMY Right 2020    Procedure: Redo right THORACOSCOPY VIDEO ASSISTED with right upper lobe wedge resection and with conversion to open thorocotomy for right upper lobectomy with intercostal cryoablation;  Surgeon: Chris Porter MD;  Location:  [facility] OR;  Service: Cardiothoracic;  Laterality: Right;       Social History     Socioeconomic History   • Marital status:    • Number of children: 1   Tobacco Use   • Smoking status: Former     Packs/day: 3.00     Years: 50.00     Pack years: 150.00     Types: Cigarettes     Quit date: 2016     Years since quittin.2   • Smokeless tobacco: Never   Vaping Use   • Vaping Use: Never used   Substance and Sexual Activity   • Alcohol use: Yes     Alcohol/week: 21.0 standard drinks     Types: 21 Cans of beer per week     Comment: 3 cans/daily   • Drug use: Never   • Sexual activity: Defer       Family History   Problem Relation Age of Onset   • Heart attack Father    • Heart disease Father    • Colon cancer Father    • Colon cancer Mother    • Liver cancer Mother        Prior to Admission Medications:  Medications Prior to Admission   Medication Sig Dispense Refill Last Dose   • albuterol sulfate  (90 Base) MCG/ACT inhaler Inhale 2  puffs Every 4 (Four) Hours As Needed for Wheezing.   Past Week   • tiotropium bromide-olodaterol (Stiolto Respimat) 2.5-2.5 MCG/ACT aerosol solution inhaler Inhale Daily.   1/24/2023   • doxycycline (VIBRAMYCIN) 100 MG capsule Take 1 capsule by mouth 2 (Two) Times a Day With Meals. 20 capsule 0        Allergies:  Allergies   Allergen Reactions   • No Known Drug Allergy         Vitals: Temp:  [97.6 °F (36.4 °C)] 97.6 °F (36.4 °C)  Heart Rate:  [47] 47  Resp:  [18] 18  BP: (142)/(77) 142/77    Review of Systems:   Within Normal Limits Abnormal   HEENT [x]    []     Cardiovascular [x]   []     Gastrointestinal [x]   []     Genitourinary [x]   []     Neurologic [x]   []     Pulmonary [x]   []       Physical Exam:   Within Normal Limits Abnormal   HEENT [x]    []     Heart [x]   []     Lungs [x]   []     Abdomen [x]   []     Extremities [x]   []       Impression: Left nuclear sclerotic cataract.     Plan: CATARACT PHACO EXTRACTION WITH INTRAOCULAR LENS IMPLANT LEFT (Left)     Christiane Haynes, DO  1/24/2023

## 2023-01-24 NOTE — ANESTHESIA POSTPROCEDURE EVALUATION
Patient: Saman Aguayo    Procedure Summary     Date: 01/24/23 Room / Location: [facility] OR 1 /  [facility] OR    Anesthesia Start: 1052 Anesthesia Stop:     Procedure: CATARACT PHACO EXTRACTION WITH INTRAOCULAR LENS IMPLANT LEFT COMPLICATED WITH MALYUGIN RING (Left: Eye) Diagnosis:       Nuclear sclerotic cataract of left eye      (Nuclear sclerotic cataract of left eye [H25.12])    Surgeons: Christiane Haynes DO Provider: Jett Villa CRNA    Anesthesia Type: MAC ASA Status: 3          Anesthesia Type: MAC    Vitals  No vitals data found for the desired time range.          Post Anesthesia Care and Evaluation    Patient location during evaluation: PHASE II  Patient participation: complete - patient participated  Level of consciousness: awake  Pain score: 0  Pain management: adequate    Airway patency: patent  Anesthetic complications: No anesthetic complications  PONV Status: none  Cardiovascular status: acceptable  Respiratory status: acceptable  Hydration status: acceptable    Comments: vsss resp spont, reflexes intact, responsive, report given to pacu nurse.  See rn note for postop vs

## 2023-02-14 ENCOUNTER — OFFICE VISIT (OUTPATIENT)
Dept: PULMONOLOGY | Facility: CLINIC | Age: 68
End: 2023-02-14
Payer: MEDICARE

## 2023-02-14 VITALS
WEIGHT: 165 LBS | OXYGEN SATURATION: 96 % | SYSTOLIC BLOOD PRESSURE: 120 MMHG | DIASTOLIC BLOOD PRESSURE: 64 MMHG | BODY MASS INDEX: 22.35 KG/M2 | RESPIRATION RATE: 14 BRPM | HEART RATE: 44 BPM | HEIGHT: 72 IN

## 2023-02-14 DIAGNOSIS — Z87.891 PERSONAL HISTORY OF TOBACCO USE, PRESENTING HAZARDS TO HEALTH: ICD-10-CM

## 2023-02-14 DIAGNOSIS — J44.9 CHRONIC OBSTRUCTIVE PULMONARY DISEASE, UNSPECIFIED COPD TYPE: Primary | ICD-10-CM

## 2023-02-14 DIAGNOSIS — R00.1 BRADYCARDIA: ICD-10-CM

## 2023-02-14 DIAGNOSIS — J43.9 PULMONARY EMPHYSEMA, UNSPECIFIED EMPHYSEMA TYPE: ICD-10-CM

## 2023-02-14 DIAGNOSIS — C34.91 NON-SMALL CELL CANCER OF RIGHT LUNG: ICD-10-CM

## 2023-02-14 PROCEDURE — 99214 OFFICE O/P EST MOD 30 MIN: CPT | Performed by: NURSE PRACTITIONER

## 2023-02-14 NOTE — PROGRESS NOTES
"Chief Complaint   Patient presents with   • Shortness of Breath   • Follow-up         Subjective   Saman Aguayo is a 67 y.o. male.     History of Present Illness   Patient comes today for follow up of shortness of breath and COPD.     Symptoms have been stable since the last clinic visit. Patient reports no recent exacerbations.  He had a cold with sinus issues for about 3 weeks but is finally better. He took OTC meds.    Patient is using medications, as prescribed. He is using Stiolto daily. He uses VILMA 1-2 times a week.     Exercise tolerance has also remained stable.     Quit smoking 6 years ago.      The following portions of the patient's history were reviewed and updated as appropriate: allergies, current medications, past family history, past medical history, past social history and past surgical history.       Review of Systems   HENT: Positive for sinus pressure. Negative for sneezing and sore throat.    Respiratory: Positive for cough, chest tightness, shortness of breath and wheezing.        Objective   Visit Vitals  /64   Pulse (!) 44   Resp 14   Ht 182.9 cm (72\") Comment: pt reported   Wt 74.8 kg (165 lb)   SpO2 96%   BMI 22.38 kg/m²   HR 49 on recheck      Physical Exam  Vitals reviewed.   HENT:      Head: Atraumatic.   Cardiovascular:      Rate and Rhythm: Normal rate and regular rhythm.   Pulmonary:      Effort: Pulmonary effort is normal. No respiratory distress.   Musculoskeletal:      Cervical back: Neck supple.   Neurological:      Mental Status: He is alert and oriented to person, place, and time.             Assessment & Plan   Diagnoses and all orders for this visit:    1. Chronic obstructive pulmonary disease, unspecified COPD type (HCC) (Primary)    2. Non-small cell cancer of right lung (HCC)  -     CT Chest Without Contrast Diagnostic; Future    3. Pulmonary emphysema, unspecified emphysema type (HCC)    4. Personal history of tobacco use, presenting hazards to health    5. " Bradycardia           Return in about 6 months (around 8/14/2023) for Recheck, For Dr. Lim, Imaging studies.    DISCUSSION (if any):  PFT 1/2022 consistent with moderate obstruction.     He does not use oxygen at all.     No change to the current medications has been made. Overall, COPD symptoms stable with meds. Continue Stiolto and VILMA.     Compliance with medications stressed.     Side effects of prescribed medications discussed with the patient    He f/w Dr. Almonte for bradycardia.     He has had 2 lung resections due to cancer.  The most recent NSCLC was resected March 2020.  He will need to continue CT scan every 6 months for 5 years.    I reviewed the latest CT scan which shows no recurrence of disease.  His last CT was November 2022.  His next CT chest will be due May 2023.    Study Result    Narrative & Impression   CT SCAN OF THE CHEST WITHOUT CONTRAST 11/21/2022 6:30 AM      HISTORY:   History of lung cancer s/p resection; C34.91-Malignant neoplasm of  unspecified part of right bronchus or lung non-small cell lung cancer,  follow-up.     PROCEDURE:   Axial CT images were obtained from the lung apex to the mid abdomen  without IV contrast. Coronal and sagittal reformatted images generated  from the axial data set and provided for interpretation. This study was  performed with techniques to keep radiation doses as low as reasonably  achievable, (ALARA). Individualized dose reduction techniques using  automated exposure control or adjustment of mA and/or kV according to  the patient size were employed.305.48 mGy.cm     COMPARISON:   CT chest dated 05/20/2022, 02/07/2022, 12/09/2021, 06/15/2021,  01/22/2021 and 10/15/2020.     FINDINGS:      CHEST:   Lack of IV contrast somewhat limits evaluation of the thoracic viscera.     Lungs/Pleura:  Central airways are patent. Small amount of endotracheal and  endobronchial wall adherent mucus/secretions. Stable postsurgical  changes from right lower lobe and  middle lobe resection. Compensatory  hypertrophy of the right upper lobe. Bilateral diffuse moderate amount  of centrilobular and paraseptal emphysema. No suspicious pulmonary  nodules or masses identified.  No pneumothorax or pleural effusion.  Bilateral apical pleural thickening/scarring.     Heart, vessels and mediastinum:  The heart is normal in size. No pericardial effusion. The aorta and  pulmonary arteries are normal in caliber. Coronary artery disease is  present.     Lymph nodes:  No pathologically enlarged thoracic lymph nodes within the limits of a  noncontrast-enhanced examination.     Chest wall:  No acute findings.     Bones:  No acute fracture. No aggressive osseous sclerotic or lucent lesions.  Moderate multilevel degenerative changes of the thoracic spine.     Upper abdomen:  No acute findings in the upper abdomen. Prior cholecystectomy. Scattered  calcific splenic granuloma.     IMPRESSION:  No acute findings in the chest. No evidence of disease progression or  recurrence. No significant interval change.              Images personally reviewed, interpreted and dictated by VERN Coyle..               Dictated utilizing Dragon dictation.    This document was electronically signed by SAMIRA Wylie February 14, 2023  10:26 EST

## 2023-03-14 ENCOUNTER — OFFICE VISIT (OUTPATIENT)
Dept: INTERNAL MEDICINE | Facility: CLINIC | Age: 68
End: 2023-03-14
Payer: MEDICARE

## 2023-03-14 VITALS
SYSTOLIC BLOOD PRESSURE: 138 MMHG | TEMPERATURE: 97.6 F | DIASTOLIC BLOOD PRESSURE: 82 MMHG | OXYGEN SATURATION: 100 % | WEIGHT: 162.4 LBS | BODY MASS INDEX: 22 KG/M2 | HEART RATE: 51 BPM | HEIGHT: 72 IN

## 2023-03-14 DIAGNOSIS — L08.9 LACERATION OF FINGER WITH INFECTION, INITIAL ENCOUNTER: ICD-10-CM

## 2023-03-14 DIAGNOSIS — S69.92XA INJURY OF LEFT LITTLE FINGER, INITIAL ENCOUNTER: Primary | ICD-10-CM

## 2023-03-14 DIAGNOSIS — S69.92XA INJURY OF LEFT INDEX FINGER, INITIAL ENCOUNTER: ICD-10-CM

## 2023-03-14 DIAGNOSIS — S61.219A LACERATION OF FINGER WITH INFECTION, INITIAL ENCOUNTER: ICD-10-CM

## 2023-03-14 PROCEDURE — 1160F RVW MEDS BY RX/DR IN RCRD: CPT | Performed by: NURSE PRACTITIONER

## 2023-03-14 PROCEDURE — 99213 OFFICE O/P EST LOW 20 MIN: CPT | Performed by: NURSE PRACTITIONER

## 2023-03-14 PROCEDURE — 1159F MED LIST DOCD IN RCRD: CPT | Performed by: NURSE PRACTITIONER

## 2023-03-14 RX ORDER — BETAMETHASONE DIPROPIONATE 0.5 MG/G
CREAM TOPICAL
COMMUNITY
Start: 2023-02-22

## 2023-03-14 RX ORDER — DOXYCYCLINE HYCLATE 100 MG/1
100 CAPSULE ORAL 2 TIMES DAILY
Qty: 14 CAPSULE | Refills: 0 | Status: SHIPPED | OUTPATIENT
Start: 2023-03-14 | End: 2023-03-21

## 2023-03-14 NOTE — PROGRESS NOTES
"  Office Visit      Patient Name: Saman Aguayo  : 1955   MRN: [id number]   Care Team: Patient Care Team:  Vermeesch, Marilyn K, MD as PCP - General (Internal Medicine & Pediatrics)  Max Almonte MD as Consulting Physician (Cardiology)  Juan Alberto Carty MD as Surgeon (Cardiothoracic Surgery)  Shady Singh MD as Consulting Physician (General Surgery)  Dinh Nova MD (Inactive) as Consulting Physician (Orthopedic Surgery)  Erin Sosa APRN as Nurse Practitioner (Cardiothoracic Surgery)  Hilda Smyth APRN as Nurse Practitioner (Pulmonary Disease)  Rianna Fernandez APRN as Nurse Practitioner (Cardiothoracic Surgery)  Janina Mojica APRN as Nurse Practitioner (Nurse Practitioner)  Jose Lee MD as Consulting Physician (Otolaryngology)  Veronika Barry, QUIANA as Ambulatory  (Oncology) ([facility])    Chief Complaint  Wound Infection (Left hand- pinky and index finger. Cut on metal and drill bit )    Subjective     Subjective      Saman Aguayo presents to [facility] for skin problem.   Injured left pinky finger and pointer finger while working. Cut pinky finger on piece of metal and drilled into the pointer finger with drill bit.  Endorses erythema, mucousy yellow drainage from the site, stiffness and pain of joints in hand, and swelling.   He has tried soaking the hands in warm salt water which has helped the swelling some.   He has had no fever and chills.   Tetanus is UTD.    Objective     Objective   Vital Signs:   /82   Pulse 51   Temp 97.6 °F (36.4 °C)   Ht 182.9 cm (72\")   Wt 73.7 kg (162 lb 6.4 oz)   SpO2 100%   BMI 22.03 kg/m²     Physical Exam  Vitals and nursing note reviewed.   Constitutional:       General: He is not in acute distress.     Appearance: Normal appearance. He is not toxic-appearing.   Eyes:      Pupils: Pupils are equal, round, and reactive to light.   Neck:      Vascular: " No carotid bruit.   Cardiovascular:      Rate and Rhythm: Normal rate and regular rhythm.      Heart sounds: Normal heart sounds. No murmur heard.  Pulmonary:      Effort: Pulmonary effort is normal. No respiratory distress.      Breath sounds: Normal breath sounds. No wheezing.   Abdominal:      General: Bowel sounds are normal. There is no distension.      Palpations: Abdomen is soft.      Tenderness: There is no abdominal tenderness.   Musculoskeletal:         General: Normal range of motion.        Hands:       Cervical back: Neck supple. No tenderness.   Skin:     General: Skin is warm and dry.      Findings: Signs of injury (As marked with surrounding erythema and swelling tender to touch) present. No rash.   Neurological:      General: No focal deficit present.      Mental Status: He is alert.   Psychiatric:         Mood and Affect: Mood normal.         Behavior: Behavior normal.          Assessment / Plan      Assessment & Plan   Problem List Items Addressed This Visit    None  Visit Diagnoses     Injury of left little finger, initial encounter    -  Primary    Injury of left index finger, initial encounter        Laceration of finger with infection, initial encounter        Relevant Medications    doxycycline (VIBRAMYCIN) 100 MG capsule    Will cover for bacterial infection with doxycycline twice daily x7 days, advised on side effects of the medication and encouraged to take with food.  Recommend keeping area clean with Dial soap and water, avoid Neosporin, and strict return precautions discussed.        Follow Up   Return if symptoms worsen or fail to improve.  Patient was given instructions and counseling regarding his condition or for health maintenance advice. Please see specific information pulled into the AVS if appropriate.     SAMIRA Hernandez  [facility]

## 2023-04-03 ENCOUNTER — OFFICE VISIT (OUTPATIENT)
Dept: INTERNAL MEDICINE | Facility: CLINIC | Age: 68
End: 2023-04-03
Payer: MEDICARE

## 2023-04-03 ENCOUNTER — HOSPITAL ENCOUNTER (OUTPATIENT)
Dept: GENERAL RADIOLOGY | Facility: HOSPITAL | Age: 68
Discharge: HOME | End: 2023-04-03
Admitting: INTERNAL MEDICINE
Payer: MEDICARE

## 2023-04-03 VITALS
SYSTOLIC BLOOD PRESSURE: 128 MMHG | DIASTOLIC BLOOD PRESSURE: 84 MMHG | HEART RATE: 78 BPM | TEMPERATURE: 98 F | OXYGEN SATURATION: 98 % | BODY MASS INDEX: 21.81 KG/M2 | WEIGHT: 161 LBS | HEIGHT: 72 IN

## 2023-04-03 DIAGNOSIS — R59.1 LYMPHADENOPATHY: ICD-10-CM

## 2023-04-03 DIAGNOSIS — R10.30 INGUINAL PAIN, UNSPECIFIED LATERALITY: ICD-10-CM

## 2023-04-03 DIAGNOSIS — C34.2 LUNG CANCER, MIDDLE LOBE: ICD-10-CM

## 2023-04-03 DIAGNOSIS — R53.82 CHRONIC FATIGUE: ICD-10-CM

## 2023-04-03 DIAGNOSIS — U09.9 POST-COVID CHRONIC LOSS OF TASTE: ICD-10-CM

## 2023-04-03 DIAGNOSIS — R59.0 AXILLARY LYMPHADENOPATHY: Primary | ICD-10-CM

## 2023-04-03 DIAGNOSIS — R05.3 CHRONIC COUGH: Primary | ICD-10-CM

## 2023-04-03 DIAGNOSIS — R43.2 POST-COVID CHRONIC LOSS OF TASTE: ICD-10-CM

## 2023-04-03 DIAGNOSIS — J40 BRONCHITIS: ICD-10-CM

## 2023-04-03 PROCEDURE — 1159F MED LIST DOCD IN RCRD: CPT | Performed by: INTERNAL MEDICINE

## 2023-04-03 PROCEDURE — 1160F RVW MEDS BY RX/DR IN RCRD: CPT | Performed by: INTERNAL MEDICINE

## 2023-04-03 PROCEDURE — 71046 X-RAY EXAM CHEST 2 VIEWS: CPT

## 2023-04-03 PROCEDURE — 99214 OFFICE O/P EST MOD 30 MIN: CPT | Performed by: INTERNAL MEDICINE

## 2023-04-03 NOTE — PROGRESS NOTES
Subjective   Saman Aguayo is a 67 y.o. male.     Chief Complaint   Patient presents with   • Joint Pain       History of Present Illness   Patient here to establish care.  Also has a complaints.  Patient complains pain in both groin and axilla area.  Denies any urinary symptoms no fever chills.  Symptoms started 2 to 3 weeks ago.  Patient denies any injury or new medications.  14 months ago patient was contracted to COVID virus ever since then patient complains feeling tired no energy loss of taste.  Patient denies any significant weight loss or loss of appetite.  Patient does have a history of lung cancer November low-dose CT scan unremarkable.    Current Outpatient Medications:   •  albuterol sulfate  (90 Base) MCG/ACT inhaler, Inhale 2 puffs Every 4 (Four) Hours As Needed for Wheezing., Disp: , Rfl:   •  betamethasone dipropionate 0.05 % cream, APPLY TWICE A DAY FOR 2 WEEKS, Disp: , Rfl:   •  tiotropium bromide-olodaterol (Stiolto Respimat) 2.5-2.5 MCG/ACT aerosol solution inhaler, Inhale Daily., Disp: , Rfl:     The following portions of the patient's history were reviewed and updated as appropriate: allergies, current medications, past family history, past medical history, past social history, past surgical history and problem list.    Review of Systems   Constitutional: Positive for fatigue. Negative for chills and fever.   HENT: Negative for congestion and sore throat.    Respiratory: Positive for cough.    Cardiovascular: Negative.    Gastrointestinal: Negative.    Genitourinary: Negative.  Negative for dysuria.   Musculoskeletal: Negative.    Skin: Negative.    Neurological: Negative.    Hematological: Positive for adenopathy.        Tenderness in both groins and axilla area.  Possible lymphadenopathy.   Psychiatric/Behavioral: Negative.  The patient is not nervous/anxious.        Objective   Physical Exam  Constitutional:       General: He is not in acute distress.     Appearance: Normal appearance.  He is normal weight. He is not ill-appearing, toxic-appearing or diaphoretic.      Comments: Probable lymphadenopathy in axilla and groin area.   Cardiovascular:      Rate and Rhythm: Normal rate and regular rhythm.      Heart sounds: Normal heart sounds.   Pulmonary:      Effort: Pulmonary effort is normal.      Breath sounds: Normal breath sounds.   Abdominal:      General: Bowel sounds are normal.   Musculoskeletal:         General: Tenderness ( Axilla groins tenderness) present.      Cervical back: Neck supple.   Skin:     General: Skin is warm.   Neurological:      Mental Status: He is alert and oriented to person, place, and time.         All tests have been reviewed.    Assessment & Plan   Diagnoses and all orders for this visit:    Chronic cough patient does have a history of emphysema patient is seeing pulmonologist.  Patient is not on oxygen.  Patient is taking nebulizer inhaler and denies any significant short of breath above baseline  -     XR Chest PA & Lateral    Lymphadenopathy axilla groin area we will also do CT scan.  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

## 2023-04-04 LAB
ALBUMIN SERPL-MCNC: 4.2 G/DL (ref 3.5–5.2)
ALBUMIN/GLOB SERPL: 1.1 G/DL
ALP SERPL-CCNC: 89 U/L (ref 39–117)
ALT SERPL-CCNC: 15 U/L (ref 1–41)
APPEARANCE UR: CLEAR
AST SERPL-CCNC: 14 U/L (ref 1–40)
BASOPHILS # BLD AUTO: 0.05 10*3/MM3 (ref 0–0.2)
BASOPHILS NFR BLD AUTO: 0.6 % (ref 0–1.5)
BILIRUB SERPL-MCNC: 0.6 MG/DL (ref 0–1.2)
BILIRUB UR QL STRIP: NEGATIVE
BUN SERPL-MCNC: 17 MG/DL (ref 8–23)
BUN/CREAT SERPL: 16.3 (ref 7–25)
CALCIUM SERPL-MCNC: 9.6 MG/DL (ref 8.6–10.5)
CHLORIDE SERPL-SCNC: 99 MMOL/L (ref 98–107)
CK SERPL-CCNC: 52 U/L (ref 20–200)
CO2 SERPL-SCNC: 30.5 MMOL/L (ref 22–29)
COLOR UR: YELLOW
CREAT SERPL-MCNC: 1.04 MG/DL (ref 0.76–1.27)
CRP SERPL-MCNC: 2.48 MG/DL (ref 0–0.5)
EGFRCR SERPLBLD CKD-EPI 2021: 77.7 ML/MIN/1.73
EOSINOPHIL # BLD AUTO: 0.14 10*3/MM3 (ref 0–0.4)
EOSINOPHIL NFR BLD AUTO: 1.7 % (ref 0.3–6.2)
ERYTHROCYTE [DISTWIDTH] IN BLOOD BY AUTOMATED COUNT: 12.5 % (ref 12.3–15.4)
ERYTHROCYTE [SEDIMENTATION RATE] IN BLOOD BY WESTERGREN METHOD: 14 MM/HR (ref 0–20)
GLOBULIN SER CALC-MCNC: 3.8 GM/DL
GLUCOSE SERPL-MCNC: 107 MG/DL (ref 65–99)
GLUCOSE UR QL STRIP: NEGATIVE
HCT VFR BLD AUTO: 49.2 % (ref 37.5–51)
HGB BLD-MCNC: 16.7 G/DL (ref 13–17.7)
HGB UR QL STRIP: NEGATIVE
IMM GRANULOCYTES # BLD AUTO: 0.03 10*3/MM3 (ref 0–0.05)
IMM GRANULOCYTES NFR BLD AUTO: 0.4 % (ref 0–0.5)
KETONES UR QL STRIP: NEGATIVE
LEUKOCYTE ESTERASE UR QL STRIP: NEGATIVE
LYMPHOCYTES # BLD AUTO: 1.7 10*3/MM3 (ref 0.7–3.1)
LYMPHOCYTES NFR BLD AUTO: 20.9 % (ref 19.6–45.3)
MCH RBC QN AUTO: 31.6 PG (ref 26.6–33)
MCHC RBC AUTO-ENTMCNC: 33.9 G/DL (ref 31.5–35.7)
MCV RBC AUTO: 93 FL (ref 79–97)
MONOCYTES # BLD AUTO: 0.62 10*3/MM3 (ref 0.1–0.9)
MONOCYTES NFR BLD AUTO: 7.6 % (ref 5–12)
MYOGLOBIN SERPL-MCNC: 30.5 NG/ML (ref 28–72)
NEUTROPHILS # BLD AUTO: 5.6 10*3/MM3 (ref 1.7–7)
NEUTROPHILS NFR BLD AUTO: 68.8 % (ref 42.7–76)
NITRITE UR QL STRIP: NEGATIVE
NRBC BLD AUTO-RTO: 0 /100 WBC (ref 0–0.2)
PH UR STRIP: 6.5 [PH] (ref 5–8)
PLATELET # BLD AUTO: 233 10*3/MM3 (ref 140–450)
POTASSIUM SERPL-SCNC: 4.7 MMOL/L (ref 3.5–5.2)
PROT SERPL-MCNC: 8 G/DL (ref 6–8.5)
PROT UR QL STRIP: NEGATIVE
RBC # BLD AUTO: 5.29 10*6/MM3 (ref 4.14–5.8)
SODIUM SERPL-SCNC: 137 MMOL/L (ref 136–145)
SP GR UR STRIP: 1.01 (ref 1–1.03)
UROBILINOGEN UR STRIP-MCNC: NORMAL MG/DL
WBC # BLD AUTO: 8.14 10*3/MM3 (ref 3.4–10.8)

## 2023-04-07 ENCOUNTER — TELEPHONE (OUTPATIENT)
Dept: INTERNAL MEDICINE | Facility: CLINIC | Age: 68
End: 2023-04-07
Payer: MEDICARE

## 2023-04-07 ENCOUNTER — TELEPHONE (OUTPATIENT)
Dept: INTERNAL MEDICINE | Facility: CLINIC | Age: 68
End: 2023-04-07

## 2023-04-07 NOTE — TELEPHONE ENCOUNTER
Caller: Anna Aguayo    Relationship: Emergency Contact    Best call back number: [phone]    What is the best time to reach you: ANYTIME    Who are you requesting to speak with (clinical staff, provider,  specific staff member): PROVIDER OR CLINICAL STAFF    What was the call regarding: FOLLOWING UP ON AN ULTRASOUND OF [place] AND WOULD LIKE LAB RESULTS. PLEASE ADVISE    Do you require a callback: YES

## 2023-04-07 NOTE — TELEPHONE ENCOUNTER
Spoke with [place], advised letter has been sent re: labs.  Provided radiology scheduling number to schedule US.

## 2023-04-07 NOTE — TELEPHONE ENCOUNTER
Caller: Anna Aguayo    Relationship: Emergency Contact    Best call back number: [phone], OR HOME [phone]    What is the best time to reach you: ANY    Who are you requesting to speak with (clinical staff, provider,  specific staff member):      Do you know the name of the person who called:      What was the call regarding: SCHEDULING SAYS [facility] IS HAVING A CT SCAN-ABS/[PELVIS, AN ULTRASOUND AND A CT SCAN OF CHEST WITH AND W/O CONTRAST.  IS THIS CORRECT?    ON 052223 THERE IS A CT SCAN W/O CONTRAST IS SCHEDULE BY DR HERNANDEZ. AND HE HAD ONE 6 MONTHS AGO AS WELL.    Do you require a callback: YES

## 2023-04-13 ENCOUNTER — HOSPITAL ENCOUNTER (OUTPATIENT)
Dept: CT IMAGING | Facility: HOSPITAL | Age: 68
Discharge: HOME | End: 2023-04-13
Payer: MEDICARE

## 2023-04-13 PROCEDURE — 71270 CT THORAX DX C-/C+: CPT

## 2023-04-13 PROCEDURE — 74178 CT ABD&PLV WO CNTR FLWD CNTR: CPT

## 2023-04-13 PROCEDURE — 25510000001 IOPAMIDOL 61 % SOLUTION: Performed by: INTERNAL MEDICINE

## 2023-04-13 RX ADMIN — IOPAMIDOL 100 ML: 612 INJECTION, SOLUTION INTRAVENOUS at 14:25

## 2023-04-17 ENCOUNTER — TELEPHONE (OUTPATIENT)
Dept: INTERNAL MEDICINE | Facility: CLINIC | Age: 68
End: 2023-04-17

## 2023-04-17 ENCOUNTER — OFFICE VISIT (OUTPATIENT)
Dept: INTERNAL MEDICINE | Facility: CLINIC | Age: 68
End: 2023-04-17
Payer: MEDICARE

## 2023-04-17 VITALS
DIASTOLIC BLOOD PRESSURE: 76 MMHG | BODY MASS INDEX: 21.54 KG/M2 | OXYGEN SATURATION: 98 % | SYSTOLIC BLOOD PRESSURE: 120 MMHG | HEIGHT: 72 IN | TEMPERATURE: 97.5 F | HEART RATE: 77 BPM | WEIGHT: 159 LBS

## 2023-04-17 DIAGNOSIS — M89.8X9 BONE PAIN: ICD-10-CM

## 2023-04-17 DIAGNOSIS — R93.5 ABNORMAL FINDINGS ON DIAGNOSTIC IMAGING OF OTHER ABDOMINAL REGIONS, INCLUDING RETROPERITONEUM: ICD-10-CM

## 2023-04-17 DIAGNOSIS — C34.90 PRIMARY MALIGNANT NEOPLASM OF LUNG METASTATIC TO OTHER SITE, UNSPECIFIED LATERALITY: ICD-10-CM

## 2023-04-17 DIAGNOSIS — C34.2 LUNG CANCER, MIDDLE LOBE: Primary | ICD-10-CM

## 2023-04-17 PROCEDURE — 99214 OFFICE O/P EST MOD 30 MIN: CPT | Performed by: INTERNAL MEDICINE

## 2023-04-17 RX ORDER — HYDROCODONE BITARTRATE AND ACETAMINOPHEN 5; 325 MG/1; MG/1
1 TABLET ORAL EVERY 8 HOURS PRN
Qty: 45 TABLET | Refills: 0 | Status: SHIPPED | OUTPATIENT
Start: 2023-04-17 | End: 2023-04-17 | Stop reason: SDUPTHER

## 2023-04-17 RX ORDER — HYDROCODONE BITARTRATE AND ACETAMINOPHEN 5; 325 MG/1; MG/1
1 TABLET ORAL EVERY 8 HOURS PRN
Qty: 45 TABLET | Refills: 0 | Status: SHIPPED | OUTPATIENT
Start: 2023-04-17

## 2023-04-17 NOTE — TELEPHONE ENCOUNTER
[facility] DOES NOT HAVE HYDROCODONE 5.325 MG.  CAN WE PLEASE RESEND TO [facility] IN [place]?    CALL IS NEEDED

## 2023-04-17 NOTE — PROGRESS NOTES
Subjective   Saman Aguayo is a 68 y.o. male.     Chief Complaint   Patient presents with   • Follow-up     Recent imaging and lab results    • Joint Pain   • Fatigue              History of Present Illness   Patient here for follow-up.  CT scan showed multiple lung nodules and possible metastatic lung cancer to pancreas also.  Patient complains of severe pain in both shoulders right hip and thighs any movement will make it worse denies any injury or overexertion.  Blood test that showed CRP elevated sugar slightly elevated.  Patient complains of very tired and no energy.    Current Outpatient Medications:   •  albuterol sulfate  (90 Base) MCG/ACT inhaler, Inhale 2 puffs Every 4 (Four) Hours As Needed for Wheezing., Disp: , Rfl:   •  betamethasone dipropionate 0.05 % cream, APPLY TWICE A DAY FOR 2 WEEKS, Disp: , Rfl:   •  tiotropium bromide-olodaterol (Stiolto Respimat) 2.5-2.5 MCG/ACT aerosol solution inhaler, Inhale Daily., Disp: , Rfl:   •  HYDROcodone-acetaminophen (NORCO) 5-325 MG per tablet, Take 1 tablet by mouth Every 8 (Eight) Hours As Needed for Other (bone pain >)., Disp: 45 tablet, Rfl: 0    The following portions of the patient's history were reviewed and updated as appropriate: allergies, current medications, past family history, past medical history, past social history, past surgical history and problem list.    Review of Systems   Constitutional: Positive for fatigue.   Respiratory: Negative.    Cardiovascular: Negative.    Gastrointestinal: Negative.    Musculoskeletal: Positive for arthralgias.        Bone pain   Skin: Negative.    Neurological: Negative.    Psychiatric/Behavioral: Negative.        Objective   Physical Exam  Cardiovascular:      Rate and Rhythm: Normal rate and regular rhythm.      Heart sounds: Normal heart sounds.   Pulmonary:      Effort: Pulmonary effort is normal.      Breath sounds: Normal breath sounds.   Abdominal:      General: Bowel sounds are normal.    Musculoskeletal:         General: Tenderness present.      Cervical back: Neck supple.   Skin:     General: Skin is warm.   Neurological:      Mental Status: He is alert and oriented to person, place, and time.         All tests have been reviewed.    Assessment & Plan   Diagnoses and all orders for this visit:    Lung cancer, middle lobe s/p right VATS with lobectomy history of lung cancer CT scan showed possible recurrence.  -     Ambulatory Referral to Pulmonology  -     Ambulatory Referral to Hematology / Oncology    Bone pain possible related to cancer initiate hydrocodone.  -     HYDROcodone-acetaminophen (NORCO) 5-325 MG per tablet; Take 1 tablet by mouth Every 8 (Eight) Hours As Needed for Other (bone pain >).    Primary malignant neoplasm of lung metastatic to other site, unspecified laterality  -     NM PET/CT Whole Body    Abnormal findings on diagnostic imaging of other abdominal regions, including retroperitoneum  -     NM PET/CT Whole Body      Crp elevated     Hyperglycemia diet     Pancreatic nodules ? Mets     Ref to onc and pulm     Axilla nodule patient yet to do ultrasound patient is going to discuss more with lung doctor and oncologist    3 mo

## 2023-04-18 ENCOUNTER — LAB (OUTPATIENT)
Dept: LAB | Facility: HOSPITAL | Age: 68
End: 2023-04-18
Payer: MEDICARE

## 2023-04-18 ENCOUNTER — OFFICE VISIT (OUTPATIENT)
Dept: PULMONOLOGY | Facility: CLINIC | Age: 68
End: 2023-04-18
Payer: MEDICARE

## 2023-04-18 VITALS
WEIGHT: 159.2 LBS | BODY MASS INDEX: 21.56 KG/M2 | DIASTOLIC BLOOD PRESSURE: 74 MMHG | SYSTOLIC BLOOD PRESSURE: 123 MMHG | HEART RATE: 77 BPM | RESPIRATION RATE: 18 BRPM | OXYGEN SATURATION: 97 % | HEIGHT: 72 IN

## 2023-04-18 DIAGNOSIS — C34.91 NON-SMALL CELL CANCER OF RIGHT LUNG: ICD-10-CM

## 2023-04-18 DIAGNOSIS — Z87.891 PERSONAL HISTORY OF TOBACCO USE, PRESENTING HAZARDS TO HEALTH: ICD-10-CM

## 2023-04-18 DIAGNOSIS — R93.89 ABNORMAL CT OF THE CHEST: ICD-10-CM

## 2023-04-18 DIAGNOSIS — J44.9 CHRONIC OBSTRUCTIVE PULMONARY DISEASE, UNSPECIFIED COPD TYPE: ICD-10-CM

## 2023-04-18 DIAGNOSIS — J44.9 CHRONIC OBSTRUCTIVE PULMONARY DISEASE, UNSPECIFIED COPD TYPE: Primary | ICD-10-CM

## 2023-04-18 LAB
CRP SERPL-MCNC: 7.93 MG/DL (ref 0–0.5)
ERYTHROCYTE [SEDIMENTATION RATE] IN BLOOD: 12 MM/HR (ref 0–20)

## 2023-04-18 PROCEDURE — 99214 OFFICE O/P EST MOD 30 MIN: CPT | Performed by: INTERNAL MEDICINE

## 2023-04-18 PROCEDURE — 86140 C-REACTIVE PROTEIN: CPT

## 2023-04-18 PROCEDURE — 85652 RBC SED RATE AUTOMATED: CPT

## 2023-04-18 RX ORDER — PREDNISONE 20 MG/1
TABLET ORAL
Qty: 10 TABLET | Refills: 0 | Status: SHIPPED | OUTPATIENT
Start: 2023-04-18

## 2023-04-18 NOTE — PROGRESS NOTES
"  Chief Complaint   Patient presents with   • Shortness of Breath   • Follow-up         Subjective   Saman Aguayo is a 68 y.o. male.     History of Present Illness   Patient comes in today as a work in after he started having \"weird joint pains and aches\" 3 weeks ago that he started noticing after working on a car.    The patient says that over the past 3-4 weeks he has noticed extreme difficulty in raising arms above shoulder and also notices that his hips hurt when he gets up.    Although he denies any pain in his hands or toes, he does notice increased stiffness in the morning involving his hands.    Although he also admits to losing weight, he denies any fever, chills or night sweats etc.    He says that his primary care provider ordered a CT and told him that it was very concerning.    He is using Stiolto for underlying COPD.      The following portions of the patient's history were reviewed and updated as appropriate: allergies, current medications, past family history, past medical history, past social history and past surgical history.    Review of Systems   HENT: Positive for sinus pressure. Negative for sneezing and sore throat.    Respiratory: Positive for cough and wheezing. Negative for chest tightness and shortness of breath.        Objective   Visit Vitals  /74   Pulse 77   Resp 18   Ht 182.9 cm (72.01\")   Wt 72.2 kg (159 lb 3.2 oz)   SpO2 97%   BMI 21.59 kg/m²       Physical Exam  Vitals reviewed.   Constitutional:       Appearance: He is well-developed.   HENT:      Head: Normocephalic and atraumatic.   Eyes:      Extraocular Movements: Extraocular movements intact.   Cardiovascular:      Rate and Rhythm: Normal rate.   Pulmonary:      Comments: Somewhat hyperresonant to percussion.  Somewhat decreased air entry.  No obvious wheezing noted.   Musculoskeletal:      Cervical back: Neck supple.      Comments: Tenderness noted on palpation of humerus and scapula.  Tenderness also noted on " palpation of hip joint and proximal femur.  Mild tenderness noted on palpation of digits.   Neurological:      Mental Status: He is alert.         Assessment & Plan   Diagnoses and all orders for this visit:    1. Chronic obstructive pulmonary disease, unspecified COPD type (Primary)  -     Sedimentation Rate; Future  -     C-reactive Protein; Future    2. Non-small cell cancer of right lung  -     Sedimentation Rate; Future  -     C-reactive Protein; Future    3. Abnormal CT of the chest  -     Sedimentation Rate; Future  -     C-reactive Protein; Future    4. Personal history of tobacco use, presenting hazards to health    Other orders  -     predniSONE (DELTASONE) 20 MG tablet; Take 2 tablets daily for 5 days.  Dispense: 10 tablet; Refill: 0           Return for Keep appt already in system.    DISCUSSION (if any):  Last CT scan results was reviewed in great detail with the patient.  Results for orders placed in visit on 04/03/23    CT Chest With & Without Contrast    Narrative  CT SCAN OF THE CHEST WITHOUT AND WITH CONTRAST 4/13/2023 2:10 PM    HISTORY:  axilla lymphadenopathy; R59.0-Localized enlarged lymph nodes.Patient had  history of lung cancer, status post resection.    PROCEDURE:  Axial CT images were obtained from the lung apex to the mid abdomen  without and with IV contrast. Coronal and sagittal reformatted images  generated from the axial data set and provided for interpretation. This  study was performed with techniques to keep radiation doses as low as  reasonably achievable, (ALARA). Individualized dose reduction techniques  using automated exposure control or adjustment of mA and/or kV according  to the patient size were employed.    COMPARISON:  CT chest 11/21/2022.    FINDINGS:    CHEST:  Lungs/Pleura:  Postsurgical changes from right middle lobe and lower lobe resection.  There is interval development of multiple bilateral pulmonary nodules  with large right hilar spiculated pulmonary nodule  measuring up to 30 mm  (series 2, image 35). Right lung apical clustered pulmonary nodules with  the largest nodule measuring up to 6 mm (series 2, image 17). Left lower  lobe clustered nodules, for example superior segment fissural based  pulmonary nodule measuring 4 mm.  No pneumothorax or pleural effusion.    Heart, vessels and mediastinum:  The heart is normal in size. No pericardial effusion. The aorta and  pulmonary arteries are normal in caliber. Coronary artery disease is  present. Left thyroid lobe nodule measuring 1.6 cm.    Lymph nodes:  No pathologically enlarged intrathoracic lymph nodes. Mildly prominent  bilateral axillary lymph nodes with a benign-appearing morphology,  unchanged from prior exam.    Chest wall:  No acute findings.    Bones:  No aggressive osseous sclerotic or lucent lesions identified.    Upper abdomen:  There are 2 cystic lesions in the pancreatic head (series 2, image 68).  Prior cholecystectomy.    Impression  Interval development of multiple bilateral pulmonary nodules with  clustered nodules in the right upper lobe and left lower lobe superior  segment, and a spiculated nodule in the right hilar region, measuring up  to 13 mm. Findings are concerning for metastasis. Findings are  concerning for disease progression. Consider PET CT for further  evaluation.    There are 2 cystic lesions in the pancreatic head, pancreatic metastasis  cannot be excluded. Other differential considerations include  intraductal papillary mucinous neoplasm.    Mildly benign-appearing prominent axillary lymph nodes. No definite  evidence of oracio metastasis.      Images personally reviewed, interpreted and dictated by VERN Coyle..                    This report was signed and finalized on 4/14/2023 3:36 PM by VERN Coyle.      I reviewed his CT images from November 2022, with the patient and his wife.  There was no evidence of recurrence on that CT scan.    I told the patient that  his recent CT findings are extremely concerning for recurrence of malignancy and since he clearly has spiculated nodule in the right hilar region, measuring about 1.3 cm, the best option may be to proceed with navigational bronchoscopy.    PET scan has already been ordered by his primary care provider.    I told the patient that I will reach out to our colleagues in [place] to see if navigation bronchoscopy can be arranged.    As far as his joint pains are concerned, they are not entirely consistent with polymyalgia rheumatica but I will order ESR and CRP.    If ESR is significantly elevated, he may need to be considered for a short course of steroids.    I do however feel that the joint pain/tenderness may be manifestation of hypertrophic osteoarthropathy which may be associated with non-small cell lung cancer although other malignancies can cause similar findings.    I suggested a trial of NSAIDs as it may provide symptomatic relief for now.    Nonetheless, his overall presentation and radiology is definitely concerning for recurrent non-small cell lung cancer.    Latest available PFTs were reviewed.  Consistent with moderate obstructive defect. Performed in January 2022.    I have advised him to continue Stiolto.    Compliance with medications stressed.     Side effects of prescribed medications discussed with the patient      Dictated utilizing Dragon dictation.    This document was electronically signed by Nannette Lim MD on 04/18/23 at 12:18 EDT

## 2023-04-18 NOTE — H&P (VIEW-ONLY)
"  Chief Complaint   Patient presents with   • Shortness of Breath   • Follow-up         Subjective   Saman Aguayo is a 68 y.o. male.     History of Present Illness   Patient comes in today as a work in after he started having \"weird joint pains and aches\" 3 weeks ago that he started noticing after working on a car.    The patient says that over the past 3-4 weeks he has noticed extreme difficulty in raising arms above shoulder and also notices that his hips hurt when he gets up.    Although he denies any pain in his hands or toes, he does notice increased stiffness in the morning involving his hands.    Although he also admits to losing weight, he denies any fever, chills or night sweats etc.    He says that his primary care provider ordered a CT and told him that it was very concerning.    He is using Stiolto for underlying COPD.      The following portions of the patient's history were reviewed and updated as appropriate: allergies, current medications, past family history, past medical history, past social history and past surgical history.    Review of Systems   HENT: Positive for sinus pressure. Negative for sneezing and sore throat.    Respiratory: Positive for cough and wheezing. Negative for chest tightness and shortness of breath.        Objective   Visit Vitals  /74   Pulse 77   Resp 18   Ht 182.9 cm (72.01\")   Wt 72.2 kg (159 lb 3.2 oz)   SpO2 97%   BMI 21.59 kg/m²       Physical Exam  Vitals reviewed.   Constitutional:       Appearance: He is well-developed.   HENT:      Head: Normocephalic and atraumatic.   Eyes:      Extraocular Movements: Extraocular movements intact.   Cardiovascular:      Rate and Rhythm: Normal rate.   Pulmonary:      Comments: Somewhat hyperresonant to percussion.  Somewhat decreased air entry.  No obvious wheezing noted.   Musculoskeletal:      Cervical back: Neck supple.      Comments: Tenderness noted on palpation of humerus and scapula.  Tenderness also noted on " palpation of hip joint and proximal femur.  Mild tenderness noted on palpation of digits.   Neurological:      Mental Status: He is alert.         Assessment & Plan   Diagnoses and all orders for this visit:    1. Chronic obstructive pulmonary disease, unspecified COPD type (Primary)  -     Sedimentation Rate; Future  -     C-reactive Protein; Future    2. Non-small cell cancer of right lung  -     Sedimentation Rate; Future  -     C-reactive Protein; Future    3. Abnormal CT of the chest  -     Sedimentation Rate; Future  -     C-reactive Protein; Future    4. Personal history of tobacco use, presenting hazards to health    Other orders  -     predniSONE (DELTASONE) 20 MG tablet; Take 2 tablets daily for 5 days.  Dispense: 10 tablet; Refill: 0           Return for Keep appt already in system.    DISCUSSION (if any):  Last CT scan results was reviewed in great detail with the patient.  Results for orders placed in visit on 04/03/23    CT Chest With & Without Contrast    Narrative  CT SCAN OF THE CHEST WITHOUT AND WITH CONTRAST 4/13/2023 2:10 PM    HISTORY:  axilla lymphadenopathy; R59.0-Localized enlarged lymph nodes.Patient had  history of lung cancer, status post resection.    PROCEDURE:  Axial CT images were obtained from the lung apex to the mid abdomen  without and with IV contrast. Coronal and sagittal reformatted images  generated from the axial data set and provided for interpretation. This  study was performed with techniques to keep radiation doses as low as  reasonably achievable, (ALARA). Individualized dose reduction techniques  using automated exposure control or adjustment of mA and/or kV according  to the patient size were employed.    COMPARISON:  CT chest 11/21/2022.    FINDINGS:    CHEST:  Lungs/Pleura:  Postsurgical changes from right middle lobe and lower lobe resection.  There is interval development of multiple bilateral pulmonary nodules  with large right hilar spiculated pulmonary nodule  measuring up to 30 mm  (series 2, image 35). Right lung apical clustered pulmonary nodules with  the largest nodule measuring up to 6 mm (series 2, image 17). Left lower  lobe clustered nodules, for example superior segment fissural based  pulmonary nodule measuring 4 mm.  No pneumothorax or pleural effusion.    Heart, vessels and mediastinum:  The heart is normal in size. No pericardial effusion. The aorta and  pulmonary arteries are normal in caliber. Coronary artery disease is  present. Left thyroid lobe nodule measuring 1.6 cm.    Lymph nodes:  No pathologically enlarged intrathoracic lymph nodes. Mildly prominent  bilateral axillary lymph nodes with a benign-appearing morphology,  unchanged from prior exam.    Chest wall:  No acute findings.    Bones:  No aggressive osseous sclerotic or lucent lesions identified.    Upper abdomen:  There are 2 cystic lesions in the pancreatic head (series 2, image 68).  Prior cholecystectomy.    Impression  Interval development of multiple bilateral pulmonary nodules with  clustered nodules in the right upper lobe and left lower lobe superior  segment, and a spiculated nodule in the right hilar region, measuring up  to 13 mm. Findings are concerning for metastasis. Findings are  concerning for disease progression. Consider PET CT for further  evaluation.    There are 2 cystic lesions in the pancreatic head, pancreatic metastasis  cannot be excluded. Other differential considerations include  intraductal papillary mucinous neoplasm.    Mildly benign-appearing prominent axillary lymph nodes. No definite  evidence of oracio metastasis.      Images personally reviewed, interpreted and dictated by VERN Coyle..                    This report was signed and finalized on 4/14/2023 3:36 PM by VERN Coyle.      I reviewed his CT images from November 2022, with the patient and his wife.  There was no evidence of recurrence on that CT scan.    I told the patient that  his recent CT findings are extremely concerning for recurrence of malignancy and since he clearly has spiculated nodule in the right hilar region, measuring about 1.3 cm, the best option may be to proceed with navigational bronchoscopy.    PET scan has already been ordered by his primary care provider.    I told the patient that I will reach out to our colleagues in [place] to see if navigation bronchoscopy can be arranged.    As far as his joint pains are concerned, they are not entirely consistent with polymyalgia rheumatica but I will order ESR and CRP.    If ESR is significantly elevated, he may need to be considered for a short course of steroids.    I do however feel that the joint pain/tenderness may be manifestation of hypertrophic osteoarthropathy which may be associated with non-small cell lung cancer although other malignancies can cause similar findings.    I suggested a trial of NSAIDs as it may provide symptomatic relief for now.    Nonetheless, his overall presentation and radiology is definitely concerning for recurrent non-small cell lung cancer.    Latest available PFTs were reviewed.  Consistent with moderate obstructive defect. Performed in January 2022.    I have advised him to continue Stiolto.    Compliance with medications stressed.     Side effects of prescribed medications discussed with the patient      Dictated utilizing Dragon dictation.    This document was electronically signed by Nannette Lim MD on 04/18/23 at 12:18 EDT

## 2023-04-19 ENCOUNTER — HOSPITAL ENCOUNTER (OUTPATIENT)
Dept: ULTRASOUND IMAGING | Facility: HOSPITAL | Age: 68
Discharge: HOME | End: 2023-04-19
Admitting: INTERNAL MEDICINE
Payer: MEDICARE

## 2023-04-19 DIAGNOSIS — R59.1 LYMPHADENOPATHY: ICD-10-CM

## 2023-04-19 PROCEDURE — 76882 US LMTD JT/FCL EVL NVASC XTR: CPT

## 2023-04-21 ENCOUNTER — TELEPHONE (OUTPATIENT)
Dept: INTERNAL MEDICINE | Facility: CLINIC | Age: 68
End: 2023-04-21

## 2023-04-21 ENCOUNTER — PREP FOR SURGERY (OUTPATIENT)
Dept: OTHER | Facility: HOSPITAL | Age: 68
End: 2023-04-21
Payer: MEDICARE

## 2023-04-21 DIAGNOSIS — R91.8 MULTIPLE PULMONARY NODULES: Primary | ICD-10-CM

## 2023-04-21 RX ORDER — LIDOCAINE HYDROCHLORIDE 40 MG/ML
4 INJECTION, SOLUTION RETROBULBAR; TOPICAL ONCE
OUTPATIENT
Start: 2023-04-21 | End: 2023-04-21

## 2023-04-21 NOTE — TELEPHONE ENCOUNTER
Caller: Anna Aguayo    Relationship: Emergency Contact    Best call back number: [phone]   What test was performed: ULTRASOUND OF AXILLARY  When was the test performed: 04.19.23  Where was the test performed[place]  [facility] [place] WITH RESULTS    PET SCAN SCHEDULE  05.09.23 AT [facility] [place] AT [facility]

## 2023-04-25 ENCOUNTER — TELEPHONE (OUTPATIENT)
Dept: INTERNAL MEDICINE | Facility: CLINIC | Age: 68
End: 2023-04-25
Payer: MEDICARE

## 2023-04-25 NOTE — TELEPHONE ENCOUNTER
Caller: Jett Aguayo    Relationship: Emergency Contact    Best call back number: [phone]     Caller requesting test results: JETT    What test was performed: US OF LYMPH NODES UNDER ARMS    When was the test performed: [id number]    Where was the test performed: [facility]    Additional notes: CALLING FOR THE RESULTS    STILL IN PAIN ARMS/LEGS

## 2023-04-25 NOTE — TELEPHONE ENCOUNTER
Result Review:    Patient has called to get the results from most recent: Axilla US    The results are in the chart but have not been reviewed by you. Please advise.       Relevant Lab or Imaging

## 2023-04-26 NOTE — TELEPHONE ENCOUNTER
Wife states patient is still having BUE pain and decreased ROM.  Appt scheduled with extender for 4-27-23.

## 2023-04-27 ENCOUNTER — OFFICE VISIT (OUTPATIENT)
Dept: INTERNAL MEDICINE | Facility: CLINIC | Age: 68
End: 2023-04-27
Payer: MEDICARE

## 2023-04-27 VITALS
OXYGEN SATURATION: 93 % | WEIGHT: 155 LBS | TEMPERATURE: 98 F | DIASTOLIC BLOOD PRESSURE: 80 MMHG | HEIGHT: 72 IN | BODY MASS INDEX: 20.99 KG/M2 | HEART RATE: 65 BPM | SYSTOLIC BLOOD PRESSURE: 126 MMHG

## 2023-04-27 DIAGNOSIS — M89.8X9 BONE PAIN: Primary | ICD-10-CM

## 2023-04-27 DIAGNOSIS — C34.2 LUNG CANCER, MIDDLE LOBE: ICD-10-CM

## 2023-04-27 DIAGNOSIS — R93.5 ABNORMAL FINDINGS ON DIAGNOSTIC IMAGING OF OTHER ABDOMINAL REGIONS, INCLUDING RETROPERITONEUM: ICD-10-CM

## 2023-04-27 DIAGNOSIS — C34.90 PRIMARY MALIGNANT NEOPLASM OF LUNG METASTATIC TO OTHER SITE, UNSPECIFIED LATERALITY: ICD-10-CM

## 2023-04-27 PROCEDURE — 1160F RVW MEDS BY RX/DR IN RCRD: CPT | Performed by: NURSE PRACTITIONER

## 2023-04-27 PROCEDURE — 99213 OFFICE O/P EST LOW 20 MIN: CPT | Performed by: NURSE PRACTITIONER

## 2023-04-27 PROCEDURE — 1159F MED LIST DOCD IN RCRD: CPT | Performed by: NURSE PRACTITIONER

## 2023-04-27 RX ORDER — HYDROCODONE BITARTRATE AND ACETAMINOPHEN 10; 325 MG/1; MG/1
1 TABLET ORAL EVERY 6 HOURS PRN
COMMUNITY
End: 2023-04-28 | Stop reason: SDUPTHER

## 2023-04-27 RX ORDER — HYDROCODONE BITARTRATE AND ACETAMINOPHEN 10; 325 MG/1; MG/1
1 TABLET ORAL EVERY 6 HOURS PRN
Qty: 60 TABLET | Refills: 0 | Status: CANCELLED | OUTPATIENT
Start: 2023-04-27

## 2023-04-27 RX ORDER — HYDROCODONE BITARTRATE AND ACETAMINOPHEN 5; 325 MG/1; MG/1
1 TABLET ORAL EVERY 8 HOURS PRN
Qty: 45 TABLET | Refills: 0 | Status: CANCELLED | OUTPATIENT
Start: 2023-04-27

## 2023-04-27 NOTE — PROGRESS NOTES
Chief Complaint   Patient presents with   • Shoulder Pain     Bilateral X 5 weeks   • Hip Pain     Bilateral X 5 weeks     Subjective       HPI:  Saman Aguayo is a 68 y.o. male presenting for follow-up bone pain.  It is worsening in his bilateral shoulders, worse in the left.  He also has pain in his bilateral hips.  He had recent work-up with his PCP that showed an abnormal CT of the lungs, pancreas, concerning for metastases and disease progression of cancer.  Over the past 4 weeks his bone pain has significantly worsened.  He had a bilateral ultrasound of his lymph nodes, right was normal, left was abnormal with a nodule noted concerning for metastasis.  There is no palpable nodule in his left axilla.  He did follow-up with pulmonology and they are planning on doing a bronchoscopy.  He has been referred to hematology/oncology.  He was prescribed Norco 5 mg, patient states after he takes it the pain is decreased for about an hour and a half, then returns.  It is progressively worsening.  He is barely able to lift his arms up without significant pain.  His wife try to get him to go to the emergency room and he declined.  He has an upcoming PET scan scheduled on 5/9.    CT Abdomen Pelvis With & Without Contrast    Addendum Date: 4/14/2023    ADDENDUM:    Possibility of pancreatic metastasis cannot be excluded. Recommended further evaluation with PET/CT.    Images personally reviewed, interpreted and dictated by VERN Coyle.  This report was signed and finalized on 4/14/2023 3:37 PM by VERN Coyle.    Result Date: 4/14/2023  No acute findings in the abdomen or pelvis.  No definite lymphadenopathy in the abdomen or pelvis. No significant interval change.  Small nonenhancing cystic lesions in the pancreatic head, most likely a intraductal papillary mucinous neoplasms, grossly unchanged from prior exams.  Images personally reviewed, interpreted and dictated by VERN Coyle.   754.04 mGy.cm  4.73 mGy  This study was performed with techniques to keep radiation doses as low as reasonably achievable (ALARA). Individualized dose reduction techniques using automated exposure control or adjustment of mA and/or kV according to the patient size were employed.      This report was signed and finalized on 4/14/2023 2:05 PM by VERN Coyle.    CT Chest With & Without Contrast    Result Date: 4/14/2023  Interval development of multiple bilateral pulmonary nodules with clustered nodules in the right upper lobe and left lower lobe superior segment, and a spiculated nodule in the right hilar region, measuring up to 13 mm. Findings are concerning for metastasis. Findings are concerning for disease progression. Consider PET CT for further evaluation.  There are 2 cystic lesions in the pancreatic head, pancreatic metastasis cannot be excluded. Other differential considerations include intraductal papillary mucinous neoplasm.  Mildly benign-appearing prominent axillary lymph nodes. No definite evidence of oracio metastasis.   Images personally reviewed, interpreted and dictated by VERN Coyle..          This report was signed and finalized on 4/14/2023 3:36 PM by VERN Coyle.    XR Chest PA & Lateral    Result Date: 4/3/2023  Impression: No acute cardiopulmonary process. Stable elevation of the right hemidiaphragm. CT evaluation suggested to evaluate for lymphadenopathy as clinically indicated. Electronically Signed: Delia Castro  4/3/2023 12:21 PM EDT  Workstation ID: [id number]    US Axilla Left    Result Date: 4/22/2023  Impression: 1 cm hypoechoic nodule within the left axilla which lacks the typical morphology of a normal lymph node. This could represent metastatic disease given the patient's history of lung cancer. Consider PET/CT scan for further characterization. Electronically Signed: Brodie Olivera  4/22/2023 8:11 AM EDT  Workstation ID: [id number]    US Axilla Right    Result Date:  "4/22/2023  Impression: 1. Multiple morphologically normal appearing right axillary lymph nodes. Electronically Signed: Brodie Olivera  4/22/2023 8:09 AM EDT  Workstation ID: [id number]         History:    Past Medical History:   Diagnosis Date   • Abnormal ECG    • Abscess of skin    • Arthritis     HANDS AND BACK    • Bradycardia    • Bunion     LEFT FOOT   • COPD (chronic obstructive pulmonary disease)    • Full dentures    • History of echocardiogram 09/21/2016   • Hx of exercise stress test 09/27/2016    DR. BAKER \"IT WAS NORMAL\"   • Low back pain    • Lung cancer, middle lobe 11/08/2016    RIGHT MIDDLE LOBE   • Lung nodule     RUL-7MM.  PRESENTLY HAS, AND FOLLOWS WITH DR. HERNANDEZ.  STATES FOUND \"A COUPLE MONTHS AGO\"   • Premature atrial contraction    • Pulmonary hypertension    • Skin cancer     BASAL CELL-NOSE, LIP   • Wears dentures 01/18/2023   • Wears eyeglasses        Past Surgical History:   Procedure Laterality Date   • APPENDECTOMY     • BASAL CELL CARCINOMA EXCISION      NOSE, LIP   • BRONCHOSCOPY  2016   • BRONCHOSCOPY N/A 03/13/2020    Procedure: BRONCHOSCOPY;  Surgeon: Chris Porter MD;  Location: [facility] OR;  Service: Cardiothoracic;  Laterality: N/A;   • BUNIONECTOMY Left 2013    GREAT TOE   • CATARACT EXTRACTION W/ INTRAOCULAR LENS IMPLANT Right 12/9/2022    Procedure: CATARACT PHACO EXTRACTION WITH INTRAOCULAR LENS IMPLANT RIGHT COMPLICATED WITH MALYUGIN RING;  Surgeon: Christiane Haynes DO;  Location: [facility] OR;  Service: Ophthalmology;  Laterality: Right;   • CATARACT EXTRACTION W/ INTRAOCULAR LENS IMPLANT Left 1/24/2023    Procedure: CATARACT PHACO EXTRACTION WITH INTRAOCULAR LENS IMPLANT LEFT COMPLICATED WITH MALYUGIN RING;  Surgeon: Christiane Haynes DO;  Location: [facility] OR;  Service: Ophthalmology;  Laterality: Left;   • COLONOSCOPY  2018   • DIAGNOSTIC LAPAROSCOPY N/A 06/29/2020    Procedure: DIAGNOSTIC LAPAROSCOPY, APPENDECTOMY, CHOLECYSTECOMY WITH CHOLANGIOGRAPHY;  Surgeon: Francisco" Shady CARTAGENA MD;  Location:  [facility] OR;  Service: General;  Laterality: N/A;   • ENDOSCOPY N/A 2020    Procedure: ESOPHAGOGASTRODUODENOSCOPY WITH BIOPSY;  Surgeon: Shady Singh MD;  Location: [facility] ENDOSCOPY;  Service: Gastroenterology;  Laterality: N/A;   • GALLBLADDER SURGERY  2022   • INGUINAL HERNIA REPAIR Left     Dr. Singh/inguinal    • KNEE ARTHROSCOPY Right 2018    Procedure: Diagnostic arthroscopy right knee with partial medial meniscectomy;  Surgeon: Dinh Nova MD;  Location:  [facility] OR;  Service: Orthopedics   • LUNG REMOVAL, PARTIAL  2016    RIGHT MIDDLE LOBE   • MOUTH SURGERY      FULL EXTRACTION OF TEETH   • SKIN BIOPSY     • THORACOSCOPY N/A 2016    Procedure: BRONCH THORACOSCOPY VIDEO ASSISTED  WITH LOBECTOMY, MEDIALSTINAL LYMPH NODE DISECTION;  Surgeon: Chris Porter MD;  Location:  [facility] OR;  Service:    • THORACOSCOPY VIDEO ASSISTED WITH LOBECTOMY Right 2020    Procedure: Redo right THORACOSCOPY VIDEO ASSISTED with right upper lobe wedge resection and with conversion to open thorocotomy for right upper lobectomy with intercostal cryoablation;  Surgeon: Chris Porter MD;  Location:  [facility] OR;  Service: Cardiothoracic;  Laterality: Right;       Family History   Problem Relation Age of Onset   • Heart attack Father    • Heart disease Father    • Colon cancer Father    • Colon cancer Mother    • Liver cancer Mother        Social History     Socioeconomic History   • Marital status:    • Number of children: 1   Tobacco Use   • Smoking status: Former     Packs/day: 3.00     Years: 50.00     Pack years: 150.00     Types: Cigarettes     Quit date: 2016     Years since quittin.4   • Smokeless tobacco: Never   Vaping Use   • Vaping Use: Never used   Substance and Sexual Activity   • Alcohol use: Yes     Alcohol/week: 21.0 standard drinks     Types: 21 Cans of beer per week     Comment: 3 cans/daily   • Drug use: Never   • Sexual activity: Defer  "      No Known Allergies      Current Outpatient Medications:   •  albuterol sulfate  (90 Base) MCG/ACT inhaler, Inhale 2 puffs Every 4 (Four) Hours As Needed for Wheezing., Disp: , Rfl:   •  betamethasone dipropionate 0.05 % cream, APPLY TWICE A DAY FOR 2 WEEKS, Disp: , Rfl:   •  HYDROcodone-acetaminophen (NORCO)  MG per tablet, Take 1 tablet by mouth Every 6 (Six) Hours As Needed., Disp: , Rfl:   •  tiotropium bromide-olodaterol (Stiolto Respimat) 2.5-2.5 MCG/ACT aerosol solution inhaler, Inhale Daily., Disp: , Rfl:     The following portions of the patient's history were reviewed and updated as appropriate: allergies, current medications, past family history, past medical history, past social history, past surgical history and problem list.      Objective   /80   Pulse 65   Temp 98 °F (36.7 °C)   Ht 182.9 cm (72.01\")   Wt 70.3 kg (155 lb)   SpO2 93%   BMI 21.02 kg/m²   Body mass index is 21.02 kg/m².  Physical Exam  Vitals and nursing note reviewed.   Constitutional:       General: He is not in acute distress.     Appearance: Normal appearance. He is ill-appearing (chronically). He is not diaphoretic.   HENT:      Head: Normocephalic and atraumatic.      Nose: Nose normal.   Eyes:      General: No scleral icterus.     Extraocular Movements: Extraocular movements intact.   Neck:      Trachea: Trachea and phonation normal.   Cardiovascular:      Rate and Rhythm: Normal rate and regular rhythm.   Pulmonary:      Effort: Pulmonary effort is normal.      Breath sounds: Normal breath sounds.   Musculoskeletal:      Right shoulder: Bony tenderness present. Decreased range of motion. Decreased strength.      Left shoulder: Bony tenderness present. Decreased range of motion. Decreased strength.      Cervical back: Normal range of motion.      Right hip: Bony tenderness present.      Left hip: Bony tenderness present.   Skin:     General: Skin is warm and dry.      Coloration: Skin is not jaundiced " or pale.   Neurological:      Mental Status: He is alert and oriented to person, place, and time.      Gait: Gait normal.   Psychiatric:         Mood and Affect: Mood normal.         Behavior: Behavior normal.         Labs:  Results for orders placed or performed in visit on 04/18/23   Sedimentation Rate    Specimen: Blood   Result Value Ref Range    Sed Rate 12 0 - 20 mm/hr   C-reactive Protein    Specimen: Blood   Result Value Ref Range    C-Reactive Protein 7.93 (H) 0.00 - 0.50 mg/dL       Assessment & Plan   Saman Aguayo is here today and the following problems have been addressed:      Diagnoses and all orders for this visit:    1. Bone pain (Primary)    2. Primary malignant neoplasm of lung metastatic to other site, unspecified laterality    3. Abnormal findings on diagnostic imaging of other abdominal regions, including retroperitoneum    Discussed with PCP. Increase Norco to 10 mg every 6 hours PRN for pain. Pended the rx to PCP.  Discussed constipation risk of opiates and how to combat this  Discussed differentials related to bone pain, concerned about bone metastasis  Has upcoming PET scan and keep follow up with oncology      Follow Up   Return for Next scheduled follow up.  Patient was given instructions and counseling regarding his condition or for health maintenance advice. Please see specific information pulled into the AVS if appropriate.     Ursula HOGAN  [facility]

## 2023-04-28 ENCOUNTER — TELEPHONE (OUTPATIENT)
Dept: INTERNAL MEDICINE | Facility: CLINIC | Age: 68
End: 2023-04-28
Payer: MEDICARE

## 2023-04-28 RX ORDER — HYDROCODONE BITARTRATE AND ACETAMINOPHEN 10; 325 MG/1; MG/1
1 TABLET ORAL EVERY 6 HOURS PRN
Qty: 60 TABLET | Refills: 0 | Status: SHIPPED | OUTPATIENT
Start: 2023-04-28

## 2023-04-28 RX ORDER — HYDROCODONE BITARTRATE AND ACETAMINOPHEN 10; 325 MG/1; MG/1
1 TABLET ORAL EVERY 6 HOURS PRN
Qty: 60 TABLET | Refills: 0 | Status: SHIPPED | OUTPATIENT
Start: 2023-04-28 | End: 2023-04-28 | Stop reason: SDUPTHER

## 2023-04-28 NOTE — TELEPHONE ENCOUNTER
Ursula pended this to Dr. Do yesterday but he didn't review the message. Dr. Avila is on call but out of office today. Can you send this in for patient?

## 2023-04-28 NOTE — TELEPHONE ENCOUNTER
Patient's daughter has called the office, requesting an update status on having pain medication to be called into [facility] pharmacy. This request is based on office visit 4/27/2023 .    Verified current phone number on file for emergency contact (daughter).

## 2023-04-28 NOTE — TELEPHONE ENCOUNTER
XIAO THE PATIENT'S DAUGHTER IS CALLING TO CHECK ON THE PAIN MEDICATION FOR HER DAD   THEY ARE TRYING TO KEEP HIM AS COMFORTABLE AS THEY CAN WITH THE PAIN HE IS HAVING     PATIENT WAS IN THE OFFICE YESTERDAY ON 4-27-23 AND SAW KAREN MORRIS AND DOCTOR GUTIERREZ APPROVED THE PAIN MEDICATION     [place] YOHAN CONFIRMED     XIAO CALL BACK  [phone]

## 2023-05-05 ENCOUNTER — HOSPITAL ENCOUNTER (OUTPATIENT)
Dept: CT IMAGING | Facility: HOSPITAL | Age: 68
Discharge: HOME | End: 2023-05-05
Payer: MEDICARE

## 2023-05-05 ENCOUNTER — PRE-ADMISSION TESTING (OUTPATIENT)
Dept: PREADMISSION TESTING | Facility: HOSPITAL | Age: 68
End: 2023-05-05
Payer: MEDICARE

## 2023-05-05 VITALS — BODY MASS INDEX: 20.96 KG/M2 | WEIGHT: 154.76 LBS | HEIGHT: 72 IN

## 2023-05-05 DIAGNOSIS — R91.8 MULTIPLE PULMONARY NODULES: ICD-10-CM

## 2023-05-05 LAB
ALBUMIN SERPL-MCNC: 3.7 G/DL (ref 3.5–5.2)
ALBUMIN/GLOB SERPL: 0.9 G/DL
ALP SERPL-CCNC: 89 U/L (ref 39–117)
ALT SERPL W P-5'-P-CCNC: 11 U/L (ref 1–41)
ANION GAP SERPL CALCULATED.3IONS-SCNC: 6 MMOL/L (ref 5–15)
APTT PPP: 34.5 SECONDS (ref 22–39)
AST SERPL-CCNC: 12 U/L (ref 1–40)
BASOPHILS # BLD AUTO: 0.05 10*3/MM3 (ref 0–0.2)
BASOPHILS NFR BLD AUTO: 0.6 % (ref 0–1.5)
BILIRUB SERPL-MCNC: 0.4 MG/DL (ref 0–1.2)
BUN SERPL-MCNC: 15 MG/DL (ref 8–23)
BUN/CREAT SERPL: 17 (ref 7–25)
CALCIUM SPEC-SCNC: 9.5 MG/DL (ref 8.6–10.5)
CHLORIDE SERPL-SCNC: 96 MMOL/L (ref 98–107)
CO2 SERPL-SCNC: 32 MMOL/L (ref 22–29)
CREAT SERPL-MCNC: 0.88 MG/DL (ref 0.76–1.27)
DEPRECATED RDW RBC AUTO: 43.2 FL (ref 37–54)
EGFRCR SERPLBLD CKD-EPI 2021: 92.5 ML/MIN/1.73
EOSINOPHIL # BLD AUTO: 0.14 10*3/MM3 (ref 0–0.4)
EOSINOPHIL NFR BLD AUTO: 1.7 % (ref 0.3–6.2)
ERYTHROCYTE [DISTWIDTH] IN BLOOD BY AUTOMATED COUNT: 12.5 % (ref 12.3–15.4)
GLOBULIN UR ELPH-MCNC: 4.2 GM/DL
GLUCOSE SERPL-MCNC: 103 MG/DL (ref 65–99)
HCT VFR BLD AUTO: 47.5 % (ref 37.5–51)
HGB BLD-MCNC: 15.3 G/DL (ref 13–17.7)
IMM GRANULOCYTES # BLD AUTO: 0.03 10*3/MM3 (ref 0–0.05)
IMM GRANULOCYTES NFR BLD AUTO: 0.4 % (ref 0–0.5)
INR PPP: 1.01 (ref 0.89–1.12)
LYMPHOCYTES # BLD AUTO: 1.62 10*3/MM3 (ref 0.7–3.1)
LYMPHOCYTES NFR BLD AUTO: 19.7 % (ref 19.6–45.3)
MCH RBC QN AUTO: 29.9 PG (ref 26.6–33)
MCHC RBC AUTO-ENTMCNC: 32.2 G/DL (ref 31.5–35.7)
MCV RBC AUTO: 93 FL (ref 79–97)
MONOCYTES # BLD AUTO: 0.78 10*3/MM3 (ref 0.1–0.9)
MONOCYTES NFR BLD AUTO: 9.5 % (ref 5–12)
NEUTROPHILS NFR BLD AUTO: 5.59 10*3/MM3 (ref 1.7–7)
NEUTROPHILS NFR BLD AUTO: 68.1 % (ref 42.7–76)
NRBC BLD AUTO-RTO: 0 /100 WBC (ref 0–0.2)
PLATELET # BLD AUTO: 308 10*3/MM3 (ref 140–450)
PMV BLD AUTO: 10 FL (ref 6–12)
POTASSIUM SERPL-SCNC: 4.5 MMOL/L (ref 3.5–5.2)
PROT SERPL-MCNC: 7.9 G/DL (ref 6–8.5)
PROTHROMBIN TIME: 13.4 SECONDS (ref 12.2–14.5)
QT INTERVAL: 424 MS
QTC INTERVAL: 394 MS
RBC # BLD AUTO: 5.11 10*6/MM3 (ref 4.14–5.8)
SODIUM SERPL-SCNC: 134 MMOL/L (ref 136–145)
WBC NRBC COR # BLD: 8.21 10*3/MM3 (ref 3.4–10.8)

## 2023-05-05 PROCEDURE — 85730 THROMBOPLASTIN TIME PARTIAL: CPT

## 2023-05-05 PROCEDURE — 36415 COLL VENOUS BLD VENIPUNCTURE: CPT

## 2023-05-05 PROCEDURE — 85610 PROTHROMBIN TIME: CPT

## 2023-05-05 PROCEDURE — 85025 COMPLETE CBC W/AUTO DIFF WBC: CPT

## 2023-05-05 PROCEDURE — 71250 CT THORAX DX C-: CPT

## 2023-05-05 PROCEDURE — 80053 COMPREHEN METABOLIC PANEL: CPT

## 2023-05-05 PROCEDURE — 93005 ELECTROCARDIOGRAM TRACING: CPT

## 2023-05-05 NOTE — PAT
An arrival time for procedure was not provided during PAT visit. If patient had any questions or concerns about their arrival time, they were instructed to contact their surgeon/physician.  Additionally, if the patient referred to an arrival time that was acquired from their my chart account, patient was encouraged to verify that time with their surgeon/physician. Arrival times are NOT provided in Pre Admission Testing Department.    Patient denies any current skin issues.     CARDIOLOGY NOTE FROM DR. BAKER ON CHART & IN [facility] FROM 6/2/22. PT HAS ANNUAL F/U ON 6/2/23 SCHEDULED. ECHO IN Epic AND ON CHART FROM 5/26/22.

## 2023-05-09 ENCOUNTER — HOSPITAL ENCOUNTER (OUTPATIENT)
Dept: PET IMAGING | Facility: HOSPITAL | Age: 68
Discharge: HOME | End: 2023-05-09
Payer: MEDICARE

## 2023-05-09 LAB — GLUCOSE BLDC GLUCOMTR-MCNC: 104 MG/DL (ref 70–130)

## 2023-05-09 PROCEDURE — 82948 REAGENT STRIP/BLOOD GLUCOSE: CPT

## 2023-05-09 PROCEDURE — 78816 PET IMAGE W/CT FULL BODY: CPT

## 2023-05-09 PROCEDURE — A9552 F18 FDG: HCPCS | Performed by: INTERNAL MEDICINE

## 2023-05-09 PROCEDURE — 0 FLUDEOXYGLUCOSE F18 SOLUTION: Performed by: INTERNAL MEDICINE

## 2023-05-09 RX ADMIN — FLUDEOXYGLUCOSE F18 1 DOSE: 300 INJECTION INTRAVENOUS at 08:13

## 2023-05-10 ENCOUNTER — ANESTHESIA (OUTPATIENT)
Dept: GASTROENTEROLOGY | Facility: HOSPITAL | Age: 68
End: 2023-05-10
Payer: MEDICARE

## 2023-05-10 ENCOUNTER — HOSPITAL ENCOUNTER (OUTPATIENT)
Facility: HOSPITAL | Age: 68
Setting detail: HOSPITAL OUTPATIENT SURGERY
Discharge: HOME | End: 2023-05-10
Attending: INTERNAL MEDICINE | Admitting: INTERNAL MEDICINE
Payer: MEDICARE

## 2023-05-10 ENCOUNTER — APPOINTMENT (OUTPATIENT)
Dept: GENERAL RADIOLOGY | Facility: HOSPITAL | Age: 68
End: 2023-05-10
Payer: MEDICARE

## 2023-05-10 ENCOUNTER — ANESTHESIA EVENT (OUTPATIENT)
Dept: GASTROENTEROLOGY | Facility: HOSPITAL | Age: 68
End: 2023-05-10
Payer: MEDICARE

## 2023-05-10 VITALS
OXYGEN SATURATION: 92 % | TEMPERATURE: 97.6 F | DIASTOLIC BLOOD PRESSURE: 70 MMHG | HEART RATE: 79 BPM | RESPIRATION RATE: 18 BRPM | SYSTOLIC BLOOD PRESSURE: 135 MMHG

## 2023-05-10 DIAGNOSIS — R91.8 MULTIPLE PULMONARY NODULES: ICD-10-CM

## 2023-05-10 LAB
QT INTERVAL: 424 MS
QTC INTERVAL: 394 MS

## 2023-05-10 PROCEDURE — 71045 X-RAY EXAM CHEST 1 VIEW: CPT

## 2023-05-10 PROCEDURE — 31654 BRONCH EBUS IVNTJ PERPH LES: CPT | Performed by: INTERNAL MEDICINE

## 2023-05-10 PROCEDURE — 87205 SMEAR GRAM STAIN: CPT | Performed by: INTERNAL MEDICINE

## 2023-05-10 PROCEDURE — 87102 FUNGUS ISOLATION CULTURE: CPT | Performed by: INTERNAL MEDICINE

## 2023-05-10 PROCEDURE — 25010000002 FENTANYL CITRATE (PF) 100 MCG/2ML SOLUTION: Performed by: NURSE ANESTHETIST, CERTIFIED REGISTERED

## 2023-05-10 PROCEDURE — 88305 TISSUE EXAM BY PATHOLOGIST: CPT | Performed by: INTERNAL MEDICINE

## 2023-05-10 PROCEDURE — 87206 SMEAR FLUORESCENT/ACID STAI: CPT | Performed by: INTERNAL MEDICINE

## 2023-05-10 PROCEDURE — 31627 NAVIGATIONAL BRONCHOSCOPY: CPT | Performed by: INTERNAL MEDICINE

## 2023-05-10 PROCEDURE — 25010000002 DEXAMETHASONE PER 1 MG: Performed by: NURSE ANESTHETIST, CERTIFIED REGISTERED

## 2023-05-10 PROCEDURE — 31629 BRONCHOSCOPY/NEEDLE BX EACH: CPT | Performed by: INTERNAL MEDICINE

## 2023-05-10 PROCEDURE — 31628 BRONCHOSCOPY/LUNG BX EACH: CPT | Performed by: INTERNAL MEDICINE

## 2023-05-10 PROCEDURE — 31623 DX BRONCHOSCOPE/BRUSH: CPT | Performed by: INTERNAL MEDICINE

## 2023-05-10 PROCEDURE — 25010000002 PROPOFOL 10 MG/ML EMULSION: Performed by: NURSE ANESTHETIST, CERTIFIED REGISTERED

## 2023-05-10 PROCEDURE — 87252 VIRUS INOCULATION TISSUE: CPT | Performed by: INTERNAL MEDICINE

## 2023-05-10 PROCEDURE — 31624 DX BRONCHOSCOPE/LAVAGE: CPT | Performed by: INTERNAL MEDICINE

## 2023-05-10 PROCEDURE — 87070 CULTURE OTHR SPECIMN AEROBIC: CPT | Performed by: INTERNAL MEDICINE

## 2023-05-10 PROCEDURE — 87116 MYCOBACTERIA CULTURE: CPT | Performed by: INTERNAL MEDICINE

## 2023-05-10 PROCEDURE — 25010000002 SUGAMMADEX 200 MG/2ML SOLUTION: Performed by: NURSE ANESTHETIST, CERTIFIED REGISTERED

## 2023-05-10 PROCEDURE — 76000 FLUOROSCOPY <1 HR PHYS/QHP: CPT

## 2023-05-10 RX ORDER — SODIUM CHLORIDE, SODIUM LACTATE, POTASSIUM CHLORIDE, CALCIUM CHLORIDE 600; 310; 30; 20 MG/100ML; MG/100ML; MG/100ML; MG/100ML
9 INJECTION, SOLUTION INTRAVENOUS CONTINUOUS
Status: CANCELLED | OUTPATIENT
Start: 2023-05-10

## 2023-05-10 RX ORDER — EPHEDRINE SULFATE 50 MG/ML
INJECTION, SOLUTION INTRAVENOUS AS NEEDED
Status: DISCONTINUED | OUTPATIENT
Start: 2023-05-10 | End: 2023-05-10 | Stop reason: SURG

## 2023-05-10 RX ORDER — LIDOCAINE HYDROCHLORIDE 40 MG/ML
4 INJECTION, SOLUTION RETROBULBAR; TOPICAL ONCE
Status: DISCONTINUED | OUTPATIENT
Start: 2023-05-10 | End: 2023-05-10

## 2023-05-10 RX ORDER — PHENYLEPHRINE HCL IN 0.9% NACL 1 MG/10 ML
SYRINGE (ML) INTRAVENOUS AS NEEDED
Status: DISCONTINUED | OUTPATIENT
Start: 2023-05-10 | End: 2023-05-10 | Stop reason: SURG

## 2023-05-10 RX ORDER — LIDOCAINE HYDROCHLORIDE 10 MG/ML
INJECTION, SOLUTION EPIDURAL; INFILTRATION; INTRACAUDAL; PERINEURAL AS NEEDED
Status: DISCONTINUED | OUTPATIENT
Start: 2023-05-10 | End: 2023-05-10 | Stop reason: SURG

## 2023-05-10 RX ORDER — ONDANSETRON 2 MG/ML
4 INJECTION INTRAMUSCULAR; INTRAVENOUS ONCE AS NEEDED
Status: DISCONTINUED | OUTPATIENT
Start: 2023-05-10 | End: 2023-05-10 | Stop reason: HOSPADM

## 2023-05-10 RX ORDER — ROCURONIUM BROMIDE 10 MG/ML
INJECTION, SOLUTION INTRAVENOUS AS NEEDED
Status: DISCONTINUED | OUTPATIENT
Start: 2023-05-10 | End: 2023-05-10 | Stop reason: SURG

## 2023-05-10 RX ORDER — SODIUM CHLORIDE 0.9 % (FLUSH) 0.9 %
10 SYRINGE (ML) INJECTION EVERY 12 HOURS SCHEDULED
Status: CANCELLED | OUTPATIENT
Start: 2023-05-10

## 2023-05-10 RX ORDER — IPRATROPIUM BROMIDE AND ALBUTEROL SULFATE 2.5; .5 MG/3ML; MG/3ML
3 SOLUTION RESPIRATORY (INHALATION) ONCE AS NEEDED
Status: DISCONTINUED | OUTPATIENT
Start: 2023-05-10 | End: 2023-05-10 | Stop reason: HOSPADM

## 2023-05-10 RX ORDER — SODIUM CHLORIDE, SODIUM LACTATE, POTASSIUM CHLORIDE, CALCIUM CHLORIDE 600; 310; 30; 20 MG/100ML; MG/100ML; MG/100ML; MG/100ML
9 INJECTION, SOLUTION INTRAVENOUS CONTINUOUS
Status: DISCONTINUED | OUTPATIENT
Start: 2023-05-10 | End: 2023-05-10 | Stop reason: HOSPADM

## 2023-05-10 RX ORDER — DEXAMETHASONE SODIUM PHOSPHATE 4 MG/ML
INJECTION, SOLUTION INTRA-ARTICULAR; INTRALESIONAL; INTRAMUSCULAR; INTRAVENOUS; SOFT TISSUE AS NEEDED
Status: DISCONTINUED | OUTPATIENT
Start: 2023-05-10 | End: 2023-05-10 | Stop reason: SURG

## 2023-05-10 RX ORDER — PROPOFOL 10 MG/ML
VIAL (ML) INTRAVENOUS AS NEEDED
Status: DISCONTINUED | OUTPATIENT
Start: 2023-05-10 | End: 2023-05-10 | Stop reason: SURG

## 2023-05-10 RX ORDER — SODIUM CHLORIDE 9 MG/ML
40 INJECTION, SOLUTION INTRAVENOUS AS NEEDED
Status: DISCONTINUED | OUTPATIENT
Start: 2023-05-10 | End: 2023-05-10 | Stop reason: HOSPADM

## 2023-05-10 RX ORDER — FAMOTIDINE 20 MG/1
20 TABLET, FILM COATED ORAL ONCE
Status: CANCELLED | OUTPATIENT
Start: 2023-05-10 | End: 2023-05-10

## 2023-05-10 RX ORDER — SODIUM CHLORIDE 0.9 % (FLUSH) 0.9 %
10 SYRINGE (ML) INJECTION AS NEEDED
Status: DISCONTINUED | OUTPATIENT
Start: 2023-05-10 | End: 2023-05-10 | Stop reason: HOSPADM

## 2023-05-10 RX ORDER — FENTANYL CITRATE 50 UG/ML
INJECTION, SOLUTION INTRAMUSCULAR; INTRAVENOUS AS NEEDED
Status: DISCONTINUED | OUTPATIENT
Start: 2023-05-10 | End: 2023-05-10 | Stop reason: SURG

## 2023-05-10 RX ORDER — MIDAZOLAM HYDROCHLORIDE 1 MG/ML
0.5 INJECTION INTRAMUSCULAR; INTRAVENOUS
Status: CANCELLED | OUTPATIENT
Start: 2023-05-10

## 2023-05-10 RX ORDER — FAMOTIDINE 10 MG/ML
20 INJECTION, SOLUTION INTRAVENOUS ONCE
Status: CANCELLED | OUTPATIENT
Start: 2023-05-10 | End: 2023-05-10

## 2023-05-10 RX ORDER — LIDOCAINE HYDROCHLORIDE 10 MG/ML
0.5 INJECTION, SOLUTION EPIDURAL; INFILTRATION; INTRACAUDAL; PERINEURAL ONCE AS NEEDED
Status: CANCELLED | OUTPATIENT
Start: 2023-05-10

## 2023-05-10 RX ADMIN — PROPOFOL 150 MG: 10 INJECTION, EMULSION INTRAVENOUS at 11:11

## 2023-05-10 RX ADMIN — LIDOCAINE HYDROCHLORIDE 50 MG: 10 INJECTION, SOLUTION EPIDURAL; INFILTRATION; INTRACAUDAL; PERINEURAL at 11:11

## 2023-05-10 RX ADMIN — ROCURONIUM BROMIDE 10 MG: 10 SOLUTION INTRAVENOUS at 11:58

## 2023-05-10 RX ADMIN — SUGAMMADEX 200 MG: 100 INJECTION, SOLUTION INTRAVENOUS at 12:15

## 2023-05-10 RX ADMIN — Medication 200 MCG: at 11:25

## 2023-05-10 RX ADMIN — FENTANYL CITRATE 100 MCG: 50 INJECTION, SOLUTION INTRAMUSCULAR; INTRAVENOUS at 11:11

## 2023-05-10 RX ADMIN — DEXAMETHASONE SODIUM PHOSPHATE 4 MG: 4 INJECTION, SOLUTION INTRAMUSCULAR; INTRAVENOUS at 12:12

## 2023-05-10 RX ADMIN — Medication 100 MCG: at 11:17

## 2023-05-10 RX ADMIN — Medication 200 MCG: at 11:31

## 2023-05-10 RX ADMIN — Medication 100 MCG: at 11:38

## 2023-05-10 RX ADMIN — ROCURONIUM BROMIDE 50 MG: 10 SOLUTION INTRAVENOUS at 11:11

## 2023-05-10 RX ADMIN — EPHEDRINE SULFATE 10 MG: 50 INJECTION INTRAVENOUS at 11:38

## 2023-05-10 RX ADMIN — DEXAMETHASONE SODIUM PHOSPHATE 8 MG: 4 INJECTION, SOLUTION INTRAMUSCULAR; INTRAVENOUS at 11:20

## 2023-05-10 RX ADMIN — Medication 10 ML: at 11:06

## 2023-05-10 RX ADMIN — SODIUM CHLORIDE, POTASSIUM CHLORIDE, SODIUM LACTATE AND CALCIUM CHLORIDE 9 ML/HR: 600; 310; 30; 20 INJECTION, SOLUTION INTRAVENOUS at 11:04

## 2023-05-10 NOTE — ANESTHESIA POSTPROCEDURE EVALUATION
Patient: Saman Aguayo    Procedure Summary     Date: 05/10/23 Room / Location:  [facility]    Anesthesia Start: 1107 Anesthesia Stop: 1229    Procedure: NAVIGATIONAL BRONCHOSCOPY WITH ION ROBOT Diagnosis:       Multiple pulmonary nodules      (Multiple pulmonary nodules [R91.8])    Surgeons: Rico Damon DO Provider: Danlio Rainey MD    Anesthesia Type: general ASA Status: 3          Anesthesia Type: general    Vitals  Vitals Value Taken Time   /101 05/10/23 1230   Temp 97 °F (36.1 °C) 05/10/23 1230   Pulse 80 05/10/23 1230   Resp 14 05/10/23 1230   SpO2 97 % 05/10/23 1230           Post Anesthesia Care and Evaluation    Patient location during evaluation: PACU  Patient participation: complete - patient participated  Level of consciousness: awake and alert  Pain management: adequate    Airway patency: patent  Anesthetic complications: No anesthetic complications  PONV Status: none  Cardiovascular status: hemodynamically stable and acceptable  Respiratory status: nonlabored ventilation, acceptable and nasal cannula  Hydration status: acceptable

## 2023-05-10 NOTE — ANESTHESIA PREPROCEDURE EVALUATION
Anesthesia Evaluation     Patient summary reviewed and Nursing notes reviewed   NPO Solid Status: > 8 hours  NPO Liquid Status: > 2 hours           Airway   Mallampati: II  TM distance: >3 FB  Neck ROM: full  No difficulty expected  Dental    (+) edentulous    Pulmonary    (+) a smoker Former Abstained day of surgery, lung cancer, COPD severe, rhonchi, decreased breath sounds,     ROS comment: PULM HTN  Cardiovascular   Exercise tolerance: poor (<4 METS)    ECG reviewed  Rhythm: regular  Rate: normal    (+) dysrhythmias Bradycardia,     ROS comment: Interpretation Summary    ? Estimated left ventricular EF = 56% Left ventricular ejection fraction appears to be 56 - 60%. Left ventricular systolic function is normal.  ? Left ventricular diastolic function was normal.  ? Estimated right ventricular systolic pressure from tricuspid regurgitation is moderately elevated (45-55 mmHg).  ? Moderate to severe tricuspid valve regurgitation is present.  ? Moderate pulmonary hypertension is present.      Neuro/Psych  (+) dizziness/light headedness,    GI/Hepatic/Renal/Endo      Musculoskeletal     Abdominal   (-) obese    Abdomen: soft.   Substance History      OB/GYN          Other   arthritis,    history of cancer active                    Anesthesia Plan    ASA 3     general     intravenous induction     Anesthetic plan, risks, benefits, and alternatives have been provided, discussed and informed consent has been obtained with: patient.    Plan discussed with CRNA.        CODE STATUS:

## 2023-05-11 ENCOUNTER — CONSULT (OUTPATIENT)
Dept: ONCOLOGY | Facility: CLINIC | Age: 68
End: 2023-05-11
Payer: MEDICARE

## 2023-05-11 VITALS
BODY MASS INDEX: 20.72 KG/M2 | TEMPERATURE: 97.3 F | OXYGEN SATURATION: 98 % | WEIGHT: 153 LBS | SYSTOLIC BLOOD PRESSURE: 129 MMHG | RESPIRATION RATE: 16 BRPM | HEIGHT: 72 IN | DIASTOLIC BLOOD PRESSURE: 60 MMHG | HEART RATE: 40 BPM

## 2023-05-11 DIAGNOSIS — C34.2 LUNG CANCER, MIDDLE LOBE: Primary | ICD-10-CM

## 2023-05-11 LAB
CYTO UR: NORMAL
LAB AP CASE REPORT: NORMAL
LAB AP CLINICAL INFORMATION: NORMAL
PATH REPORT.FINAL DX SPEC: NORMAL
PATH REPORT.GROSS SPEC: NORMAL
REF LAB TEST METHOD: NORMAL

## 2023-05-11 PROCEDURE — 1126F AMNT PAIN NOTED NONE PRSNT: CPT | Performed by: INTERNAL MEDICINE

## 2023-05-11 PROCEDURE — 99204 OFFICE O/P NEW MOD 45 MIN: CPT | Performed by: INTERNAL MEDICINE

## 2023-05-11 NOTE — PROGRESS NOTES
New Patient Office Visit      Date: 2023     Patient Name: Saman Aguayo  MRN: [id number]  : 1955  Referring Physician: Dre Do    Chief Complaint: Establish care for history of lung cancer and new right lung nodule    History of Present Illness: Saman Aguayo is a pleasant 68 y.o. male history of right lung adenocarcinoma status post 2 resections, osteoarthritis, COPD who presents today for evaluation of history of lung cancer new right lung nodule. The patient is accompanied by their wife who contributes to the history of their care.  Patient was initially diagnosed with a stage IA infiltrating non-small cell undifferentiated carcinoma s/p right VATS with right middle lobectomy and mediastinal lymph node dissection in  ([id number]) with lymphovascular invasion present.  He then had recurrence early  requiring redo right VATS with right upper wedge resection conversion to thoracotomy with completion lobectomy and mediastinal lymph node dissection 2020 by Dr. Porter.  Pathology consistent with stage IA2 non-small cell carcinoma most consistent with adenocarcinoma ([id number]).   He had negative CT scans until 2023 when he presented with significant bilateral shoulder pain.  CT chest notable for a 1.3 cm right upper lobe nodule concerning for disease recurrence or metastasis.  PET/CT with some slight hypermetabolic activity in the lesion but no other sites of disease noted.  Underwent biopsy with Dr. Damon on 5/10/2023.  Pathology not consistent with malignancy    Oncology History:    Oncology/Hematology History    No history exists.       Subjective      Review of Systems:     Constitutional: Negative for fevers, chills, or weight loss  Eyes: Negative for blurred vision or discharge         Ear/Nose/Throat: Negative for difficulty swallowing, sore throat, LAD                                                       Respiratory: Negative for cough, SOA, wheezing                        "                                                                 Cardiovascular: Negative for chest pain or palpitations                                                                  Gastrointestinal: Negative for nausea, vomiting or diarrhea                                                                     Genitourinary: Negative for dysuria or hematuria                                                                                           Musculoskeletal: Negative for any joint pains or muscle aches                                                                        Neurologic: Negative for any weakness, headaches, dizziness                                                                         Hematologic: Negative for any easy bleeding or bruising                                                                                   Psychiatric: Negative for anxiety or depression                             Past Medical History:   Past Medical History:   Diagnosis Date   • Abnormal ECG    • Abscess of skin    • Arthritis     HANDS AND BACK    • Bradycardia    • Bunion     LEFT FOOT   • COPD (chronic obstructive pulmonary disease)    • Full dentures    • History of echocardiogram 09/21/2016   • Hx of exercise stress test 09/27/2016    DR. BAKER \"IT WAS NORMAL\"   • Low back pain    • Lung cancer, middle lobe 11/08/2016    RIGHT MIDDLE LOBE   • Lung nodule     RUL-7MM.  PRESENTLY HAS, AND FOLLOWS WITH DR. HERNANDEZ.  STATES FOUND \"A COUPLE MONTHS AGO\"   • Premature atrial contraction    • Pulmonary hypertension    • Skin cancer     BASAL CELL-NOSE, LIP   • Wears dentures 01/18/2023   • Wears eyeglasses        Past Surgical History:   Past Surgical History:   Procedure Laterality Date   • APPENDECTOMY     • BASAL CELL CARCINOMA EXCISION      NOSE, LIP   • BRONCHOSCOPY  2016   • BRONCHOSCOPY N/A 03/13/2020    Procedure: BRONCHOSCOPY;  Surgeon: Chris Porter MD;  Location: [facility];  Service: Cardiothoracic;  " Laterality: N/A;   • BRONCHOSCOPY WITH ION ROBOTIC ASSIST N/A 5/10/2023    Procedure: NAVIGATIONAL BRONCHOSCOPY WITH ION ROBOT;  Surgeon: Rico Damon DO;  Location: [facility] ENDOSCOPY;  Service: Robotics - Pulmonary;  Laterality: N/A;  Ion Cath 4 0038   0035   • BUNIONECTOMY Left 2013    GREAT TOE   • CATARACT EXTRACTION W/ INTRAOCULAR LENS IMPLANT Right 12/9/2022    Procedure: CATARACT PHACO EXTRACTION WITH INTRAOCULAR LENS IMPLANT RIGHT COMPLICATED WITH MALYUGIN RING;  Surgeon: Christiane Haynes DO;  Location: [facility] OR;  Service: Ophthalmology;  Laterality: Right;   • CATARACT EXTRACTION W/ INTRAOCULAR LENS IMPLANT Left 1/24/2023    Procedure: CATARACT PHACO EXTRACTION WITH INTRAOCULAR LENS IMPLANT LEFT COMPLICATED WITH MALYUGIN RING;  Surgeon: Christiane Haynes DO;  Location: [facility] OR;  Service: Ophthalmology;  Laterality: Left;   • COLONOSCOPY  2018   • DIAGNOSTIC LAPAROSCOPY N/A 06/29/2020    Procedure: DIAGNOSTIC LAPAROSCOPY, APPENDECTOMY, CHOLECYSTECOMY WITH CHOLANGIOGRAPHY;  Surgeon: Shady Singh MD;  Location: [facility] OR;  Service: General;  Laterality: N/A;   • ENDOSCOPY N/A 06/03/2020    Procedure: ESOPHAGOGASTRODUODENOSCOPY WITH BIOPSY;  Surgeon: Shady Singh MD;  Location: [facility] ENDOSCOPY;  Service: Gastroenterology;  Laterality: N/A;   • GALLBLADDER SURGERY  12/07/2022   • INGUINAL HERNIA REPAIR Left 2002    Dr. Singh/inguinal    • KNEE ARTHROSCOPY Right 12/12/2018    Procedure: Diagnostic arthroscopy right knee with partial medial meniscectomy;  Surgeon: Dinh Nova MD;  Location: [facility] OR;  Service: Orthopedics   • LUNG REMOVAL, PARTIAL  2016    RIGHT MIDDLE LOBE   • MOUTH SURGERY      FULL EXTRACTION OF TEETH   • SKIN BIOPSY     • THORACOSCOPY N/A 11/18/2016    Procedure: BRONCH THORACOSCOPY VIDEO ASSISTED  WITH LOBECTOMY, MEDIALSTINAL LYMPH NODE DISECTION;  Surgeon: Chris Porter MD;  Location: [facility] OR;  Service:    • THORACOSCOPY VIDEO ASSISTED WITH LOBECTOMY  "Right 2020    Procedure: Redo right THORACOSCOPY VIDEO ASSISTED with right upper lobe wedge resection and with conversion to open thorocotomy for right upper lobectomy with intercostal cryoablation;  Surgeon: Chris Porter MD;  Location: [facility];  Service: Cardiothoracic;  Laterality: Right;       Family History:   Family History   Problem Relation Age of Onset   • Heart attack Father    • Heart disease Father    • Colon cancer Father    • Colon cancer Mother    • Liver cancer Mother        Social History:   Social History     Socioeconomic History   • Marital status:    • Number of children: 1   Tobacco Use   • Smoking status: Former     Packs/day: 3.00     Years: 50.00     Pack years: 150.00     Types: Cigarettes     Quit date: 2016     Years since quittin.5   • Smokeless tobacco: Never   Vaping Use   • Vaping Use: Never used   Substance and Sexual Activity   • Alcohol use: Not Currently     Comment: 2-3 CANS DAILY   • Drug use: Never   • Sexual activity: Defer       Medications:     Current Outpatient Medications:   •  albuterol sulfate  (90 Base) MCG/ACT inhaler, Inhale 2 puffs Every 4 (Four) Hours As Needed for Wheezing., Disp: , Rfl:   •  HYDROcodone-acetaminophen (NORCO)  MG per tablet, Take 1 tablet by mouth Every 6 (Six) Hours As Needed for Severe Pain., Disp: 60 tablet, Rfl: 0  •  tiotropium bromide-olodaterol (Stiolto Respimat) 2.5-2.5 MCG/ACT aerosol solution inhaler, Inhale 2 puffs Daily., Disp: , Rfl:   No current facility-administered medications for this visit.    Allergies:   No Known Allergies    Objective     Physical Exam:  Vital Signs:   Vitals:    23 1019   BP: 129/60   Pulse: (!) 40   Resp: 16   Temp: 97.3 °F (36.3 °C)   TempSrc: Temporal   SpO2: 98%   Weight: 69.4 kg (153 lb)   Height: 182.9 cm (72\")   PainSc: 0-No pain     Pain Score    23 1019   PainSc: 0-No pain     ECOG Performance Status: 0 - Asymptomatic    Constitutional: NAD, ECOG " 0  Eyes: PERRLA, scleral anicteric  ENT: No LAD, no thyromegaly  Respiratory: CTAB, no wheezing, rales, rhonchi  Cardiovascular: RRR, no murmurs, pulses 2+ bilaterally  Abdomen: soft, NT/ND, no HSM  Musculoskeletal: strength 5/5 bilaterally, no c/c/e  Neurologic: A&O x 3, CN II-XII intact grossly  Psych: mood and affect congruent, no SI or HI    Results Review:   Admission on 05/10/2023, Discharged on 05/10/2023   Component Date Value Ref Range Status   • Case Report 05/10/2023    Final                    Value:Surgical Pathology Report                         Case: [id number]                                  Authorizing Provider:  Rico Damon       Collected:           05/10/2023 11:58 AM                                 DO Jules                                                                    Ordering Location:     [facility]   Received:            05/10/2023 01:53 PM                                 [facility]                                                                  Pathologist:           Misael Ford MD                                                        Specimen:    Lung, R, RL biopsy for path                                                               • Clinical Information 05/10/2023    Final                    Value:This result contains rich text formatting which cannot be displayed here.   • Final Diagnosis 05/10/2023    Final                    Value:This result contains rich text formatting which cannot be displayed here.   • Gross Description 05/10/2023    Final                    Value:This result contains rich text formatting which cannot be displayed here.   • Microscopic Description 05/10/2023    Final                    Value:This result contains rich text formatting which cannot be displayed here.   • Gram Stain 05/10/2023 No WBCs per low power field   Preliminary   • Gram Stain 05/10/2023 No Epithelial cells per low power field   Preliminary   • Gram  Stain 05/10/2023 No organisms seen   Preliminary   Hospital Outpatient Visit on 05/09/2023   Component Date Value Ref Range Status   • Glucose 05/09/2023 104  70 - 130 mg/dL Final    Meter: [id number] : [id number] Emory Rdz   Pre-Admission Testing on 05/05/2023   Component Date Value Ref Range Status   • QT Interval 05/05/2023 424  ms Final   • QTC Interval 05/05/2023 394  ms Final   • Glucose 05/05/2023 103 (H)  65 - 99 mg/dL Final   • BUN 05/05/2023 15  8 - 23 mg/dL Final   • Creatinine 05/05/2023 0.88  0.76 - 1.27 mg/dL Final   • Sodium 05/05/2023 134 (L)  136 - 145 mmol/L Final   • Potassium 05/05/2023 4.5  3.5 - 5.2 mmol/L Final    Slight hemolysis detected by analyzer. Results may be affected.   • Chloride 05/05/2023 96 (L)  98 - 107 mmol/L Final   • CO2 05/05/2023 32.0 (H)  22.0 - 29.0 mmol/L Final   • Calcium 05/05/2023 9.5  8.6 - 10.5 mg/dL Final   • Total Protein 05/05/2023 7.9  6.0 - 8.5 g/dL Final   • Albumin 05/05/2023 3.7  3.5 - 5.2 g/dL Final   • ALT (SGPT) 05/05/2023 11  1 - 41 U/L Final   • AST (SGOT) 05/05/2023 12  1 - 40 U/L Final   • Alkaline Phosphatase 05/05/2023 89  39 - 117 U/L Final   • Total Bilirubin 05/05/2023 0.4  0.0 - 1.2 mg/dL Final   • Globulin 05/05/2023 4.2  gm/dL Final    Calculated Result   • A/G Ratio 05/05/2023 0.9  g/dL Final   • BUN/Creatinine Ratio 05/05/2023 17.0  7.0 - 25.0 Final   • Anion Gap 05/05/2023 6.0  5.0 - 15.0 mmol/L Final   • eGFR 05/05/2023 92.5  >60.0 mL/min/1.73 Final   • Protime 05/05/2023 13.4  12.2 - 14.5 Seconds Final   • INR 05/05/2023 1.01  0.89 - 1.12 Final   • PTT 05/05/2023 34.5  22.0 - 39.0 seconds Final   • WBC 05/05/2023 8.21  3.40 - 10.80 10*3/mm3 Final   • RBC 05/05/2023 5.11  4.14 - 5.80 10*6/mm3 Final   • Hemoglobin 05/05/2023 15.3  13.0 - 17.7 g/dL Final   • Hematocrit 05/05/2023 47.5  37.5 - 51.0 % Final   • MCV 05/05/2023 93.0  79.0 - 97.0 fL Final   • MCH 05/05/2023 29.9  26.6 - 33.0 pg Final   • MCHC 05/05/2023 32.2  31.5 -  35.7 g/dL Final   • RDW 05/05/2023 12.5  12.3 - 15.4 % Final   • RDW-SD 05/05/2023 43.2  37.0 - 54.0 fl Final   • MPV 05/05/2023 10.0  6.0 - 12.0 fL Final   • Platelets 05/05/2023 308  140 - 450 10*3/mm3 Final   • Neutrophil % 05/05/2023 68.1  42.7 - 76.0 % Final   • Lymphocyte % 05/05/2023 19.7  19.6 - 45.3 % Final   • Monocyte % 05/05/2023 9.5  5.0 - 12.0 % Final   • Eosinophil % 05/05/2023 1.7  0.3 - 6.2 % Final   • Basophil % 05/05/2023 0.6  0.0 - 1.5 % Final   • Immature Grans % 05/05/2023 0.4  0.0 - 0.5 % Final   • Neutrophils, Absolute 05/05/2023 5.59  1.70 - 7.00 10*3/mm3 Final   • Lymphocytes, Absolute 05/05/2023 1.62  0.70 - 3.10 10*3/mm3 Final   • Monocytes, Absolute 05/05/2023 0.78  0.10 - 0.90 10*3/mm3 Final   • Eosinophils, Absolute 05/05/2023 0.14  0.00 - 0.40 10*3/mm3 Final   • Basophils, Absolute 05/05/2023 0.05  0.00 - 0.20 10*3/mm3 Final   • Immature Grans, Absolute 05/05/2023 0.03  0.00 - 0.05 10*3/mm3 Final   • nRBC 05/05/2023 0.0  0.0 - 0.2 /100 WBC Final       CT Abdomen Pelvis With & Without Contrast    Addendum Date: 4/14/2023 Addendum:   ADDENDUM:    Possibility of pancreatic metastasis cannot be excluded. Recommended further evaluation with PET/CT.    Images personally reviewed, interpreted and dictated by VERN Coyle.  This report was signed and finalized on 4/14/2023 3:37 PM by VERN Coyle.    Result Date: 4/14/2023  Narrative: CT SCAN OF THE ABDOMEN AND PELVIS WITHOUT AND WITH CONTRAST    4/13/2023 2:10 PM  HISTORY: Pelvic lymphadenopathy; R59.1-Generalized enlarged lymph nodesAbdominal pain.  PROCEDURE: Axial CT images were obtained from the lung bases to the pubic symphysis without and following IV contrast administration. Coronal and sagittal reformatted images were generated from the axial data set and provided for interpretation. This study was performed with techniques to keep radiation doses as low as reasonably achievable, (ALARA). Individualized dose  reduction techniques using automated exposure control or adjustment of mA and/or kV according to the patient size were employed.  COMPARISON: CT abdomen pelvis dated 10/15/2020. CT abdomen pelvis dated 06/29/2020.  FINDINGS: Evaluation is limited due to respiratory and patient motion. LOWER CHEST: The heart is normal in size. Coronary and aortic atherosclerotic disease present. Severe emphysema.  ABDOMEN/PELVIS: Liver, gallbladder and bile ducts: The liver enhances homogenously without suspicious focal hepatic lesion. Prior cholecystectomy. No significant biliary ductal dilatation.  Adrenal glands: The adrenal glands are morphologically unremarkable without suspicious lesion.  Kidneys, ureters and urinary bladder: No suspicious renal lesions. No hydronephrosis. Unremarkable urinary bladder.  Spleen: The spleen is normal in size.  Pancreas: There are 2 hypodense cystic lesions in the pancreatic head/proximal body, measuring approximately 15 mm (series 4, image 34) and 7 mm (series 4, image 37).  Gastrointestinal system and mesentery: There is no evidence of bowel obstruction. Appendix is not identified and appears to be surgically absent with surgical clips are in place. No significant mesenteric inflammation. Moderate sigmoid colon diverticulosis without evidence of acute diverticulitis.  Lymph nodes: No pathologically enlarged abdominal or pelvic lymph nodes are present. Mildly prominent bilateral external iliac lymph oracio chain, right lymph node measuring up to 10 mm and left one measuring up to 10 mm (series 3, image 76), unchanged from prior exams dated back to 2020.  Vessels: The abdominal aorta is normal in caliber. Aortic and bilateral iliac atherosclerotic disease is present. The celiac trunk, superior mesenteric artery, inferior mesenteric artery and their branch vessels appear grossly patent. The superior mesenteric vein, splenic vein and main portal veins are patent. The inferior vena cava and hepatic  veins are unremarkable.  Peritoneum: No free intraperitoneal fluid or pneumoperitoneum.  Pelvic viscera: No acute findings.  Body wall: No acute findings. No significant body wall hernias.  Bones: No acute fracture. Degenerative changes of the lumbar spine.      Impression: No acute findings in the abdomen or pelvis.  No definite lymphadenopathy in the abdomen or pelvis. No significant interval change.  Small nonenhancing cystic lesions in the pancreatic head, most likely a intraductal papillary mucinous neoplasms, grossly unchanged from prior exams.  Images personally reviewed, interpreted and dictated by VERN Coyle.   754.04 mGy.cm 4.73 mGy  This study was performed with techniques to keep radiation doses as low as reasonably achievable (ALARA). Individualized dose reduction techniques using automated exposure control or adjustment of mA and/or kV according to the patient size were employed.      This report was signed and finalized on 4/14/2023 2:05 PM by VERN Coyle.    CT Chest Without Contrast Diagnostic    Result Date: 5/6/2023  Narrative: CT CHEST WO CONTRAST DIAGNOSTIC Date of Exam: 5/5/2023 11:14 AM EDT Indication: ROBOTIC BRONCHOSCOPY PROTOCOL. Comparison: CT chest 4/13/2023. Technique: Axial CT images were obtained of the chest without contrast administration.  Reconstructed coronal and sagittal images were also obtained. Automated exposure control and iterative construction methods were used. Findings: Volume loss in the right hemithorax is consistent with the history of right middle lobectomy and right lower lobectomy. There is a spiculated nodule within the perihilar right lung measuring 1.9 x 1.2 cm (series 2 image 191), corresponding to the nodule described on the prior outside CT chest study. Scattered small noncalcified nodular densities are seen within the right apex, not thought to be significantly changed, including a dominant 8 mm noncalcified nodule series 2 image 65).  There are smaller scattered 2-3 motor noncalcified nodules within the left lung, as well, including a 3 mm nodule in the central left lower lobe (series 2 image 194). These are thought to be unchanged. Advanced emphysema is present. No acute lung consolidations are identified. There is chronic fibrosis in the left base. Heart size is normal. Coronary artery calcifications are present. Mild thoracic aortic calcific atherosclerosis. No pathologic enlarged lymph nodes. Thyroid gland is within normal limits. Cholecystectomy changes are present. Low-density or cystic foci within the pancreatic head measuring approximately 1.3 cm and 0.9 cm, are unchanged. Mild to moderate stool is seen within the upper abdominal large bowel. Mild left adrenal thickening or hyperplasia, unchanged. Remainder of the imaged upper abdominal organs are within normal limits. Degenerative endplate spurring is present within the thoracic spine. No acute or suspicious osseous abnormality.     Impression: 1. 1.9 x 1.2 cm perihilar right lung irregular noncalcified nodule, suspicious for malignancy. There are others smaller scattered noncalcified nodules within both lungs, greatest in the apex of the right lung, measuring 8 mm or less in size, unchanged. 2. No pathologic enlarged lymph nodes within the chest. 3. Right middle lobectomy and right lower lobectomy. 4. Cystic foci within the pancreatic head are unchanged. These have undergone recent evaluation with outside multiphase CT abdomen from 4/13/2023. Electronically Signed: Mindi Tim  5/6/2023 11:03 AM EDT  Workstation ID: [id number]    CT Chest With & Without Contrast    Result Date: 4/14/2023  Narrative: CT SCAN OF THE CHEST WITHOUT AND WITH CONTRAST 4/13/2023 2:10 PM  HISTORY: axilla lymphadenopathy; R59.0-Localized enlarged lymph nodes.Patient had history of lung cancer, status post resection.  PROCEDURE: Axial CT images were obtained from the lung apex to the mid abdomen without and with  IV contrast. Coronal and sagittal reformatted images generated from the axial data set and provided for interpretation. This study was performed with techniques to keep radiation doses as low as reasonably achievable, (ALARA). Individualized dose reduction techniques using automated exposure control or adjustment of mA and/or kV according to the patient size were employed.  COMPARISON: CT chest 11/21/2022.  FINDINGS:  CHEST: Lungs/Pleura: Postsurgical changes from right middle lobe and lower lobe resection. There is interval development of multiple bilateral pulmonary nodules with large right hilar spiculated pulmonary nodule measuring up to 30 mm (series 2, image 35). Right lung apical clustered pulmonary nodules with the largest nodule measuring up to 6 mm (series 2, image 17). Left lower lobe clustered nodules, for example superior segment fissural based pulmonary nodule measuring 4 mm.  No pneumothorax or pleural effusion.  Heart, vessels and mediastinum: The heart is normal in size. No pericardial effusion. The aorta and pulmonary arteries are normal in caliber. Coronary artery disease is present. Left thyroid lobe nodule measuring 1.6 cm.  Lymph nodes: No pathologically enlarged intrathoracic lymph nodes. Mildly prominent bilateral axillary lymph nodes with a benign-appearing morphology, unchanged from prior exam.  Chest wall: No acute findings.  Bones: No aggressive osseous sclerotic or lucent lesions identified.  Upper abdomen: There are 2 cystic lesions in the pancreatic head (series 2, image 68). Prior cholecystectomy.      Impression: Interval development of multiple bilateral pulmonary nodules with clustered nodules in the right upper lobe and left lower lobe superior segment, and a spiculated nodule in the right hilar region, measuring up to 13 mm. Findings are concerning for metastasis. Findings are concerning for disease progression. Consider PET CT for further evaluation.  There are 2 cystic lesions  in the pancreatic head, pancreatic metastasis cannot be excluded. Other differential considerations include intraductal papillary mucinous neoplasm.  Mildly benign-appearing prominent axillary lymph nodes. No definite evidence of oracio metastasis.   Images personally reviewed, interpreted and dictated by VERN Coyle..          This report was signed and finalized on 4/14/2023 3:36 PM by VERN Coyle.    XR Chest 1 View    Result Date: 5/10/2023  Narrative: XR CHEST 1 VW Date of Exam: 5/10/2023 12:46 PM EDT Indication: post bronch, r/o pneumo Comparison: CT chest 5/5/2023 Findings: Redemonstration of irregular nodular opacity adjacent to the right hilum. There is background emphysema. No focal consolidation. There is a questionable small right pleural effusion. There is some streaky and hazy opacities at the right lung base which are nonspecific and may reflect some associated atelectasis. No evidence of pneumothorax.     Impression: Impression: No pneumothorax. Redemonstration of nodular opacity adjacent to the right hilum. Questionable small right pleural effusion with streaky opacities at the right lung base which may reflect some atelectasis. Electronically Signed: Mikel Ackerman  5/10/2023 1:06 PM EDT  Workstation ID: [id number]    FL C Arm During Surgery    Result Date: 5/11/2023  Narrative: FL C ARM DURING SURGERY Date of Exam: 5/10/2023 11:18 AM EDT Indication: NAVIGATIONAL BRONCHOSCOPY WITH ION ROBOT. Comparison: None available. Technique: Single intraprocedural fluoroscopic image submitted for review. Fluoroscopic Time: 7 minutes 33 seconds Findings: Intraoperative fluoroscopy demonstrates bronchoscopy.     Impression: Impression: Intraoperative fluoroscopy demonstrates bronchoscopy. Please see procedure report for full findings. Electronically Signed: Tommy Lion  5/11/2023 3:50 AM EDT  Workstation ID: [id number]    NM PET/CT Whole Body    Result Date: 5/10/2023  Narrative: NM PET/CT  WHOLE BODY Date of Exam: 5/9/2023 8:00 AM EDT Indication: lung ca mets. Comparison: CT chest from May 5, 2023 and PET/CT from February 6, 2020 Technique:  Whole body PET/CT imaging was performed with positron emission tomography (PET with concurrently acquired computed tomography (CT) for attenuation correction and anatomical localization); field of view imaged was the vertex to toes.  Fasting Blood glucose level: 104 mg/dl.  FDG dosage: 12.9 mCi F-18 Intravenous administration. Images were obtained after 60 minutes of equilibrium.  SUV values were normalized using body weight. Reference uptake values: Mediastinum: 2.0 SUVmax Liver: 2.3 SUVmax Findings: Head/neck: No abnormally enlarged or FDG avid lymph nodes are identified. No concerning soft tissue mass is identified. Chest: No abnormally enlarged or FDG avid lymph nodes are identified. A 1.9 cm right perihilar nodule is FDG avid with SUV max 6.1. The other smaller pulmonary nodules are not FDG avid and more indeterminate. There is emphysema. Abdomen/pelvis: No abnormally enlarged or FDG avid lymph nodes are identified. No concerning soft tissue mass is identified. A cystic lesion along the pancreatic head is not FDG avid. There is sigmoid diverticulosis. Bones and extremities: No aggressive appearing or FDG avid osseous lesion is identified. No concerning soft tissue mass is identified. There is mild mucosal disease in the left maxillary sinus.     Impression: Impression: 1.1.9 cm right perihilar nodule is FDG avid, and concerning for metastasis versus a new primary lung malignancy. 2.No evidence of metastasis outside of the chest. Electronically Signed: Frank Montoya  5/10/2023 10:24 AM EDT  Workstation ID: [id number]    US Axilla Left    Result Date: 4/22/2023  Narrative: US AXILLA LEFT Date of Exam: 4/19/2023 2:12 PM EDT Indication: Axilla lymphadenopathy.  Comparison: CT chest 4/13/2023 Technique:  Diagnostic axilla ultrasound with grayscale and color  imaging of the left axilla was performed. Findings: Within the left axilla there are multiple normal-appearing axillary lymph nodes. These nodes have peripheral hypoechoic rim of parenchyma with echogenic fatty hilum. Largest node measures 3.1 x 1.0 cm. There is a single more rounded hypoechoic 10 x 6 x 8  mm nodule within the left axilla which is nonspecific. This lacks the typical fatty hilum of a lymph node. Given the patient's history of lung cancer this could potentially represent metastatic disease. Consider PET/CT scan for further evaluation.     Impression: Impression: 1 cm hypoechoic nodule within the left axilla which lacks the typical morphology of a normal lymph node. This could represent metastatic disease given the patient's history of lung cancer. Consider PET/CT scan for further characterization. Electronically Signed: Brodie Olivera  4/22/2023 8:11 AM EDT  Workstation ID: [id number]    US Axilla Right    Result Date: 4/22/2023  Narrative: US AXILLA RIGHT Date of Exam: 4/19/2023 2:12 PM EDT Indication: axillary lymphadenopathy.  Comparison: None available. Technique:  Diagnostic axilla ultrasound with grayscale and color imaging of the right axilla was performed. Findings: There are multiple normal-appearing lymph nodes. One of the largest nodes measures 1.6 x 1.0 cm in size. These nodes have hypoechoic thin rim of parenchyma and echogenic central region consistent with fatty hilum. No pathologically enlarged axillary nodes identified.     Impression: Impression: 1. Multiple morphologically normal appearing right axillary lymph nodes. Electronically Signed: Brodie Olivera  4/22/2023 8:09 AM EDT  Workstation ID: [id number]      Assessment / Plan      Assessment/Plan:   1. Lung cancer, middle lobe s/p right VATS with lobectomy (Primary)  -Initially diagnosed with a stage IA infiltrating non-small cell undifferentiated carcinoma s/p right VATS with right middle lobectomy and mediastinal lymph node dissection  in 2016 ([id number]) with lymphovascular invasion present.    -Recurrence early 2020 requiring redo right VATS with right upper wedge resection conversion to thoracotomy with completion lobectomy and mediastinal lymph node dissection March 2020 by Dr. Porter.  Pathology consistent with stage IA2 non-small cell carcinoma most consistent with adenocarcinoma ([id number]).   -CT scan in April 2023 concerning for new right lung lesion measuring 1.3 cm  -PET/CT with avidity noted in the primary lesion but no other sites of disease  -Biopsy nondiagnostic in May 2023  -We will plan to present his case at tumor board on Tuesday and decide next steps based on our discussion           Follow Up:   Follow-up pending tumor board discussion     Camden Lombardi MD  Hematology and Oncology     Please note that portions of this note may have been completed with a voice recognition program. Efforts were made to edit the dictations, but occasionally words are mistranscribed.

## 2023-05-12 LAB
BACTERIA SPEC RESP CULT: NORMAL
GRAM STN SPEC: NORMAL

## 2023-05-13 LAB — VIRUS SPEC CULT: NORMAL

## 2023-05-15 NOTE — PROGRESS NOTES
I called the patient and informed him that his biopsy results show no signs of cancer, the lesion is 1.3 cm it is notable that the area where the nodule is located has an extremely distorted airway, but we did have to confirmation of ultrasound in the area at the time of the biopsies.  In my opinion there are 2 options either we can watch and wait conservatively with a follow-up CT scan in 3 months or proceed with CyberKnife.  Patient is seeing Dr. Lombardi as well and Dr. Lim.  They should keep their appointments with oncology and Dr. Lim.  I would be happy to reattempt the biopsy, but do not think there is going to be a better option especially given his long history and the location of the lesion as far as getting a different form of a biopsy.  I did inform the patient that cultures are still pending.    Electronically signed by Rico Damon DO, 05/15/23, 5:12 PM EDT.

## 2023-05-16 DIAGNOSIS — C34.91 NSCLC OF RIGHT LUNG: Primary | ICD-10-CM

## 2023-05-17 ENCOUNTER — OFFICE VISIT (OUTPATIENT)
Dept: INTERNAL MEDICINE | Facility: CLINIC | Age: 68
End: 2023-05-17
Payer: MEDICARE

## 2023-05-17 VITALS
HEART RATE: 76 BPM | OXYGEN SATURATION: 97 % | TEMPERATURE: 97.4 F | BODY MASS INDEX: 20.32 KG/M2 | SYSTOLIC BLOOD PRESSURE: 130 MMHG | HEIGHT: 72 IN | DIASTOLIC BLOOD PRESSURE: 66 MMHG | WEIGHT: 150 LBS

## 2023-05-17 DIAGNOSIS — M25.50 PAIN IN JOINTS: Primary | ICD-10-CM

## 2023-05-17 DIAGNOSIS — C34.2 LUNG CANCER, MIDDLE LOBE: ICD-10-CM

## 2023-05-17 DIAGNOSIS — Z51.81 ENCOUNTER FOR THERAPEUTIC DRUG LEVEL MONITORING: ICD-10-CM

## 2023-05-17 DIAGNOSIS — R91.1 RIGHT UPPER LOBE PULMONARY NODULE: ICD-10-CM

## 2023-05-17 LAB
FUNGUS WND CULT: NORMAL
MYCOBACTERIUM SPEC CULT: NORMAL
NIGHT BLUE STAIN TISS: NORMAL

## 2023-05-17 RX ORDER — PREDNISONE 20 MG/1
20 TABLET ORAL DAILY
Qty: 14 TABLET | Refills: 0 | Status: SHIPPED | OUTPATIENT
Start: 2023-05-17 | End: 2023-05-19

## 2023-05-17 RX ORDER — HYDROCODONE BITARTRATE AND ACETAMINOPHEN 5; 325 MG/1; MG/1
1 TABLET ORAL EVERY 8 HOURS PRN
Qty: 30 TABLET | Refills: 0 | Status: SHIPPED | OUTPATIENT
Start: 2023-05-17

## 2023-05-17 NOTE — PROGRESS NOTES
Subjective   Saman Aguayo is a 68 y.o. male.     Chief Complaint   Patient presents with   • Lung Cancer   • Shoulder Pain     Can't dress or groom self   • Joint Swelling     Wrist         History of Present Illness   Patient here for follow-up.  Patient states pain is still severe pain is has nature for migration.  Used to have a severe pain in the hips and both shoulders now the pain is mainly in the left shoulder left elbow and wrist area also swelling to.  Denies any fever chills.  Lung mass biopsy did not show malignancy.  PET scan no apparent metastasis to assess per patient.  But patient states he is going to receive radiation therapy.    Current Outpatient Medications:   •  albuterol sulfate  (90 Base) MCG/ACT inhaler, Inhale 2 puffs Every 4 (Four) Hours As Needed for Wheezing., Disp: , Rfl:   •  HYDROcodone-acetaminophen (NORCO)  MG per tablet, Take 1 tablet by mouth Every 6 (Six) Hours As Needed for Severe Pain., Disp: 60 tablet, Rfl: 0  •  tiotropium bromide-olodaterol (Stiolto Respimat) 2.5-2.5 MCG/ACT aerosol solution inhaler, Inhale 2 puffs Daily., Disp: , Rfl:   •  HYDROcodone-acetaminophen (NORCO) 5-325 MG per tablet, Take 1 tablet by mouth Every 8 (Eight) Hours As Needed (joints pain)., Disp: 30 tablet, Rfl: 0  •  predniSONE (DELTASONE) 20 MG tablet, Take 1 tablet by mouth Daily., Disp: 14 tablet, Rfl: 0    The following portions of the patient's history were reviewed and updated as appropriate: allergies, current medications, past family history, past medical history, past social history, past surgical history and problem list.    Review of Systems   Constitutional: Negative.    Respiratory: Negative.    Cardiovascular: Negative.    Gastrointestinal: Negative.    Musculoskeletal: Positive for arthralgias.   Skin: Negative.    Neurological: Negative.    Psychiatric/Behavioral: Negative.        Objective   Physical Exam  Cardiovascular:      Rate and Rhythm: Normal rate and regular  rhythm.      Heart sounds: Normal heart sounds.   Pulmonary:      Effort: Pulmonary effort is normal.      Breath sounds: Normal breath sounds.   Abdominal:      General: Bowel sounds are normal.   Musculoskeletal:         General: Tenderness present.      Cervical back: Neck supple.      Comments: Range of motion decreased   Skin:     General: Skin is warm.   Neurological:      Mental Status: He is alert and oriented to person, place, and time.         All tests have been reviewed.    Assessment & Plan   Diagnoses and all orders for this visit:    Pain in joints check lab to rule out rheumatoid arthritis or connective tissue disease.  We will trial prednisone since patient has severe pain encourage patient to lower the Lortab.  Will cut down to 5 mg 3 times a day as needed only.  UDS today contract today.  CRP is very high.  -     REINALDO  -     Rheumatoid Factor  -     C-reactive Protein  -     Cyclic Citrul Peptide Antibody, IgG / IgA  -     Ambulatory Referral to Rheumatology  -     HYDROcodone-acetaminophen (NORCO) 5-325 MG per tablet; Take 1 tablet by mouth Every 8 (Eight) Hours As Needed (joints pain).  -     Uric Acid    Lung cancer, middle lobe s/p right VATS with lobectomy  Wrist swelling pain check uric acid check of blood tests.  Right upper lobe pulmonary nodule biopsy no apparent.  Cancer tissue per patient PET scan still show metastasis versus new primary lung malignancy.    Patient states he is going to have radiation therapy per pulmonologist.    Other orders  -     predniSONE (DELTASONE) 20 MG tablet; Take 1 tablet by mouth Daily.    2 weeks follow-up given prednisone 10 mg daily for 2 weeks for now

## 2023-05-18 ENCOUNTER — HOSPITAL ENCOUNTER (OUTPATIENT)
Dept: RADIATION ONCOLOGY | Facility: HOSPITAL | Age: 68
Setting detail: RADIATION/ONCOLOGY SERIES
Discharge: HOME | End: 2023-05-18
Payer: MEDICARE

## 2023-05-18 ENCOUNTER — HOSPITAL ENCOUNTER (OUTPATIENT)
Dept: RADIATION ONCOLOGY | Facility: HOSPITAL | Age: 68
Discharge: HOME | End: 2023-05-18

## 2023-05-18 ENCOUNTER — OFFICE VISIT (OUTPATIENT)
Dept: RADIATION ONCOLOGY | Facility: HOSPITAL | Age: 68
End: 2023-05-18
Payer: MEDICARE

## 2023-05-18 DIAGNOSIS — C34.31 MALIGNANT NEOPLASM OF LOWER LOBE OF RIGHT LUNG: Primary | ICD-10-CM

## 2023-05-18 PROCEDURE — 77332 RADIATION TREATMENT AID(S): CPT | Performed by: RADIOLOGY

## 2023-05-18 PROCEDURE — G0463 HOSPITAL OUTPT CLINIC VISIT: HCPCS

## 2023-05-18 PROCEDURE — 77290 THER RAD SIMULAJ FIELD CPLX: CPT | Performed by: RADIOLOGY

## 2023-05-18 NOTE — PROGRESS NOTES
CONSULTATION NOTE      :                                                          1955  DATE OF CONSULTATION:                       2023   REQUESTING PHYSICIAN:                   Camden Lombardi MD  REASON FOR CONSULTATION:           Malignant neoplasm of lower lobe of right lung  - Stage IA2 (cT1b, cN0, cM0)       BRIEF HISTORY:  The patient is a very pleasant 68 y.o. male  with history of 2 prior resected non-small cell lung cancers.  He has a prior history of cigarette smoking.  He is status post right middle lobectomy 2016 right 2.1 cm non-small cell carcinoma.  He is status post right upper lobectomy 3/13/2020 for a 1.4 cm non-small cell carcinoma.  He has received no adjuvant treatment.  Approximately 5 weeks ago he suffered cuts on his left hand which resulted in subsequent infection, cellulitis, swelling and then more diffuse joint pains for which etiology has not yet been established.  CT chest 2023 identified a new 1.5 cm noncalcified right hilar tumor within the residual right lower lobe.  In retrospect, this was present on the previous CT chest from 2022 when it was less well-defined and measured closer to 1.3 to 1.4 cm diameter.  On recent navigational CT imaging of chest and pet imaging the right hilar tumor measures 1.65 to 1.9 cm diameter and is a solitary focus of hypermetabolism, SUV 6.1.  The symptomatic shoulder joints, wrists and knees also show benign-appearing inflammatory metabolic activity.  Bronchoscopy with attempted biopsy was nondiagnostic.  So far, all cultures for bacteria, fungus, virus or TB are negative.  Joint pain has persisted.  Rheumatoid and other inflammatory markers drawn yesterday are pending.  He was started on prednisone yesterday with no significant improvement yet.    Allergy: No Known Allergies    Social History:   Social History     Socioeconomic History   • Marital status:    • Number of children: 1   Tobacco Use   • Smoking  "status: Former     Packs/day: 3.00     Years: 50.00     Pack years: 150.00     Types: Cigarettes     Quit date: 2016     Years since quittin.5   • Smokeless tobacco: Never   Vaping Use   • Vaping Use: Never used   Substance and Sexual Activity   • Alcohol use: Not Currently     Comment: 2-3 CANS DAILY   • Drug use: Never   • Sexual activity: Defer       Past Medical History:   Past Medical History:   Diagnosis Date   • Abnormal ECG    • Abscess of skin    • Arthritis     HANDS AND BACK    • Bradycardia    • Bunion     LEFT FOOT   • COPD (chronic obstructive pulmonary disease)    • Full dentures    • History of echocardiogram 2016   • Hx of exercise stress test 2016    DR. BAKER \"IT WAS NORMAL\"   • Low back pain    • Lung cancer, middle lobe 2016    RIGHT MIDDLE LOBE   • Lung nodule     RUL-7MM.  PRESENTLY HAS, AND FOLLOWS WITH DR. HERNANDEZ.  STATES FOUND \"A COUPLE MONTHS AGO\"   • Premature atrial contraction    • Pulmonary hypertension    • Skin cancer     BASAL CELL-NOSE, LIP   • Wears dentures 2023   • Wears eyeglasses        Family History: family history includes Colon cancer in his father and mother; Heart attack in his father; Heart disease in his father; Liver cancer in his mother.     Surgical History:   Past Surgical History:   Procedure Laterality Date   • APPENDECTOMY     • BASAL CELL CARCINOMA EXCISION      NOSE, LIP   • BRONCHOSCOPY     • BRONCHOSCOPY N/A 2020    Procedure: BRONCHOSCOPY;  Surgeon: Chris Porter MD;  Location: [facility] OR;  Service: Cardiothoracic;  Laterality: N/A;   • BRONCHOSCOPY WITH ION ROBOTIC ASSIST N/A 5/10/2023    Procedure: NAVIGATIONAL BRONCHOSCOPY WITH ION ROBOT;  Surgeon: Rico Damon DO;  Location: [facility] ENDOSCOPY;  Service: Robotics - Pulmonary;  Laterality: N/A;  Ion Cath 4 0038   0035   • BUNIONECTOMY Left     GREAT TOE   • CATARACT EXTRACTION W/ INTRAOCULAR LENS IMPLANT Right 2022    Procedure: " CATARACT PHACO EXTRACTION WITH INTRAOCULAR LENS IMPLANT RIGHT COMPLICATED WITH MALYUGIN RING;  Surgeon: Christiane Haynes DO;  Location: [facility] OR;  Service: Ophthalmology;  Laterality: Right;   • CATARACT EXTRACTION W/ INTRAOCULAR LENS IMPLANT Left 1/24/2023    Procedure: CATARACT PHACO EXTRACTION WITH INTRAOCULAR LENS IMPLANT LEFT COMPLICATED WITH MALYUGIN RING;  Surgeon: Christiane Haynes DO;  Location:  [facility] OR;  Service: Ophthalmology;  Laterality: Left;   • COLONOSCOPY  2018   • DIAGNOSTIC LAPAROSCOPY N/A 06/29/2020    Procedure: DIAGNOSTIC LAPAROSCOPY, APPENDECTOMY, CHOLECYSTECOMY WITH CHOLANGIOGRAPHY;  Surgeon: Shady Singh MD;  Location: [facility] OR;  Service: General;  Laterality: N/A;   • ENDOSCOPY N/A 06/03/2020    Procedure: ESOPHAGOGASTRODUODENOSCOPY WITH BIOPSY;  Surgeon: Shady Singh MD;  Location: [facility] ENDOSCOPY;  Service: Gastroenterology;  Laterality: N/A;   • GALLBLADDER SURGERY  12/07/2022   • INGUINAL HERNIA REPAIR Left 2002    Dr. Singh/inguinal    • KNEE ARTHROSCOPY Right 12/12/2018    Procedure: Diagnostic arthroscopy right knee with partial medial meniscectomy;  Surgeon: Dinh Nova MD;  Location:  [facility] OR;  Service: Orthopedics   • LUNG REMOVAL, PARTIAL  2016    RIGHT MIDDLE LOBE   • MOUTH SURGERY      FULL EXTRACTION OF TEETH   • SKIN BIOPSY     • THORACOSCOPY N/A 11/18/2016    Procedure: BRONCH THORACOSCOPY VIDEO ASSISTED  WITH LOBECTOMY, MEDIALSTINAL LYMPH NODE DISECTION;  Surgeon: Chris Porter MD;  Location:  [facility] OR;  Service:    • THORACOSCOPY VIDEO ASSISTED WITH LOBECTOMY Right 03/13/2020    Procedure: Redo right THORACOSCOPY VIDEO ASSISTED with right upper lobe wedge resection and with conversion to open thorocotomy for right upper lobectomy with intercostal cryoablation;  Surgeon: Chris Porter MD;  Location:  [facility] OR;  Service: Cardiothoracic;  Laterality: Right;        Review of Systems:   Review of Systems   Constitutional: Positive for appetite change,  fatigue and unexpected weight change.   Respiratory: Positive for cough, shortness of breath and wheezing.    Gastrointestinal: Positive for constipation.   Musculoskeletal: Positive for arthralgias and myalgias.   Neurological: Positive for dizziness.   Psychiatric/Behavioral: Positive for sleep disturbance.           Objective   VITAL SIGNS: There were no vitals filed for this visit.             Physical Exam:   Physical Exam  Vitals and nursing note reviewed.   Constitutional:       Appearance: He is well-developed.   HENT:      Head: Normocephalic and atraumatic.   Cardiovascular:      Rate and Rhythm: Normal rate and regular rhythm.      Heart sounds: Normal heart sounds. No murmur heard.  Pulmonary:      Effort: Pulmonary effort is normal.      Breath sounds: Normal breath sounds. No wheezing or rales.   Abdominal:      General: Bowel sounds are normal. There is no distension.      Palpations: Abdomen is soft.      Tenderness: There is no abdominal tenderness.   Musculoskeletal:         General: Swelling ( Left wrist) and tenderness ( Bilateral shoulders, wrists and knees, most severe around left shoulder) present. Normal range of motion.      Cervical back: Normal range of motion and neck supple.   Lymphadenopathy:      Cervical: No cervical adenopathy.      Upper Body:      Right upper body: No supraclavicular adenopathy.      Left upper body: No supraclavicular adenopathy.   Skin:     General: Skin is warm and dry.   Neurological:      Mental Status: He is alert and oriented to person, place, and time.      Sensory: No sensory deficit.   Psychiatric:         Behavior: Behavior normal.         Thought Content: Thought content normal.         Judgment: Judgment normal.              The following portions of the patient's history were reviewed and updated as appropriate: allergies, current medications, past family history, past medical history, past social history, past surgical history and problem  list.    Assessment:   Assessment      Radiographic diagnosis of a third metachronous early-stage primary neoplasm of the right lung.   He is status post right middle lobectomy 2016 and right upper lobectomy 2020 for early stage non-small cell lung cancers.  Although navigational biopsy was nondiagnostic, patient has a focal solitary hypermetabolic right hilar tumor located in the residual right lower lobe which has been slowly growing since November 2022.  Clinical stage IA2 (T1b, N0, M0).  Additionally, he has had recent generalized arthropathy and adenopathy which began after a cut on the left upper extremity and cellulitis but actual etiology has not been established.  Although this does not appear to be due to metastatic disease, work-up is still pending and he could possibly have a paraneoplastic syndrome.  We discussed options of continued surveillance versus empiric treatment of the right hilar lung tumor.  Patient and wife are inclined to proceed with treatment.  This would be best accomplished with stereotactic body radiotherapy, especially since any additional surgical intervention would require completion pneumonectomy.  We reviewed the CyberKnife treatment procedure in detail today.  All questions were answered.  Informed consent was obtained.    RECOMMENDATIONS: He will undergo CT simulation today.  The right lower lobe/hilar tumor will likely receive a dose of approximately 48 Akhtar delivered in 3 sessions on the CyberKnife, on an every other day treatment schedule.    I spent a total of 55 minutes on todays visit, with more than 45 minutes in direct face to face communication, and the remainder of the time spent in reviewing the relevant history, records, available imaging, and for documentation.    Follow Up:   Return in about 1 week (around 5/25/2023) for CyberKnife treatment.  Diagnoses and all orders for this visit:    1. Malignant neoplasm of lower lobe of right lung (Primary)         Nathan GUTIERREZ  MD [place]

## 2023-05-19 ENCOUNTER — OFFICE VISIT (OUTPATIENT)
Dept: PULMONOLOGY | Facility: CLINIC | Age: 68
End: 2023-05-19
Payer: MEDICARE

## 2023-05-19 VITALS
SYSTOLIC BLOOD PRESSURE: 114 MMHG | DIASTOLIC BLOOD PRESSURE: 58 MMHG | BODY MASS INDEX: 20.18 KG/M2 | RESPIRATION RATE: 16 BRPM | OXYGEN SATURATION: 95 % | HEART RATE: 47 BPM | WEIGHT: 149 LBS | HEIGHT: 72 IN

## 2023-05-19 DIAGNOSIS — J44.9 CHRONIC OBSTRUCTIVE PULMONARY DISEASE, UNSPECIFIED COPD TYPE: ICD-10-CM

## 2023-05-19 DIAGNOSIS — M89.40: ICD-10-CM

## 2023-05-19 DIAGNOSIS — C34.91 NON-SMALL CELL CANCER OF RIGHT LUNG: Primary | ICD-10-CM

## 2023-05-19 DIAGNOSIS — R93.89 ABNORMAL CT OF THE CHEST: ICD-10-CM

## 2023-05-19 LAB
ANA SER QL: NEGATIVE
CCP IGA+IGG SERPL IA-ACNC: 6 UNITS (ref 0–19)
CRP SERPL-MCNC: 6.3 MG/DL (ref 0–0.5)
RHEUMATOID FACT SERPL-ACNC: 14.6 IU/ML
URATE SERPL-MCNC: 5.3 MG/DL (ref 3.4–7)
VIRUS SPEC CULT: NORMAL

## 2023-05-19 PROCEDURE — 99214 OFFICE O/P EST MOD 30 MIN: CPT | Performed by: INTERNAL MEDICINE

## 2023-05-19 RX ORDER — PREDNISONE 20 MG/1
TABLET ORAL
Qty: 100 TABLET | Refills: 0 | Status: SHIPPED | OUTPATIENT
Start: 2023-05-19

## 2023-05-19 NOTE — PROGRESS NOTES
"  Chief Complaint   Patient presents with   • Breathing Problem   • Follow-up       Subjective   Saman Aguayo is a 68 y.o. male.     History of Present Illness   Patient comes in today to follow-up on the results of bronchoscopy.    The patient continues to have joint pains and aches but feels that his stiffness and pain has improved somewhat since he was started on steroid by his primary care provider.    He continues to use Stiolto on a regular basis.    He is actually aware of the results of the bronchoscopy.    The following portions of the patient's history were reviewed and updated as appropriate: allergies, current medications, past family history, past medical history, past social history and past surgical history.    Review of Systems   HENT: Negative for sinus pressure, sneezing and sore throat.    Respiratory: Negative for cough, chest tightness, shortness of breath and wheezing.        Objective   Visit Vitals  /58   Pulse (!) 47   Resp 16   Ht 182.9 cm (72\") Comment: pt reported   Wt 67.6 kg (149 lb)   SpO2 95%   BMI 20.21 kg/m²       Physical Exam  Vitals reviewed.   Constitutional:       Appearance: He is well-developed.   HENT:      Head: Normocephalic and atraumatic.   Eyes:      Extraocular Movements: Extraocular movements intact.   Cardiovascular:      Rate and Rhythm: Normal rate.   Pulmonary:      Comments: Somewhat hyperresonant to percussion.  Somewhat decreased air entry.  No obvious wheezing noted.   Musculoskeletal:      Cervical back: Neck supple.      Comments: Tenderness also noted on palpation of hip joint and proximal femur.  Mild tenderness noted on palpation of wrist.   Neurological:      Mental Status: He is alert.           Assessment & Plan   Diagnoses and all orders for this visit:    1. Non-small cell cancer of right lung (Primary)    2. Chronic obstructive pulmonary disease, unspecified COPD type    3. Abnormal CT of the chest    4. Pulmonary hypertrophic " osteoarthropathy    Other orders  -     predniSONE (DELTASONE) 20 MG tablet; Take 2 tablets daily for 2 weeks and then 1 tablet daily for 4 weeks.  Dispense: 100 tablet; Refill: 0           Return for Keep appt already in system.    DISCUSSION (if any):  I reviewed the results with the patient in great detail.    I told him that the results were not consistent with malignancy.    Although the results are not consistent with malignancy, sampling variation will need to be considered.      Given overall complex picture with 2 previous surgeries already performed for lung cancer, and after discussion with Dr. Porter and his oncologist, Dr. oLmbardi, I agree that the best option for this patient would be to proceed with CyberKnife treatment.    He does appear to have symptoms consistent with pulmonary hypertrophic osteoarthropathy and since he seems to be responding to prednisone for now, I will continue with but will increase the dose and slowly taper it over the next 4 to 6 weeks or so.    If the patient develops refractory symptoms, then 1 option would be to consider bisphosphonates as they have been shown to improve symptoms in small case series.    I told the patient to continue Stiolto for underlying COPD.    I also told the patient that the finding of pulmonary hypertrophic osteoarthropathy is not indication for metastatic disease but will definitely need to be followed.      Dictated utilizing Dragon dictation.    This document was electronically signed by Nannette Lim MD on 05/19/23 at 10:40 EDT

## 2023-05-19 NOTE — PROGRESS NOTES
[facility] Cardiology Office Follow Up Note    Saman Aguayo  [id number]  2023    Primary Care Provider: Dre Do MD   Referring Provider: No ref. provider found    Chief Complaint: Routine follow-up    History of Present Illness:   Mr. Saman Aguayo is a 68 y.o. male who presents to the Cardiology Clinic for routine follow-up.  The patient reports feeling well from a cardiac standpoint.  He denies chest pain, palpitations, dizziness, syncope, and lower extremity edema.  He reports shortness of breath consistent with his baseline.  He is currently undergoing cyberknife radiation for lung cancer with his next treatment scheduled for Monday.  He offers no other complaints or concerns at this time.    Past Cardiac Testin. Last Coronary Angio: None    2. Prior Stress Testin2019   • Left ventricular ejection fraction is normal (Calculated EF = 52%).  • Myocardial perfusion imaging indicates a normal myocardial perfusion study with no evidence of ischemia.  • Impressions are consistent with a low risk study.  • Findings consistent with a normal ECG stress test.  • Findings consistent with a normal ECG stress test.     Normal Lexiscan    3. Last Echo: 2022  • Estimated left ventricular EF = 56% Left ventricular ejection fraction appears to be 56 - 60%. Left ventricular systolic function is normal.  • Left ventricular diastolic function was normal.  • Estimated right ventricular systolic pressure from tricuspid regurgitation is moderately elevated (45-55 mmHg).  • Moderate to severe tricuspid valve regurgitation is present.  • Moderate pulmonary hypertension is present.    4. Prior Holter Monitor: 2022   1.  The predominant rhythm is sinus rhythm with an average heart rate 65 bpm.  2.  No sustained arrhythmias.  3.  Occasional sinus pauses with the longest being 3.1 seconds in duration.  4.  Occasional supraventricular ectopy including limited runs of PSVT with the  "longest lasting 17 seconds in duration.  5.  Occasional ventricular ectopy.  6.  One symptomatic event including \"lightheadedness, heart racing\" corresponding to sinus bradycardia 56 bpm.      Review of Systems:   Review of Systems  As Noted in HPI.   I have reviewed and confirmed the accuracy of the ROS as documented by the MA/LPN/RN SAMIRA Montes    I have reviewed and/or updated the patient's past medical, past surgical, family, social history, problem list and allergies as appropriate.     Medications:     Current Outpatient Medications:   •  albuterol sulfate  (90 Base) MCG/ACT inhaler, Inhale 2 puffs Every 4 (Four) Hours As Needed for Wheezing., Disp: , Rfl:   •  predniSONE (DELTASONE) 20 MG tablet, Take 2 tablets daily for 2 weeks and then 1 tablet daily for 4 weeks., Disp: 100 tablet, Rfl: 0  •  tiotropium bromide-olodaterol (Stiolto Respimat) 2.5-2.5 MCG/ACT aerosol solution inhaler, Inhale 2 puffs Daily. PRN, Disp: , Rfl:   •  HYDROcodone-acetaminophen (NORCO)  MG per tablet, Take 1 tablet by mouth Every 6 (Six) Hours As Needed for Severe Pain. (Patient not taking: Reported on 6/2/2023), Disp: 60 tablet, Rfl: 0    Physical Exam:  Vital Signs:   Vitals:    06/02/23 0802   BP: 110/62   Pulse: 73   SpO2: 96%   Weight: 70.3 kg (155 lb)   Height: 182.9 cm (72\")     Body mass index is 21.02 kg/m².    Physical Exam  Vitals and nursing note reviewed.   Constitutional:       General: He is not in acute distress.  HENT:      Head: Normocephalic and atraumatic.   Neck:      Trachea: Trachea normal.   Cardiovascular:      Rate and Rhythm: Normal rate and regular rhythm.      Pulses: Normal pulses.      Heart sounds: Normal heart sounds. No murmur heard.    No friction rub. No gallop.   Pulmonary:      Effort: Pulmonary effort is normal.      Breath sounds: Normal breath sounds. Decreased air movement present.   Musculoskeletal:      Cervical back: Neck supple.      Right lower leg: No edema. "      Left lower leg: No edema.   Skin:     General: Skin is warm and dry.   Neurological:      Mental Status: He is alert and oriented to person, place, and time.   Psychiatric:         Mood and Affect: Mood normal.         Behavior: Behavior normal. Behavior is cooperative.         Thought Content: Thought content does not include suicidal ideation.         Results Review:   I reviewed the patient's new clinical results.    Procedures    Assessment / Plan:   Diagnoses and all orders for this visit:    1. Cor pulmonale (chronic) (Primary)  Stable symptoms    2. Pulmonary hypertension  5/22, moderate per echocardiogram  Group 3    3. Lung cancer, middle lobe s/p right VATS with lobectomy  Ongoing radiation  Complete smoking cession in 2016    Preventative Cardiology:   Tobacco Cessation: N/A   Advance Care Planning: ACP discussion was declined by the patient. Patient does not have an advance directive, declines further assistance.     Follow Up:   Return in about 1 year (around 6/2/2024) for Follow-up with Dr. Almonte.      Thank you for allowing me to participate in the care of your patient. Please do not hesitate to contact me with additional questions or concerns.     SAMIRA Varela

## 2023-05-20 LAB
1OH-MIDAZOLAM UR QL SCN: NORMAL
6MAM UR QL SCN: NEGATIVE NG/ML
7AMINOCLONAZEPAM UR-MCNC: NORMAL NG/ML
A-OH ALPRAZ/CREAT UR: NORMAL
A-OH-TRIAZOLAM/CREAT UR CFM: NORMAL NG/MG{CREAT}
ACP UR QL CFM: NORMAL
ALPRAZ/CREAT UR CFM: NORMAL NG/MG{CREAT}
AMPHETAMINES UR QL SCN: NEGATIVE NG/ML
APAP UR QL SCN: NORMAL
BARBITURATES UR QL SCN: NEGATIVE NG/ML
BENZODIAZ SCN METH UR: NORMAL
BUPRENORPHINE UR QL SCN: NORMAL
BUPRENORPHINE/CREAT UR: NORMAL
CANNABINOIDS UR QL SCN: NEGATIVE NG/ML
CARISOPRODOL UR QL: NEGATIVE NG/ML
CLONAZEPAM CTO UR SCN-MCNC: NORMAL NG/ML
COCAINE+BZE UR QL SCN: NEGATIVE NG/ML
CREAT UR-MCNC: NORMAL MG/DL
D-METHORPHAN UR-MCNC: NORMAL NG/ML
D-METHORPHAN+LEVORPHANOL UR QL: NORMAL
DESALKYLFLURAZ/CREAT UR: NORMAL NG/MG{CREAT}
DIAZEPAM/CREAT UR: NORMAL NG/MG{CREAT}
ETHANOL UR QL SCN: NEGATIVE G/DL
ETHANOL UR QL SCN: NEGATIVE NG/ML
FENTANYL CTO UR SCN-MCNC: NORMAL NG/ML
FENTANYL/CREAT UR: NORMAL
FLUNITRAZEPAM UR QL SCN: NORMAL
GABAPENTIN UR-MCNC: NORMAL UG/ML
HYPNOTICS UR QL SCN: NORMAL
KETAMINE UR QL: NORMAL
LORAZEPAM/CREAT UR: NORMAL
MEPERIDINE UR QL SCN: NEGATIVE NG/ML
METHADONE UR QL SCN: NEGATIVE NG/ML
METHADONE+METAB UR QL SCN: NEGATIVE NG/ML
MIDAZOLAM/CREAT UR CFM: NORMAL NG/MG{CREAT}
MISCELLANEOUS, UR: NORMAL
NORBUPRENORPHINE/CREAT UR: NORMAL
NORDIAZEPAM/CREAT UR: NORMAL
NORFENTANYL/CREAT UR: NORMAL
NORFLUNITRAZEPAM UR-MCNC: NORMAL NG/ML
NORKETAMINE UR-MCNC: NORMAL UG/ML
OPIATES UR SCN-MCNC: NORMAL NG/ML
OTHER HALLUCINOGENS UR: NEGATIVE
OXAZEPAM/CREAT UR: NORMAL
OXYCODONE CTO UR SCN-MCNC: NEGATIVE NG/ML
PCP UR QL SCN: NEGATIVE NG/ML
PRESCRIBED MEDICATIONS: NORMAL
PRESCRIBED MEDICATIONS: NORMAL
PROPOXYPH UR QL SCN: NEGATIVE NG/ML
TAPENTADOL CTO UR SCN-MCNC: NEGATIVE NG/ML
TEMAZEPAM/CREAT UR: NORMAL
TRAMADOL UR QL SCN: NEGATIVE NG/ML
ZALEPLON UR-MCNC: NORMAL NG/ML
ZOLPIDEM PHENYL-4-CARB UR QL SCN: NORMAL
ZOLPIDEM UR QL SCN: NORMAL
ZOPICLONE-N-OXIDE UR-MCNC: NORMAL NG/ML

## 2023-05-22 ENCOUNTER — TELEPHONE (OUTPATIENT)
Dept: INTERNAL MEDICINE | Facility: CLINIC | Age: 68
End: 2023-05-22
Payer: MEDICARE

## 2023-05-23 LAB
1OH-MIDAZOLAM UR QL SCN: NOT DETECTED NG/MG CREAT
6MAM UR QL SCN: NEGATIVE NG/ML
7AMINOCLONAZEPAM UR-MCNC: NOT DETECTED NG/MG CREAT
A-OH ALPRAZ/CREAT UR: NOT DETECTED NG/MG CREAT
A-OH-TRIAZOLAM/CREAT UR CFM: NOT DETECTED NG/MG CREAT
ACP UR QL CFM: NOT DETECTED
ALPRAZ/CREAT UR CFM: NOT DETECTED NG/MG CREAT
AMPHETAMINES UR QL SCN: NEGATIVE NG/ML
APAP UR QL SCN: NORMAL UG/ML
APAP UR QL: NORMAL
APAP UR-MCNC: PRESENT UG/ML
BARBITURATES UR QL SCN: NEGATIVE NG/ML
BENZODIAZ SCN METH UR: NEGATIVE
BUPRENORPHINE UR QL SCN: NEGATIVE
BUPRENORPHINE/CREAT UR: NOT DETECTED NG/MG CREAT
CANNABINOIDS UR QL SCN: NEGATIVE NG/ML
CARISOPRODOL UR QL: NEGATIVE NG/ML
CLONAZEPAM CTO UR SCN-MCNC: NOT DETECTED NG/MG CREAT
COCAINE+BZE UR QL SCN: NEGATIVE NG/ML
CODEINE UR CFM-MCNC: NOT DETECTED NG/MG CREAT
CREAT UR-MCNC: 40 MG/DL
D-METHORPHAN UR-MCNC: NOT DETECTED NG/ML
D-METHORPHAN+LEVORPHANOL UR QL: NOT DETECTED
DESALKYLFLURAZ/CREAT UR: NOT DETECTED NG/MG CREAT
DHC/CREAT UR: 503 NG/MG CREAT
DIAZEPAM/CREAT UR: NOT DETECTED NG/MG CREAT
ETHANOL UR QL SCN: NEGATIVE G/DL
ETHANOL UR QL SCN: NEGATIVE NG/ML
FENTANYL CTO UR SCN-MCNC: NEGATIVE NG/ML
FENTANYL/CREAT UR: NOT DETECTED NG/MG CREAT
FLUNITRAZEPAM UR QL SCN: NOT DETECTED NG/MG CREAT
GABAPENTIN UR-MCNC: NEGATIVE UG/ML
HYDROCODONE UR CFM-MCNC: 2045 NG/MG CREAT
HYDROMORPHONE UR CFM-MCNC: 1750 NG/MG CREAT
HYPNOTICS UR QL SCN: NEGATIVE
KETAMINE UR QL: NOT DETECTED
LORAZEPAM/CREAT UR: NOT DETECTED NG/MG CREAT
MEPERIDINE UR QL SCN: NEGATIVE NG/ML
METHADONE UR QL SCN: NEGATIVE NG/ML
METHADONE+METAB UR QL SCN: NEGATIVE NG/ML
MIDAZOLAM/CREAT UR CFM: NOT DETECTED NG/MG CREAT
MISCELLANEOUS, UR: NEGATIVE
MORPHINE UR CFM-MCNC: NOT DETECTED NG/MG CREAT
NORBUPRENORPHINE/CREAT UR: NOT DETECTED NG/MG CREAT
NORCODEINE/CREAT UR CFM: NOT DETECTED NG/MG CREAT
NORDIAZEPAM/CREAT UR: NOT DETECTED NG/MG CREAT
NORFENTANYL/CREAT UR: NOT DETECTED NG/MG CREAT
NORFLUNITRAZEPAM UR-MCNC: NOT DETECTED NG/MG CREAT
NORHYDROCODONE UR CFM-MCNC: 3023 NG/MG CREAT
NORKETAMINE UR-MCNC: NOT DETECTED UG/ML
NORMORPHINE: NOT DETECTED NG/MG CREAT
OPIATES UR QL CFM: NORMAL
OPIATES UR SCN-MCNC: NORMAL NG/ML
OTHER HALLUCINOGENS UR: NEGATIVE
OXAZEPAM/CREAT UR: NOT DETECTED NG/MG CREAT
OXYCODONE CTO UR SCN-MCNC: NEGATIVE NG/ML
PCP UR QL SCN: NEGATIVE NG/ML
PRESCRIBED MEDICATIONS: NORMAL
PRESCRIBED MEDICATIONS: NORMAL
PROPOXYPH UR QL SCN: NEGATIVE NG/ML
TAPENTADOL CTO UR SCN-MCNC: NEGATIVE NG/ML
TEMAZEPAM/CREAT UR: NOT DETECTED NG/MG CREAT
TRAMADOL UR QL SCN: NEGATIVE NG/ML
ZALEPLON UR-MCNC: NOT DETECTED NG/ML
ZOLPIDEM PHENYL-4-CARB UR QL SCN: NOT DETECTED
ZOLPIDEM UR QL SCN: NOT DETECTED
ZOPICLONE-N-OXIDE UR-MCNC: NOT DETECTED NG/ML

## 2023-05-23 PROCEDURE — 77300 RADIATION THERAPY DOSE PLAN: CPT | Performed by: RADIOLOGY

## 2023-05-23 PROCEDURE — 77334 RADIATION TREATMENT AID(S): CPT | Performed by: RADIOLOGY

## 2023-05-23 PROCEDURE — 77295 3-D RADIOTHERAPY PLAN: CPT | Performed by: RADIOLOGY

## 2023-05-24 LAB
FUNGUS WND CULT: NORMAL
MYCOBACTERIUM SPEC CULT: NORMAL
NIGHT BLUE STAIN TISS: NORMAL

## 2023-05-31 ENCOUNTER — HOSPITAL ENCOUNTER (OUTPATIENT)
Dept: RADIATION ONCOLOGY | Facility: HOSPITAL | Age: 68
Discharge: HOME | End: 2023-05-31

## 2023-05-31 LAB
FUNGUS WND CULT: NORMAL
MYCOBACTERIUM SPEC CULT: NORMAL
NIGHT BLUE STAIN TISS: NORMAL

## 2023-05-31 PROCEDURE — 77373 STRTCTC BDY RAD THER TX DLVR: CPT | Performed by: RADIOLOGY

## 2023-05-31 PROCEDURE — 77280 THER RAD SIMULAJ FIELD SMPL: CPT | Performed by: RADIOLOGY

## 2023-06-02 ENCOUNTER — HOSPITAL ENCOUNTER (OUTPATIENT)
Dept: RADIATION ONCOLOGY | Facility: HOSPITAL | Age: 68
Setting detail: RADIATION/ONCOLOGY SERIES
Discharge: HOME | End: 2023-06-02

## 2023-06-02 ENCOUNTER — OFFICE VISIT (OUTPATIENT)
Dept: CARDIOLOGY | Facility: CLINIC | Age: 68
End: 2023-06-02

## 2023-06-02 VITALS
WEIGHT: 155 LBS | OXYGEN SATURATION: 96 % | DIASTOLIC BLOOD PRESSURE: 62 MMHG | SYSTOLIC BLOOD PRESSURE: 110 MMHG | HEIGHT: 72 IN | BODY MASS INDEX: 20.99 KG/M2 | HEART RATE: 73 BPM

## 2023-06-02 DIAGNOSIS — I27.81 COR PULMONALE (CHRONIC): Primary | ICD-10-CM

## 2023-06-02 PROCEDURE — 1160F RVW MEDS BY RX/DR IN RCRD: CPT | Performed by: NURSE PRACTITIONER

## 2023-06-02 PROCEDURE — 99213 OFFICE O/P EST LOW 20 MIN: CPT | Performed by: NURSE PRACTITIONER

## 2023-06-02 PROCEDURE — 1159F MED LIST DOCD IN RCRD: CPT | Performed by: NURSE PRACTITIONER

## 2023-06-06 ENCOUNTER — OFFICE VISIT (OUTPATIENT)
Dept: CARDIAC SURGERY | Facility: CLINIC | Age: 68
End: 2023-06-06
Payer: MEDICARE

## 2023-06-06 VITALS
DIASTOLIC BLOOD PRESSURE: 60 MMHG | HEIGHT: 72 IN | SYSTOLIC BLOOD PRESSURE: 128 MMHG | WEIGHT: 153.6 LBS | BODY MASS INDEX: 20.8 KG/M2 | HEART RATE: 57 BPM | TEMPERATURE: 98.3 F | OXYGEN SATURATION: 98 %

## 2023-06-06 DIAGNOSIS — R91.1 RIGHT UPPER LOBE PULMONARY NODULE: ICD-10-CM

## 2023-06-06 DIAGNOSIS — C34.2 LUNG CANCER, MIDDLE LOBE: Primary | ICD-10-CM

## 2023-06-06 DIAGNOSIS — C34.31 MALIGNANT NEOPLASM OF LOWER LOBE OF RIGHT LUNG: ICD-10-CM

## 2023-06-06 NOTE — PROGRESS NOTES
[facility] Cardiothoracic Surgery Office Follow Up Note     Date of Encounter: 2023     Name: Saman Aguayo  : 1955     Referred By: No ref. provider found  PCP: Dre Do MD    Chief Complaint:    Chief Complaint   Patient presents with    Lung Cancer     6 month follow-up with CT chest. Patient is currently undergoing Cyberknife therapy for recurrent right lung cancer. He has had two treatments so far.        Subjective      History of Present Illness:    Saman Aguayo is a 68 y.o. male former smoker, with a history of COPD, valvular heart disease, CHF, and stage Ia infiltrating non-small cell undifferentiated lung carcinoma s/p right VATS with right middle lobectomy + mediastinal lymph node dissection in  ([id number]) with lymphovascular invasion present.  Patient had recurrence early  requiring redo right VATS with right upper wedge resection conversion to thoracotomy with completion lobectomy and mediastinal lymph node dissection 2020 by Dr. Porter. Pathology consistent with stage I A2 non-small cell carcinoma most consistent with adenocarcinoma (T1b N0 MX).  Patient has been following with our office and pulmonology Dr. Lim for surveillance imaging.  Patient was last seen in CT Surgery clinic 2022 with stable CT chest.  Patient was seen by Pulmonology 23 w/ notation of  review of stable PFT's and stable CT chest imaging.      On 4/3/23, patient was evaluated by PCP, Dr. Do, for sudden onset pain in both groins and axillae/shoulders.  Lymphadenopathy was appreciated on exam.  CT chest 23 noted multiple new lung nodules, specifically 1.5 cm right hilar nodule concerning for disease recurrence or metastasis.  NMPET 2023 showed hypermetabolic activity in this right upper lobe lesion @ SUV 6.1, but no other PET avid lesions.  Bronchoscopy was performed by Dr. Damon on 5/10/2023.  Pathology not consistent with malignancy/ non-diagnostic.  Consultation with  Medical Oncology, Dr. Camden Lombardi, 5/11/2023.  MDT case review and recommendations included consideration for CyberKnife therapy to treat this new right lung lesion.  Patient met with Dr. Nathan Bermeo, Radiation Oncology, on 5/18/2023.  Following discussion and review of his history/treatment options, patient elected to proceed w/  CyberKnife therapy.  He has completed 2 treatment sessions thus far.      Patient states prednisone has been beneficial for his joint pain.  He c/o dyspnea only with moderate exertion, but daily fatigue (worse on Cyberknife treatment days).     Review of Systems:  Review of Systems   Constitutional: Positive for malaise/fatigue and weight loss (20lbs). Negative for chills, decreased appetite, diaphoresis, fever, night sweats and weight gain.   HENT:  Negative for hoarse voice.    Eyes:  Negative for blurred vision, double vision and visual disturbance.   Cardiovascular:  Positive for leg swelling (ankles). Negative for chest pain, claudication, dyspnea on exertion, irregular heartbeat, near-syncope, orthopnea, palpitations, paroxysmal nocturnal dyspnea and syncope.   Respiratory:  Positive for cough. Negative for hemoptysis, shortness of breath, sputum production and wheezing.    Hematologic/Lymphatic: Negative for adenopathy and bleeding problem. Does not bruise/bleed easily.   Skin:  Negative for color change, nail changes, poor wound healing and rash.   Musculoskeletal:  Positive for joint pain. Negative for back pain, falls and muscle cramps.   Gastrointestinal:  Negative for abdominal pain, dysphagia and heartburn.   Genitourinary:  Negative for flank pain.   Neurological:  Positive for dizziness (when standing up too quick). Negative for brief paralysis, disturbances in coordination, focal weakness, headaches, light-headedness, loss of balance, numbness, paresthesias, sensory change, vertigo and weakness.   Psychiatric/Behavioral:  Negative for depression and suicidal ideas.   "  Allergic/Immunologic: Positive for environmental allergies. Negative for persistent infections.     I have reviewed the following portions of the patient's history: allergies, current medications, past family history, past medical history, past social history, past surgical history, problem list, and ROS and confirm it's accurate.    Allergies:  No Known Allergies    Medications:      Current Outpatient Medications:     albuterol sulfate  (90 Base) MCG/ACT inhaler, Inhale 2 puffs Every 4 (Four) Hours As Needed for Wheezing., Disp: , Rfl:     predniSONE (DELTASONE) 20 MG tablet, Take 2 tablets daily for 2 weeks and then 1 tablet daily for 4 weeks., Disp: 100 tablet, Rfl: 0    tiotropium bromide-olodaterol (Stiolto Respimat) 2.5-2.5 MCG/ACT aerosol solution inhaler, Inhale 2 puffs Daily. PRN, Disp: , Rfl:     History:   Past Medical History:   Diagnosis Date    Abnormal ECG     Abscess of skin     Arthritis     HANDS AND BACK     Bradycardia     Bunion     LEFT FOOT    COPD (chronic obstructive pulmonary disease)     Full dentures     History of echocardiogram 09/21/2016    Hx of exercise stress test 09/27/2016    DR. BAKER \"IT WAS NORMAL\"    Low back pain     Lung cancer, middle lobe 11/08/2016    RIGHT MIDDLE LOBE    Lung nodule     RUL-7MM.  PRESENTLY HAS, AND FOLLOWS WITH DR. HERNANDEZ.  STATES FOUND \"A COUPLE MONTHS AGO\"    Premature atrial contraction     Pulmonary hypertension     Skin cancer     BASAL CELL-NOSE, LIP    Wears dentures 01/18/2023    Wears eyeglasses        Past Surgical History:   Procedure Laterality Date    APPENDECTOMY      BASAL CELL CARCINOMA EXCISION      NOSE, LIP    BRONCHOSCOPY  2016    BRONCHOSCOPY N/A 03/13/2020    Procedure: BRONCHOSCOPY;  Surgeon: Chris Porter MD;  [facility];  Service: Cardiothoracic;  Laterality: N/A;    BRONCHOSCOPY WITH ION ROBOTIC ASSIST N/A 5/10/2023    Procedure: NAVIGATIONAL BRONCHOSCOPY WITH ION ROBOT;  Surgeon: Rico Damon " DO Jules;  Location: [facility] ENDOSCOPY;  Service: Robotics - Pulmonary;  Laterality: N/A;  Ion Cath 4 0038   0035    BUNIONECTOMY Left 2013    GREAT TOE    CATARACT EXTRACTION W/ INTRAOCULAR LENS IMPLANT Right 12/9/2022    Procedure: CATARACT PHACO EXTRACTION WITH INTRAOCULAR LENS IMPLANT RIGHT COMPLICATED WITH MALYUGIN RING;  Surgeon: Christiane Haynes DO;  Location: [facility] OR;  Service: Ophthalmology;  Laterality: Right;    CATARACT EXTRACTION W/ INTRAOCULAR LENS IMPLANT Left 1/24/2023    Procedure: CATARACT PHACO EXTRACTION WITH INTRAOCULAR LENS IMPLANT LEFT COMPLICATED WITH MALYUGIN RING;  Surgeon: Christiane Haynes DO;  Location: [facility] OR;  Service: Ophthalmology;  Laterality: Left;    COLONOSCOPY  2018    DIAGNOSTIC LAPAROSCOPY N/A 06/29/2020    Procedure: DIAGNOSTIC LAPAROSCOPY, APPENDECTOMY, CHOLECYSTECOMY WITH CHOLANGIOGRAPHY;  Surgeon: Shady Singh MD;  Location: [facility] OR;  Service: General;  Laterality: N/A;    ENDOSCOPY N/A 06/03/2020    Procedure: ESOPHAGOGASTRODUODENOSCOPY WITH BIOPSY;  Surgeon: Shady Singh MD;  Location: [facility] ENDOSCOPY;  Service: Gastroenterology;  Laterality: N/A;    GALLBLADDER SURGERY  12/07/2022    INGUINAL HERNIA REPAIR Left 2002    Dr. Singh/inguinal     KNEE ARTHROSCOPY Right 12/12/2018    Procedure: Diagnostic arthroscopy right knee with partial medial meniscectomy;  Surgeon: Dinh Nova MD;  Location: [facility] OR;  Service: Orthopedics    LUNG REMOVAL, PARTIAL  2016    RIGHT MIDDLE LOBE    MOUTH SURGERY      FULL EXTRACTION OF TEETH    SKIN BIOPSY      THORACOSCOPY N/A 11/18/2016    Procedure: BRONCH THORACOSCOPY VIDEO ASSISTED  WITH LOBECTOMY, MEDIALSTINAL LYMPH NODE DISECTION;  Surgeon: Chris Porter MD;  Location: [facility] OR;  Service:     THORACOSCOPY VIDEO ASSISTED WITH LOBECTOMY Right 03/13/2020    Procedure: Redo right THORACOSCOPY VIDEO ASSISTED with right upper lobe wedge resection and with conversion to open thorocotomy for right upper lobectomy  "with intercostal cryoablation;  Surgeon: Chris Porter MD;  Location: [facility];  Service: Cardiothoracic;  Laterality: Right;       Social History     Socioeconomic History    Marital status:     Number of children: 1   Tobacco Use    Smoking status: Former     Packs/day: 3.00     Years: 50.00     Pack years: 150.00     Types: Cigarettes     Quit date: 2016     Years since quittin.5    Smokeless tobacco: Never   Vaping Use    Vaping Use: Never used   Substance and Sexual Activity    Alcohol use: Not Currently     Comment: 2-3 CANS DAILY    Drug use: Never    Sexual activity: Defer        Family History   Problem Relation Age of Onset    Heart attack Father     Heart disease Father     Colon cancer Father     Colon cancer Mother     Liver cancer Mother        Objective   Physical Exam:  Vitals:    23 0900   BP: 128/60   Pulse: 57   Temp: 98.3 °F (36.8 °C)   SpO2: 98%   Weight: 69.7 kg (153 lb 9.6 oz)   Height: 182.9 cm (72\")  Comment: patient reported      Body mass index is 20.83 kg/m².    Physical Exam  Vitals reviewed.   Constitutional:       General: He is not in acute distress.     Appearance: He is well-groomed.   HENT:      Head: Normocephalic and atraumatic.   Eyes:      General: Lids are normal.      Conjunctiva/sclera: Conjunctivae normal.      Pupils: Pupils are equal, round, and reactive to light.   Cardiovascular:      Rate and Rhythm: Regular rhythm. Bradycardia present.      Pulses:           Dorsalis pedis pulses are 1+ on the right side and 1+ on the left side.        Posterior tibial pulses are 1+ on the right side and 1+ on the left side.      Heart sounds: S1 normal and S2 normal. No murmur heard.  Pulmonary:      Effort: Pulmonary effort is normal. No respiratory distress.      Breath sounds: Normal breath sounds.   Musculoskeletal:         General: Normal range of motion.      Cervical back: Normal range of motion and neck supple.      Right lower leg: No edema.      " Left lower leg: No edema.   Lymphadenopathy:      Cervical: No cervical adenopathy.      Upper Body:      Right upper body: No supraclavicular or axillary adenopathy.      Left upper body: No supraclavicular or axillary adenopathy.   Skin:     General: Skin is warm and dry.      Capillary Refill: Capillary refill takes less than 2 seconds.   Neurological:      General: No focal deficit present.      Mental Status: He is alert and oriented to person, place, and time.      GCS: GCS eye subscore is 4. GCS verbal subscore is 5. GCS motor subscore is 6.   Psychiatric:         Attention and Perception: Attention normal.         Mood and Affect: Mood normal.         Speech: Speech normal.         Behavior: Behavior is cooperative.       Imaging/Labs:  Case Report   Surgical Pathology Report                         Case: [id number]                                   Authorizing Provider:  Rico Damon       Collected:           05/10/2023 11:58 AM                                  DO Jules                                                                     Ordering Location:     [facility]   Received:            05/10/2023 01:53 PM                                  ENDO SUITES                                                                   Pathologist:           Misael Ford MD                                                         Specimen:    Lung, R, RL biopsy for path                                                                Clinical Information    Multiple pulmonary nodules   Final Diagnosis   LUNG, RIGHT, TRANSBRONCHIAL BIOPSY:  Portions of bronchial wall and bronchial mucosa with no tumor or granuloma identified.  No alveolar parenchyma present for evaluation.   Electronically signed by Misael Ford MD on 5/11/2023 at 0918   Gross Description    1. Lung, R.  Received in formalin labeled RL biopsy is a 0.5 x 0.5 x 0.1 cm aggregate of fragmented tan-red tissue, filtered and  submitted entirely in a single cassette.  LDP      Microscopic Description    The slides are reviewed and demonstrate histopathologic features supporting the above rendered diagnosis.     [facility] LAB       NM PET/CT Whole Body  Result Date: 5/10/2023  Impression: 1.1.9 cm right perihilar nodule is FDG avid, and concerning for metastasis versus a new primary lung malignancy. 2.No evidence of metastasis outside of the chest. Electronically Signed: Frank Montoya  5/10/2023 10:24 AM EDT  Workstation ID: [id number]      CT SCAN OF THE CHEST WITHOUT AND WITH CONTRAST 4/13/2023 2:10 PM  COMPARISON:  CT chest 11/21/2022   Impression  Interval development of multiple bilateral pulmonary nodules with  clustered nodules in the right upper lobe and left lower lobe superior  segment, and a spiculated nodule in the right hilar region, measuring up  to 13 mm. Findings are concerning for metastasis. Findings are  concerning for disease progression. Consider PET CT for further  evaluation.   There are 2 cystic lesions in the pancreatic head, pancreatic metastasis  cannot be excluded. Other differential considerations include  intraductal papillary mucinous neoplasm.   Mildly benign-appearing prominent axillary lymph nodes. No definite  evidence of oracio metastasis.     This report was signed and finalized on 4/14/2023 3:36 PM by VERN Coyle.      CT Chest Without Contrast Diagnostic: 11/21/2022.  Personally reviewed.  Agree w/ Radiologist interpretation of no new nodules.  Post operative changes in right noted.     No acute findings in the chest. No evidence of disease progression or recurrence. No significant interval change.  Images personally reviewed, interpreted and dictated by VERN Coyle. This report was signed and finalized on 11/21/2022 4:23 PM by CROW Coyle    CT chest without contrast 5/20/22:   IMPRESSION:  Stable exam without evidence of local tumor recurrence  or  metastatic disease.    PFT 1/4/2022  FVC 4.0 L (86% predicted)  FEV1 2.08 L (59% predicted) FEV1/FVC percent 52% (68% predicted)  DLCO 11.47 mL/min/mmHg (41% predicted)    Assessment / Plan      Assessment / Plan:  1. Lung cancer, middle lobe s/p right VATS with lobectomy (Primary) 2016  2. Malignant neoplasm of lower lobe of right lung s/p right upper lobectomy 2020  3. Right upper lobe neoplasm 4/3/2023  - 68 y.o. male former smoker, with a history of COPD, valvular heart disease, CHF, and stage Ia infiltrating non-small cell undifferentiated lung carcinoma s/p right VATS with right middle lobectomy + mediastinal lymph node dissection in 2016 ([id number]) with lymphovascular invasion present.    - Recurrence early 2020 requiring redo right VATS with right upper wedge resection conversion to thoracotomy with completion lobectomy and mediastinal lymph node dissection March 2020 by Dr. Porter. Pathology consistent with stage I A2 non-small cell carcinoma most consistent with adenocarcinoma (T1b N0 MX).    - Patient has been following with CT Surgery and pulmonology, Dr. Lim, for surveillance imaging.    - Patient was last seen in CT Surgery clinic 12/7/2022 with stable CT chest.    - Seen by Pulmonology 2/17/23 w/ notation of  review of stable PFT's and stable CT chest imaging.    - 4/3/23 evaluated by PCP, Dr. Do, for sudden onset pain in both groins and axillae/shoulders.  Lymphadenopathy was appreciated on exam.    - CT chest 4/13/23 noted multiple new lung nodules, specifically 1.5 cm right hilar nodule concerning for disease recurrence or metastasis.    - NMPET 5/9/2023 showed hypermetabolic activity in this right upper lobe lesion @ SUV 6.1, but no other PET avid lesions.    - Bronchoscopy was performed by Dr. Damon on 5/10/2023.  Pathology not consistent with malignancy/ non-diagnostic.    - Consultation with Medical Oncology, Dr. Camden Lombardi, 5/11/2023.    - MDT case review and recommendations  included consideration for CyberKnife therapy to treat this third metachronous right hilar lung neoplasm.  (Clinical stage 1B)  - Patient met with Dr. Nathan Bermeo, Radiation Oncology, on 5/18/2023.  Following discussion and review of his history/treatment options, patient elected to proceed w/  CyberKnife therapy.  He has completed 2 treatment sessions thus far.    - Patient states prednisone has been beneficial for his joint pain.    - He c/o dyspnea only with moderate exertion, but daily fatigue (worse on Cyberknife treatment days).    - Reviewed new right hilar neoplasm and treatment plan details with Dr. Porter.    - Reviewed January 2022 PFTs with Dr. Porter     - Per Dr. Porter, although PFTs are technically acceptable for pneumonectomy, should any further surgical intervention be required for his lung mass, the only option would be pneumonectomy and this issue would need to be carefully considered as it would be a third intra-thoracic procedure for this gentleman.     - CT Surgery will be available as needed to assist Medical Oncology during the course of treatment.  No follow up appointments will be scheduled at this time.        Follow Up:   Return PRN at the request of Medical Oncology/ Pulmonology.   Or sooner for any further concerns or worsening sign and symptoms. If unable to reach us in the office please dial 911 or go to the nearest emergency department.      SAMIRA Parra  [facility] Cardiothoracic Surgery    Time Spent: I spent 43 minutes caring for Jack on this date of service. This time includes time spent by me in the following activities: preparing for the visit, reviewing tests, obtaining and/or reviewing a separately obtained history, performing a medically appropriate examination and/or evaluation, counseling and educating the patient/family/caregiver, referring and communicating with other health care professionals, documenting information in the medical record, and care  coordination.

## 2023-06-07 ENCOUNTER — HOSPITAL ENCOUNTER (OUTPATIENT)
Dept: RADIATION ONCOLOGY | Facility: HOSPITAL | Age: 68
Discharge: HOME | End: 2023-06-07

## 2023-06-07 ENCOUNTER — DOCUMENTATION (OUTPATIENT)
Dept: ONCOLOGY | Facility: CLINIC | Age: 68
End: 2023-06-07
Payer: MEDICARE

## 2023-06-07 LAB
FUNGUS WND CULT: NORMAL
MYCOBACTERIUM SPEC CULT: NORMAL
NIGHT BLUE STAIN TISS: NORMAL

## 2023-06-07 NOTE — PROGRESS NOTES
SW received call from radiation medicine explaining that pt made three trips from [place] on 6/6 due to machine issues. PT asked if there was transportation assistance. SW provide $20 in [employer] gas cards on this dates to assist with transportation costs.

## 2023-06-14 LAB
FUNGUS WND CULT: NORMAL
MYCOBACTERIUM SPEC CULT: NORMAL
NIGHT BLUE STAIN TISS: NORMAL

## 2023-06-19 RX ORDER — PREDNISONE 20 MG/1
TABLET ORAL
Qty: 56 TABLET | Refills: 1 | OUTPATIENT
Start: 2023-06-19

## 2023-06-21 LAB
FUNGUS WND CULT: NORMAL
MYCOBACTERIUM SPEC CULT: NORMAL
NIGHT BLUE STAIN TISS: NORMAL

## 2023-07-13 ENCOUNTER — HOSPITAL ENCOUNTER (OUTPATIENT)
Dept: RADIATION ONCOLOGY | Facility: HOSPITAL | Age: 68
Setting detail: RADIATION/ONCOLOGY SERIES
Discharge: HOME | End: 2023-07-13
Payer: MEDICARE

## 2023-07-18 PROBLEM — M25.50 PAIN IN JOINTS: Status: ACTIVE | Noted: 2023-07-18

## 2023-07-20 ENCOUNTER — TELEPHONE (OUTPATIENT)
Dept: ONCOLOGY | Facility: CLINIC | Age: 68
End: 2023-07-20

## 2023-07-20 NOTE — TELEPHONE ENCOUNTER
Caller: [facility] CENTRAL SCHEDULING     Best call back number: [phone]    What is the best time to reach you: ANYTIME     Who are you requesting to speak with (clinical staff, provider,  specific staff member): REFERRAL     Do you know the name of the person who called: ASHANTI     What was the call regarding: ASHANTI CALLING FROM [facility] CENTRAL SCHEDULING SHE SPOKE WITH MADHURI MADSEN AND WANTED HER TO KNOW SHE IS WORKING ON LINKING THE CT CHEST W CONTRAST & CT ABDOMEN PELVIS W CONTRAST TO BE DONE ON 9/7/2023 AT [facility] IN [place].     CALL HER BACK TO DISCUSS

## 2023-08-15 ENCOUNTER — OFFICE VISIT (OUTPATIENT)
Dept: PULMONOLOGY | Facility: CLINIC | Age: 68
End: 2023-08-15
Payer: MEDICARE

## 2023-08-15 VITALS
HEART RATE: 56 BPM | RESPIRATION RATE: 18 BRPM | SYSTOLIC BLOOD PRESSURE: 120 MMHG | HEIGHT: 72 IN | OXYGEN SATURATION: 95 % | WEIGHT: 163 LBS | BODY MASS INDEX: 22.08 KG/M2 | DIASTOLIC BLOOD PRESSURE: 66 MMHG

## 2023-08-15 DIAGNOSIS — R94.2 ABNORMAL PET SCAN OF LUNG: ICD-10-CM

## 2023-08-15 DIAGNOSIS — J44.9 CHRONIC OBSTRUCTIVE PULMONARY DISEASE, UNSPECIFIED COPD TYPE: Primary | ICD-10-CM

## 2023-08-15 DIAGNOSIS — M89.40: ICD-10-CM

## 2023-08-15 DIAGNOSIS — C34.91 NON-SMALL CELL CANCER OF RIGHT LUNG: ICD-10-CM

## 2023-08-15 PROCEDURE — 99214 OFFICE O/P EST MOD 30 MIN: CPT | Performed by: INTERNAL MEDICINE

## 2023-08-15 RX ORDER — PREDNISONE 10 MG/1
TABLET ORAL
Qty: 50 TABLET | Refills: 0 | Status: SHIPPED | OUTPATIENT
Start: 2023-08-15

## 2023-08-15 NOTE — PROGRESS NOTES
"  Chief Complaint   Patient presents with    Breathing Problem    Follow-up         Subjective   Saman Aguayo is a 68 y.o. male.   Patient was evaluated today for follow up of shortness of breath, NSCLC and COPD.     Patient says that his symptoms have been stable since the last clinic visit. he reports no recent exacerbations.     Patient is using Stiolto, as prescribed. Exercise tolerance has also remained stable .     His symptoms of arthritis are much better since he has received radiation for likely recurrent NSCLC.       The following portions of the patient's history were reviewed and updated as appropriate: allergies, current medications, past family history, past medical history, past social history, and past surgical history.    Review of Systems   HENT:  Negative for sinus pressure, sneezing and sore throat.    Respiratory:  Negative for cough, chest tightness, shortness of breath and wheezing.      Objective   Visit Vitals  /66   Pulse 56   Resp 18   Ht 182.9 cm (72\") Comment: pt reported   Wt 73.9 kg (163 lb)   SpO2 95%   BMI 22.11 kg/mý       Physical Exam  Vitals reviewed.   Constitutional:       Appearance: He is well-developed.   HENT:      Head: Normocephalic and atraumatic.   Eyes:      Extraocular Movements: Extraocular movements intact.   Cardiovascular:      Rate and Rhythm: Normal rate.   Pulmonary:      Comments: Somewhat hyperresonant to percussion.  Somewhat decreased air entry.  No obvious wheezing noted.   Musculoskeletal:      Cervical back: Neck supple.   Neurological:      Mental Status: He is alert.         Assessment & Plan   Diagnoses and all orders for this visit:    1. Chronic obstructive pulmonary disease, unspecified COPD type (Primary)  -     Pulmonary Function Test; Future    2. Non-small cell cancer of right lung    3. Abnormal PET scan of lung    4. Pulmonary hypertrophic osteoarthropathy    Other orders  -     predniSONE (DELTASONE) 10 MG tablet; Take 1 tablet daily " for 4 weeks THEN 1/2 tablet for 4 weeks and THEN stop.  Dispense: 50 tablet; Refill: 0           Return in about 6 months (around 2/15/2024).    DISCUSSION (if any):  Last CT scan results was reviewed in great detail with the patient.  Results for orders placed during the hospital encounter of 05/05/23    CT Chest Without Contrast Diagnostic    Narrative  CT CHEST WO CONTRAST DIAGNOSTIC    Date of Exam: 5/5/2023 11:14 AM EDT    Indication: ROBOTIC BRONCHOSCOPY PROTOCOL.    Comparison: CT chest 4/13/2023.    Technique: Axial CT images were obtained of the chest without contrast administration.  Reconstructed coronal and sagittal images were also obtained. Automated exposure control and iterative construction methods were used.      Findings:  Volume loss in the right hemithorax is consistent with the history of right middle lobectomy and right lower lobectomy. There is a spiculated nodule within the perihilar right lung measuring 1.9 x 1.2 cm (series 2 image 191), corresponding to the nodule  described on the prior outside CT chest study.    Scattered small noncalcified nodular densities are seen within the right apex, not thought to be significantly changed, including a dominant 8 mm noncalcified nodule series 2 image 65). There are smaller scattered 2-3 motor noncalcified nodules within  the left lung, as well, including a 3 mm nodule in the central left lower lobe (series 2 image 194). These are thought to be unchanged.    Advanced emphysema is present. No acute lung consolidations are identified. There is chronic fibrosis in the left base.    Heart size is normal. Coronary artery calcifications are present. Mild thoracic aortic calcific atherosclerosis. No pathologic enlarged lymph nodes. Thyroid gland is within normal limits.    Cholecystectomy changes are present. Low-density or cystic foci within the pancreatic head measuring approximately 1.3 cm and 0.9 cm, are unchanged. Mild to moderate stool is seen within  the upper abdominal large bowel. Mild left adrenal thickening or  hyperplasia, unchanged. Remainder of the imaged upper abdominal organs are within normal limits.    Degenerative endplate spurring is present within the thoracic spine. No acute or suspicious osseous abnormality.    Impression  1. 1.9 x 1.2 cm perihilar right lung irregular noncalcified nodule, suspicious for malignancy. There are others smaller scattered noncalcified nodules within both lungs, greatest in the apex of the right lung, measuring 8 mm or less in size, unchanged.  2. No pathologic enlarged lymph nodes within the chest.  3. Right middle lobectomy and right lower lobectomy.  4. Cystic foci within the pancreatic head are unchanged. These have undergone recent evaluation with outside multiphase CT abdomen from 4/13/2023.      Electronically Signed: Mindi Tim  5/6/2023 11:03 AM EDT  Workstation ID: [id number]      Latest available PFTs were reviewed.  Consistent with moderate COPD. Performed in Jan 2022.    PFTs will be ordered to be done upon follow up.    We have reviewed his pulmonary medications in great detail.    Compliance with medications stressed.     Side effects of prescribed medications discussed with the patient    He will need to continue close follow up with Rad Onc and Oncology.    Will decrease prednisone to 10 mg for 4 weeks and then 5 mg for 4 more weeks before d/cing it.     His HPOA, expectedly, seems to have improved with radiation treatment of likely NSCLC.    I asked him to discuss the possibility of examining his specimen/blood, for possible research, since he has had 2 recurrences of lung cancer, despite adequate surgical resection the first 2 times, with his oncologist.       Dictated utilizing Dragon dictation.    This document was electronically signed by Nannette Lim MD on 08/15/23 at 11:40 EDT

## 2023-08-17 RX ORDER — TIOTROPIUM BROMIDE AND OLODATEROL 3.124; 2.736 UG/1; UG/1
2 SPRAY, METERED RESPIRATORY (INHALATION)
Status: CANCELLED | OUTPATIENT
Start: 2023-08-17

## 2023-08-17 NOTE — TELEPHONE ENCOUNTER
Caller: Anna Aguayo    Relationship: Emergency Contact    Best call back number: [phone]    Requested Prescriptions:   Requested Prescriptions     Pending Prescriptions Disp Refills    tiotropium bromide-olodaterol (Stiolto Respimat) 2.5-2.5 MCG/ACT aerosol solution inhaler       Sig: Inhale 2 puffs Daily. PRN        Pharmacy where request should be sent:  [facility]     Last office visit with prescribing clinician: 8/15/2023   Last telemedicine visit with prescribing clinician: Visit date not found   Next office visit with prescribing clinician: 2/15/2024     Additional details provided by patient: PHARMACY MAY NEED APPROVAL    Does the patient have less than a 3 day supply:  [x] Yes  [] No    Would you like a call back once the refill request has been completed: [x] Yes [] No    If the office needs to give you a call back, can they leave a voicemail: [x] Yes [] No    Simone Garcia Rep   08/17/23 11:38 EDT

## 2023-08-18 RX ORDER — TIOTROPIUM BROMIDE AND OLODATEROL 3.124; 2.736 UG/1; UG/1
2 SPRAY, METERED RESPIRATORY (INHALATION)
Qty: 4 G | Refills: 9 | Status: SHIPPED | OUTPATIENT
Start: 2023-08-18

## 2023-09-06 RX ORDER — PREDNISONE 5 MG/1
TABLET ORAL
Qty: 56 TABLET | Refills: 0 | Status: SHIPPED | OUTPATIENT
Start: 2023-09-06

## 2023-09-07 ENCOUNTER — HOSPITAL ENCOUNTER (OUTPATIENT)
Dept: CT IMAGING | Facility: HOSPITAL | Age: 68
Discharge: HOME | End: 2023-09-07
Payer: MEDICARE

## 2023-09-07 DIAGNOSIS — C34.31 MALIGNANT NEOPLASM OF LOWER LOBE OF RIGHT LUNG: ICD-10-CM

## 2023-09-07 PROCEDURE — 25510000001 IOPAMIDOL 61 % SOLUTION: Performed by: NURSE PRACTITIONER

## 2023-09-07 PROCEDURE — 74177 CT ABD & PELVIS W/CONTRAST: CPT

## 2023-09-07 PROCEDURE — 71260 CT THORAX DX C+: CPT

## 2023-09-07 RX ADMIN — IOPAMIDOL 100 ML: 612 INJECTION, SOLUTION INTRAVENOUS at 08:46

## 2023-09-13 ENCOUNTER — HOSPITAL ENCOUNTER (OUTPATIENT)
Dept: RADIATION ONCOLOGY | Facility: HOSPITAL | Age: 68
Setting detail: RADIATION/ONCOLOGY SERIES
Discharge: HOME | End: 2023-09-13
Payer: MEDICARE

## 2023-09-13 ENCOUNTER — OFFICE VISIT (OUTPATIENT)
Dept: RADIATION ONCOLOGY | Facility: HOSPITAL | Age: 68
End: 2023-09-13
Payer: MEDICARE

## 2023-09-13 VITALS
TEMPERATURE: 96.8 F | WEIGHT: 161 LBS | HEART RATE: 48 BPM | BODY MASS INDEX: 21.84 KG/M2 | DIASTOLIC BLOOD PRESSURE: 70 MMHG | RESPIRATION RATE: 20 BRPM | OXYGEN SATURATION: 92 % | SYSTOLIC BLOOD PRESSURE: 149 MMHG

## 2023-09-13 DIAGNOSIS — C34.31 MALIGNANT NEOPLASM OF LOWER LOBE OF RIGHT LUNG: Primary | ICD-10-CM

## 2023-09-13 PROCEDURE — G0463 HOSPITAL OUTPT CLINIC VISIT: HCPCS

## 2023-09-13 NOTE — PROGRESS NOTES
FOLLOW UP NOTE    PATIENT:                                                      Saman Aguayo  MEDICAL RECORD #:                        [id number]  :                                                          1955  COMPLETION DATE:   2023  DIAGNOSIS:     Lung cancer    Lung cancer, middle lobe s/p right VATS with lobectomy  - No stage assigned    Malignant neoplasm of lower lobe of right lung  - Stage IA2 (cT1b, cN0, cM0)      BRIEF HISTORY:    Follow-up to review recent surveillance imaging after treatment with CyberKnife stereotactic body radiotherapy for a new right perihilar tumor.  Posttreatment fatigue is improving.  Breathing is stable.  No chest pain.  He is on prednisone for arthritis which is significantly helped his pain.  He has pending consultation with rheumatology.  CT chest performed in [facility] 2023 shows resolution of the treated right hilar lung tumor.  In its place, there is a 12 mm area of mixed density consistent with postradiation change.  No new lung lesions identified.  No pathologic adenopathy.  CT abdomen shows stable cystic change in the pancreas.    MEDICATIONS: Medication reconciliation for the patient was reviewed and confirmed in the electronic medical record.    Review of Systems   Respiratory:  Positive for cough, shortness of breath and wheezing.    Cardiovascular:  Positive for leg swelling.   Musculoskeletal:  Positive for arthralgias, back pain and neck pain.   Neurological:  Positive for dizziness and light-headedness.           Physical Exam  Vitals and nursing note reviewed.   Constitutional:       Appearance: He is well-developed.   HENT:      Head: Normocephalic and atraumatic.   Cardiovascular:      Rate and Rhythm: Normal rate and regular rhythm.      Heart sounds: Normal heart sounds. No murmur heard.  Pulmonary:      Effort: Pulmonary effort is normal.      Breath sounds: Normal breath sounds. No wheezing or rales.   Abdominal:      General: Bowel  sounds are normal. There is no distension.      Palpations: Abdomen is soft.      Tenderness: There is no abdominal tenderness.   Musculoskeletal:         General: No tenderness. Normal range of motion.      Cervical back: Normal range of motion and neck supple.   Lymphadenopathy:      Cervical: No cervical adenopathy.      Upper Body:      Right upper body: No supraclavicular adenopathy.      Left upper body: No supraclavicular adenopathy.   Skin:     General: Skin is warm and dry.   Neurological:      Mental Status: He is alert and oriented to person, place, and time.      Sensory: No sensory deficit.   Psychiatric:         Behavior: Behavior normal.         Thought Content: Thought content normal.         Judgment: Judgment normal.       VITAL SIGNS:   Vitals:    09/13/23 1447   BP: 149/70   Pulse: (!) 48   Resp: 20   Temp: 96.8 °F (36 °C)   TempSrc: Skin   SpO2: 92%   Weight: 73 kg (161 lb)   PainSc:   3   PainLoc: Wrist                   KSP %:  90    The following portions of the patient's history were reviewed and updated as appropriate: allergies, current medications, past family history, past medical history, past social history, past surgical history and problem list.         Diagnoses and all orders for this visit:    1. Malignant neoplasm of lower lobe of right lung (Primary)         IMPRESSION:  Radiographic diagnosis of a third metachronous early-stage primary neoplasm of the right lung, clinical stage IA2 (T1b, N0, M0).  He is status post right middle lobectomy 2016 and right upper lobectomy 2020 for early stage non-small cell lung cancers.  He is now 3 months status post CyberKnife stereotactic body radiotherapy.  Recent CT imaging shows resolution of the treated right hilar lung tumor.  There is questionable ill-defined inflammatory nodular process in its place which may be consistent with postradiation changes.  Radiologist recommended surveillance CT imaging in 3 months; however, we typically get  a post treatment PET/CT evaluation had 3 to 6 months following stereotactic body radiotherapy in the situations and that would give much more valuable restaging information.  Additionally, PET/CT may be useful for his rheumatologist will be evaluating him soon.  We will help determine that he has had disease remission from his lung cancers and may show any inflammatory joint disease.    RECOMMENDATIONS: PET/CT in 4 to 6 weeks.  We will try to coordinate this to be performed prior to his rheumatology consultation.  Same-day follow-up with me in 4 to 6 weeks to review PET scan.    I spent a total of 25 minutes on todays visit, with more than 20 minutes in direct face to face communication, and the remainder of the time spent in reviewing the relevant history, records, available imaging, and for documentation.    Return in about 4 weeks (around 10/11/2023) for Imaging - See orders, Office Visit.    Nathan Bermeo MD

## 2023-09-14 ENCOUNTER — OFFICE VISIT (OUTPATIENT)
Dept: ONCOLOGY | Facility: CLINIC | Age: 68
End: 2023-09-14
Payer: MEDICARE

## 2023-09-14 VITALS
HEART RATE: 49 BPM | SYSTOLIC BLOOD PRESSURE: 147 MMHG | HEIGHT: 72 IN | BODY MASS INDEX: 21.4 KG/M2 | TEMPERATURE: 98.2 F | RESPIRATION RATE: 16 BRPM | OXYGEN SATURATION: 99 % | DIASTOLIC BLOOD PRESSURE: 63 MMHG | WEIGHT: 158 LBS

## 2023-09-14 DIAGNOSIS — C34.2 LUNG CANCER, MIDDLE LOBE: ICD-10-CM

## 2023-09-14 DIAGNOSIS — R91.1 RIGHT UPPER LOBE PULMONARY NODULE: Primary | ICD-10-CM

## 2023-09-14 DIAGNOSIS — C34.2 LUNG CANCER, MIDDLE LOBE: Primary | ICD-10-CM

## 2023-09-14 PROCEDURE — 1125F AMNT PAIN NOTED PAIN PRSNT: CPT | Performed by: INTERNAL MEDICINE

## 2023-09-14 PROCEDURE — 99214 OFFICE O/P EST MOD 30 MIN: CPT | Performed by: INTERNAL MEDICINE

## 2023-09-14 NOTE — PROGRESS NOTES
Follow Up Office Visit      Date: 2023     Patient Name: Saman Aguayo  MRN: [id number]  : 1955  Referring Physician: Dre Do     Chief Complaint: Follow-up for history Radha has a of right lung cancer and newly diagnosed right lung cancer in May 2023     History of Present Illness: Saman Aguayo is a pleasant 68 y.o. male history of right lung adenocarcinoma status post 2 resections, osteoarthritis, COPD who presents today for evaluation of history of lung cancer new right lung nodule. The patient is accompanied by their wife who contributes to the history of their care.  Patient was initially diagnosed with a stage IA infiltrating non-small cell undifferentiated carcinoma s/p right VATS with right middle lobectomy and mediastinal lymph node dissection in  ([id number]) with lymphovascular invasion present.  He then had recurrence early  requiring redo right VATS with right upper wedge resection conversion to thoracotomy with completion lobectomy and mediastinal lymph node dissection 2020 by Dr. Porter.  Pathology consistent with stage IA2 non-small cell carcinoma most consistent with adenocarcinoma ([id number]).   He had negative CT scans until 2023 when he presented with significant bilateral shoulder pain.  CT chest notable for a 1.3 cm right upper lobe nodule concerning for disease recurrence or metastasis.  PET/CT with some slight hypermetabolic activity in the lesion but no other sites of disease noted.  Underwent biopsy with Dr. Damon on 5/10/2023.  Pathology not consistent with malignancy     Interval History:  Presents to clinic for follow-up.  Completed CyberKnife radiation to his new right lung malignancy in 2023.  Fatigue has significantly improved but has noted some worsening arthritic pains in his hands and wrist.  Denies any other new bone or joint pains    Oncology History:    Oncology/Hematology History   Malignant neoplasm of lower lobe of right lung    5/9/2023 Cancer Staged    Staging form: Lung, AJCC 8th Edition  - Clinical stage from 5/9/2023: Stage IA2 (cT1b, cN0, cM0) - Signed by Nathan Bermeo MD on 5/18/2023 5/18/2023 Initial Diagnosis    Malignant neoplasm of lower lobe of right lung     5/31/2023 - 6/7/2023 Radiation    Radiation OncologyTreatment Course:  Saman Aguayo received 4800 cGy in 3 fractions to right lung via Stereotactic Radiation Therapy - SRT.         Subjective      Review of Systems:   Constitutional: Negative for fevers, chills, or weight loss  Eyes: Negative for blurred vision or discharge         Ear/Nose/Throat: Negative for difficulty swallowing, sore throat, LAD                                                       Respiratory: Negative for cough, SOA, wheezing                                                                                        Cardiovascular: Negative for chest pain or palpitations                                                                  Gastrointestinal: Negative for nausea, vomiting or diarrhea                                                                     Genitourinary: Negative for dysuria or hematuria                                                                                           Musculoskeletal: Negative for any joint pains or muscle aches                                                                        Neurologic: Negative for any weakness, headaches, dizziness                                                                         Hematologic: Negative for any easy bleeding or bruising                                                                                   Psychiatric: Negative for anxiety or depression                          Past Medical History/Past Surgical History/ Family History/ Social History: Reviewed by me and unchanged from my previous documentation done on July 2023.     Medications:     Current Outpatient Medications:     albuterol sulfate HFA  "108 (90 Base) MCG/ACT inhaler, Inhale 2 puffs Every 4 (Four) Hours As Needed for Wheezing., Disp: , Rfl:     predniSONE (DELTASONE) 5 MG tablet, Take 2 tablets daily for 2 weeks and then 1 tablet daily for 4 weeks., Disp: 56 tablet, Rfl: 0    tiotropium bromide-olodaterol (Stiolto Respimat) 2.5-2.5 MCG/ACT aerosol solution inhaler, Inhale 2 puffs Daily. PRN, Disp: 4 g, Rfl: 9    Allergies:   No Known Allergies    Objective     Physical Exam:  Vital Signs:   Vitals:    09/14/23 1453   BP: 147/63   Pulse: (!) 49   Resp: 16   Temp: 98.2 °F (36.8 °C)   TempSrc: Temporal   SpO2: 99%   Weight: 71.7 kg (158 lb)   Height: 182.9 cm (72\")   PainSc:   3   PainLoc: Comment: wrists and ankles     Pain Score    09/14/23 1453   PainSc:   3   PainLoc: Comment: wrists and ankles     ECOG Performance Status: 1 - Symptomatic but completely ambulatory    Constitutional: NAD, ECOG 1  Eyes: PERRLA, scleral anicteric  ENT: No LAD, no thyromegaly  Respiratory: CTAB, no wheezing, rales, rhonchi  Cardiovascular: RRR, no murmurs, pulses 2+ bilaterally  Abdomen: soft, NT/ND, no HSM  Musculoskeletal: strength 5/5 bilaterally, no c/c/e  Neurologic: A&O x 3, CN II-XII intact grossly    Results Review:   No visits with results within 2 Week(s) from this visit.   Latest known visit with results is:   Office Visit on 07/18/2023   Component Date Value Ref Range Status    WBC 07/18/2023 7.13  3.40 - 10.80 10*3/mm3 Final    RBC 07/18/2023 4.87  4.14 - 5.80 10*6/mm3 Final    Hemoglobin 07/18/2023 15.8  13.0 - 17.7 g/dL Final    Hematocrit 07/18/2023 45.7  37.5 - 51.0 % Final    MCV 07/18/2023 93.8  79.0 - 97.0 fL Final    MCH 07/18/2023 32.4  26.6 - 33.0 pg Final    MCHC 07/18/2023 34.6  31.5 - 35.7 g/dL Final    RDW 07/18/2023 14.7  12.3 - 15.4 % Final    Platelets 07/18/2023 162  140 - 450 10*3/mm3 Final    Neutrophil Rel % 07/18/2023 67.6  42.7 - 76.0 % Final    Lymphocyte Rel % 07/18/2023 21.2  19.6 - 45.3 % Final    Monocyte Rel % 07/18/2023 7.4  " 5.0 - 12.0 % Final    Eosinophil Rel % 07/18/2023 2.1  0.3 - 6.2 % Final    Basophil Rel % 07/18/2023 0.6  0.0 - 1.5 % Final    Neutrophils Absolute 07/18/2023 4.82  1.70 - 7.00 10*3/mm3 Final    Lymphocytes Absolute 07/18/2023 1.51  0.70 - 3.10 10*3/mm3 Final    Monocytes Absolute 07/18/2023 0.53  0.10 - 0.90 10*3/mm3 Final    Eosinophils Absolute 07/18/2023 0.15  0.00 - 0.40 10*3/mm3 Final    Basophils Absolute 07/18/2023 0.04  0.00 - 0.20 10*3/mm3 Final    Immature Granulocyte Rel % 07/18/2023 1.1 (H)  0.0 - 0.5 % Final    Immature Grans Absolute 07/18/2023 0.08 (H)  0.00 - 0.05 10*3/mm3 Final    nRBC 07/18/2023 0.0  0.0 - 0.2 /100 WBC Final    TSH 07/18/2023 1.190  0.270 - 4.200 uIU/mL Final    Vitamin B-12 07/18/2023 464  211 - 946 pg/mL Final    Results may be falsely increased if patient taking Biotin.    25 Hydroxy, Vitamin D 07/18/2023 50.1  30.0 - 100.0 ng/ml Final    Comment: Reference Range for Total Vitamin D 25(OH)  Deficiency <20.0 ng/mL  Insufficiency 21-29 ng/mL  Sufficiency  ng/mL  Toxicity >100 ng/ml      Total Protein 07/18/2023 6.1  6.0 - 8.5 g/dL Final    Albumin 07/18/2023 4.0  3.5 - 5.2 g/dL Final    Total Bilirubin 07/18/2023 0.4  0.0 - 1.2 mg/dL Final    Bilirubin, Direct 07/18/2023 <0.2  0.0 - 0.3 mg/dL Final    Alkaline Phosphatase 07/18/2023 74  39 - 117 U/L Final    AST (SGOT) 07/18/2023 11  1 - 40 U/L Final    ALT (SGPT) 07/18/2023 16  1 - 41 U/L Final       CT Chest With Contrast Diagnostic, CT Abdomen Pelvis With Contrast    Result Date: 9/8/2023  Narrative: CT OF THE CHEST, ABDOMEN AND PELVIS WITH CONTRAST  INDICATION: NSCLC; C34.31-Malignant neoplasm of lower lobe, right bronchus or lung  TECHNIQUE:  Thin section axial images were obtained from the lung apices to the pubic symphysis following IV and oral contrast administration. Multiplanar reconstruction images were obtained from the axial data.  COMPARISON: Noncontrast exam performed as part of a PET/CT dated 05/09/2023.   FINDINGS:  CHEST:  There is no mediastinal, hilar, or axillary lymphadenopathy. There is no pleural or pericardial effusion. There are changes of emphysema. There are postoperative changes from right lobectomy. The previously seen right perihilar nodule has been resected. Hyperinflation of the remaining portions of the right lung is noted. There is a new nodular density in the lingula, mixed density that measures approximately 12 mm on image 56. The lungs are otherwise clear. No consolidation.  ABDOMEN: The liver is homogeneous.  The gallbladder is absent. The spleen and adrenal glands are unremarkable. There is a stable cystic lesion in the head of the pancreas measuring 2 cm on image 31. No pancreatic ductal dilatation.  The kidneys are unremarkable.  There is no mass, hydronephrosis, or perinephric stranding. The abdominal portions of the GI tract are within normal limits. There is no abdominal lymphadenopathy or ascites. Atherosclerotic disease of the abdominal aorta is noted.  PELVIS: The prostate is enlarged. The urinary bladder is unremarkable. The appendix is not visualized. There is diverticulosis of the distal colon without diverticulitis. Changes from left inguinal hernia repair are noted. No lymphadenopathy or ascites.   BONES: No acute osseous abnormality in the chest, abdomen, or pelvis.      Impression: 1. Interval right lobectomy with resection of the previously seen nodule. 2. New lingular nodule. Recommend 3-month followup chest CT. 3. Stable 2 cm cystic lesion in the pancreas. This could represent an epithelial cyst. Small side branch IPMN is not excluded. Consider MRCP if indicated.            This study was performed with techniques to keep radiation doses as low as reasonably achievable (ALARA). Individualized dose reduction techniques using automated exposure control or adjustment of mA and/or kV according to the patient size were employed.    This report was signed and finalized on 9/8/2023  11:59 AM by Suma Leo MD.       Assessment / Plan      Assessment/Plan:   1. Lung cancer, middle lobe s/p right VATS with lobectomy (Primary)  -Initially diagnosed with a stage IA infiltrating non-small cell undifferentiated carcinoma s/p right VATS with right middle lobectomy and mediastinal lymph node dissection in 2016 ([id number]) with lymphovascular invasion present.    -Recurrence early 2020 requiring redo right VATS with right upper wedge resection conversion to thoracotomy with completion lobectomy and mediastinal lymph node dissection March 2020 by Dr. Porter.  Pathology consistent with stage IA2 non-small cell carcinoma most consistent with adenocarcinoma ([id number]).   -CT scan in April 2023 concerning for new right lung lesion measuring 1.3 cm  -PET/CT with avidity noted in the primary lesion but no other sites of disease  -Biopsy nondiagnostic in May 2023  -Status post CyberKnife in June 2023  -CT C/A/P in September 2023 reviewed and showing significant resolution of his disease in the right but a concerning nodule in the left lingula  -Has been seen by Dr. Bermeo who ordered a PET/CT yesterday which I am agreeable with  -We will plan to follow-up after PET/CT         Follow Up:   Follow-up in 2 months       Camden Lombardi MD  Hematology and Oncology     Please note that portions of this note may have been completed with a voice recognition program. Efforts were made to edit the dictations, but occasionally words are mistranscribed.

## 2023-10-05 RX ORDER — PREDNISONE 5 MG/1
TABLET ORAL
Qty: 56 TABLET | Refills: 0 | OUTPATIENT
Start: 2023-10-05

## 2023-10-09 RX ORDER — PREDNISONE 10 MG/1
TABLET ORAL
Qty: 50 TABLET | Refills: 0 | OUTPATIENT
Start: 2023-10-09

## 2023-10-17 ENCOUNTER — APPOINTMENT (OUTPATIENT)
Dept: GENERAL RADIOLOGY | Facility: HOSPITAL | Age: 68
End: 2023-10-17
Payer: MEDICARE

## 2023-10-17 ENCOUNTER — HOSPITAL ENCOUNTER (EMERGENCY)
Facility: HOSPITAL | Age: 68
Discharge: HOME | End: 2023-10-17
Attending: EMERGENCY MEDICINE
Payer: MEDICARE

## 2023-10-17 VITALS
HEART RATE: 66 BPM | WEIGHT: 169 LBS | RESPIRATION RATE: 20 BRPM | HEIGHT: 72 IN | OXYGEN SATURATION: 91 % | SYSTOLIC BLOOD PRESSURE: 101 MMHG | TEMPERATURE: 98 F | DIASTOLIC BLOOD PRESSURE: 56 MMHG | BODY MASS INDEX: 22.89 KG/M2

## 2023-10-17 DIAGNOSIS — J44.1 COPD WITH ACUTE EXACERBATION: Primary | ICD-10-CM

## 2023-10-17 LAB
ALBUMIN SERPL-MCNC: 3.7 G/DL (ref 3.5–5.2)
ALBUMIN/GLOB SERPL: 1.2 G/DL
ALP SERPL-CCNC: 67 U/L (ref 39–117)
ALT SERPL W P-5'-P-CCNC: 10 U/L (ref 1–41)
ANION GAP SERPL CALCULATED.3IONS-SCNC: 9.1 MMOL/L (ref 5–15)
AST SERPL-CCNC: 10 U/L (ref 1–40)
B PARAPERT DNA SPEC QL NAA+PROBE: NOT DETECTED
B PERT DNA SPEC QL NAA+PROBE: NOT DETECTED
BASOPHILS # BLD AUTO: 0.05 10*3/MM3 (ref 0–0.2)
BASOPHILS NFR BLD AUTO: 0.4 % (ref 0–1.5)
BILIRUB SERPL-MCNC: 1.2 MG/DL (ref 0–1.2)
BUN SERPL-MCNC: 12 MG/DL (ref 8–23)
BUN/CREAT SERPL: 14.1 (ref 7–25)
C PNEUM DNA NPH QL NAA+NON-PROBE: NOT DETECTED
CALCIUM SPEC-SCNC: 9.1 MG/DL (ref 8.6–10.5)
CHLORIDE SERPL-SCNC: 99 MMOL/L (ref 98–107)
CO2 SERPL-SCNC: 25.9 MMOL/L (ref 22–29)
CREAT SERPL-MCNC: 0.85 MG/DL (ref 0.76–1.27)
D DIMER PPP FEU-MCNC: 0.41 MCGFEU/ML (ref 0–0.7)
DEPRECATED RDW RBC AUTO: 44.1 FL (ref 37–54)
EGFRCR SERPLBLD CKD-EPI 2021: 93.5 ML/MIN/1.73
EOSINOPHIL # BLD AUTO: 0.04 10*3/MM3 (ref 0–0.4)
EOSINOPHIL NFR BLD AUTO: 0.4 % (ref 0.3–6.2)
ERYTHROCYTE [DISTWIDTH] IN BLOOD BY AUTOMATED COUNT: 12.9 % (ref 12.3–15.4)
FLUAV SUBTYP SPEC NAA+PROBE: NOT DETECTED
FLUBV RNA ISLT QL NAA+PROBE: NOT DETECTED
GLOBULIN UR ELPH-MCNC: 3.1 GM/DL
GLUCOSE SERPL-MCNC: 113 MG/DL (ref 65–99)
HADV DNA SPEC NAA+PROBE: NOT DETECTED
HCOV 229E RNA SPEC QL NAA+PROBE: NOT DETECTED
HCOV HKU1 RNA SPEC QL NAA+PROBE: NOT DETECTED
HCOV NL63 RNA SPEC QL NAA+PROBE: NOT DETECTED
HCOV OC43 RNA SPEC QL NAA+PROBE: NOT DETECTED
HCT VFR BLD AUTO: 47.3 % (ref 37.5–51)
HGB BLD-MCNC: 16.3 G/DL (ref 13–17.7)
HMPV RNA NPH QL NAA+NON-PROBE: NOT DETECTED
HOLD SPECIMEN: NORMAL
HOLD SPECIMEN: NORMAL
HPIV1 RNA ISLT QL NAA+PROBE: NOT DETECTED
HPIV2 RNA SPEC QL NAA+PROBE: NOT DETECTED
HPIV3 RNA NPH QL NAA+PROBE: NOT DETECTED
HPIV4 P GENE NPH QL NAA+PROBE: NOT DETECTED
IMM GRANULOCYTES # BLD AUTO: 0.04 10*3/MM3 (ref 0–0.05)
IMM GRANULOCYTES NFR BLD AUTO: 0.4 % (ref 0–0.5)
LYMPHOCYTES # BLD AUTO: 1.34 10*3/MM3 (ref 0.7–3.1)
LYMPHOCYTES NFR BLD AUTO: 12 % (ref 19.6–45.3)
M PNEUMO IGG SER IA-ACNC: NOT DETECTED
MCH RBC QN AUTO: 31.9 PG (ref 26.6–33)
MCHC RBC AUTO-ENTMCNC: 34.5 G/DL (ref 31.5–35.7)
MCV RBC AUTO: 92.6 FL (ref 79–97)
MONOCYTES # BLD AUTO: 0.94 10*3/MM3 (ref 0.1–0.9)
MONOCYTES NFR BLD AUTO: 8.4 % (ref 5–12)
NEUTROPHILS NFR BLD AUTO: 78.4 % (ref 42.7–76)
NEUTROPHILS NFR BLD AUTO: 8.72 10*3/MM3 (ref 1.7–7)
NRBC BLD AUTO-RTO: 0 /100 WBC (ref 0–0.2)
NT-PROBNP SERPL-MCNC: 301.9 PG/ML (ref 0–900)
PLATELET # BLD AUTO: 171 10*3/MM3 (ref 140–450)
PMV BLD AUTO: 10.6 FL (ref 6–12)
POTASSIUM SERPL-SCNC: 3.9 MMOL/L (ref 3.5–5.2)
PROT SERPL-MCNC: 6.8 G/DL (ref 6–8.5)
RBC # BLD AUTO: 5.11 10*6/MM3 (ref 4.14–5.8)
RHINOVIRUS RNA SPEC NAA+PROBE: NOT DETECTED
RSV RNA NPH QL NAA+NON-PROBE: NOT DETECTED
SARS-COV-2 RNA NPH QL NAA+NON-PROBE: NOT DETECTED
SODIUM SERPL-SCNC: 134 MMOL/L (ref 136–145)
TROPONIN T SERPL HS-MCNC: 17 NG/L
WBC NRBC COR # BLD: 11.13 10*3/MM3 (ref 3.4–10.8)
WHOLE BLOOD HOLD COAG: NORMAL
WHOLE BLOOD HOLD SPECIMEN: NORMAL

## 2023-10-17 PROCEDURE — 80053 COMPREHEN METABOLIC PANEL: CPT | Performed by: EMERGENCY MEDICINE

## 2023-10-17 PROCEDURE — 25010000002 METHYLPREDNISOLONE PER 125 MG: Performed by: EMERGENCY MEDICINE

## 2023-10-17 PROCEDURE — 71045 X-RAY EXAM CHEST 1 VIEW: CPT

## 2023-10-17 PROCEDURE — 83880 ASSAY OF NATRIURETIC PEPTIDE: CPT | Performed by: EMERGENCY MEDICINE

## 2023-10-17 PROCEDURE — 96375 TX/PRO/DX INJ NEW DRUG ADDON: CPT

## 2023-10-17 PROCEDURE — 25010000002 MAGNESIUM SULFATE 2 GM/50ML SOLUTION: Performed by: EMERGENCY MEDICINE

## 2023-10-17 PROCEDURE — 99284 EMERGENCY DEPT VISIT MOD MDM: CPT

## 2023-10-17 PROCEDURE — 85025 COMPLETE CBC W/AUTO DIFF WBC: CPT | Performed by: EMERGENCY MEDICINE

## 2023-10-17 PROCEDURE — 85379 FIBRIN DEGRADATION QUANT: CPT | Performed by: EMERGENCY MEDICINE

## 2023-10-17 PROCEDURE — 0202U NFCT DS 22 TRGT SARS-COV-2: CPT | Performed by: EMERGENCY MEDICINE

## 2023-10-17 PROCEDURE — 96365 THER/PROPH/DIAG IV INF INIT: CPT

## 2023-10-17 PROCEDURE — 84484 ASSAY OF TROPONIN QUANT: CPT | Performed by: EMERGENCY MEDICINE

## 2023-10-17 PROCEDURE — 93005 ELECTROCARDIOGRAM TRACING: CPT

## 2023-10-17 PROCEDURE — 94640 AIRWAY INHALATION TREATMENT: CPT

## 2023-10-17 PROCEDURE — 94761 N-INVAS EAR/PLS OXIMETRY MLT: CPT

## 2023-10-17 PROCEDURE — 93005 ELECTROCARDIOGRAM TRACING: CPT | Performed by: EMERGENCY MEDICINE

## 2023-10-17 PROCEDURE — 94799 UNLISTED PULMONARY SVC/PX: CPT

## 2023-10-17 RX ORDER — METHYLPREDNISOLONE SODIUM SUCCINATE 125 MG/2ML
125 INJECTION, POWDER, LYOPHILIZED, FOR SOLUTION INTRAMUSCULAR; INTRAVENOUS ONCE
Status: COMPLETED | OUTPATIENT
Start: 2023-10-17 | End: 2023-10-17

## 2023-10-17 RX ORDER — DOXYCYCLINE 100 MG/1
100 CAPSULE ORAL 2 TIMES DAILY
Qty: 14 CAPSULE | Refills: 0 | Status: SHIPPED | OUTPATIENT
Start: 2023-10-17 | End: 2023-10-24

## 2023-10-17 RX ORDER — IPRATROPIUM BROMIDE AND ALBUTEROL SULFATE 2.5; .5 MG/3ML; MG/3ML
3 SOLUTION RESPIRATORY (INHALATION) ONCE
Status: COMPLETED | OUTPATIENT
Start: 2023-10-17 | End: 2023-10-17

## 2023-10-17 RX ORDER — ALBUTEROL SULFATE 1.25 MG/3ML
1 SOLUTION RESPIRATORY (INHALATION) EVERY 6 HOURS PRN
Qty: 30 EACH | Refills: 0 | Status: SHIPPED | OUTPATIENT
Start: 2023-10-17

## 2023-10-17 RX ORDER — MAGNESIUM SULFATE HEPTAHYDRATE 40 MG/ML
2 INJECTION, SOLUTION INTRAVENOUS ONCE
Status: COMPLETED | OUTPATIENT
Start: 2023-10-17 | End: 2023-10-17

## 2023-10-17 RX ORDER — SODIUM CHLORIDE 0.9 % (FLUSH) 0.9 %
10 SYRINGE (ML) INJECTION AS NEEDED
Status: DISCONTINUED | OUTPATIENT
Start: 2023-10-17 | End: 2023-10-17 | Stop reason: HOSPADM

## 2023-10-17 RX ORDER — PREDNISONE 20 MG/1
20 TABLET ORAL 2 TIMES DAILY
Qty: 10 TABLET | Refills: 0 | Status: SHIPPED | OUTPATIENT
Start: 2023-10-17 | End: 2023-10-22

## 2023-10-17 RX ADMIN — MAGNESIUM SULFATE HEPTAHYDRATE 2 G: 40 INJECTION, SOLUTION INTRAVENOUS at 09:51

## 2023-10-17 RX ADMIN — METHYLPREDNISOLONE SODIUM SUCCINATE 125 MG: 125 INJECTION, POWDER, FOR SOLUTION INTRAMUSCULAR; INTRAVENOUS at 09:50

## 2023-10-17 RX ADMIN — IPRATROPIUM BROMIDE AND ALBUTEROL SULFATE 3 ML: .5; 3 SOLUTION RESPIRATORY (INHALATION) at 09:14

## 2023-10-17 NOTE — ED PROVIDER NOTES
"Subjective   History of Present Illness  68-year-old male presents to the ED with a chief complaint of shortness of breath.  Patient states that he has been short of breath for couple days.  Does not wear oxygen at home.  Presented to the ED hypoxic requiring a few liters of nasal cannula to maintain appropriate oxygen saturations.  Admits to a cough.  Cough is productive of small amount of sputum.  The patient does have a history of lung cancer status post lobectomy's.  He denies fever or chills.  No nausea vomiting diarrhea abdominal pain.  No lightheadedness or dizziness.  No prior treatments or limiting factors.  No other complaints at this time.    Review of Systems   Respiratory:  Positive for cough, shortness of breath and wheezing.    Cardiovascular:  Negative for chest pain.   All other systems reviewed and are negative.      Past Medical History:   Diagnosis Date    Abnormal ECG     Abscess of skin     Arthritis     HANDS AND BACK     Bradycardia     Bunion     LEFT FOOT    COPD (chronic obstructive pulmonary disease)     Full dentures     History of echocardiogram 09/21/2016    Hx of exercise stress test 09/27/2016    DR. BAKER \"IT WAS NORMAL\"    Low back pain     Lung cancer, middle lobe 11/08/2016    RIGHT MIDDLE LOBE    Lung nodule     RUL-7MM.  PRESENTLY HAS, AND FOLLOWS WITH DR. HERNANDEZ.  STATES FOUND \"A COUPLE MONTHS AGO\"    Premature atrial contraction     Pulmonary hypertension     Skin cancer     BASAL CELL-NOSE, LIP    Wears dentures 01/18/2023    Wears eyeglasses        No Known Allergies    Past Surgical History:   Procedure Laterality Date    APPENDECTOMY      BASAL CELL CARCINOMA EXCISION      NOSE, LIP    BRONCHOSCOPY  2016    BRONCHOSCOPY N/A 03/13/2020    Procedure: BRONCHOSCOPY;  Surgeon: Chris Porter MD;  Location: [facility];  Service: Cardiothoracic;  Laterality: N/A;    BRONCHOSCOPY WITH ION ROBOTIC ASSIST N/A 05/10/2023    Procedure: NAVIGATIONAL BRONCHOSCOPY WITH ION ROBOT;  " Surgeon: Rico Damon DO;  Location:  [facility];  Service: Robotics - Pulmonary;  Laterality: N/A;  Ion Cath 4 0038   0035    BUNIONECTOMY Left 2013    GREAT TOE    CATARACT EXTRACTION W/ INTRAOCULAR LENS IMPLANT Right 12/09/2022    Procedure: CATARACT PHACO EXTRACTION WITH INTRAOCULAR LENS IMPLANT RIGHT COMPLICATED WITH MALYUGIN RING;  Surgeon: Christiane Haynes DO;  Location:  [facility];  Service: Ophthalmology;  Laterality: Right;    CATARACT EXTRACTION W/ INTRAOCULAR LENS IMPLANT Left 01/24/2023    Procedure: CATARACT PHACO EXTRACTION WITH INTRAOCULAR LENS IMPLANT LEFT COMPLICATED WITH MALYUGIN RING;  Surgeon: Christiane Haynes DO;  Location:  [facility];  Service: Ophthalmology;  Laterality: Left;    COLONOSCOPY  2018    CYBERKNIFE  06/07/2023    RLL/hilar tumor    DIAGNOSTIC LAPAROSCOPY N/A 06/29/2020    Procedure: DIAGNOSTIC LAPAROSCOPY, APPENDECTOMY, CHOLECYSTECOMY WITH CHOLANGIOGRAPHY;  Surgeon: Shady Singh MD;  Location:  [facility];  Service: General;  Laterality: N/A;    ENDOSCOPY N/A 06/03/2020    Procedure: ESOPHAGOGASTRODUODENOSCOPY WITH BIOPSY;  Surgeon: Shady Singh MD;  Location:  [facility];  Service: Gastroenterology;  Laterality: N/A;    GALLBLADDER SURGERY  12/07/2022    INGUINAL HERNIA REPAIR Left 2002    Dr. Singh/inguinal     KNEE ARTHROSCOPY Right 12/12/2018    Procedure: Diagnostic arthroscopy right knee with partial medial meniscectomy;  Surgeon: Dinh Nova MD;  Location:  [facility];  Service: Orthopedics    LUNG REMOVAL, PARTIAL  2016    RIGHT MIDDLE LOBE    MOUTH SURGERY      FULL EXTRACTION OF TEETH    SKIN BIOPSY      THORACOSCOPY N/A 11/18/2016    Procedure: BRONCH THORACOSCOPY VIDEO ASSISTED  WITH LOBECTOMY, MEDIALSTINAL LYMPH NODE DISECTION;  Surgeon: Chris Porter MD;  Location:  [facility];  Service:     THORACOSCOPY VIDEO ASSISTED WITH LOBECTOMY Right 03/13/2020    Procedure: Redo right THORACOSCOPY VIDEO ASSISTED with right upper lobe wedge  resection and with conversion to open thorocotomy for right upper lobectomy with intercostal cryoablation;  Surgeon: Chris Porter MD;  [facility]: [facility];  Service: Cardiothoracic;  Laterality: Right;       Family History   Problem Relation Age of Onset    Heart attack Father     Heart disease Father     Colon cancer Father     Colon cancer Mother     Liver cancer Mother        Social History     Socioeconomic History    Marital status:     Number of children: 1   Tobacco Use    Smoking status: Former     Packs/day: 3.00     Years: 50.00     Additional pack years: 0.00     Total pack years: 150.00     Types: Cigarettes     Quit date: 2016     Years since quittin.9    Smokeless tobacco: Never   Vaping Use    Vaping Use: Never used   Substance and Sexual Activity    Alcohol use: Not Currently     Comment: 2-3 CANS DAILY    Drug use: Never    Sexual activity: Defer           Objective   Physical Exam  Vitals and nursing note reviewed.   Constitutional:       General: He is not in acute distress.     Appearance: He is not diaphoretic.      Comments: Chronically ill-appearing.  Thin.  Cachectic.   HENT:      Head: Normocephalic and atraumatic.      Nose: Nose normal.   Eyes:      Conjunctiva/sclera: Conjunctivae normal.      Pupils: Pupils are equal, round, and reactive to light.   Cardiovascular:      Rate and Rhythm: Normal rate and regular rhythm.   Pulmonary:      Effort: Tachypnea present. No respiratory distress.      Comments: Mild tachypnea.,  Coarse inspiratory and expiratory wheezing in the bilateral lung fields with questionable rhonchi in the bilateral lower lobes.  Chest:      Chest wall: No tenderness.   Abdominal:      General: There is no distension.      Palpations: Abdomen is soft.      Tenderness: There is no abdominal tenderness.   Musculoskeletal:         General: No deformity.      Right lower leg: No edema.      Left lower leg: No edema.   Neurological:      Mental Status: He is  alert and oriented to person, place, and time.      Cranial Nerves: No cranial nerve deficit.      Coordination: Coordination normal.         Procedures           ED Course  ED Course as of 10/22/23 2238   Tue Oct 17, 2023   0926 EKG interpreted by me.  Sinus rhythm.  Rate of 64.  Occasional PVC.  Right bundle branch block.  No obvious ST or T wave changes.  Abnormal EKG. [CG]      ED Course User Index  [CG] Andrea Castellanos DO                Lab Results (last 24 hours)       ** No results found for the last 24 hours. **                                     Medical Decision Making  Problems Addressed:  COPD with acute exacerbation: complicated acute illness or injury    Amount and/or Complexity of Data Reviewed  Labs: ordered.  Radiology: ordered.  ECG/medicine tests: ordered.    Risk  Prescription drug management.      Chief complaint: Shortness of breath    Differential diagnosis includes but not limited to: COPD exacerbation, pneumonia, malignant pleural effusion, pneumothorax, STEMI, NSTEMI, aspiration, among other    Patient arrives stable, afebrile, without respiratory distress with initial vitals interpreted by myself.  Pertinent initial vitals include hypoxic, 91% on room air.  Likely normal for this patient.  BP stable heart rate normal.    Plan: Chest x-ray, symptomatic treatment, CBC CMP EKG BMP troponin D-dimer, respiratory panel, other    Results from initial plan were reviewed and interpreted by myself and include: D-dimer 0.41, troponin indeterminate at 17, CBC shows relatively normal white blood cell count at 11.1, hemoglobin of 16 3 hematocrit of 14.7 mild left shift with 78% neutrophils.  CMP is reassuring with normal renal function normal LFTs.  BNP is negative at 301.  Chest x-ray shows chronic changes without focal pneumonia or pleural effusions.  Respiratory panel shows negative    Consultations N/A    Reevaluation resting comfortably.  Improved breath sounds in the bilateral lung fields.   Improved aeration of bilateral lungs.    Discussion: Patient presented to the ED with shortness of breath cough and wheeze.  Suspect most likely a COPD exacerbation given patient's history although there were other diagnoses in the differential, please see list above.  Work-up was relatively reassuring without significant elevation in his troponin or BNP.  EKG was nonischemic.  Patient was treated symptomatically with DuoNeb Solu-Medrol magnesium.  On reevaluation he was breathing more comfortably.      Diagnostic information from other sources includes: Review of previous visits, prior labs, prior imaging, available notes from prior evaluations or visits with specialists, medication list, allergies, past medical history, past surgical history    Interventions in the ED included: DuoNeb, Solu-Medrol, magnesium, supplemental oxygen as needed, other    Disposition plan: Discharge.  Follow-up outpatient as needed.  Strict return precaution given.    Final diagnoses:   COPD with acute exacerbation       ED Disposition  ED Disposition       ED Disposition   Discharge    Condition   Stable    Comment   --               Nannette Lim MD  [address] [id number] [phone]               Medication List        New Prescriptions      doxycycline 100 MG capsule  Commonly known as: MONODOX  Take 1 capsule by mouth 2 (Two) Times a Day for 7 days.            Changed      * albuterol sulfate  (90 Base) MCG/ACT inhaler  Commonly known as: PROVENTIL HFA;VENTOLIN HFA;PROAIR HFA  What changed: Another medication with the same name was added. Make sure you understand how and when to take each.     * albuterol 1.25 MG/3ML nebulizer solution  Commonly known as: ACCUNEB  Take 3 mL by nebulization Every 6 (Six) Hours As Needed for Wheezing.  What changed: You were already taking a medication with the same name, and this prescription was added. Make sure you understand how and when to take each.      predniSONE 20 MG tablet  Commonly known as: DELTASONE  Take 1 tablet by mouth 2 (Two) Times a Day for 5 days.  What changed:   medication strength  how much to take  how to take this  when to take this  additional instructions           * This list has 2 medication(s) that are the same as other medications prescribed for you. Read the directions carefully, and ask your doctor or other care provider to review them with you.                   Where to Get Your Medications        These medications were sent to [facility]/pharmacy #[id number] - [place], [place] - [address] [place] [address] [phone]  - [phone] [address] [address]      Phone: [phone]   albuterol 1.25 MG/3ML nebulizer solution  doxycycline 100 MG capsule  predniSONE 20 MG tablet            Andrea Castellanos, DO  10/22/23 2233

## 2023-10-20 ENCOUNTER — TELEPHONE (OUTPATIENT)
Dept: PULMONOLOGY | Facility: CLINIC | Age: 68
End: 2023-10-20

## 2023-10-20 ENCOUNTER — TELEPHONE (OUTPATIENT)
Dept: PULMONOLOGY | Facility: CLINIC | Age: 68
End: 2023-10-20
Payer: MEDICARE

## 2023-10-20 NOTE — TELEPHONE ENCOUNTER
Caller: Anna Aguayo    Relationship: Emergency Contact    Best call back number: [phone]    What is the best time to reach you: ANYTIME    Who are you requesting to speak with (clinical staff, provider,  specific staff member): MA    Do you know the name of the person who called: ASHLIE VELEZ    What was the call regarding: SHE CALLED TO CHECK HOW PT WAS DOING SINCE MONDAY.

## 2023-10-20 NOTE — TELEPHONE ENCOUNTER
I attempted to call the patient to see how he has been doing since Monday. Patient did not answer and a message was left to give us a call back.

## 2023-10-26 ENCOUNTER — HOSPITAL ENCOUNTER (OUTPATIENT)
Dept: RADIATION ONCOLOGY | Facility: HOSPITAL | Age: 68
Setting detail: RADIATION/ONCOLOGY SERIES
Discharge: HOME | End: 2023-10-26
Payer: MEDICARE

## 2023-10-26 ENCOUNTER — HOSPITAL ENCOUNTER (OUTPATIENT)
Dept: PET IMAGING | Facility: HOSPITAL | Age: 68
Discharge: HOME | End: 2023-10-26
Payer: MEDICARE

## 2023-10-26 ENCOUNTER — OFFICE VISIT (OUTPATIENT)
Dept: RADIATION ONCOLOGY | Facility: HOSPITAL | Age: 68
End: 2023-10-26
Payer: MEDICARE

## 2023-10-26 VITALS
DIASTOLIC BLOOD PRESSURE: 73 MMHG | HEART RATE: 83 BPM | RESPIRATION RATE: 20 BRPM | OXYGEN SATURATION: 92 % | TEMPERATURE: 98 F | BODY MASS INDEX: 22 KG/M2 | SYSTOLIC BLOOD PRESSURE: 123 MMHG | WEIGHT: 162.2 LBS

## 2023-10-26 DIAGNOSIS — C34.2 LUNG CANCER, MIDDLE LOBE: ICD-10-CM

## 2023-10-26 DIAGNOSIS — R91.1 RIGHT UPPER LOBE PULMONARY NODULE: ICD-10-CM

## 2023-10-26 DIAGNOSIS — C34.31 MALIGNANT NEOPLASM OF LOWER LOBE OF RIGHT LUNG: Primary | ICD-10-CM

## 2023-10-26 LAB — GLUCOSE BLDC GLUCOMTR-MCNC: 135 MG/DL (ref 70–130)

## 2023-10-26 PROCEDURE — 0 FLUDEOXYGLUCOSE F18 SOLUTION: Performed by: RADIOLOGY

## 2023-10-26 PROCEDURE — 82948 REAGENT STRIP/BLOOD GLUCOSE: CPT

## 2023-10-26 PROCEDURE — A9552 F18 FDG: HCPCS | Performed by: RADIOLOGY

## 2023-10-26 PROCEDURE — G0463 HOSPITAL OUTPT CLINIC VISIT: HCPCS

## 2023-10-26 PROCEDURE — 78815 PET IMAGE W/CT SKULL-THIGH: CPT

## 2023-10-26 RX ORDER — PREDNISONE 20 MG/1
20 TABLET ORAL DAILY
COMMUNITY

## 2023-10-26 RX ADMIN — FLUDEOXYGLUCOSE F18 1 DOSE: 300 INJECTION INTRAVENOUS at 08:14

## 2023-10-26 NOTE — PROGRESS NOTES
FOLLOW UP NOTE    PATIENT:                                                      Saman Aguayo  MEDICAL RECORD #:                        [id number]  :                                                          1955  COMPLETION DATE:   2023  DIAGNOSIS:     Malignant neoplasm of lower lobe of right lung  - Stage IA2 (cT1b, cN0, cM0)      BRIEF HISTORY:    He has a history of a radiographic diagnosis of a third metachronous early-stage primary neoplasm of the right lung, clinical stage IA2 (T1b, N0, M0).  He is status post prior right middle lobectomy  and right upper lobectomy  for early stage non-small cell lung cancers.  He is now 4 months status post CyberKnife stereotactic body radiotherapy to the new tumor.  He tolerated treatment well.  He has had a recent exacerbation of COPD and right lower lobe pneumonia for which she was prescribed a course of antibiotics and is already significantly improved.  PET/CT evaluation performed earlier today shows complete resolution of the treated right hilar tumor.  There is mild post radiation effect in the region.  There is mild diffuse uptake in the right lower lobe closer to the diaphragm which likely corresponds with his site of recent infection causing COPD exacerbation.  There is no associated worrisome mass.  No evidence of pathologic lymphadenopathy.  No evidence of extrathoracic metastasis.    MEDICATIONS: Medication reconciliation for the patient was reviewed and confirmed in the electronic medical record.    Review of Systems   Constitutional:  Positive for fatigue.   Respiratory:  Positive for chest tightness, cough, shortness of breath and wheezing.    Musculoskeletal:  Positive for arthralgias, back pain and gait problem.   Neurological:  Positive for gait problem, headaches and light-headedness.             Physical Exam  Vitals and nursing note reviewed.   Constitutional:       Appearance: He is well-developed.   HENT:      Head: Normocephalic and  atraumatic.   Cardiovascular:      Rate and Rhythm: Normal rate and regular rhythm.      Heart sounds: Normal heart sounds. No murmur heard.  Pulmonary:      Effort: Pulmonary effort is normal.      Breath sounds: Normal breath sounds. No wheezing or rales.   Abdominal:      General: Bowel sounds are normal. There is no distension.      Palpations: Abdomen is soft.      Tenderness: There is no abdominal tenderness.   Musculoskeletal:         General: No tenderness. Normal range of motion.      Cervical back: Normal range of motion and neck supple.   Lymphadenopathy:      Cervical: No cervical adenopathy.      Upper Body:      Right upper body: No supraclavicular adenopathy.      Left upper body: No supraclavicular adenopathy.   Skin:     General: Skin is warm and dry.   Neurological:      Mental Status: He is alert and oriented to person, place, and time.      Sensory: No sensory deficit.   Psychiatric:         Behavior: Behavior normal.         Thought Content: Thought content normal.         Judgment: Judgment normal.         VITAL SIGNS:   Vitals:    10/26/23 1257   BP: 123/73   Pulse: 83   Resp: 20   Temp: 98 °F (36.7 °C)   TempSrc: Skin   SpO2: 92%   Weight: 73.6 kg (162 lb 3.2 oz)   PainSc:   5   PainLoc: Generalized                       The following portions of the patient's history were reviewed and updated as appropriate: allergies, current medications, past family history, past medical history, past social history, past surgical history and problem list.         Diagnoses and all orders for this visit:    1. Malignant neoplasm of lower lobe of right lung (Primary)         IMPRESSION:  Radiographic diagnosis of a third metachronous early-stage primary neoplasm of the right lung, clinical stage IA2 (T1b, N0, M0).  He is now 4 months status post CyberKnife stereotactic body radiotherapy.  PET/CT performed earlier today shows complete resolution of the treated right lung tumor.  There appears to be healing  infection in the right lower lobe which is clinically responding to recent course of antibiotics.    RECOMMENDATIONS: Continue surveillance imaging with approximately every 6-month interval noncontrast CT chest.  Patient will return to Dr. Lim in December 2023 and discuss with him coordination of ordering scans.  Dr. Lim previously has been ordering scans twice yearly.  I will be available if needed and if patient or his other physicians wish me to continue ordering scans and to continue routine follow-up.    I spent a total of 25 minutes on todays visit, with more than 15 minutes in direct face to face communication, and the remainder of the time spent in reviewing the relevant history, records, available imaging, and for documentation.    Return if symptoms worsen or fail to improve.    Nathan Bermeo MD

## 2023-12-12 ENCOUNTER — OFFICE VISIT (OUTPATIENT)
Dept: INTERNAL MEDICINE | Facility: CLINIC | Age: 68
End: 2023-12-12
Payer: MEDICARE

## 2023-12-12 VITALS
HEIGHT: 72 IN | WEIGHT: 161.2 LBS | HEART RATE: 66 BPM | BODY MASS INDEX: 21.83 KG/M2 | DIASTOLIC BLOOD PRESSURE: 50 MMHG | SYSTOLIC BLOOD PRESSURE: 114 MMHG | OXYGEN SATURATION: 96 % | TEMPERATURE: 97.3 F

## 2023-12-12 DIAGNOSIS — J40 BRONCHITIS: Primary | ICD-10-CM

## 2023-12-12 LAB
EXPIRATION DATE: NORMAL
EXPIRATION DATE: NORMAL
FLUAV AG UPPER RESP QL IA.RAPID: NOT DETECTED
FLUBV AG UPPER RESP QL IA.RAPID: NOT DETECTED
INTERNAL CONTROL: NORMAL
INTERNAL CONTROL: NORMAL
Lab: NORMAL
Lab: NORMAL
S PYO AG THROAT QL: NEGATIVE
SARS-COV-2 AG UPPER RESP QL IA.RAPID: NOT DETECTED

## 2023-12-12 RX ORDER — AMOXICILLIN AND CLAVULANATE POTASSIUM 875; 125 MG/1; MG/1
1 TABLET, FILM COATED ORAL 2 TIMES DAILY
Qty: 14 TABLET | Refills: 0 | Status: SHIPPED | OUTPATIENT
Start: 2023-12-12

## 2023-12-12 RX ORDER — METHYLPREDNISOLONE SODIUM SUCCINATE 125 MG/2ML
125 INJECTION, POWDER, LYOPHILIZED, FOR SOLUTION INTRAMUSCULAR; INTRAVENOUS ONCE
Status: COMPLETED | OUTPATIENT
Start: 2023-12-12 | End: 2023-12-12

## 2023-12-12 RX ADMIN — METHYLPREDNISOLONE SODIUM SUCCINATE 125 MG: 125 INJECTION, POWDER, LYOPHILIZED, FOR SOLUTION INTRAMUSCULAR; INTRAVENOUS at 13:34

## 2023-12-12 NOTE — PROGRESS NOTES
Subjective   Saman Aguayo is a 68 y.o. male.     History of Present Illness  Patient presents with 10 days of cough with yellow sputum production.  Extreme fatigue.  Chills.  Body aches.  Sneezing.  Runny nose.  Oxygen level 96 today.      The following portions of the patient's history were reviewed and updated as appropriate: allergies, current medications, past family history, past medical history, past social history, past surgical history, and problem list.    Review of Systems   HENT:  Negative for congestion and sore throat.    Respiratory:  Negative for cough.    Neurological:  Negative for headaches.   All other systems reviewed and are negative.      Objective   Physical Exam  Vitals and nursing note reviewed.   Constitutional:       Appearance: Normal appearance. He is normal weight.   HENT:      Head: Normocephalic and atraumatic.      Right Ear: External ear normal.      Left Ear: External ear normal.      Nose: Nose normal.      Mouth/Throat:      Mouth: Mucous membranes are moist.      Pharynx: Oropharynx is clear.   Eyes:      Extraocular Movements: Extraocular movements intact.      Conjunctiva/sclera: Conjunctivae normal.      Pupils: Pupils are equal, round, and reactive to light.   Cardiovascular:      Rate and Rhythm: Normal rate and regular rhythm.      Pulses: Normal pulses.      Heart sounds: Normal heart sounds. No murmur heard.     No gallop.   Pulmonary:      Effort: Pulmonary effort is normal. No respiratory distress.      Breath sounds: No stridor. Wheezing present. No rhonchi or rales.   Abdominal:      General: Abdomen is flat. Bowel sounds are normal.      Palpations: Abdomen is soft.   Musculoskeletal:         General: Normal range of motion.      Cervical back: Normal range of motion and neck supple. No rigidity or tenderness.   Lymphadenopathy:      Cervical: No cervical adenopathy.   Skin:     General: Skin is warm.      Capillary Refill: Capillary refill takes less than 2  seconds.      Coloration: Skin is not jaundiced or pale.      Findings: No bruising, erythema, lesion or rash.   Neurological:      General: No focal deficit present.      Mental Status: He is alert and oriented to person, place, and time. Mental status is at baseline.   Psychiatric:         Mood and Affect: Mood normal.         Behavior: Behavior normal.         Thought Content: Thought content normal.         Judgment: Judgment normal.         Assessment & Plan   Diagnoses and all orders for this visit:    1. Bronchitis (Primary)  -     POCT SARS-CoV-2 Antigen PIERRE + Flu  -     POCT rapid strep A  -     methylPREDNISolone sodium succinate (SOLU-Medrol) injection 125 mg  -     amoxicillin-clavulanate (AUGMENTIN) 875-125 MG per tablet; Take 1 tablet by mouth 2 (Two) Times a Day.  Dispense: 14 tablet; Refill: 0         Flu strep and COVID all negative  Will treat for bronchitis.  Solu-Medrol 125 given today.  Patient to go back to his prednisone 20 tomorrow.  Start Augmentin today.  Patient has had right middle and right lower lobe lobectomy due to cancer

## 2023-12-15 ENCOUNTER — OFFICE VISIT (OUTPATIENT)
Dept: PULMONOLOGY | Facility: CLINIC | Age: 68
End: 2023-12-15
Payer: MEDICARE

## 2023-12-15 VITALS
RESPIRATION RATE: 18 BRPM | SYSTOLIC BLOOD PRESSURE: 122 MMHG | HEART RATE: 52 BPM | OXYGEN SATURATION: 96 % | DIASTOLIC BLOOD PRESSURE: 72 MMHG | WEIGHT: 163.4 LBS | HEIGHT: 72 IN | BODY MASS INDEX: 22.13 KG/M2

## 2023-12-15 DIAGNOSIS — J44.9 CHRONIC OBSTRUCTIVE PULMONARY DISEASE, UNSPECIFIED COPD TYPE: ICD-10-CM

## 2023-12-15 DIAGNOSIS — R06.02 SHORTNESS OF BREATH: ICD-10-CM

## 2023-12-15 DIAGNOSIS — C34.91 NON-SMALL CELL CANCER OF RIGHT LUNG: Primary | ICD-10-CM

## 2023-12-15 RX ORDER — TIOTROPIUM BROMIDE AND OLODATEROL 3.124; 2.736 UG/1; UG/1
2 SPRAY, METERED RESPIRATORY (INHALATION)
Qty: 4 G | Refills: 9 | Status: SHIPPED | OUTPATIENT
Start: 2023-12-15

## 2023-12-15 NOTE — PROGRESS NOTES
"Chief Complaint   Patient presents with    Shortness of Breath    Follow-up         Subjective   Saman Aguayo is a 68 y.o. male.   The patient comes in today for follow-up of shortness of breath.    He went to ER in October due to low oxygen levels. 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 He was sent home after oxygen levels improved.     He saw [facility] primary group earlier this week due to increased SOB, productive cough, sneezing and congestion. He is on antibiotic now.     He c/o having no energy.     He is s/p cyberknife and Rad Onc said he was in remission.     He quit smoking in 2016.     He does not use oxygen.     He uses Stiolto daily and albuterol in neb 3 times a day.       The following portions of the patient's history were reviewed and updated as appropriate: allergies, current medications, past family history, past medical history, past social history, and past surgical history.    Review of Systems   HENT:  Positive for sinus pressure, sneezing and sore throat.    Respiratory:  Positive for cough, shortness of breath and wheezing. Negative for chest tightness.    Cardiovascular:  Negative for palpitations and leg swelling.   Psychiatric/Behavioral:  Negative for sleep disturbance.        Objective   Visit Vitals  /72   Pulse 52   Resp 18   Ht 182.9 cm (72.01\") Comment: pt reported   Wt 74.1 kg (163 lb 6.4 oz)   SpO2 96%   BMI 22.16 kg/m²   ============================  ============================    6 MINUTE WALK TEST    Saman Aguayo   1955             BASELINE   SpO2%: 96 % RA or    Heart Rate 52   Blood Pressure 122/72     EXERCISE SpO2% HEART RATE RA or O2 @ LPM   1 MINUTE 92 82 ra   2 MINUTES 92 82 ra   3 MINUTES 91 75 ra   4 MINUTES 92 67 ra   5 MINUTES 92 90 ra   6 MINUTES 92 78 ra   (Number of laps: 8 X 36 meters + Final partial lap: 0 meters = 288 meters)            Distance Walked:  288 Meters   SpO2% Post Exercise:  95 % ra   HR Post " Exercise:  46     Reason to stop (if applicable):   ____ Chest Pain   ____ Light Headedness   ____ Dyspnea Unrelieved by Rest   ____ Abnormal Gait Pattern   ____ Severe Fatigue   ____ Other (Specify: __________________________)    Tech Comments (if any): na     Test performed by: rr    ============================  ============================       Physical Exam  Vitals reviewed.   HENT:      Head: Atraumatic.   Cardiovascular:      Rate and Rhythm: Normal rate and regular rhythm.   Pulmonary:      Effort: Pulmonary effort is normal. No respiratory distress.      Comments: Minimal expiratory wheezing noted.   Musculoskeletal:      Cervical back: Neck supple.   Neurological:      Mental Status: He is alert and oriented to person, place, and time.           Assessment & Plan   Diagnoses and all orders for this visit:    1. Non-small cell cancer of right lung (Primary)  -     CT Chest Without Contrast Diagnostic; Future    2. Chronic obstructive pulmonary disease, unspecified COPD type    3. Shortness of breath  -     Converted Six Minute Walk    Other orders  -     tiotropium bromide-olodaterol (Stiolto Respimat) 2.5-2.5 MCG/ACT aerosol solution inhaler; Inhale 2 puffs Daily. PRN  Dispense: 4 g; Refill: 9           Return for keep appt in Feb with PFT.    DISCUSSION (if any):  He should get flu vaccine once he is back to baseline respiratory status.     PFT ordered for f/u visit.     I have asked him to increase prednisone to 40 mg daily for the next 5 days. Finish all antibiotics.  I have asked him to use nebulizer at least twice a day for the next week.    On 6-minute walk, he did not display exercise hypoxemia.    For COPD treatment he will need to continue Stiolto daily and the rescue medication as needed for shortness of breath and wheezing.    I reviewed PET from October. He will need to continue CT chest every 6 months so I have ordered CT to be completed in April.    Study Result    Narrative & Impression    NM PET/CT SKULL BASE TO MID THIGH     Date of Exam: 10/26/2023 7:45 AM EDT     Indication: Non-small cell lung cancer (NSCLC), metastatic, assess treatment response.     Comparison: 2/6/2020 whole-body PET CT scan. Chest abdomen pelvis CT scans of 9/7/2023     Technique: 11.69 mCi of F-18 FDG was administered intravenously. PET imaging was obtained from skull base to mid-thigh approximately 60 minutes after radiotracer injection. A low dose non contrast CT was obtained for attenuation correction and anatomic   localization. Fused PET-CT and 3D MIP reconstructions were utilized for image interpretation.  Fasting blood glucose level: 135 mg/dl.     Reference uptake values:  Mediastinum: 2.1 SUVmax  Liver: 2.5 SUVmax  Normalization method: Body Weight     Findings:  Prior PET scan report noted hypermetabolic approximately 2 cm right perihilar nodule, otherwise negative exam. Recent diagnostic CT scan report indicates new 12 mm nodular density in the lingula, stable to similar pancreatic cystic lesion.     Today's 3D images show linear activity in the right posterior costophrenic recess that appears to correspond to an area of new disease in the posterior right lower lobe potentially a small focus of pneumonia. Right perihilar activity is no longer   present. No new PET scan abnormality is seen elsewhere on the 3D series.     Multiplanar images show no significant asymmetry of uptake in the brain. No hypermetabolic node or mass is seen in the neck.     In the chest, no hypermetabolic mediastinal hilar or axillary disease is seen. Patchy disease in the right lung base, suggestive of pneumonia on CT scan has maximal SUV of 3.3. For reference, the immediately adjacent liver measures 2.5.     Below the diaphragm, there is the usual heterogeneous activity in the liver without evidence of focal lesion. No hypermetabolic solid organ disease or adenopathy is seen. There is expected GI and  tract activity. No hypermetabolic  node or mass is seen   in the pelvis. No pathologic marrow space uptake is seen. Patient small pancreatic cyst remains non-hypermetabolic. This area measures only maximal SUV of 1.3, identical to adjacent surrounding soft tissue.     IMPRESSION:  Impression:     1. Previously noted hypermetabolic right lung lesion is no longer seen.     2. Mild new patchy posterior right lower lobe pulmonary parenchymal disease, mildly hypermetabolic, suggesting a small focus of pneumonia or possibly aspiration.     3. Negative PET scan elsewhere. No hypermetabolic activity in the patient's small pancreatic cyst.        Dictated utilizing Dragon dictation.    This document was electronically signed by SAMIRA Wylie December 15, 2023  13:55 EST

## 2023-12-20 ENCOUNTER — HOSPITAL ENCOUNTER (OUTPATIENT)
Dept: GENERAL RADIOLOGY | Facility: HOSPITAL | Age: 68
Discharge: HOME | End: 2023-12-20
Payer: MEDICARE

## 2023-12-20 ENCOUNTER — LAB (OUTPATIENT)
Dept: LAB | Facility: HOSPITAL | Age: 68
End: 2023-12-20
Payer: MEDICARE

## 2023-12-20 ENCOUNTER — OFFICE VISIT (OUTPATIENT)
Dept: INTERNAL MEDICINE | Facility: CLINIC | Age: 68
End: 2023-12-20
Payer: MEDICARE

## 2023-12-20 VITALS
SYSTOLIC BLOOD PRESSURE: 123 MMHG | TEMPERATURE: 98.2 F | HEART RATE: 72 BPM | DIASTOLIC BLOOD PRESSURE: 73 MMHG | OXYGEN SATURATION: 96 % | WEIGHT: 158 LBS | BODY MASS INDEX: 21.4 KG/M2 | HEIGHT: 72 IN

## 2023-12-20 DIAGNOSIS — R04.2 HEMOPTYSIS: ICD-10-CM

## 2023-12-20 DIAGNOSIS — J40 BRONCHITIS: ICD-10-CM

## 2023-12-20 DIAGNOSIS — J40 BRONCHITIS: Primary | ICD-10-CM

## 2023-12-20 LAB
ALBUMIN SERPL-MCNC: 4.2 G/DL (ref 3.5–5.2)
ALBUMIN/GLOB SERPL: 1.3 G/DL
ALP SERPL-CCNC: 79 U/L (ref 39–117)
ALT SERPL W P-5'-P-CCNC: 15 U/L (ref 1–41)
ANION GAP SERPL CALCULATED.3IONS-SCNC: 11.4 MMOL/L (ref 5–15)
AST SERPL-CCNC: 12 U/L (ref 1–40)
BILIRUB SERPL-MCNC: 0.7 MG/DL (ref 0–1.2)
BUN SERPL-MCNC: 14 MG/DL (ref 8–23)
BUN/CREAT SERPL: 13.1 (ref 7–25)
CALCIUM SPEC-SCNC: 9.5 MG/DL (ref 8.6–10.5)
CHLORIDE SERPL-SCNC: 95 MMOL/L (ref 98–107)
CO2 SERPL-SCNC: 30.6 MMOL/L (ref 22–29)
CREAT SERPL-MCNC: 1.07 MG/DL (ref 0.76–1.27)
DEPRECATED RDW RBC AUTO: 39.4 FL (ref 37–54)
EGFRCR SERPLBLD CKD-EPI 2021: 74.7 ML/MIN/1.73
ERYTHROCYTE [DISTWIDTH] IN BLOOD BY AUTOMATED COUNT: 12 % (ref 12.3–15.4)
ERYTHROCYTE [SEDIMENTATION RATE] IN BLOOD: 35 MM/HR (ref 0–20)
GLOBULIN UR ELPH-MCNC: 3.2 GM/DL
GLUCOSE SERPL-MCNC: 102 MG/DL (ref 65–99)
HCT VFR BLD AUTO: 49.7 % (ref 37.5–51)
HGB BLD-MCNC: 16.6 G/DL (ref 13–17.7)
MCH RBC QN AUTO: 30.1 PG (ref 26.6–33)
MCHC RBC AUTO-ENTMCNC: 33.4 G/DL (ref 31.5–35.7)
MCV RBC AUTO: 90.2 FL (ref 79–97)
PLATELET # BLD AUTO: 245 10*3/MM3 (ref 140–450)
PMV BLD AUTO: 10.7 FL (ref 6–12)
POTASSIUM SERPL-SCNC: 5.2 MMOL/L (ref 3.5–5.2)
PROT SERPL-MCNC: 7.4 G/DL (ref 6–8.5)
RBC # BLD AUTO: 5.51 10*6/MM3 (ref 4.14–5.8)
SODIUM SERPL-SCNC: 137 MMOL/L (ref 136–145)
WBC NRBC COR # BLD AUTO: 11.67 10*3/MM3 (ref 3.4–10.8)

## 2023-12-20 PROCEDURE — 1159F MED LIST DOCD IN RCRD: CPT | Performed by: NURSE PRACTITIONER

## 2023-12-20 PROCEDURE — 71046 X-RAY EXAM CHEST 2 VIEWS: CPT

## 2023-12-20 PROCEDURE — 99213 OFFICE O/P EST LOW 20 MIN: CPT | Performed by: NURSE PRACTITIONER

## 2023-12-20 PROCEDURE — 80053 COMPREHEN METABOLIC PANEL: CPT

## 2023-12-20 PROCEDURE — 85027 COMPLETE CBC AUTOMATED: CPT

## 2023-12-20 PROCEDURE — 85652 RBC SED RATE AUTOMATED: CPT

## 2023-12-20 PROCEDURE — 36415 COLL VENOUS BLD VENIPUNCTURE: CPT

## 2023-12-20 PROCEDURE — 1160F RVW MEDS BY RX/DR IN RCRD: CPT | Performed by: NURSE PRACTITIONER

## 2023-12-20 RX ORDER — DEXTROMETHORPHAN HYDROBROMIDE AND PROMETHAZINE HYDROCHLORIDE 15; 6.25 MG/5ML; MG/5ML
5 SYRUP ORAL 4 TIMES DAILY PRN
Qty: 180 ML | Refills: 0 | Status: SHIPPED | OUTPATIENT
Start: 2023-12-20

## 2023-12-20 NOTE — PROGRESS NOTES
Office Visit      Patient Name: Saman Aguayo  : 1955   MRN: [id number]   Care Team: Patient Care Team:  Yuliya Watt DO as PCP - General (Family Medicine)  Max Almonte MD as Consulting Physician (Cardiology)  Juan Alberto Carty MD as Surgeon (Cardiothoracic Surgery)  Shady Singh MD as Consulting Physician (General Surgery)  Erin Sosa APRN as Nurse Practitioner (Cardiothoracic Surgery)  Hilda Smyth APRN as Nurse Practitioner (Pulmonary Disease)  Rianna Fernandez APRN as Nurse Practitioner (Cardiothoracic Surgery)  Janina Mojica APRN as Nurse Practitioner (Nurse Practitioner)  Jose Lee MD as Consulting Physician (Otolaryngology)  Veronika Barry RN as Ambulatory  (Oncology) ([facility])  Nathan Bermeo MD as Consulting Physician (Radiation Oncology)  Camden Lombardi MD as Referring Physician (Hematology and Oncology)  Raghu Morejon MD as Consulting Physician (Rheumatology)    Chief Complaint  Cough (Patient was here last week and dx with bronchitis.  States he has been coughing and sputum tinged in blood. )    Subjective     Subjective      Saman Aguayo presents to [facility] for cough and hemoptysis.   Diagnosed with bronchitis last week and given augmentin with steroid burst.   He is complaining of feeling extremely fatigued, started having hemoptysis yesterday and as of this afternoon has stopped, and continued cough.   He finished antibiotics and steroids.    He continues to have bothersome cough and blood tinged sputum. He states the blood is quite a bit on his tissue when he coughs into it . The blood is bright red.   Denies night sweats, fever, and nose bleeds.  He has had some weight loss, down 10 pounds since October.   Intermittently chilled and short of breath, continues to use prescribed inhalers.   Weakness and fatigue has actually been going on and worsening over the  "last 3 months.  He does have a history of NSCLC. Recent PET October, [place]. S/P cyberknife treatment. Reoccurrence x 3 per his report.     Objective     Objective   Vital Signs:   /73 (BP Location: Right arm)   Pulse 72   Temp 98.2 °F (36.8 °C) (Tympanic)   Ht 182.9 cm (72.01\")   Wt 71.7 kg (158 lb)   SpO2 96%   BMI 21.42 kg/m²     Physical Exam  Vitals and nursing note reviewed.   Constitutional:       General: He is not in acute distress.     Appearance: Normal appearance. He is ill-appearing. He is not toxic-appearing.   HENT:      Right Ear: Tympanic membrane and ear canal normal. No middle ear effusion. Tympanic membrane is not bulging.      Left Ear: Tympanic membrane and ear canal normal.  No middle ear effusion. Tympanic membrane is not bulging.      Nose: Nose normal. No congestion or rhinorrhea.      Right Sinus: No maxillary sinus tenderness or frontal sinus tenderness.      Left Sinus: No maxillary sinus tenderness or frontal sinus tenderness.      Mouth/Throat:      Mouth: Mucous membranes are moist.      Pharynx: Posterior oropharyngeal erythema (significant irritation noted) present.   Eyes:      General:         Right eye: Discharge (clear) present.         Left eye: Discharge (clear) present.     Pupils: Pupils are equal, round, and reactive to light.   Cardiovascular:      Rate and Rhythm: Normal rate and regular rhythm.      Heart sounds: Normal heart sounds. No murmur heard.  Pulmonary:      Effort: Pulmonary effort is normal. No respiratory distress.      Breath sounds: Normal breath sounds. Wheezing (throughout lung fields) present. No rales.   Abdominal:      General: Bowel sounds are normal. There is no distension.      Palpations: Abdomen is soft.      Tenderness: There is no abdominal tenderness.   Musculoskeletal:      Cervical back: Neck supple. No tenderness.   Lymphadenopathy:      Head:      Right side of head: No submental, submandibular or tonsillar adenopathy.      Left " side of head: No submental, submandibular or tonsillar adenopathy.      Cervical: No cervical adenopathy.   Skin:     General: Skin is warm and dry.      Findings: No rash.   Neurological:      Mental Status: He is alert.   Psychiatric:         Mood and Affect: Mood normal.         Behavior: Behavior normal.          Assessment / Plan      Assessment & Plan   Problem List Items Addressed This Visit       Bronchitis - Primary    Relevant Medications    promethazine-dextromethorphan (PROMETHAZINE-DM) 6.25-15 MG/5ML syrup    Other Relevant Orders    CBC No Differential    Comprehensive metabolic panel    Sedimentation rate, automated    XR Chest PA & Lateral     Other Visit Diagnoses       Hemoptysis        Relevant Orders    CBC No Differential    Comprehensive metabolic panel    Sedimentation rate, automated    XR Chest PA & Lateral    Will perform labs and x-ray given history. No distinct pneumonia on exam. Could be irritation from significant coughing, discussed red flags to go to the ED with. Will provide PRN promethazine-DM as needed, educated on drowsy side effect. Continue maintenance inhaler and abortive albuterol PRN. He will let pulmonology know of new symptoms, may want CT sooner if abnormality on x-ray. Wife and patient understand this plan.           BMI is within normal parameters. No other follow-up for BMI required.      Follow Up   Return if symptoms worsen or fail to improve.  Patient was given instructions and counseling regarding his condition or for health maintenance advice. Please see specific information pulled into the AVS if appropriate.     SAMIRA Hernandez  [facility]

## 2023-12-21 ENCOUNTER — TELEPHONE (OUTPATIENT)
Dept: INTERNAL MEDICINE | Facility: CLINIC | Age: 68
End: 2023-12-21
Payer: MEDICARE

## 2023-12-21 ENCOUNTER — TELEPHONE (OUTPATIENT)
Dept: PULMONOLOGY | Facility: CLINIC | Age: 68
End: 2023-12-21

## 2023-12-21 NOTE — TELEPHONE ENCOUNTER
"Relay     \"Labs show mild elevation in inflammatory markers, still waiting on x-ray read. To my eye I don't see any big changes but will let you know once reviewed by the radiologist. \"        "

## 2023-12-21 NOTE — TELEPHONE ENCOUNTER
Hub staff attempted to follow warm transfer process and was unsuccessful     Caller: Maricruz Calloway    Relationship to patient: Emergency Contact    Best call back number: [phone]    Patient is needing: PT IS COUGHING BLOOD, WAS SENT FOR XRAYS AND LABS LAST NIGHT. WAS ADV TO SPEAK W HERNANDEZ OR MICKY TODAY REG RESULTS AND NEXT STEPS.

## 2023-12-21 NOTE — TELEPHONE ENCOUNTER
----- Message from SAMIRA Hensley sent at 12/21/2023  9:05 AM EST -----  Labs show mild elevation in inflammatory markers, still waiting on x-ray read. To my eye I don't see any big changes but will let you know once reviewed by the radiologist.

## 2023-12-21 NOTE — TELEPHONE ENCOUNTER
Name: JETT SHEIKH    Relationship: SPOUSE    Best Callback Number: [phone]    HUB PROVIDED THE RELAY MESSAGE FROM THE OFFICE    PATIENT HAS FURTHER QUESTIONS AND WOULD LIKE A CALL BACK AT THE FOLLOWING PHONE NUMBER [phone]    ADDITIONAL INFORMATION: PATIENT'S WIFE WOULD LIKE TO KNOW IF HE WILL BE PUT ON ANY OTHER ANTIBIOTICS. PLEASE CALL BACK TO ADVISE ON FURTHER QUESTIONS.

## 2023-12-26 ENCOUNTER — HOSPITAL ENCOUNTER (OUTPATIENT)
Dept: CT IMAGING | Facility: HOSPITAL | Age: 68
Discharge: HOME | End: 2023-12-26
Admitting: NURSE PRACTITIONER
Payer: MEDICARE

## 2023-12-26 ENCOUNTER — TELEPHONE (OUTPATIENT)
Dept: RADIATION ONCOLOGY | Facility: HOSPITAL | Age: 68
End: 2023-12-26
Payer: MEDICARE

## 2023-12-26 DIAGNOSIS — C34.91 NON-SMALL CELL CANCER OF RIGHT LUNG: ICD-10-CM

## 2023-12-26 PROCEDURE — 71250 CT THORAX DX C-: CPT

## 2023-12-26 NOTE — TELEPHONE ENCOUNTER
Pt's wife called stating 2 weeks ago pt had bronchitis and then started coughing up blood.  She states he had a CXR and then the pulmonary doctor ordered a CT scan that is been done today.  She states she would like Dr. Bermeo to look at it and if needed get an appointment.  I explained  is on vacation this week but I will notify him of the CT scan as soon as he returns next Tues.  Wife verbalized understanding.

## 2023-12-29 ENCOUNTER — TELEPHONE (OUTPATIENT)
Dept: PULMONOLOGY | Facility: CLINIC | Age: 68
End: 2023-12-29
Payer: MEDICARE

## 2023-12-29 NOTE — TELEPHONE ENCOUNTER
Called and spoke to patients wife, she was informed of the information regarding the CT scan that Hilda discussed with Dr. Lombardi, she is understanding, she stated that she will call their office and schedule an apt for Saman HEATON.

## 2024-01-02 ENCOUNTER — DOCUMENTATION (OUTPATIENT)
Dept: RADIATION ONCOLOGY | Facility: HOSPITAL | Age: 69
End: 2024-01-02
Payer: MEDICARE

## 2024-01-02 DIAGNOSIS — C34.2 LUNG CANCER, MIDDLE LOBE: Primary | ICD-10-CM

## 2024-01-02 NOTE — PROGRESS NOTES
Telephone conversation with patient with patient/wife.    They called last week to report that he had recent lung infection and abnormal imaging studies.    Approximately 2 weeks ago he was noted to have bronchitis/pneumonia and hemoptysis.  After round of antibiotics, steroids and cough medicine his breathing has improved and the hemoptysis has resolved.  He is still fatigued but slowly improving.    CT chest 12/26/2023 noted a new 16 x 6 mm oval density in the remaining right lower lobe of the lung (status post prior right upper lobectomy and right middle lobectomies).  There is also an 8 mm nodule in the left lower lobe of the lung not seen on prior scan and a 3 mm right upper lobe nodule that was unchanged.  There was no evidence of local recurrence in the right hilar region treated 6 months ago with stereotactic body radiotherapy.  There was no pathologic lymphadenopathy.    Impression: He is just over 6 months status post CyberKnife stereotactic body radiotherapy for a third metachronous early-stage primary neoplasm of the right lung.  No evidence of local recurrence in the right hilar lower lung region treated with SBRT.  He has had recent upper respiratory infectious symptoms which are improving, and abnormal CT imaging study of the chest with findings compatible with infectious or inflammatory change; however, with his history of multiple lung malignancies, closer [place] follow-up is recommended.    Plan:  He has follow-up with his medical oncologist, Dr. Lombardi, later this week.  He will continue follow-up with his pulmonary medicine team and primary physician.  I will order repeat CT chest to be performed in approximately 2 months in the [facility].  Follow-up with me after scan.

## 2024-01-03 ENCOUNTER — OFFICE VISIT (OUTPATIENT)
Dept: INTERNAL MEDICINE | Facility: CLINIC | Age: 69
End: 2024-01-03
Payer: MEDICARE

## 2024-01-03 VITALS
HEIGHT: 72 IN | SYSTOLIC BLOOD PRESSURE: 132 MMHG | OXYGEN SATURATION: 93 % | HEART RATE: 81 BPM | BODY MASS INDEX: 22.21 KG/M2 | RESPIRATION RATE: 16 BRPM | DIASTOLIC BLOOD PRESSURE: 66 MMHG | WEIGHT: 164 LBS

## 2024-01-03 DIAGNOSIS — C34.31 MALIGNANT NEOPLASM OF LOWER LOBE OF RIGHT LUNG: ICD-10-CM

## 2024-01-03 DIAGNOSIS — J06.9 UPPER RESPIRATORY INFECTION, VIRAL: ICD-10-CM

## 2024-01-03 DIAGNOSIS — Z12.11 COLON CANCER SCREENING: ICD-10-CM

## 2024-01-03 DIAGNOSIS — M15.9 PRIMARY OSTEOARTHRITIS INVOLVING MULTIPLE JOINTS: ICD-10-CM

## 2024-01-03 DIAGNOSIS — Z80.0 FAMILY HX OF COLON CANCER: Primary | ICD-10-CM

## 2024-01-03 PROBLEM — R42 DIZZINESS: Status: RESOLVED | Noted: 2022-04-07 | Resolved: 2024-01-03

## 2024-01-03 PROBLEM — Z00.00 ENCOUNTER FOR SUBSEQUENT ANNUAL WELLNESS VISIT (AWV) IN MEDICARE PATIENT: Status: RESOLVED | Noted: 2020-10-05 | Resolved: 2024-01-03

## 2024-01-03 PROBLEM — R53.82 CHRONIC FATIGUE: Status: RESOLVED | Noted: 2020-08-03 | Resolved: 2024-01-03

## 2024-01-03 PROBLEM — U09.9 POST-COVID CHRONIC LOSS OF TASTE: Status: RESOLVED | Noted: 2022-04-07 | Resolved: 2024-01-03

## 2024-01-03 PROBLEM — R43.2 POST-COVID CHRONIC LOSS OF TASTE: Status: RESOLVED | Noted: 2022-04-07 | Resolved: 2024-01-03

## 2024-01-03 RX ORDER — CETIRIZINE HYDROCHLORIDE 10 MG/1
10 TABLET ORAL NIGHTLY
Qty: 90 TABLET | Refills: 1 | Status: SHIPPED | OUTPATIENT
Start: 2024-01-03

## 2024-01-03 RX ORDER — AZELASTINE 1 MG/ML
2 SPRAY, METERED NASAL 2 TIMES DAILY
Qty: 30 ML | Refills: 2 | Status: SHIPPED | OUTPATIENT
Start: 2024-01-03

## 2024-01-03 RX ORDER — BENZONATATE 100 MG/1
100 CAPSULE ORAL 3 TIMES DAILY PRN
Qty: 30 CAPSULE | Refills: 0 | Status: SHIPPED | OUTPATIENT
Start: 2024-01-03

## 2024-01-03 RX ORDER — GUAIFENESIN 600 MG/1
600 TABLET, EXTENDED RELEASE ORAL 2 TIMES DAILY
Qty: 20 TABLET | Refills: 2 | Status: SHIPPED | OUTPATIENT
Start: 2024-01-03

## 2024-01-03 NOTE — PROGRESS NOTES
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 male who presents today for chronic conditions    Hx of right Lung CA s/p right middle lobectomy  and right upper lobectomy  for early stage non-small cell lung cancers.  History of COPD.  Follows with Dr Lim and Dr Lombardi: last CT scan showed Interval development of small nodular densities which could be inflammatory or metastatic disease.  Recently had an episode of bronchitis.    Colonoscopy  fhx of colon CA (mother and father)  150 pack year history    The following portions of the patient's history were reviewed and updated as appropriate: allergies, current medications, past family history, past medical history, past social history, past surgical history, and problem list.      Family History:   Family History   Problem Relation Age of Onset    Heart attack Father     Heart disease Father     Colon cancer Father     Colon cancer Mother     Liver cancer Mother        Social History:   Social History     Socioeconomic History    Marital status:     Number of children: 1   Tobacco Use    Smoking status: Former     Packs/day: 3.00     Years: 50.00     Additional pack years: 0.00     Total pack years: 150.00     Types: Cigarettes     Quit date: 2016     Years since quittin.1     Passive exposure: Past    Smokeless tobacco: Never   Vaping Use    Vaping Use: Never used   Substance and Sexual Activity    Alcohol use: Not Currently     Comment: 2-3 CANS DAILY    Drug use: Never    Sexual activity: Defer       Health Maintenance   Topic Date Due    COLORECTAL CANCER SCREENING  2023    ANNUAL WELLNESS VISIT  10/14/2023    INFLUENZA VACCINE  2024 (Originally 2023)    Pneumococcal Vaccine 65+ (3 of 3 - PPSV23 or PCV20) 2024 (Originally 10/27/2021)    COVID-19  "Vaccine (3 - Pfizer risk series) 07/20/2024 (Originally 4/26/2021)    TDAP/TD VACCINES (2 - Td or Tdap) 01/26/2027    HEPATITIS C SCREENING  Completed    AAA SCREEN (ONE-TIME)  Completed    ZOSTER VACCINE  Completed    LUNG CANCER SCREENING  Discontinued       Objective     Vital Signs  /66   Pulse 81   Resp 16   Ht 182.9 cm (72.01\")   Wt 74.4 kg (164 lb)   SpO2 93%   BMI 22.24 kg/m²   Estimated body mass index is 22.24 kg/m² as calculated from the following:    Height as of this encounter: 182.9 cm (72.01\").    Weight as of this encounter: 74.4 kg (164 lb).  BMI is within normal parameters. No other follow-up for BMI required.    Physical Exam  Vitals and nursing note reviewed.   Constitutional:       Appearance: Normal appearance.   HENT:      Head: Normocephalic and atraumatic.      Nose: Congestion present.      Mouth/Throat:      Pharynx: No oropharyngeal exudate or posterior oropharyngeal erythema.   Cardiovascular:      Rate and Rhythm: Normal rate and regular rhythm.      Pulses: Normal pulses.      Heart sounds: Normal heart sounds.   Pulmonary:      Effort: Pulmonary effort is normal. No respiratory distress.      Breath sounds: Normal breath sounds. No wheezing, rhonchi or rales.   Abdominal:      General: Abdomen is flat. Bowel sounds are normal.      Palpations: Abdomen is soft.      Tenderness: There is no abdominal tenderness. There is no guarding.   Musculoskeletal:      Cervical back: Neck supple.   Skin:     General: Skin is warm.      Capillary Refill: Capillary refill takes less than 2 seconds.   Neurological:      General: No focal deficit present.      Mental Status: He is alert. Mental status is at baseline.   Psychiatric:         Mood and Affect: Mood normal.         Behavior: Behavior normal.          Procedures     Assessment and Plan     Diagnoses and all orders for this visit:    1. Family hx of colon cancer (Primary)  -     Ambulatory Referral For Screening Colonoscopy    2. " Colon cancer screening  -     Ambulatory Referral For Screening Colonoscopy    3. Upper respiratory infection, viral  -     azelastine (ASTELIN) 0.1 % nasal spray; 2 sprays into the nostril(s) as directed by provider 2 (Two) Times a Day. Use in each nostril as directed  Dispense: 30 mL; Refill: 2  -     guaiFENesin (Mucinex) 600 MG 12 hr tablet; Take 1 tablet by mouth 2 (Two) Times a Day.  Dispense: 20 tablet; Refill: 2  -     benzonatate (Tessalon Perles) 100 MG capsule; Take 1 capsule by mouth 3 (Three) Times a Day As Needed for Cough.  Dispense: 30 capsule; Refill: 0  -     cetirizine (zyrTEC) 10 MG tablet; Take 1 tablet by mouth Every Night.  Dispense: 90 tablet; Refill: 1    4. Primary osteoarthritis involving multiple joints  Assessment & Plan:  Stable for now      5. Malignant neoplasm of lower lobe of right lung  Assessment & Plan:  Follows with pulmonology Dr. Lim and Dr. Lombardi.           Counseling was given to patient for the following topics: instructions for management, risks and benefits of treatment options, and importance of treatment compliance.    Follow Up  Return in about 3 months (around 4/3/2024) for Medicare Wellness.    MD ANA LAURA Epperson  [facility]  [address] [place] [id number] [phone]

## 2024-01-04 ENCOUNTER — OFFICE VISIT (OUTPATIENT)
Dept: ONCOLOGY | Facility: CLINIC | Age: 69
End: 2024-01-04
Payer: MEDICARE

## 2024-01-04 VITALS
DIASTOLIC BLOOD PRESSURE: 65 MMHG | TEMPERATURE: 97.4 F | HEIGHT: 72 IN | SYSTOLIC BLOOD PRESSURE: 140 MMHG | RESPIRATION RATE: 12 BRPM | OXYGEN SATURATION: 94 % | BODY MASS INDEX: 21.81 KG/M2 | HEART RATE: 46 BPM | WEIGHT: 161 LBS

## 2024-01-04 DIAGNOSIS — C34.31 MALIGNANT NEOPLASM OF LOWER LOBE OF RIGHT LUNG: ICD-10-CM

## 2024-01-04 DIAGNOSIS — C34.2 LUNG CANCER, MIDDLE LOBE: Primary | ICD-10-CM

## 2024-01-04 PROCEDURE — 99214 OFFICE O/P EST MOD 30 MIN: CPT | Performed by: INTERNAL MEDICINE

## 2024-01-04 PROCEDURE — 1125F AMNT PAIN NOTED PAIN PRSNT: CPT | Performed by: INTERNAL MEDICINE

## 2024-01-04 NOTE — PROGRESS NOTES
Follow Up Office Visit      Date: 2024     Patient Name: Saman Aguayo  MRN: [id number]  : 1955  Referring Physician: Dre Do     Chief Complaint: Follow-up for history Radha has a of right lung cancer and newly diagnosed right lung cancer in May 2023     History of Present Illness: Saman Aguayo is a pleasant 68 y.o. male history of right lung adenocarcinoma status post 2 resections, osteoarthritis, COPD who presents today for evaluation of history of lung cancer new right lung nodule. The patient is accompanied by their wife who contributes to the history of their care.  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  He then had recurrence early  requiring redo right VATS with right upper wedge resection conversion to thoracotomy with completion lobectomy and mediastinal lymph node dissection 2020 by Dr. Porter.  Pathology consistent with stage IA2 non-small cell carcinoma most consistent with adenocarcinoma ([id number]).   He had negative CT scans until 2023 when he presented with significant bilateral shoulder pain.  CT chest notable for a 1.3 cm right upper lobe nodule concerning for disease recurrence or metastasis.  PET/CT with some slight hypermetabolic activity in the lesion but no other sites of disease noted.  Underwent biopsy with Dr. Damon on 5/10/2023.  Pathology not consistent with malignancy     Interval History:  Presents to clinic for follow-up.  Completed CyberKnife radiation to his new right lung malignancy in 2023.  Has been dealing with upper respiratory infections and pneumonias over the past several weeks.  Notes that he has been feeling better over the past couple days.  Denies any fevers or chills today.  Still having a cough but denies any worsening  production    Oncology History:    Oncology/Hematology History   Malignant neoplasm of lower lobe of right lung   5/9/2023 Cancer Staged    Staging form: Lung, AJCC 8th Edition  - Clinical stage from 5/9/2023: Stage IA2 (cT1b, cN0, cM0) - Signed by Nathan Bermeo MD on 5/18/2023 5/18/2023 Initial Diagnosis    Malignant neoplasm of lower lobe of right lung     5/31/2023 - 6/7/2023 Radiation    Radiation OncologyTreatment Course:  Saman Aguayo received 4800 cGy in 3 fractions to right lung via Stereotactic Radiation Therapy - SRT.         Subjective      Review of Systems:   Constitutional: Negative for fevers, chills, or weight loss  Eyes: Negative for blurred vision or discharge         Ear/Nose/Throat: Negative for difficulty swallowing, sore throat, LAD                                                       Respiratory: Negative for cough, SOA, wheezing                                                                                        Cardiovascular: Negative for chest pain or palpitations                                                                  Gastrointestinal: Negative for nausea, vomiting or diarrhea                                                                     Genitourinary: Negative for dysuria or hematuria                                                                                           Musculoskeletal: Negative for any joint pains or muscle aches                                                                        Neurologic: Negative for any weakness, headaches, dizziness                                                                         Hematologic: Negative for any easy bleeding or bruising                                                                                   Psychiatric: Negative for anxiety or depression                          Past Medical History/Past Surgical History/ Family History/ Social History: Reviewed by me and unchanged from my previous  "documentation done on September 2023.     Medications:     Current Outpatient Medications:     albuterol (ACCUNEB) 1.25 MG/3ML nebulizer solution, Take 3 mL by nebulization Every 6 (Six) Hours As Needed for Wheezing., Disp: 30 each, Rfl: 0    albuterol sulfate  (90 Base) MCG/ACT inhaler, Inhale 2 puffs Every 4 (Four) Hours As Needed for Wheezing., Disp: , Rfl:     azelastine (ASTELIN) 0.1 % nasal spray, 2 sprays into the nostril(s) as directed by provider 2 (Two) Times a Day. Use in each nostril as directed, Disp: 30 mL, Rfl: 2    benzonatate (Tessalon Perles) 100 MG capsule, Take 1 capsule by mouth 3 (Three) Times a Day As Needed for Cough., Disp: 30 capsule, Rfl: 0    cetirizine (zyrTEC) 10 MG tablet, Take 1 tablet by mouth Every Night., Disp: 90 tablet, Rfl: 1    guaiFENesin (Mucinex) 600 MG 12 hr tablet, Take 1 tablet by mouth 2 (Two) Times a Day., Disp: 20 tablet, Rfl: 2    tiotropium bromide-olodaterol (Stiolto Respimat) 2.5-2.5 MCG/ACT aerosol solution inhaler, Inhale 2 puffs Daily. PRN, Disp: 4 g, Rfl: 9    Allergies:   No Known Allergies    Objective     Physical Exam:  Vital Signs:   Vitals:    01/04/24 1016   BP: 140/65   Pulse: (!) 46   Resp: 12   Temp: 97.4 °F (36.3 °C)   SpO2: 94%   Weight: 73 kg (161 lb)   Height: 182.9 cm (72\")   PainSc:   4   PainLoc: Generalized     Pain Score    01/04/24 1016   PainSc:   4   PainLoc: Generalized     ECOG Performance Status: 1 - Symptomatic but completely ambulatory    Constitutional: NAD, ECOG 1  Eyes: PERRLA, scleral anicteric  ENT: No LAD, no thyromegaly  Respiratory: CTAB, no wheezing, rales, rhonchi  Cardiovascular: RRR, no murmurs, pulses 2+ bilaterally  Abdomen: soft, NT/ND, no HSM  Musculoskeletal: strength 5/5 bilaterally, no c/c/e  Neurologic: A&O x 3, CN II-XII intact grossly    Results Review:   No visits with results within 2 Week(s) from this visit.   Latest known visit with results is:   Lab on 12/20/2023   Component Date Value Ref Range " Status    Sed Rate 12/20/2023 35 (H)  0 - 20 mm/hr Final    WBC 12/20/2023 11.67 (H)  3.40 - 10.80 10*3/mm3 Final    RBC 12/20/2023 5.51  4.14 - 5.80 10*6/mm3 Final    Hemoglobin 12/20/2023 16.6  13.0 - 17.7 g/dL Final    Hematocrit 12/20/2023 49.7  37.5 - 51.0 % Final    MCV 12/20/2023 90.2  79.0 - 97.0 fL Final    MCH 12/20/2023 30.1  26.6 - 33.0 pg Final    MCHC 12/20/2023 33.4  31.5 - 35.7 g/dL Final    RDW 12/20/2023 12.0 (L)  12.3 - 15.4 % Final    RDW-SD 12/20/2023 39.4  37.0 - 54.0 fl Final    MPV 12/20/2023 10.7  6.0 - 12.0 fL Final    Platelets 12/20/2023 245  140 - 450 10*3/mm3 Final    Glucose 12/20/2023 102 (H)  65 - 99 mg/dL Final    BUN 12/20/2023 14  8 - 23 mg/dL Final    Creatinine 12/20/2023 1.07  0.76 - 1.27 mg/dL Final    Sodium 12/20/2023 137  136 - 145 mmol/L Final    Potassium 12/20/2023 5.2  3.5 - 5.2 mmol/L Final    Chloride 12/20/2023 95 (L)  98 - 107 mmol/L Final    CO2 12/20/2023 30.6 (H)  22.0 - 29.0 mmol/L Final    Calcium 12/20/2023 9.5  8.6 - 10.5 mg/dL Final    Total Protein 12/20/2023 7.4  6.0 - 8.5 g/dL Final    Albumin 12/20/2023 4.2  3.5 - 5.2 g/dL Final    ALT (SGPT) 12/20/2023 15  1 - 41 U/L Final    AST (SGOT) 12/20/2023 12  1 - 40 U/L Final    Alkaline Phosphatase 12/20/2023 79  39 - 117 U/L Final    Total Bilirubin 12/20/2023 0.7  0.0 - 1.2 mg/dL Final    Globulin 12/20/2023 3.2  gm/dL Final    A/G Ratio 12/20/2023 1.3  g/dL Final    BUN/Creatinine Ratio 12/20/2023 13.1  7.0 - 25.0 Final    Anion Gap 12/20/2023 11.4  5.0 - 15.0 mmol/L Final    eGFR 12/20/2023 74.7  >60.0 mL/min/1.73 Final       CT Chest Without Contrast Diagnostic    Result Date: 12/27/2023  Narrative: PROCEDURE: CT CHEST WO CONTRAST DIAGNOSTIC-  HISTORY: Non-small cell lung cancer (NSCLC), non-metastatic, assess treatment response; C34.91-Malignant neoplasm of unspecified part of right bronchus or lung  COMPARISON: September 2023.  PROCEDURE: Multiple axial CT images were obtained from the thoracic inlet  through the upper abdomen following the administration of intravenous contrast.  FINDINGS: There is a new oval density in the right upper lobe measuring 16 x 6 mm. There is an 8 mm nodule in the left lower lobe best seen on image 57. This nodule was not readily evident on the prior exam, however the nodule could have been obscured by atelectasis. There is a 3 mm right upper lobe nodule that is unchanged. There is no adenopathy. There is no pleural or pericardial effusion.      Impression: 1. Interval development of small nodular densities which could be inflammatory or metastatic disease. Consider chest CT follow-up in 3 months. 2. Scattered chronic changes.  This study was performed with techniques to keep radiation doses as low as reasonably achievable (ALARA). Individualized dose reduction techniques using automated exposure control or adjustment of mA and/or kV according to the patient size were employed.   Images were reviewed, interpreted, and dictated by Dr. Juan F Saba MD Transcribed by Day Mercado PA-C.  This report was signed and finalized on 12/27/2023 9:29 AM by Juan F Saba MD.      XR Chest PA & Lateral    Result Date: 12/21/2023  Narrative: X-RAY CHEST PA AND LATERAL   HISTORY: Cough, hemoptysis, history of lung cancer, status post CyberKnife- PET October clear; J40-Bronchitis, not specified as acute or chronic; R04.2-Hemoptysis.  COMPARISON: 10/17/2023  FINDINGS: Postoperative changes right thorax are present. A nodular density is seen in the right suprahilar region new from prior exam. Chronic-appearing scarring is noted in the right perihilar region stable. Volume loss of the right lung is noted.  There is no evidence of effusion or other pleural disease.  The mediastinum has a normal appearance.  The cardiac silhouette is unremarkable.      Impression: Unremarkable chest exam. 1. No obvious pneumonia. 2. Incidental nodular density right suprahilar region. 3. Posttreatment changes, right  lung.    This report was signed and finalized on 12/21/2023 1:15 PM by Juan F Saba MD.       Assessment / Plan      Assessment/Plan:   1. 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    -Recurrence early 2020 requiring redo right VATS with right upper wedge resection conversion to thoracotomy with completion lobectomy and mediastinal lymph node dissection March 2020 by Dr. Porter.  Pathology consistent with stage IA2 non-small cell carcinoma most consistent with adenocarcinoma ([id number]).   -CT scan in April 2023 concerning for new right lung lesion measuring 1.3 cm  -PET/CT with avidity noted in the primary lesion but no other sites of disease  -Biopsy nondiagnostic in May 2023  -Status post CyberKnife in June 2023  -CT C/A/P in September 2023 reviewed and showing significant resolution of his disease in the right but a concerning nodule in the left lingula  -PET/CT without evidence of significant hypermetabolic activity  -CT scan in December 2023 reviewed and showing a slight new lesion within the right lung however given his history of infection over the past few weeks this could be inflammatory  -Given my discussion with Dr. Bermeo, we will plan for repeat CT scans in 2 months.  Ordered today    Follow Up:   Follow-up in 2 months     Camden Lombardi MD  Hematology and Oncology     Please note that portions of this note may have been completed with a voice recognition program. Efforts were made to edit the dictations, but occasionally words are mistranscribed.

## 2024-01-18 DIAGNOSIS — J06.9 UPPER RESPIRATORY INFECTION, VIRAL: ICD-10-CM

## 2024-01-18 RX ORDER — AZELASTINE HYDROCHLORIDE 137 UG/1
2 SPRAY, METERED NASAL 2 TIMES DAILY
OUTPATIENT
Start: 2024-01-18

## 2024-01-19 RX ORDER — PREDNISONE 5 MG/1
TABLET ORAL
Qty: 56 TABLET | Refills: 0 | OUTPATIENT
Start: 2024-01-19

## 2024-01-30 DIAGNOSIS — J44.9 CHRONIC OBSTRUCTIVE PULMONARY DISEASE, UNSPECIFIED COPD TYPE: Primary | ICD-10-CM

## 2024-01-30 RX ORDER — TIOTROPIUM BROMIDE AND OLODATEROL 3.124; 2.736 UG/1; UG/1
2 SPRAY, METERED RESPIRATORY (INHALATION)
Qty: 4 G | Refills: 9 | Status: SHIPPED | OUTPATIENT
Start: 2024-01-30 | End: 2024-02-01 | Stop reason: SDUPTHER

## 2024-02-01 DIAGNOSIS — J44.9 CHRONIC OBSTRUCTIVE PULMONARY DISEASE, UNSPECIFIED COPD TYPE: ICD-10-CM

## 2024-02-01 RX ORDER — TIOTROPIUM BROMIDE AND OLODATEROL 3.124; 2.736 UG/1; UG/1
2 SPRAY, METERED RESPIRATORY (INHALATION)
Qty: 4 G | Refills: 9 | Status: SHIPPED | OUTPATIENT
Start: 2024-02-01

## 2024-02-04 DIAGNOSIS — J06.9 UPPER RESPIRATORY INFECTION, VIRAL: ICD-10-CM

## 2024-02-05 RX ORDER — AZELASTINE HYDROCHLORIDE 137 UG/1
2 SPRAY, METERED NASAL 2 TIMES DAILY
Qty: 30 ML | Refills: 1 | Status: SHIPPED | OUTPATIENT
Start: 2024-02-05

## 2024-02-15 ENCOUNTER — OFFICE VISIT (OUTPATIENT)
Dept: PULMONOLOGY | Facility: CLINIC | Age: 69
End: 2024-02-15
Payer: MEDICARE

## 2024-02-15 ENCOUNTER — LAB (OUTPATIENT)
Dept: LAB | Facility: HOSPITAL | Age: 69
End: 2024-02-15
Payer: MEDICARE

## 2024-02-15 VITALS
WEIGHT: 168 LBS | OXYGEN SATURATION: 94 % | HEIGHT: 72 IN | RESPIRATION RATE: 18 BRPM | HEART RATE: 50 BPM | BODY MASS INDEX: 22.75 KG/M2 | DIASTOLIC BLOOD PRESSURE: 52 MMHG | SYSTOLIC BLOOD PRESSURE: 122 MMHG

## 2024-02-15 DIAGNOSIS — C34.91 NON-SMALL CELL CANCER OF RIGHT LUNG: ICD-10-CM

## 2024-02-15 DIAGNOSIS — M89.40: ICD-10-CM

## 2024-02-15 DIAGNOSIS — J44.9 CHRONIC OBSTRUCTIVE PULMONARY DISEASE, UNSPECIFIED COPD TYPE: ICD-10-CM

## 2024-02-15 DIAGNOSIS — J44.9 CHRONIC OBSTRUCTIVE PULMONARY DISEASE, UNSPECIFIED COPD TYPE: Primary | ICD-10-CM

## 2024-02-15 DIAGNOSIS — J30.89 OTHER ALLERGIC RHINITIS: ICD-10-CM

## 2024-02-15 LAB — URATE SERPL-MCNC: 5.9 MG/DL (ref 3.4–7)

## 2024-02-15 PROCEDURE — 36415 COLL VENOUS BLD VENIPUNCTURE: CPT

## 2024-02-15 PROCEDURE — 84550 ASSAY OF BLOOD/URIC ACID: CPT

## 2024-02-15 RX ORDER — PREDNISONE 10 MG/1
TABLET ORAL
Qty: 100 TABLET | Refills: 0 | Status: SHIPPED | OUTPATIENT
Start: 2024-02-15

## 2024-02-15 RX ORDER — PREDNISONE 20 MG/1
20 TABLET ORAL DAILY
COMMUNITY
End: 2024-02-15 | Stop reason: SDUPTHER

## 2024-02-29 ENCOUNTER — HOSPITAL ENCOUNTER (OUTPATIENT)
Dept: CT IMAGING | Facility: HOSPITAL | Age: 69
Discharge: HOME | End: 2024-02-29
Payer: MEDICARE

## 2024-02-29 DIAGNOSIS — C34.31 MALIGNANT NEOPLASM OF LOWER LOBE OF RIGHT LUNG: ICD-10-CM

## 2024-02-29 PROCEDURE — 25510000001 IOPAMIDOL 61 % SOLUTION: Performed by: INTERNAL MEDICINE

## 2024-02-29 PROCEDURE — 74177 CT ABD & PELVIS W/CONTRAST: CPT

## 2024-02-29 PROCEDURE — 71260 CT THORAX DX C+: CPT

## 2024-02-29 RX ADMIN — IOPAMIDOL 100 ML: 612 INJECTION, SOLUTION INTRAVENOUS at 08:02

## 2024-03-05 ENCOUNTER — HOSPITAL ENCOUNTER (OUTPATIENT)
Dept: RADIATION ONCOLOGY | Facility: HOSPITAL | Age: 69
Setting detail: RADIATION/ONCOLOGY SERIES
Discharge: HOME | End: 2024-03-05
Payer: MEDICARE

## 2024-03-07 ENCOUNTER — OFFICE VISIT (OUTPATIENT)
Dept: ONCOLOGY | Facility: CLINIC | Age: 69
End: 2024-03-07
Payer: MEDICARE

## 2024-03-07 VITALS
DIASTOLIC BLOOD PRESSURE: 70 MMHG | OXYGEN SATURATION: 94 % | HEART RATE: 46 BPM | WEIGHT: 167 LBS | SYSTOLIC BLOOD PRESSURE: 160 MMHG | TEMPERATURE: 97.3 F | BODY MASS INDEX: 22.62 KG/M2 | HEIGHT: 72 IN | RESPIRATION RATE: 16 BRPM

## 2024-03-07 DIAGNOSIS — C34.31 MALIGNANT NEOPLASM OF LOWER LOBE OF RIGHT LUNG: Primary | ICD-10-CM

## 2024-03-07 PROCEDURE — 1126F AMNT PAIN NOTED NONE PRSNT: CPT | Performed by: INTERNAL MEDICINE

## 2024-03-07 PROCEDURE — 99214 OFFICE O/P EST MOD 30 MIN: CPT | Performed by: INTERNAL MEDICINE

## 2024-03-07 NOTE — PROGRESS NOTES
Follow Up Office Visit      Date: 2024     Patient Name: Saman Aguayo  MRN: [id number]  : 1955  Referring Physician: Dre Do     Chief Complaint: Follow-up for history Radha has a of right lung cancer and newly diagnosed right lung cancer in May 2023     History of Present Illness: Saman Aguayo is a pleasant 68 y.o. male history of right lung adenocarcinoma status post 2 resections, osteoarthritis, COPD who presents today for evaluation of history of lung cancer new right lung nodule. The patient is accompanied by their wife who contributes to the history of their care.  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  He then had recurrence early  requiring redo right VATS with right upper wedge resection conversion to thoracotomy with completion lobectomy and mediastinal lymph node dissection 2020 by Dr. Porter.  Pathology consistent with stage IA2 non-small cell carcinoma most consistent with adenocarcinoma ([id number]).   He had negative CT scans until 2023 when he presented with significant bilateral shoulder pain.  CT chest notable for a 1.3 cm right upper lobe nodule concerning for disease recurrence or metastasis.  PET/CT with some slight hypermetabolic activity in the lesion but no other sites of disease noted.  Underwent biopsy with Dr. Damon on 5/10/2023.  Pathology not consistent with malignancy     Interval History:  Presents to clinic for follow-up.  Completed CyberKnife radiation to his new right lung malignancy in 2023.  Overall stable today.  Does note continued fatigue which is especially worse around midday.  Denies any worsening shortness of breath or cough.  Denies any fevers or chills    Oncology History:    Oncology/Hematology History   Malignant neoplasm of  lower lobe of right lung   5/9/2023 Cancer Staged    Staging form: Lung, AJCC 8th Edition  - Clinical stage from 5/9/2023: Stage IA2 (cT1b, cN0, cM0) - Signed by Nathan Bermeo MD on 5/18/2023 5/18/2023 Initial Diagnosis    Malignant neoplasm of lower lobe of right lung     5/31/2023 - 6/7/2023 Radiation    Radiation OncologyTreatment Course:  Saman Aguayo received 4800 cGy in 3 fractions to right lung via Stereotactic Radiation Therapy - SRT.         Subjective      Review of Systems:   Constitutional: Negative for fevers, chills, or weight loss  Eyes: Negative for blurred vision or discharge         Ear/Nose/Throat: Negative for difficulty swallowing, sore throat, LAD                                                       Respiratory: Negative for cough, SOA, wheezing                                                                                        Cardiovascular: Negative for chest pain or palpitations                                                                  Gastrointestinal: Negative for nausea, vomiting or diarrhea                                                                     Genitourinary: Negative for dysuria or hematuria                                                                                           Musculoskeletal: Negative for any joint pains or muscle aches                                                                        Neurologic: Negative for any weakness, headaches, dizziness                                                                         Hematologic: Negative for any easy bleeding or bruising                                                                                   Psychiatric: Negative for anxiety or depression                          Past Medical History/Past Surgical History/ Family History/ Social History: Reviewed by me and unchanged from my previous documentation done on January 2024.     Medications:     Current Outpatient Medications:  "    albuterol (ACCUNEB) 1.25 MG/3ML nebulizer solution, Take 3 mL by nebulization Every 6 (Six) Hours As Needed for Wheezing., Disp: 30 each, Rfl: 0    albuterol sulfate  (90 Base) MCG/ACT inhaler, Inhale 2 puffs Every 4 (Four) Hours As Needed for Wheezing., Disp: , Rfl:     predniSONE (DELTASONE) 10 MG tablet, Use as directed., Disp: 100 tablet, Rfl: 0    tiotropium bromide-olodaterol (Stiolto Respimat) 2.5-2.5 MCG/ACT aerosol solution inhaler, Inhale 2 puffs Daily., Disp: 4 g, Rfl: 9    Allergies:   No Known Allergies    Objective     Physical Exam:  Vital Signs:   Vitals:    03/07/24 0944   BP: 160/70   Pulse: (!) 46   Resp: 16   Temp: 97.3 °F (36.3 °C)   SpO2: 94%   Weight: 75.8 kg (167 lb)   Height: 182.9 cm (72\")   PainSc: 0-No pain     Pain Score    03/07/24 0944   PainSc: 0-No pain     ECOG Performance Status: 1    Constitutional: NAD, ECOG 1  Eyes: PERRLA, scleral anicteric  ENT: No LAD, no thyromegaly  Respiratory: CTAB, no wheezing, rales, rhonchi  Cardiovascular: RRR, no murmurs, pulses 2+ bilaterally  Abdomen: soft, NT/ND, no HSM  Musculoskeletal: strength 5/5 bilaterally, no c/c/e  Neurologic: A&O x 3, CN II-XII intact grossly    Results Review:   No visits with results within 2 Week(s) from this visit.   Latest known visit with results is:   Lab on 02/15/2024   Component Date Value Ref Range Status    Uric Acid 02/15/2024 5.9  3.4 - 7.0 mg/dL Final       CT Chest With Contrast Diagnostic    Result Date: 2/29/2024  Narrative: PROCEDURE: CT CHEST W CONTRAST DIAGNOSTIC-  HISTORY: NSCLC; C34.31-Malignant neoplasm of lower lobe, right bronchus or lung  COMPARISON: December 26, 2023.  PROCEDURE: The patient was injected with IV contrast.  Axial images were obtained from the lung apex to the mid abdomen by computed tomography. This study was performed with techniques to keep radiation doses as low as reasonably achievable, (ALARA). Individualized dose reduction techniques using automated exposure " control or adjustment of mA and/or kV according to the patient size were employed.  FINDINGS:  CHEST: There is no suspicious axillary adenopathy. There is no suspicious hilar or mediastinal adenopathy.  The heart is proper size. There is no pericardial or pleural effusion. Again noted is postsurgical change from previous right middle lobectomy with volume loss and elevation of the right hemidiaphragm. Metallic surgical clips noted in the right hilum. There is moderate emphysematous change bilaterally, most prominent in the right upper lobe. No bony destructive lesion seen. Groundglass opacity posteriorly in the left lower lobe is identified which is new from the prior exam, consider edema versus pneumonia. Pleural parenchymal scarring is noted laterally in the right lower lobe, stable. No new suspicious nodule identified. Limited images of the upper abdomen demonstrate cholecystectomy clips. Vascular calcifications noted.      Impression: Stable postsurgical and emphysematous change compared to prior.  New posterior left lower lobe edema versus pneumonia.  CTDI: 5.06 mGy DLP:420.81 mGy.cm  This report was signed and finalized on 2/29/2024 8:54 AM by Mindi Del Valle MD.      CT Abdomen Pelvis With Contrast    Result Date: 2/29/2024  Narrative: PROCEDURE: CT ABDOMEN PELVIS W CONTRAST-  HISTORY: NSCLC; C34.31-Malignant neoplasm of lower lobe, right bronchus or lung  COMPARISON: September 7, 2023..  PROCEDURE: The patient was injected with IV contrast. Axial images were obtained from the lung bases to the pubic symphysis by computed tomography. This study was performed with techniques to keep radiation doses as low as reasonably achievable, (ALARA). Individualized dose reduction techniques using automated exposure control or adjustment of mA and/or kV according to the patient size were employed.  FINDINGS:  ABDOMEN: The lung bases demonstrate a clear right lung base. There is groundglass opacities posteriorly in the left  lower lobe, consider pneumonia or edema. This is new compared to the prior exam. The heart is proper size. The liver is homogenous with no focal abnormality. Clips in vascular calcifications noted. There are moderate calcifications at the origin of the celiac axis and SMA. There are also moderate calcifications at the origin of the renal arteries bilaterally. The spleen is unremarkable. No adrenal mass is present.  The pancreas again demonstrates the cystic lesion in the head of the pancreas which appears to be bilobed this is mildly increased in the AP diameter from 21 mm to 26 mm, recommend MRCP with and without contrast for further evaluation. The kidneys are unremarkable, without evidence of mass or hydronephrosis. The aorta is proper caliber. There is no free fluid or adenopathy.  PELVIS: The GI tract demonstrates no obstruction. Diverticulosis identified without evidence of diverticulitis. Prostate is normal in size and contains a few calcifications but demonstrates inhomogeneous contrast enhancement. The borders of the seminal vesicles are indistinct, recommend correlation with PSA. No bony destructive lesion seen.. The appendix is not identified. The urinary bladder is unremarkable. There is no free fluid, adenopathy, or inflammatory process.      Impression: Mildly enlarged cystic lesion head of the pancreas, recommend MRCP with and without contrast.  Inhomogeneous prostate with seminal vesicles the demonstrate indistinct borders, recommend correlation with PSA.  Posterior left lower lobe airspace disease as described, new from prior.  CTDI: 5.06 mGy DLP:420.81 mGy.cm  This report was signed and finalized on 2/29/2024 8:37 AM by Mindi Del Valle MD.       Assessment / Plan      Assessment/Plan:   1. 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    -Recurrence early 2020 requiring redo right VATS with right upper wedge resection conversion to thoracotomy with completion lobectomy and mediastinal lymph node dissection March 2020 by Dr. Porter.  Pathology consistent with stage IA2 non-small cell carcinoma most consistent with adenocarcinoma ([id number]).   -CT scan in April 2023 concerning for new right lung lesion measuring 1.3 cm  -PET/CT with avidity noted in the primary lesion but no other sites of disease  -Biopsy nondiagnostic in May 2023  -Status post CyberKnife in June 2023  -CT C/A/P in September 2023 reviewed and showing significant resolution of his disease in the right but a concerning nodule in the left lingula  -PET/CT without evidence of significant hypermetabolic activity  -CT scan in December 2023 reviewed and showing a slight new lesion within the right lung however given his history of infection over the past few weeks this could be inflammatory  -CT C/A/P in February 2024 reviewed and showing no new nodules or concerns for malignancy.  Will monitor his pancreatic cyst lesion and patient has been previously worked up for prostate cancer which was negative  -Plan for repeat CT scans in 3 months.  Ordered today         Follow Up:   Follow-up in 3 months     Camden Lombardi MD  Hematology and Oncology     Please note that portions of this note may have been completed with a voice recognition program. Efforts were made to edit the dictations, but occasionally words are mistranscribed.

## 2024-04-03 ENCOUNTER — OFFICE VISIT (OUTPATIENT)
Dept: INTERNAL MEDICINE | Facility: CLINIC | Age: 69
End: 2024-04-03
Payer: MEDICARE

## 2024-04-03 VITALS
RESPIRATION RATE: 16 BRPM | SYSTOLIC BLOOD PRESSURE: 138 MMHG | OXYGEN SATURATION: 99 % | WEIGHT: 172 LBS | HEIGHT: 72 IN | HEART RATE: 49 BPM | DIASTOLIC BLOOD PRESSURE: 68 MMHG | BODY MASS INDEX: 23.3 KG/M2

## 2024-04-03 DIAGNOSIS — C34.2 LUNG CANCER, MIDDLE LOBE: Primary | ICD-10-CM

## 2024-04-03 DIAGNOSIS — J43.2 CENTRILOBULAR EMPHYSEMA: ICD-10-CM

## 2024-04-03 DIAGNOSIS — I27.81 COR PULMONALE (CHRONIC): ICD-10-CM

## 2024-04-03 DIAGNOSIS — K86.9 LESION OF PANCREAS: ICD-10-CM

## 2024-04-03 PROBLEM — R00.2 PALPITATIONS: Status: RESOLVED | Noted: 2022-06-02 | Resolved: 2024-04-03

## 2024-04-03 PROBLEM — Z12.5 SCREENING PSA (PROSTATE SPECIFIC ANTIGEN): Status: RESOLVED | Noted: 2020-10-05 | Resolved: 2024-04-03

## 2024-04-03 PROBLEM — J01.40 ACUTE NON-RECURRENT PANSINUSITIS: Status: RESOLVED | Noted: 2022-12-19 | Resolved: 2024-04-03

## 2024-04-03 PROBLEM — J40 BRONCHITIS: Status: RESOLVED | Noted: 2022-12-19 | Resolved: 2024-04-03

## 2024-04-03 NOTE — PROGRESS NOTES
The ABCs of the Annual Wellness Visit  Subsequent Medicare Wellness Visit    Subjective      Saman Aguayo is a 68 y.o. male who presents for a Subsequent Medicare Wellness Visit.  Hx of right Lung CA s/p right middle lobectomy 2016 and right upper lobectomy 2020 for early stage non-small cell lung cancers.  History of COPD.  Follows with Dr Lim and Dr Lombardi: last CT scan showed stable postsurgical and emphysematous change, new posterior LLL edema vs pneumonia on 2/2024. PFT showed moderate obstruction. On albuterol and stiolto. On prednisone prn.    Mildly enlarged cystic lesion on pancreas: repeat CT scan scheduled for 5/2024    Bradycardia HR today 49. Notes symptoms when he stands up as he gets dizzy. No episodes of falling. Follows cardiology     Colonoscopy 2020 fhx of colon CA (mother and father), due in 5 years  150 pack year history    Review of Systems   All other systems reviewed and are negative.       The following portions of the patient's history were reviewed and   updated as appropriate: allergies, current medications, past family history, past medical history, past social history, past surgical history, and problem list.    Compared to one year ago, the patient feels his physical   health is better.    Compared to one year ago, the patient feels his mental   health is better.    Recent Hospitalizations:  He was not admitted to the hospital during the last year.       Current Medical Providers:  Patient Care Team:  Kelsy Swan MD as PCP - General (Family Medicine)  Max Almonte MD as Consulting Physician (Cardiology)  Juan Alberto Carty MD as Surgeon (Cardiothoracic Surgery)  Shady Singh MD as Consulting Physician (General Surgery)  Erin Sosa APRN as Nurse Practitioner (Cardiothoracic Surgery)  Hilda Smyth APRN as Nurse Practitioner (Pulmonary Disease)  Janina Mojica APRN as Nurse Practitioner (Nurse Practitioner)  Jose Lee,  MD as Consulting Physician (Otolaryngology)  Veronika Barry, RN as Ambulatory  (Oncology) ([facility])  Nathan Bermeo MD as Consulting Physician (Radiation Oncology)  Camden Lombardi MD as Referring Physician (Hematology and Oncology)  Raghu Morejon MD as Consulting Physician (Rheumatology)    Outpatient Medications Prior to Visit   Medication Sig Dispense Refill    albuterol (ACCUNEB) 1.25 MG/3ML nebulizer solution Take 3 mL by nebulization Every 6 (Six) Hours As Needed for Wheezing. 30 each 0    albuterol sulfate  (90 Base) MCG/ACT inhaler Inhale 2 puffs Every 4 (Four) Hours As Needed for Wheezing.      predniSONE (DELTASONE) 10 MG tablet Use as directed. 100 tablet 0    tiotropium bromide-olodaterol (Stiolto Respimat) 2.5-2.5 MCG/ACT aerosol solution inhaler Inhale 2 puffs Daily. 4 g 9     No facility-administered medications prior to visit.       No opioid medication identified on active medication list. I have reviewed chart for other potential  high risk medication/s and harmful drug interactions in the elderly.        Aspirin is not on active medication list.  Aspirin use is not indicated based on review of current medical condition/s. Risk of harm outweighs potential benefits.  .    Patient Active Problem List   Diagnosis    Osteoarthritis    History of tobacco use    Pulmonary hypertension     Lung cancer, middle lobe s/p right VATS with lobectomy    COPD    Right upper lobe pulmonary nodule    History of colon polyps    Intermittent claudication    Herpes zoster without complication    Cor pulmonale (chronic)    Nuclear sclerotic cataract of left eye    Malignant neoplasm of lower lobe of right lung    Pain in joints    Lesion of pancreas     Advance Care Planning  Advance Directive is not on file.  ACP discussion was held with the patient during this visit. Patient does not have an advance directive, information provided.     Objective    Vitals:    04/03/24 0820   BP:  "138/68   Pulse: (!) 49   Resp: 16   SpO2: 99%   Weight: 78 kg (172 lb)   Height: 182.9 cm (72.01\")     Estimated body mass index is 23.32 kg/m² as calculated from the following:    Height as of this encounter: 182.9 cm (72.01\").    Weight as of this encounter: 78 kg (172 lb).    Physical Exam  Vitals and nursing note reviewed.   Constitutional:       Appearance: Normal appearance.   HENT:      Head: Normocephalic and atraumatic.   Cardiovascular:      Rate and Rhythm: Normal rate and regular rhythm.      Pulses: Normal pulses.      Heart sounds: Normal heart sounds.   Pulmonary:      Effort: Pulmonary effort is normal. No respiratory distress.      Breath sounds: Normal breath sounds. No wheezing, rhonchi or rales.   Abdominal:      General: Abdomen is flat. Bowel sounds are normal.      Palpations: Abdomen is soft.      Tenderness: There is no abdominal tenderness. There is no guarding.   Musculoskeletal:      Cervical back: Neck supple.   Skin:     General: Skin is warm.      Capillary Refill: Capillary refill takes less than 2 seconds.   Neurological:      General: No focal deficit present.      Mental Status: He is alert. Mental status is at baseline.   Psychiatric:         Mood and Affect: Mood normal.         Behavior: Behavior normal.         BMI is within normal parameters. No other follow-up for BMI required.      Does the patient have evidence of cognitive impairment?   No            HEALTH RISK ASSESSMENT    Smoking Status:  Social History     Tobacco Use   Smoking Status Former    Current packs/day: 0.00    Average packs/day: 3.0 packs/day for 50.0 years (150.0 ttl pk-yrs)    Types: Cigarettes    Start date: 1966    Quit date: 2016    Years since quittin.4    Passive exposure: Past   Smokeless Tobacco Never     Alcohol Consumption:  Social History     Substance and Sexual Activity   Alcohol Use Not Currently    Comment: 2-3 CANS DAILY     Fall Risk Screen:    DENIS Fall Risk Assessment was " completed, and patient is at MODERATE risk for falls. Assessment completed on:4/3/2024    Depression Screenin/3/2024     8:19 AM   PHQ-2/PHQ-9 Depression Screening   Little Interest or Pleasure in Doing Things 0-->not at all   Feeling Down, Depressed or Hopeless 0-->not at all   PHQ-9: Brief Depression Severity Measure Score 0       Health Habits and Functional and Cognitive Screenin/3/2024     8:19 AM   Functional & Cognitive Status   Do you have difficulty preparing food and eating? No   Do you have difficulty bathing yourself, getting dressed or grooming yourself? No   Do you have difficulty using the toilet? No   Do you have difficulty moving around from place to place? No   Do you have trouble with steps or getting out of a bed or a chair? No   Current Diet Well Balanced Diet   Dental Exam Up to date   Eye Exam Up to date   Exercise (times per week) 7 times per week   Current Exercises Include Walking   Do you need help using the phone?  No   Are you deaf or do you have serious difficulty hearing?  No   Do you need help to go to places out of walking distance? No   Do you need help shopping? No   Do you need help preparing meals?  No   Do you need help with housework?  No   Do you need help with laundry? No   Do you need help taking your medications? No   Do you need help managing money? No   Do you ever drive or ride in a car without wearing a seat belt? No   Have you felt unusual stress, anger or loneliness in the last month? No   Who do you live with? Spouse   If you need help, do you have trouble finding someone available to you? No   Have you been bothered in the last four weeks by sexual problems? No   Do you have difficulty concentrating, remembering or making decisions? No       Age-appropriate Screening Schedule:  Refer to the list below for future screening recommendations based on patient's age, sex and/or medical conditions. Orders for these recommended tests are listed in the plan  section. The patient has been provided with a written plan.    Health Maintenance   Topic Date Due    COLORECTAL CANCER SCREENING  06/19/2023    ANNUAL WELLNESS VISIT  10/14/2023    Pneumococcal Vaccine 65+ (3 of 3 - PPSV23 or PCV20) 04/03/2024 (Originally 10/27/2021)    COVID-19 Vaccine (3 - Pfizer risk series) 07/20/2024 (Originally 4/26/2021)    RSV Vaccine - Adults (1 - 1-dose 60+ series) 04/03/2025 (Originally 4/7/2013)    INFLUENZA VACCINE  08/01/2024    TDAP/TD VACCINES (2 - Td or Tdap) 01/26/2027    HEPATITIS C SCREENING  Completed    AAA SCREEN (ONE-TIME)  Completed    ZOSTER VACCINE  Completed    LUNG CANCER SCREENING  Discontinued                CMS Preventative Services Quick Reference  Risk Factors Identified During Encounter:    Fall Risk-High or Moderate: Discussed Fall Prevention in the home  Vision Screening Recommended    The above risks/problems have been discussed with the patient.  Pertinent information has been shared with the patient in the After Visit Summary.    Diagnoses and all orders for this visit:    1. Lung cancer, middle lobe s/p right VATS with lobectomy (Primary)  Assessment & Plan:  Follows with pulmonology and oncology      2. Lesion of pancreas  Assessment & Plan:  Follows with Oncology  For repeat CT abdomen pelvis and chest on 5/2024      3. Cor pulmonale (chronic)  Assessment & Plan:  With bradycardia  Follows with cardiology  Stable symptoms      4. Centrilobular emphysema  Assessment & Plan:  On prn albuterol and stiolto  Stable on current medication and dosage. Will continue current management. Refill medication as necessary.  Follows with pulmonology            Follow Up:   Next Medicare Wellness visit to be scheduled in 1 year.      An After Visit Summary and PPPS were made available to the patient.

## 2024-04-03 NOTE — ASSESSMENT & PLAN NOTE
On prn albuterol and stiolto  Stable on current medication and dosage. Will continue current management. Refill medication as necessary.  Follows with pulmonology

## 2024-05-14 ENCOUNTER — TELEPHONE (OUTPATIENT)
Dept: ONCOLOGY | Facility: CLINIC | Age: 69
End: 2024-05-14
Payer: MEDICARE

## 2024-05-14 RX ORDER — ONDANSETRON HYDROCHLORIDE 8 MG/1
8 TABLET, FILM COATED ORAL EVERY 8 HOURS PRN
Qty: 12 TABLET | Refills: 0 | Status: SHIPPED | OUTPATIENT
Start: 2024-05-14

## 2024-05-14 NOTE — TELEPHONE ENCOUNTER
Call to Anna to notify that Dr Lombardi will send in a prescription for zofran.  Anna states understood

## 2024-05-14 NOTE — TELEPHONE ENCOUNTER
Patient's wife called patient is having contrast with the scans, and it makes him nauseated they told her to call, and get something called in before this to help with the nausea. This needs to be called into [facility] in [place]. Please call her to let her know if this can be done?

## 2024-05-30 ENCOUNTER — HOSPITAL ENCOUNTER (OUTPATIENT)
Dept: CT IMAGING | Facility: HOSPITAL | Age: 69
Discharge: HOME | End: 2024-05-30
Payer: MEDICARE

## 2024-05-30 DIAGNOSIS — C34.31 MALIGNANT NEOPLASM OF LOWER LOBE OF RIGHT LUNG: ICD-10-CM

## 2024-05-30 PROCEDURE — 74177 CT ABD & PELVIS W/CONTRAST: CPT

## 2024-05-30 PROCEDURE — 25510000001 IOPAMIDOL 61 % SOLUTION: Performed by: INTERNAL MEDICINE

## 2024-05-30 PROCEDURE — 71260 CT THORAX DX C+: CPT

## 2024-05-30 RX ADMIN — IOPAMIDOL 80 ML: 612 INJECTION, SOLUTION INTRAVENOUS at 08:48

## 2024-06-06 ENCOUNTER — OFFICE VISIT (OUTPATIENT)
Dept: ONCOLOGY | Facility: CLINIC | Age: 69
End: 2024-06-06
Payer: MEDICARE

## 2024-06-06 VITALS
SYSTOLIC BLOOD PRESSURE: 149 MMHG | RESPIRATION RATE: 16 BRPM | TEMPERATURE: 97.5 F | HEART RATE: 54 BPM | WEIGHT: 164.6 LBS | DIASTOLIC BLOOD PRESSURE: 65 MMHG | HEIGHT: 72 IN | BODY MASS INDEX: 22.29 KG/M2 | OXYGEN SATURATION: 96 %

## 2024-06-06 DIAGNOSIS — C34.31 MALIGNANT NEOPLASM OF LOWER LOBE OF RIGHT LUNG: Primary | ICD-10-CM

## 2024-06-06 PROCEDURE — 1126F AMNT PAIN NOTED NONE PRSNT: CPT | Performed by: INTERNAL MEDICINE

## 2024-06-06 PROCEDURE — 99214 OFFICE O/P EST MOD 30 MIN: CPT | Performed by: INTERNAL MEDICINE

## 2024-06-06 NOTE — PROGRESS NOTES
Follow Up Office Visit      Date: 2024     Patient Name: Saman Aguayo  MRN: [id number]  : 1955  Referring Physician: Dre Do     Chief Complaint: Follow-up for history Radha has a of right lung cancer and newly diagnosed right lung cancer in May 2023     History of Present Illness: Saman Aguayo is a pleasant 68 y.o. male history of right lung adenocarcinoma status post 2 resections, osteoarthritis, COPD who presents today for evaluation of history of lung cancer new right lung nodule. The patient is accompanied by their wife who contributes to the history of their care.  Patient was initially diagnosed with a stage IA infiltrating non-small cell undifferentiated carcinoma s/p right VATS with right middle lobectomy and mediastinal lymph node dissection in  ([id number]) with lymphovascular invasion present.  He then had recurrence early  requiring redo right VATS with right upper wedge resection conversion to thoracotomy with completion lobectomy and mediastinal lymph node dissection 2020 by Dr. Porter.  Pathology consistent with stage IA2 non-small cell carcinoma most consistent with adenocarcinoma ([id number]).   He had negative CT scans until 2023 when he presented with significant bilateral shoulder pain.  CT chest notable for a 1.3 cm right upper lobe nodule concerning for disease recurrence or metastasis.  PET/CT with some slight hypermetabolic activity in the lesion but no other sites of disease noted.  Underwent biopsy with Dr. Damon on 5/10/2023.  Pathology not consistent with malignancy     Interval History:  Presents to clinic for follow-up.  Completed CyberKnife radiation to his new right lung malignancy in 2023.  Overall stable today.  No major issues today.  Did note increased nausea and vomiting after getting his CT scans with contrast.  Denies any significant dysphagia or throat pain.  Notes some increased sinus congestion    Oncology History:     Oncology/Hematology History   Malignant neoplasm of lower lobe of right lung   5/9/2023 Cancer Staged    Staging form: Lung, AJCC 8th Edition  - Clinical stage from 5/9/2023: Stage IA2 (cT1b, cN0, cM0) - Signed by Nathan Bermeo MD on 5/18/2023 5/18/2023 Initial Diagnosis    Malignant neoplasm of lower lobe of right lung     5/31/2023 - 6/7/2023 Radiation    Radiation OncologyTreatment Course:  Saman Aguayo received 4800 cGy in 3 fractions to right lung via Stereotactic Radiation Therapy - SRT.         Subjective      Review of Systems:   Constitutional: Negative for fevers, chills, or weight loss  Eyes: Negative for blurred vision or discharge         Ear/Nose/Throat: Negative for difficulty swallowing, sore throat, LAD                                                       Respiratory: Negative for cough, SOA, wheezing                                                                                        Cardiovascular: Negative for chest pain or palpitations                                                                  Gastrointestinal: Negative for nausea, vomiting or diarrhea                                                                     Genitourinary: Negative for dysuria or hematuria                                                                                           Musculoskeletal: Negative for any joint pains or muscle aches                                                                        Neurologic: Negative for any weakness, headaches, dizziness                                                                         Hematologic: Negative for any easy bleeding or bruising                                                                                   Psychiatric: Negative for anxiety or depression                          Past Medical History/Past Surgical History/ Family History/ Social History: Reviewed by me and unchanged from my previous documentation done on March 2024.  "    Medications:     Current Outpatient Medications:     albuterol (ACCUNEB) 1.25 MG/3ML nebulizer solution, Take 3 mL by nebulization Every 6 (Six) Hours As Needed for Wheezing., Disp: 30 each, Rfl: 0    albuterol sulfate  (90 Base) MCG/ACT inhaler, Inhale 2 puffs Every 4 (Four) Hours As Needed for Wheezing., Disp: , Rfl:     ondansetron (ZOFRAN) 8 MG tablet, Take 1 tablet by mouth Every 8 (Eight) Hours As Needed for Nausea or Vomiting., Disp: 12 tablet, Rfl: 0    predniSONE (DELTASONE) 10 MG tablet, Use as directed., Disp: 100 tablet, Rfl: 0    tiotropium bromide-olodaterol (Stiolto Respimat) 2.5-2.5 MCG/ACT aerosol solution inhaler, Inhale 2 puffs Daily., Disp: 4 g, Rfl: 9    Allergies:   No Known Allergies    Objective     Physical Exam:  Vital Signs:   Vitals:    06/06/24 1022   BP: 149/65   Pulse: 54   Resp: 16   Temp: 97.5 °F (36.4 °C)   SpO2: 96%   Weight: 74.7 kg (164 lb 9.6 oz)   Height: 182.9 cm (72.01\")   PainSc: 0-No pain     Pain Score    06/06/24 1022   PainSc: 0-No pain     ECOG Performance Status: 0 - Asymptomatic    Constitutional: NAD, ECOG 0  Eyes: PERRLA, scleral anicteric  ENT: No LAD, no thyromegaly  Respiratory: CTAB, no wheezing, rales, rhonchi  Cardiovascular: RRR, no murmurs, pulses 2+ bilaterally  Abdomen: soft, NT/ND, no HSM  Musculoskeletal: strength 5/5 bilaterally, no c/c/e  Neurologic: A&O x 3, CN II-XII intact grossly    Results Review:   No visits with results within 2 Week(s) from this visit.   Latest known visit with results is:   Lab on 02/15/2024   Component Date Value Ref Range Status    Uric Acid 02/15/2024 5.9  3.4 - 7.0 mg/dL Final       CT Abdomen Pelvis With Contrast, CT Chest With Contrast Diagnostic    Result Date: 5/30/2024  Narrative: PROCEDURE: CT CHEST W CONTRAST DIAGNOSTIC-, CT ABDOMEN PELVIS W CONTRAST-  HISTORY: NSCLC; C34.31-Malignant neoplasm of lower lobe, right bronchus or lung  COMPARISON: 02/29/2024..  PROCEDURE: The patient was injected with IV " contrast. Axial images were obtained from the lung apex to the pubic symphysis by computed tomography. This study was performed with techniques to keep radiation doses as low as reasonably achievable, (ALARA). Individualized dose reduction techniques using automated exposure control or adjustment of mA and/or kV according to the patient size were employed.  FINDINGS:  CHEST: There is no axillary adenopathy. There is no hilar or mediastinal adenopathy.  The heart size is normal. There is no pericardial or pleural effusion. Again noted is a 4 mm nodule right lung base best seen series 2 image 44, stable from prior exam. Moderately severe emphysematous change noted. Again noted is volume loss in the right hemithorax from previous right lobectomy. There is elevation of the right hemidiaphragm and linear scarring which is stable. No bony destructive lesion seen. Groundglass opacities in the left lower lobe posteriorly are stable.  ABDOMEN: The liver is homogenous with no focal abnormality. Clips are noted. Vascular calcifications noted. The spleen is unremarkable. No adrenal mass is present.  The pancreas is unremarkable. The kidneys are unremarkable, without evidence of mass or hydronephrosis. The aorta is normal in caliber. There is no free fluid or adenopathy.  PELVIS: The GI tract mildly dilated distal esophagus with wall thickening and small hiatal hernia. Consider esophagitis. Endoscopy may be helpful. The appendix is surgically absent and surgical clips noted in the right lower quadrant. Vascular calcifications noted. There is diverticulosis without evidence of diverticulitis. Prostate appears to be normal in size and contains calcifications. There is moderate motion artifact on the images of the pelvis decreasing the sensitivity of this exam to detect small masses or bony lesions. There is a subcentimeter right sacral lesion which is stable, likely bone island. The urinary bladder is unremarkable.  There is no  free fluid, adenopathy, or inflammatory process.      Impression: No evidence of metastatic disease in the chest.  Wall thickening of the distal esophagus and hiatal hernia, consider endoscopy for further evaluation.  Suboptimal exam secondary to motion artifact, particularly in images of the lower pelvis.  Diverticulosis.  Stable sclerotic lesion right side of the sacrum, likely bone island.   CTDI: 5.68 mGy DLP:438.96 mGy.cm  This report was signed and finalized on 5/30/2024 9:52 AM by Mindi Del Valle MD.       Assessment / Plan      Assessment/Plan:   1. Lung cancer, middle lobe s/p right VATS with lobectomy (Primary)  -Initially diagnosed with a stage IA infiltrating non-small cell undifferentiated carcinoma s/p right VATS with right middle lobectomy and mediastinal lymph node dissection in 2016 ([id number]) with lymphovascular invasion present.    -Recurrence early 2020 requiring redo right VATS with right upper wedge resection conversion to thoracotomy with completion lobectomy and mediastinal lymph node dissection March 2020 by Dr. Porter.  Pathology consistent with stage IA2 non-small cell carcinoma most consistent with adenocarcinoma ([id number]).   -CT scan in April 2023 concerning for new right lung lesion measuring 1.3 cm  -PET/CT with avidity noted in the primary lesion but no other sites of disease  -Biopsy nondiagnostic in May 2023  -Status post CyberKnife in June 2023  -CT C/A/P in September 2023 reviewed and showing significant resolution of his disease in the right but a concerning nodule in the left lingula  -PET/CT without evidence of significant hypermetabolic activity  -CT scan in December 2023 reviewed and showing a slight new lesion within the right lung however given his history of infection over the past few weeks this could be inflammatory  -CT C/A/P in February 2024 reviewed and showing no new nodules or concerns for malignancy.  Will monitor his pancreatic cyst lesion and patient has been  previously worked up for prostate cancer which was negative  -CT C/A/P in May 2024 reviewed without evidence of recurrent or metastatic disease.  Some slight wall thickening of the distal esophagus noted.  No pancreatic lesion noted  -Plan for repeat CT scans without contrast in 3 months.  Ordered today    2.  Esophageal wall thickening  -Incidentally noted on recent CT scan.  Patient asymptomatic and had an EGD in 2020  -Should he have worsening dysphagia, will plan to contact Dr. Singh for EGD    Follow Up:   Follow-up in 3 months     Camden Lombardi MD  Hematology and Oncology     Please note that portions of this note may have been completed with a voice recognition program. Efforts were made to edit the dictations, but occasionally words are mistranscribed.

## 2024-06-10 ENCOUNTER — OFFICE VISIT (OUTPATIENT)
Dept: CARDIOLOGY | Facility: CLINIC | Age: 69
End: 2024-06-10
Payer: MEDICARE

## 2024-06-10 VITALS
BODY MASS INDEX: 22.73 KG/M2 | DIASTOLIC BLOOD PRESSURE: 78 MMHG | WEIGHT: 167.8 LBS | HEIGHT: 72 IN | HEART RATE: 48 BPM | RESPIRATION RATE: 17 BRPM | SYSTOLIC BLOOD PRESSURE: 142 MMHG | OXYGEN SATURATION: 99 %

## 2024-06-10 DIAGNOSIS — I27.81 COR PULMONALE (CHRONIC): ICD-10-CM

## 2024-06-10 DIAGNOSIS — R00.2 PALPITATIONS: ICD-10-CM

## 2024-06-10 DIAGNOSIS — Z87.891 HISTORY OF TOBACCO USE: ICD-10-CM

## 2024-06-10 DIAGNOSIS — C34.31 MALIGNANT NEOPLASM OF LOWER LOBE OF RIGHT LUNG: ICD-10-CM

## 2024-06-10 DIAGNOSIS — I27.20 PULMONARY HYPERTENSION: ICD-10-CM

## 2024-06-10 DIAGNOSIS — R00.1 BRADYCARDIA, SINUS: Primary | ICD-10-CM

## 2024-06-10 PROCEDURE — 1159F MED LIST DOCD IN RCRD: CPT | Performed by: INTERNAL MEDICINE

## 2024-06-10 PROCEDURE — 1160F RVW MEDS BY RX/DR IN RCRD: CPT | Performed by: INTERNAL MEDICINE

## 2024-06-10 PROCEDURE — 99214 OFFICE O/P EST MOD 30 MIN: CPT | Performed by: INTERNAL MEDICINE

## 2024-06-10 NOTE — PROGRESS NOTES
"    Subjective:     Encounter Date:06/10/2024      Patient ID: Saman Aguayo is a 69 y.o. male.    Chief Complaint: Fatigue  HPI  This is a 69-year-old male patient who presents to cardiology clinic for routine follow-up.  The patient indicates he has been having fatigue.  He indicates that he does not have the overall strength or stamina to do activities that he enjoys such as mowing his yard.  He has had some palpitations but no dizziness or syncope.  He has resting sinus bradycardia without any medications to cause slow heart rate.  He is had a third recurrence of his lung cancer.  He is currently undergoing therapy with \"CyberKnife\".  The following portions of the patient's history were reviewed and updated as appropriate: allergies, current medications, past family history, past medical history, past social history, past surgical history and problem  Review of Systems   Constitutional: Positive for malaise/fatigue. Negative for chills, diaphoresis, fever, weight gain and weight loss.   HENT:  Negative for ear discharge, hearing loss, hoarse voice and nosebleeds.    Eyes:  Negative for discharge, double vision, pain and photophobia.   Cardiovascular:  Positive for palpitations. Negative for chest pain, claudication, cyanosis, dyspnea on exertion, irregular heartbeat, leg swelling, near-syncope, orthopnea, paroxysmal nocturnal dyspnea and syncope.   Respiratory:  Negative for cough, hemoptysis, sputum production and wheezing.    Endocrine: Negative for cold intolerance, heat intolerance, polydipsia, polyphagia and polyuria.   Hematologic/Lymphatic: Negative for adenopathy and bleeding problem. Does not bruise/bleed easily.   Skin:  Negative for color change, flushing, itching and rash.   Musculoskeletal:  Negative for muscle cramps, muscle weakness, myalgias and stiffness.   Gastrointestinal:  Negative for abdominal pain, diarrhea, hematemesis, hematochezia, nausea and vomiting.   Genitourinary:  Negative for " "dysuria, frequency and nocturia.   Neurological:  Negative for focal weakness, loss of balance, numbness, paresthesias and seizures.   Psychiatric/Behavioral:  Negative for altered mental status, hallucinations and suicidal ideas.    Allergic/Immunologic: Negative for HIV exposure, hives and persistent infections.           Current Outpatient Medications:     albuterol (ACCUNEB) 1.25 MG/3ML nebulizer solution, Take 3 mL by nebulization Every 6 (Six) Hours As Needed for Wheezing., Disp: 30 each, Rfl: 0    albuterol sulfate  (90 Base) MCG/ACT inhaler, Inhale 2 puffs Every 4 (Four) Hours As Needed for Wheezing., Disp: , Rfl:     predniSONE (DELTASONE) 10 MG tablet, Use as directed., Disp: 100 tablet, Rfl: 0    tiotropium bromide-olodaterol (Stiolto Respimat) 2.5-2.5 MCG/ACT aerosol solution inhaler, Inhale 2 puffs Daily., Disp: 4 g, Rfl: 9    Objective:   Vitals and nursing note reviewed.   Constitutional:       Appearance: Healthy appearance. Not in distress.   Neck:      Vascular: No JVR. JVD normal.   Pulmonary:      Effort: Pulmonary effort is normal.      Breath sounds: Normal breath sounds. No wheezing. No rhonchi. No rales.   Chest:      Chest wall: Not tender to palpatation.   Cardiovascular:      PMI at left midclavicular line. Normal rate. Regular rhythm. Normal S1. Normal S2.       Murmurs: There is no murmur.      No gallop.  No click. No rub.   Pulses:     Intact distal pulses.   Edema:     Peripheral edema absent.   Abdominal:      General: Bowel sounds are normal.      Palpations: Abdomen is soft.      Tenderness: There is no abdominal tenderness.   Musculoskeletal: Normal range of motion.         General: No tenderness. Skin:     General: Skin is warm and dry.   Neurological:      General: No focal deficit present.      Mental Status: Alert and oriented to person, place and time.     Blood pressure 142/78, pulse (!) 48, resp. rate 17, height 182.9 cm (72\"), weight 76.1 kg (167 lb 12.8 oz), SpO2 " 99%.   Lab Review:     Assessment:       1. Cor pulmonale (chronic)  No overt clinical right heart failure.  No peripheral edema.    2. Malignant neoplasm of lower lobe of right lung  Multiple recurrences of lung cancer.  Currently undergoing CyberKnife therapy.    3. Pulmonary hypertension   Secondary pulmonary hypertension with WHO group 3 classification.    4. History of tobacco use  The patient has not smoked in 8 years.    5. Palpitations  Patient's current palpitations and fatigue are suspected to be due to his worsening sinus bradycardia.    6. Bradycardia, sinus  The patient is not on any offending medications which would result in bradycardia.  Previous thyroid function testing was normal.  - Holter Monitor - 72 Hour Up To 15 Days    Procedures    Plan:     I have recommended a 14-day Holter monitor to assess his overall bradycardia and symptom correlation.    I suspect the patient would benefit from a permanent pacemaker implantation.  This decision will be predicated on the results of his cardiac monitor as well as his expected survival from underlying lung cancer.    No changes in medications made at today's visit.

## 2024-06-18 ENCOUNTER — OFFICE VISIT (OUTPATIENT)
Dept: PULMONOLOGY | Facility: CLINIC | Age: 69
End: 2024-06-18
Payer: MEDICARE

## 2024-06-18 VITALS
HEART RATE: 49 BPM | DIASTOLIC BLOOD PRESSURE: 62 MMHG | WEIGHT: 165 LBS | HEIGHT: 72 IN | OXYGEN SATURATION: 96 % | SYSTOLIC BLOOD PRESSURE: 124 MMHG | RESPIRATION RATE: 14 BRPM | BODY MASS INDEX: 22.35 KG/M2

## 2024-06-18 DIAGNOSIS — R00.1 BRADYCARDIA: ICD-10-CM

## 2024-06-18 DIAGNOSIS — R06.02 SHORTNESS OF BREATH: ICD-10-CM

## 2024-06-18 DIAGNOSIS — C34.91 NON-SMALL CELL CANCER OF RIGHT LUNG: ICD-10-CM

## 2024-06-18 DIAGNOSIS — G47.34 NOCTURNAL HYPOXIA: ICD-10-CM

## 2024-06-18 DIAGNOSIS — J44.9 CHRONIC OBSTRUCTIVE PULMONARY DISEASE, UNSPECIFIED COPD TYPE: Primary | ICD-10-CM

## 2024-06-18 PROCEDURE — 99214 OFFICE O/P EST MOD 30 MIN: CPT | Performed by: NURSE PRACTITIONER

## 2024-06-18 NOTE — PROGRESS NOTES
"Chief Complaint   Patient presents with    Breathing Problem    Follow-up         Subjective   Saman Aguayo is a 69 y.o. male.   Patient comes today for follow up of shortness of breath and COPD.     Symptoms have been stable since the last clinic visit. Patient reports no recent exacerbations.     He feels he is doing well overall.    Patient is using medications, as prescribed. He uses Stiolto daily. He has not used the neb or VILMA inhaler in the last 6 weeks.     Exercise tolerance has also remained stable.     Quit smoking 10 years ago.     He states he has been very fatigued and is following with cardiology.  He is currently wearing a Holter monitor ordered by cardiology.  He has been experiencing bradycardia and permanent pacemaker has been discussed by the cardiologist.        The following portions of the patient's history were reviewed and updated as appropriate: allergies, current medications, past family history, past medical history, past social history, and past surgical history.      Review of Systems   HENT:  Negative for sinus pressure, sneezing and sore throat.    Respiratory:  Negative for cough, chest tightness, shortness of breath (some) and wheezing (some).        Objective   Visit Vitals  /62   Pulse (!) 49   Resp 14   Ht 182.9 cm (72\") Comment: pt reported   Wt 74.8 kg (165 lb)   SpO2 96%   BMI 22.38 kg/m²         Physical Exam  Vitals reviewed.   HENT:      Head: Atraumatic.      Mouth/Throat:      Mouth: Mucous membranes are moist.   Eyes:      Extraocular Movements: Extraocular movements intact.   Cardiovascular:      Rate and Rhythm: Regular rhythm. Bradycardia present.   Pulmonary:      Effort: Pulmonary effort is normal. No respiratory distress.      Comments: Somewhat decreased A/E without wheezing.   Skin:     General: Skin is warm.   Neurological:      Mental Status: He is alert and oriented to person, place, and time.           Assessment & Plan   Diagnoses and all orders for " this visit:    1. Chronic obstructive pulmonary disease, unspecified COPD type (Primary)  -     Cancel: Oxygen Therapy  -     Oxygen Therapy    2. Non-small cell cancer of right lung    3. Shortness of breath    4. Nocturnal hypoxia    5. Bradycardia  Comments:  f/w Cardiology           Return for keep appt in September.    DISCUSSION (if any):  PFT 2024 consistent with moderate obstruction.     Overnight pulse oximetry was ordered at last visit and done per patient but we do not have results scanned in. I will ask staff to call [facility] for results and we will notify patient once we have results.    I received the results of overnight pulse oximetry.  The overnight test was performed February 22, 2024.  The overnight results show that the patient's oxygen saturation is less than 88% for 2 hours and 56 minutes during the test.  Unfortunately, these results were not received by our office until today.  I have ordered 2 L of oxygen at night for the patient.  I have called the patient to notify him.  The patient was not home but I left a message with his granddaughter stating that I would be ordering oxygen for him to use at night when sleeping.  Oxygen ordered through [employer].    Overall, his COPD symptoms seem to be stable.  I have asked him to continue Stiolto daily and the rescue medication as directed.       Latest Reference Range & Units 02/15/24 09:54   Uric Acid 3.4 - 7.0 mg/dL 5.9       CT ordered by Oncology every 6 months.   I reviewed last CT chest from May 30, 2024.     Study Result    Narrative & Impression   PROCEDURE: CT CHEST W CONTRAST DIAGNOSTIC-, CT ABDOMEN PELVIS W  CONTRAST-     HISTORY: NSCLC; C34.31-Malignant neoplasm of lower lobe, right bronchus  or lung     COMPARISON: 02/29/2024..     PROCEDURE: The patient was injected with IV contrast. Axial images were  obtained from the lung apex to the pubic symphysis by computed  tomography. This study was performed with techniques to keep  radiation  doses as low as reasonably achievable, (ALARA). Individualized dose  reduction techniques using automated exposure control or adjustment of  mA and/or kV according to the patient size were employed.     FINDINGS:     CHEST: There is no axillary adenopathy. There is no hilar or mediastinal  adenopathy.  The heart size is normal. There is no pericardial or  pleural effusion. Again noted is a 4 mm nodule right lung base best seen  series 2 image 44, stable from prior exam. Moderately severe  emphysematous change noted. Again noted is volume loss in the right  hemithorax from previous right lobectomy. There is elevation of the  right hemidiaphragm and linear scarring which is stable. No bony  destructive lesion seen. Groundglass opacities in the left lower lobe  posteriorly are stable.     ABDOMEN: The liver is homogenous with no focal abnormality. Clips are  noted. Vascular calcifications noted. The spleen is unremarkable. No  adrenal mass is present.  The pancreas is unremarkable. The kidneys are  unremarkable, without evidence of mass or hydronephrosis. The aorta is  normal in caliber. There is no free fluid or adenopathy.     PELVIS: The GI tract mildly dilated distal esophagus with wall  thickening and small hiatal hernia. Consider esophagitis. Endoscopy may  be helpful. The appendix is surgically absent and surgical clips noted  in the right lower quadrant. Vascular calcifications noted. There is  diverticulosis without evidence of diverticulitis. Prostate appears to  be normal in size and contains calcifications. There is moderate motion  artifact on the images of the pelvis decreasing the sensitivity of this  exam to detect small masses or bony lesions. There is a subcentimeter  right sacral lesion which is stable, likely bone island. The urinary  bladder is unremarkable.  There is no free fluid, adenopathy, or  inflammatory process.     IMPRESSION:  No evidence of metastatic disease in the chest.      Wall thickening of the distal esophagus and hiatal hernia, consider  endoscopy for further evaluation.     Suboptimal exam secondary to motion artifact, particularly in images of  the lower pelvis.     Diverticulosis.     Stable sclerotic lesion right side of the sacrum, likely bone island.        CTDI: 5.68 mGy  DLP:438.96 mGy.cm     This report was signed and finalized on 5/30/2024 9:52 AM by Mindi Del Valle MD.         Dictated utilizing Dragon dictation.    This document was electronically signed by SAMIRA Wylie June 18, 2024  12:15 EDT

## 2024-07-01 ENCOUNTER — TELEPHONE (OUTPATIENT)
Dept: CARDIOLOGY | Facility: CLINIC | Age: 69
End: 2024-07-01
Payer: MEDICARE

## 2024-07-12 ENCOUNTER — OFFICE VISIT (OUTPATIENT)
Dept: CARDIOLOGY | Facility: CLINIC | Age: 69
End: 2024-07-12
Payer: MEDICARE

## 2024-07-12 VITALS
HEIGHT: 72 IN | BODY MASS INDEX: 22.21 KG/M2 | WEIGHT: 164 LBS | OXYGEN SATURATION: 98 % | DIASTOLIC BLOOD PRESSURE: 64 MMHG | HEART RATE: 59 BPM | SYSTOLIC BLOOD PRESSURE: 130 MMHG

## 2024-07-12 DIAGNOSIS — R07.2 PRECORDIAL PAIN: Primary | ICD-10-CM

## 2024-07-12 DIAGNOSIS — I25.84 CORONARY ARTERY CALCIFICATION: Chronic | ICD-10-CM

## 2024-07-12 DIAGNOSIS — R00.1 BRADYCARDIA, SINUS: Chronic | ICD-10-CM

## 2024-07-12 DIAGNOSIS — I25.10 CORONARY ARTERY CALCIFICATION: Chronic | ICD-10-CM

## 2024-07-12 RX ORDER — ASPIRIN 81 MG/1
81 TABLET ORAL DAILY
Start: 2024-07-12 | End: 2025-07-07

## 2024-07-12 RX ORDER — ATORVASTATIN CALCIUM 40 MG/1
40 TABLET, FILM COATED ORAL DAILY
Qty: 30 TABLET | Refills: 3 | Status: SHIPPED | OUTPATIENT
Start: 2024-07-12 | End: 2024-11-09

## 2024-07-12 RX ORDER — PREDNISONE 20 MG/1
20 TABLET ORAL AS NEEDED
COMMUNITY

## 2024-07-12 NOTE — PROGRESS NOTES
[facility] Cardiology Office Follow Up Note    Saman Aguayo  [id number]  2024    Primary Care Provider: Kelsy Swan MD    Chief Complaint   Patient presents with    Follow-up    Bradycardia, sinus     Subjective     History of Present Illness:   Saman Aguayo is a 69 y.o. male with COPD, lung cancer treated with CyberKnife and prior smoking history who presents to the Cardiology Clinic for follow up of cardiac testing.  During his last visit, Dr. Almonte ordered a heart monitor due to his bradycardia, for which there was no medical explanation.  Patient says he still feels the same as he did before.  Has constant fatigue and low energy.  He feels wiped out by mid afternoon and has no strength to do anything.  Denies any chandan chest pain but upon further questioning, says he does have some chest tightness and arm pain whenever he exerts himself but has to stop to catch his breath to make it go away.  He does have baseline exertional dyspnea but this is normal for him with his COPD and lung problems.  Denies any orthopnea, PND, or leg swelling.    Past Cardiac Testin. Last Coronary Angio: none    2. Last Echo: 22  Estimated left ventricular EF = 56% Left ventricular ejection fraction appears to be 56 - 60%. Left ventricular systolic function is normal.  Left ventricular diastolic function was normal.  Estimated right ventricular systolic pressure from tricuspid regurgitation is moderately elevated (45-55 mmHg).  Moderate to severe tricuspid valve regurgitation is present.  Moderate pulmonary hypertension is present.       3. Prior Stress Testin19  Left ventricular ejection fraction is normal (Calculated EF = 52%).  Myocardial perfusion imaging indicates a normal myocardial perfusion study with no evidence of ischemia.  Impressions are consistent with a low risk study.  Findings consistent with a normal ECG stress test.  Findings consistent with a normal ECG stress  "test.    4. Prior Holter Monitor: 6/10/24  A relatively benign monitor study.  No sustained supraventricular or ventricular arrhythmia.  1 atrial triplet.  Asymptomatic.  5 ventricular triplets.  Asymptomatic.  All significant bradycardia and sinus pause occurred during sleeping hours. Asymptomatic.  No symptom correlation.    Review of Systems:  Review of Systems   Constitutional: Negative.  Positive for fatigue. Negative for chills, diaphoresis and fever.   Respiratory: Negative.  Positive for chest tightness and shortness of breath. Negative for cough.    Cardiovascular: Negative.    Gastrointestinal: Negative.    Musculoskeletal: Negative.    Neurological: Negative.        I have reviewed and/or updated the patient's past medical, past surgical, family, social history, problem list and allergies as appropriate.       Current Outpatient Medications:     albuterol (ACCUNEB) 1.25 MG/3ML nebulizer solution, Take 3 mL by nebulization Every 6 (Six) Hours As Needed for Wheezing., Disp: 30 each, Rfl: 0    albuterol sulfate  (90 Base) MCG/ACT inhaler, Inhale 2 puffs Every 4 (Four) Hours As Needed for Wheezing., Disp: , Rfl:     O2 (OXYGEN), Inhale 2 L/min every night at bedtime., Disp: , Rfl:     predniSONE (DELTASONE) 20 MG tablet, Take 1 tablet by mouth As Needed., Disp: , Rfl:     tiotropium bromide-olodaterol (Stiolto Respimat) 2.5-2.5 MCG/ACT aerosol solution inhaler, Inhale 2 puffs Daily., Disp: 4 g, Rfl: 9    atorvastatin (LIPITOR) 40 MG tablet, Take 1 tablet by mouth Daily for 120 days., Disp: 30 tablet, Rfl: 3       Objective     Vitals:  Vitals:    07/12/24 0911   BP: 130/64   BP Location: Left arm   Patient Position: Sitting   Pulse: 59   SpO2: 98%   Weight: 74.4 kg (164 lb)   Height: 182.9 cm (72.01\")       Physical Exam:  Vitals reviewed.   Constitutional:       Appearance: Healthy appearance. Not in distress.   Neck:      Vascular: No JVD.   Pulmonary:      Effort: Pulmonary effort is normal.      " Breath sounds: Normal breath sounds.   Cardiovascular:      Normal rate. Regular rhythm. Normal S1. Normal S2.       Murmurs: There is no murmur.      No gallop.  No click. No rub.   Pulses:     Intact distal pulses.   Edema:     Peripheral edema absent.   Abdominal:      General: There is no distension.      Palpations: Abdomen is soft.      Tenderness: There is no abdominal tenderness.   Skin:     General: Skin is warm and dry.         Results Review:   I reviewed the patient's new clinical results.  I reviewed the patient's new imaging results and agree with the interpretation.  I reviewed the patient's other test results and agree with the interpretation  I personally viewed and interpreted the patient's EKG/Telemetry data    Procedures        Assessment & Plan   Diagnoses and all orders for this visit:    1. Precordial pain (Primary)  2. Coronary artery calcification  Patient complains of fatigue and some exertional chest pain from time to time.  Difficult to differentiate this from his underlying pulmonary symptoms which have been chronic with his COPD and lung cancer.  I personally reviewed his CT chest from earlier this year and it does show moderate coronary calcifications in the distal left main, proximal LAD, proximal circumflex and mid RCA.  Stress test done about 5 years ago was low risk for ischemia.  -- Considering his ongoing symptoms that are somewhat debilitating, I do think it would be reasonable to exclude high risk ischemia on stress test as well as chronotropic competence.  No strong indication for repeat echo at this time since there are no signs or symptoms of heart failure.  -- I did recommend he start a baby aspirin along with a statin for secondary prevention in the setting of vascular disease.  He also has aortic calcifications.  -- Patient agreeable to starting atorvastatin 40 mg daily.  Will try aspirin 81 mg daily but has had some significant bruising previously.    3. Bradycardia,  sinus  I personally reviewed his monitor and there were no arrhythmias or blocks detected.  Agree with Dr. Almonte's interpretation.  Heart rate variability was decent.  Considering his age and pulmonary issues, he may develop some degree of sinus node dysfunction over time.  Does not appear to be symptomatic with this.  -- Stress test as above          Plan   Orders Placed This Encounter   Procedures    Stress Test With Myocardial Perfusion (1 Day)     Standing Status:   Future     Standing Expiration Date:   7/12/2025     Order Specific Question:   What stress agent will be used?     Answer:   Exercise with possible pharmacologic     Order Specific Question:   Reason for exam?     Answer:   Chest Pain     Order Specific Question:   Release to patient     Answer:   Routine Release [[id number]]     Medications:  -     atorvastatin (LIPITOR) 40 MG tablet; Take 1 tablet by mouth Daily for 120 days.  Dispense: 30 tablet; Refill: 3    Preventative Cardiology:   Tobacco Cessation: N/A  Obstructive Sleep Apnea Screening: Completed  AAA Screening: Deffered  Peripheral Arterial Disease Screening: Deffered       Follow Up:   Return in about 3 months (around 10/12/2024).    Thank you for allowing me to participate in the care of your patient. Please to not hesitate to contact me with additional questions or concerns.     Valdez Woodward MD  07/12/2024  08:00 EDT

## 2024-08-30 ENCOUNTER — HOSPITAL ENCOUNTER (OUTPATIENT)
Dept: CT IMAGING | Facility: HOSPITAL | Age: 69
Discharge: HOME | End: 2024-08-30
Payer: MEDICARE

## 2024-08-30 DIAGNOSIS — C34.31 MALIGNANT NEOPLASM OF LOWER LOBE OF RIGHT LUNG: ICD-10-CM

## 2024-08-30 PROCEDURE — 74176 CT ABD & PELVIS W/O CONTRAST: CPT

## 2024-08-30 PROCEDURE — 71250 CT THORAX DX C-: CPT

## 2024-09-05 ENCOUNTER — OFFICE VISIT (OUTPATIENT)
Dept: ONCOLOGY | Facility: CLINIC | Age: 69
End: 2024-09-05
Payer: MEDICARE

## 2024-09-05 VITALS
SYSTOLIC BLOOD PRESSURE: 152 MMHG | HEIGHT: 72 IN | RESPIRATION RATE: 16 BRPM | BODY MASS INDEX: 21.83 KG/M2 | TEMPERATURE: 98 F | OXYGEN SATURATION: 96 % | HEART RATE: 60 BPM | DIASTOLIC BLOOD PRESSURE: 73 MMHG | WEIGHT: 161.2 LBS

## 2024-09-05 DIAGNOSIS — C34.31 MALIGNANT NEOPLASM OF LOWER LOBE OF RIGHT LUNG: Primary | ICD-10-CM

## 2024-09-05 PROCEDURE — 99214 OFFICE O/P EST MOD 30 MIN: CPT | Performed by: INTERNAL MEDICINE

## 2024-09-05 PROCEDURE — 1126F AMNT PAIN NOTED NONE PRSNT: CPT | Performed by: INTERNAL MEDICINE

## 2024-09-05 NOTE — PROGRESS NOTES
Follow Up Office Visit      Date: 2024     Patient Name: Saman Aguayo  MRN: [id number]  : 1955  Referring Physician: Dre Do     Chief Complaint: Follow-up for history of previously resected right lung cancer and newly diagnosed right lung cancer in May 2023     History of Present Illness: Saman Aguayo is a pleasant 68 y.o. male history of right lung adenocarcinoma status post 2 resections, osteoarthritis, COPD who presents today for evaluation of history of lung cancer new right lung nodule. The patient is accompanied by their wife who contributes to the history of their care.  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  He then had recurrence early  requiring redo right VATS with right upper wedge resection conversion to thoracotomy with completion lobectomy and mediastinal lymph node dissection 2020 by Dr. Porter.  Pathology consistent with stage IA2 non-small cell carcinoma most consistent with adenocarcinoma ([id number]).   He had negative CT scans until 2023 when he presented with significant bilateral shoulder pain.  CT chest notable for a 1.3 cm right upper lobe nodule concerning for disease recurrence or metastasis.  PET/CT with some slight hypermetabolic activity in the lesion but no other sites of disease noted.  Underwent biopsy with Dr. Damon on 5/10/2023.  Pathology not consistent with malignancy     Interval History:  Presents to clinic for follow-up.  Completed CyberKnife radiation to his new right lung malignancy in 2023.  No major issues today.  Denies any chest pain or shortness of breath.    Oncology History:    Oncology/Hematology History   Malignant neoplasm of lower lobe of right lung   2023 Cancer Staged    Staging form: Lung, AJCC 8th Edition  -  Clinical stage from 5/9/2023: Stage IA2 (cT1b, cN0, cM0) - Signed by Nathan Bermeo MD on 5/18/2023 5/18/2023 Initial Diagnosis    Malignant neoplasm of lower lobe of right lung     5/31/2023 - 6/7/2023 Radiation    Radiation OncologyTreatment Course:  Saman Aguayo received 4800 cGy in 3 fractions to right lung via Stereotactic Radiation Therapy - SRT.         Subjective      Review of Systems:   Constitutional: Negative for fevers, chills, or weight loss  Eyes: Negative for blurred vision or discharge         Ear/Nose/Throat: Negative for difficulty swallowing, sore throat, LAD                                                       Respiratory: Negative for cough, SOA, wheezing                                                                                        Cardiovascular: Negative for chest pain or palpitations                                                                  Gastrointestinal: Negative for nausea, vomiting or diarrhea                                                                     Genitourinary: Negative for dysuria or hematuria                                                                                           Musculoskeletal: Negative for any joint pains or muscle aches                                                                        Neurologic: Negative for any weakness, headaches, dizziness                                                                         Hematologic: Negative for any easy bleeding or bruising                                                                                   Psychiatric: Negative for anxiety or depression                          Past Medical History/Past Surgical History/ Family History/ Social History: Reviewed by me and unchanged from my previous documentation done on May 2024.     Medications:     Current Outpatient Medications:     albuterol (ACCUNEB) 1.25 MG/3ML nebulizer solution, Take 3 mL by nebulization Every 6 (Six)  "Hours As Needed for Wheezing., Disp: 30 each, Rfl: 0    albuterol sulfate  (90 Base) MCG/ACT inhaler, Inhale 2 puffs Every 4 (Four) Hours As Needed for Wheezing., Disp: , Rfl:     aspirin 81 MG EC tablet, Take 1 tablet by mouth Daily for 360 days., Disp: , Rfl:     atorvastatin (LIPITOR) 40 MG tablet, Take 1 tablet by mouth Daily for 120 days., Disp: 30 tablet, Rfl: 3    O2 (OXYGEN), Inhale 2 L/min every night at bedtime., Disp: , Rfl:     predniSONE (DELTASONE) 20 MG tablet, Take 1 tablet by mouth As Needed., Disp: , Rfl:     tiotropium bromide-olodaterol (Stiolto Respimat) 2.5-2.5 MCG/ACT aerosol solution inhaler, Inhale 2 puffs Daily., Disp: 4 g, Rfl: 9    Allergies:   No Known Allergies    Objective     Physical Exam:  Vital Signs:   Vitals:    09/05/24 1037   BP: 152/73   Pulse: 60   Resp: 16   Temp: 98 °F (36.7 °C)   SpO2: 96%   Weight: 73.1 kg (161 lb 3.2 oz)   Height: 182.9 cm (72.01\")   PainSc: 0-No pain     Pain Score    09/05/24 1037   PainSc: 0-No pain     ECOG Performance Status: 0 - Asymptomatic    Constitutional: NAD, ECOG 0  Eyes: PERRLA, scleral anicteric  ENT: No LAD, no thyromegaly  Respiratory: CTAB, no wheezing, rales, rhonchi  Cardiovascular: RRR, no murmurs, pulses 2+ bilaterally  Abdomen: soft, NT/ND, no HSM  Musculoskeletal: strength 5/5 bilaterally, no c/c/e  Neurologic: A&O x 3, CN II-XII intact grossly    Results Review:   No visits with results within 2 Week(s) from this visit.   Latest known visit with results is:   Lab on 02/15/2024   Component Date Value Ref Range Status    Uric Acid 02/15/2024 5.9  3.4 - 7.0 mg/dL Final       CT Chest Without Contrast Diagnostic    Result Date: 8/30/2024  Narrative:  PROCEDURE: CT CHEST WO CONTRAST DIAGNOSTIC-  HISTORY: NSCLC; C34.31-Malignant neoplasm of lower lobe, right bronchus or lung  COMPARISON: 05/30/2024.  TECHNIQUE: Axial CT without IV contrast administration.  FINDINGS:  Emphysematous changes are present. Soft tissue density in the " right suprahilar region centrally is stable, presumably representing posttreatment changes. No new pulmonary lesion is seen.  No pleural or pericardial effusion is seen. No adenopathy or mass lesion is present.      Impression: 1. Stable exam without evidence of metastatic disease or local tumor recurrence.   This study was performed with techniques to keep radiation doses as low as reasonably achievable (ALARA). Individualized dose reduction techniques using automated exposure control or adjustment of vA and/or kV according to the patient size were employed.  This report was signed and finalized on 8/30/2024 8:21 AM by Juan F Saba MD.      CT Abdomen Pelvis Without Contrast    Result Date: 8/30/2024  Narrative: PROCEDURE: CT ABDOMEN PELVIS WO CONTRAST-  HISTORY: NSCLC; C34.31-Malignant neoplasm of lower lobe, right bronchus or lung  COMPARISON: Contrast enhanced exam 05/30/2024  Note: Noncontrast exam is less sensitive for assessment of the bowel and solid abdominal organs  TECHNIQUE: Noncontrast exam  CT ABDOMEN:  Solid organs are unremarkable. The bowel shows no evidence of obstruction. There is no free air or free fluid within the abdomen.  Postcholecystectomy changes are present.  CT PELVIS: No bowel dilatation is seen. There is no free fluid. Appendix is normal. Surgical changes are seen right lower quadrant. Mild sigmoid diverticulosis is noted. Mild prostate enlargement is present.      Impression: 1. No obvious metastatic disease. Stable exam. 2. No evidence of bowel obstruction  This study was performed with techniques to keep radiation doses as low as reasonably achievable (ALARA). Individualized dose reduction techniques using automated exposure control or adjustment of vA and/or kV according to the patient size were employed.  This report was signed and finalized on 8/30/2024 8:12 AM by Juan F Saba MD.       Assessment / Plan      Assessment/Plan:   1. 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    -Recurrence early 2020 requiring redo right VATS with right upper wedge resection conversion to thoracotomy with completion lobectomy and mediastinal lymph node dissection March 2020 by Dr. Porter.  Pathology consistent with stage IA2 non-small cell carcinoma most consistent with adenocarcinoma ([id number]).   -CT scan in April 2023 concerning for new right lung lesion measuring 1.3 cm  -PET/CT with avidity noted in the primary lesion but no other sites of disease  -Biopsy nondiagnostic in May 2023  -Status post CyberKnife in June 2023  -CT C/A/P in September 2023 reviewed and showing significant resolution of his disease in the right but a concerning nodule in the left lingula  -PET/CT without evidence of significant hypermetabolic activity  -CT scan in December 2023 reviewed and showing a slight new lesion within the right lung however given his history of infection over the past few weeks this could be inflammatory  -CT C/A/P in February 2024 reviewed and showing no new nodules or concerns for malignancy.  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  Some slight wall thickening of the distal esophagus noted.  No pancreatic lesion noted  -CT C/A/P in August 2024 reviewed without evidence of recurrent or metastatic disease  -Plan for repeat CT scans without contrast in 6 months.  Ordered today     2.  Esophageal wall thickening  -Incidentally noted on recent CT scan.  Patient asymptomatic and had an EGD in 2020  -Should he have worsening dysphagia, will plan to contact Dr. Singh for EGD         Follow Up:    Follow-up in 6 months     Camden Lombardi MD  Hematology and Oncology     Please note that portions of this note may have been completed with a voice recognition program. Efforts were made to edit the dictations, but occasionally words are mistranscribed.

## 2024-09-19 ENCOUNTER — OFFICE VISIT (OUTPATIENT)
Dept: PULMONOLOGY | Facility: CLINIC | Age: 69
End: 2024-09-19
Payer: MEDICARE

## 2024-09-19 VITALS
HEIGHT: 72 IN | SYSTOLIC BLOOD PRESSURE: 124 MMHG | RESPIRATION RATE: 14 BRPM | WEIGHT: 162 LBS | DIASTOLIC BLOOD PRESSURE: 68 MMHG | HEART RATE: 51 BPM | OXYGEN SATURATION: 95 % | BODY MASS INDEX: 21.94 KG/M2

## 2024-09-19 DIAGNOSIS — J44.9 CHRONIC OBSTRUCTIVE PULMONARY DISEASE, UNSPECIFIED COPD TYPE: Primary | ICD-10-CM

## 2024-09-19 DIAGNOSIS — C34.91 NON-SMALL CELL CANCER OF RIGHT LUNG: ICD-10-CM

## 2024-09-19 DIAGNOSIS — G47.34 NOCTURNAL HYPOXIA: ICD-10-CM

## 2024-09-19 DIAGNOSIS — J30.89 OTHER ALLERGIC RHINITIS: ICD-10-CM

## 2024-09-19 PROCEDURE — G0008 ADMIN INFLUENZA VIRUS VAC: HCPCS | Performed by: INTERNAL MEDICINE

## 2024-09-19 PROCEDURE — 99214 OFFICE O/P EST MOD 30 MIN: CPT | Performed by: INTERNAL MEDICINE

## 2024-09-19 PROCEDURE — 90662 IIV NO PRSV INCREASED AG IM: CPT | Performed by: INTERNAL MEDICINE

## 2024-09-26 ENCOUNTER — HOSPITAL ENCOUNTER (OUTPATIENT)
Dept: NUCLEAR MEDICINE | Facility: HOSPITAL | Age: 69
Discharge: HOME | End: 2024-09-26
Payer: MEDICARE

## 2024-09-26 DIAGNOSIS — R07.2 PRECORDIAL PAIN: ICD-10-CM

## 2024-09-26 PROCEDURE — 0 TECHNETIUM SESTAMIBI: Performed by: INTERNAL MEDICINE

## 2024-09-26 PROCEDURE — 78452 HT MUSCLE IMAGE SPECT MULT: CPT

## 2024-09-26 PROCEDURE — A9500 TC99M SESTAMIBI: HCPCS | Performed by: INTERNAL MEDICINE

## 2024-09-26 PROCEDURE — 93017 CV STRESS TEST TRACING ONLY: CPT

## 2024-09-26 PROCEDURE — 25010000002 REGADENOSON 0.4 MG/5ML SOLUTION: Performed by: INTERNAL MEDICINE

## 2024-09-26 RX ORDER — REGADENOSON 0.08 MG/ML
0.4 INJECTION, SOLUTION INTRAVENOUS
Status: COMPLETED | OUTPATIENT
Start: 2024-09-26 | End: 2024-09-26

## 2024-09-26 RX ADMIN — TECHNETIUM TC 99M SESTAMIBI 1 DOSE: 1 INJECTION INTRAVENOUS at 08:27

## 2024-09-26 RX ADMIN — REGADENOSON 0.4 MG: 0.08 INJECTION, SOLUTION INTRAVENOUS at 08:27

## 2024-09-26 RX ADMIN — TECHNETIUM TC 99M SESTAMIBI 1 DOSE: 1 INJECTION INTRAVENOUS at 06:40

## 2024-09-27 LAB
BH CV REST NUCLEAR ISOTOPE DOSE: 10 MCI
BH CV STRESS COMMENTS STAGE 1: NORMAL
BH CV STRESS DOSE REGADENOSON STAGE 1: 0.4
BH CV STRESS DURATION MIN STAGE 1: 0
BH CV STRESS DURATION SEC STAGE 1: 10
BH CV STRESS GRADE STAGE 1: 10
BH CV STRESS METS STAGE 1: 5
BH CV STRESS NUCLEAR ISOTOPE DOSE: 32.9 MCI
BH CV STRESS PROTOCOL 1: NORMAL
BH CV STRESS RECOVERY BP: NORMAL MMHG
BH CV STRESS RECOVERY HR: 77 BPM
BH CV STRESS SPEED STAGE 1: 1.7
BH CV STRESS STAGE 1: 1
LV EF NUC BP: 69 %
MAXIMAL PREDICTED HEART RATE: 149 BPM
PERCENT MAX PREDICTED HR: 66.44 %
STRESS BASELINE BP: NORMAL MMHG
STRESS BASELINE HR: 53 BPM
STRESS PERCENT HR: 78 %
STRESS POST PEAK BP: NORMAL MMHG
STRESS POST PEAK HR: 99 BPM
STRESS TARGET HR: 127 BPM

## 2024-09-30 LAB
ALBUMIN SERPL-MCNC: 4.2 G/DL (ref 3.5–5.2)
ALBUMIN/GLOB SERPL: 1.7 G/DL
ALP SERPL-CCNC: 84 U/L (ref 39–117)
ALT SERPL-CCNC: 12 U/L (ref 1–41)
AST SERPL-CCNC: 16 U/L (ref 1–40)
BILIRUB SERPL-MCNC: 0.5 MG/DL (ref 0–1.2)
BUN SERPL-MCNC: 16 MG/DL (ref 8–23)
BUN/CREAT SERPL: 16.3 (ref 7–25)
CALCIUM SERPL-MCNC: 9.1 MG/DL (ref 8.6–10.5)
CHLORIDE SERPL-SCNC: 100 MMOL/L (ref 98–107)
CO2 SERPL-SCNC: 28.8 MMOL/L (ref 22–29)
CREAT SERPL-MCNC: 0.98 MG/DL (ref 0.76–1.27)
EGFRCR SERPLBLD CKD-EPI 2021: 82.4 ML/MIN/1.73
ERYTHROCYTE [DISTWIDTH] IN BLOOD BY AUTOMATED COUNT: 12.8 % (ref 12.3–15.4)
ERYTHROCYTE [SEDIMENTATION RATE] IN BLOOD BY WESTERGREN METHOD: 1 MM/HR (ref 0–20)
GLOBULIN SER CALC-MCNC: 2.5 GM/DL
GLUCOSE SERPL-MCNC: 112 MG/DL (ref 65–99)
HCT VFR BLD AUTO: 51.9 % (ref 37.5–51)
HGB BLD-MCNC: 16.8 G/DL (ref 13–17.7)
MCH RBC QN AUTO: 30.8 PG (ref 26.6–33)
MCHC RBC AUTO-ENTMCNC: 32.4 G/DL (ref 31.5–35.7)
MCV RBC AUTO: 95.2 FL (ref 79–97)
PLATELET # BLD AUTO: 200 10*3/MM3 (ref 140–450)
POTASSIUM SERPL-SCNC: 5 MMOL/L (ref 3.5–5.2)
PROT SERPL-MCNC: 6.7 G/DL (ref 6–8.5)
RBC # BLD AUTO: 5.45 10*6/MM3 (ref 4.14–5.8)
SODIUM SERPL-SCNC: 136 MMOL/L (ref 136–145)
WBC # BLD AUTO: 7.14 10*3/MM3 (ref 3.4–10.8)

## 2024-10-03 ENCOUNTER — OFFICE VISIT (OUTPATIENT)
Dept: INTERNAL MEDICINE | Facility: CLINIC | Age: 69
End: 2024-10-03
Payer: MEDICARE

## 2024-10-03 VITALS
RESPIRATION RATE: 16 BRPM | SYSTOLIC BLOOD PRESSURE: 148 MMHG | OXYGEN SATURATION: 97 % | BODY MASS INDEX: 21.94 KG/M2 | HEART RATE: 52 BPM | DIASTOLIC BLOOD PRESSURE: 74 MMHG | WEIGHT: 162 LBS | HEIGHT: 72 IN

## 2024-10-03 DIAGNOSIS — R00.1 BRADYCARDIA, SINUS: ICD-10-CM

## 2024-10-03 DIAGNOSIS — Z23 NEED FOR PROPHYLACTIC VACCINATION AGAINST STREPTOCOCCUS PNEUMONIAE (PNEUMOCOCCUS): ICD-10-CM

## 2024-10-03 DIAGNOSIS — J43.2 CENTRILOBULAR EMPHYSEMA: ICD-10-CM

## 2024-10-03 DIAGNOSIS — C34.2 LUNG CANCER, MIDDLE LOBE: Primary | ICD-10-CM

## 2024-10-03 PROCEDURE — 1126F AMNT PAIN NOTED NONE PRSNT: CPT | Performed by: STUDENT IN AN ORGANIZED HEALTH CARE EDUCATION/TRAINING PROGRAM

## 2024-10-03 PROCEDURE — 99214 OFFICE O/P EST MOD 30 MIN: CPT | Performed by: STUDENT IN AN ORGANIZED HEALTH CARE EDUCATION/TRAINING PROGRAM

## 2024-10-03 PROCEDURE — G0009 ADMIN PNEUMOCOCCAL VACCINE: HCPCS | Performed by: STUDENT IN AN ORGANIZED HEALTH CARE EDUCATION/TRAINING PROGRAM

## 2024-10-03 PROCEDURE — 90677 PCV20 VACCINE IM: CPT | Performed by: STUDENT IN AN ORGANIZED HEALTH CARE EDUCATION/TRAINING PROGRAM

## 2024-10-03 NOTE — PROGRESS NOTES
Office Note     Name: Saman Aguayo    : 1955     MRN: [id number]     Chief Complaint  Hypertension (6 month follow up)    Subjective     History of Present Illness:  Saman Aguayo is a 69 y.o. male who presents today for chronic conditions    Hx of right Lung CA s/p right middle lobectomy  and right upper lobectomy  for early stage non-small cell lung cancers.  History of COPD.  Follows with Dr Lim and Dr Lombardi: last CT scan showed stable postsurgical and emphysematous change, new posterior LLL edema vs pneumonia on 2024. PFT showed moderate obstruction. On albuterol and stiolto. On prednisone prn. On home O2 at night 3.5lpm     Mildly enlarged cystic lesion on pancreas: monitored by oncology, for repeat 3/2025     Bradycardia HR today 49. Notes symptoms when he stands up as he gets dizzy. No episodes of falling. Follows cardiology     Colonoscopy  fhx of colon CA (mother and father), due in 5 years  150 pack year history     Family History:   Family History   Problem Relation Age of Onset    Heart attack Father     Heart disease Father     Colon cancer Father     Colon cancer Mother     Liver cancer Mother        Social History:   Social History     Socioeconomic History    Marital status:     Number of children: 1   Tobacco Use    Smoking status: Former     Current packs/day: 0.00     Average packs/day: 3.0 packs/day for 50.0 years (150.0 ttl pk-yrs)     Types: Cigarettes     Start date: 1966     Quit date: 2016     Years since quittin.9     Passive exposure: Past    Smokeless tobacco: Never   Vaping Use    Vaping status: Never Used   Substance and Sexual Activity    Alcohol use: Not Currently     Comment: 2-3 CANS DAILY    Drug use: Never    Sexual activity: Defer       Health Maintenance   Topic Date Due    COLORECTAL CANCER SCREENING  2023    COVID-19 Vaccine (3 - Pfizer risk series) 10/01/2025 (Originally 2021)    ANNUAL WELLNESS VISIT  2025     "TDAP/TD VACCINES (2 - Td or Tdap) 01/26/2027    HEPATITIS C SCREENING  Completed    INFLUENZA VACCINE  Completed    Pneumococcal Vaccine 65+  Completed    AAA SCREEN (ONE-TIME)  Completed    ZOSTER VACCINE  Completed    LUNG CANCER SCREENING  Discontinued       Objective     Vital Signs  /74   Pulse 52   Resp 16   Ht 182.9 cm (72.01\")   Wt 73.5 kg (162 lb)   SpO2 97%   BMI 21.97 kg/m²   Estimated body mass index is 21.97 kg/m² as calculated from the following:    Height as of this encounter: 182.9 cm (72.01\").    Weight as of this encounter: 73.5 kg (162 lb).  BMI is within normal parameters. No other follow-up for BMI required.    Physical Exam  Vitals and nursing note reviewed.   Constitutional:       Appearance: Normal appearance.   HENT:      Head: Normocephalic and atraumatic.   Cardiovascular:      Rate and Rhythm: Normal rate and regular rhythm.      Pulses: Normal pulses.      Heart sounds: Normal heart sounds.   Pulmonary:      Effort: Pulmonary effort is normal. No respiratory distress.      Breath sounds: Rhonchi present. No wheezing or rales.   Abdominal:      General: Abdomen is flat. Bowel sounds are normal.      Palpations: Abdomen is soft.      Tenderness: There is no abdominal tenderness. There is no guarding.   Musculoskeletal:      Cervical back: Neck supple.   Skin:     General: Skin is warm.      Capillary Refill: Capillary refill takes less than 2 seconds.   Neurological:      General: No focal deficit present.      Mental Status: He is alert. Mental status is at baseline.   Psychiatric:         Mood and Affect: Mood normal.         Behavior: Behavior normal.          Procedures     Assessment and Plan     Diagnoses and all orders for this visit:    1. Lung cancer, middle lobe s/p right VATS with lobectomy (Primary)  Assessment & Plan:  Follows with oncology and pulmonology  Currently on 3.5 L nasal cannula home oxygen at night      2. Centrilobular emphysema  Assessment & Plan:  On " prn albuterol and stiolto  Stable on current medication and dosage. Will continue current management. Refill medication as necessary.  Follows with oncology and pulmonology  Currently on 3.5 L nasal cannula home oxygen at night      3. Need for prophylactic vaccination against Streptococcus pneumoniae (pneumococcus)  -     Pneumococcal Conjugate Vaccine 20-Valent (PCV20)    4. Bradycardia, sinus  Assessment & Plan:  Follows with cardiology  Still with occasional dizziness  Pending stress test results           Counseling was given to patient for the following topics: instructions for management, risks and benefits of treatment options, and importance of treatment compliance.    Follow Up  Return in about 6 months (around 4/3/2025) for Medicare Wellness.    MD ANA LAURA Epperson [facility]  [address] [address] [phone]

## 2024-10-03 NOTE — ASSESSMENT & PLAN NOTE
On prn albuterol and stiolto  Stable on current medication and dosage. Will continue current management. Refill medication as necessary.  Follows with oncology and pulmonology  Currently on 3.5 L nasal cannula home oxygen at night

## 2024-10-12 DIAGNOSIS — I25.10 CORONARY ARTERY CALCIFICATION: Chronic | ICD-10-CM

## 2024-10-15 ENCOUNTER — OFFICE VISIT (OUTPATIENT)
Dept: CARDIOLOGY | Facility: CLINIC | Age: 69
End: 2024-10-15
Payer: MEDICARE

## 2024-10-15 VITALS
SYSTOLIC BLOOD PRESSURE: 130 MMHG | OXYGEN SATURATION: 90 % | DIASTOLIC BLOOD PRESSURE: 70 MMHG | WEIGHT: 166 LBS | HEART RATE: 50 BPM | HEIGHT: 72 IN | BODY MASS INDEX: 22.48 KG/M2

## 2024-10-15 DIAGNOSIS — C34.2 LUNG CANCER, MIDDLE LOBE: ICD-10-CM

## 2024-10-15 DIAGNOSIS — R00.2 PALPITATIONS: ICD-10-CM

## 2024-10-15 DIAGNOSIS — I27.20 PULMONARY HYPERTENSION: ICD-10-CM

## 2024-10-15 DIAGNOSIS — I27.81 COR PULMONALE (CHRONIC): ICD-10-CM

## 2024-10-15 DIAGNOSIS — I25.10 CORONARY ARTERY CALCIFICATION: ICD-10-CM

## 2024-10-15 DIAGNOSIS — R00.1 BRADYCARDIA, SINUS: Primary | ICD-10-CM

## 2024-10-15 DIAGNOSIS — I73.9 INTERMITTENT CLAUDICATION: ICD-10-CM

## 2024-10-15 DIAGNOSIS — J43.2 CENTRILOBULAR EMPHYSEMA: ICD-10-CM

## 2024-10-15 DIAGNOSIS — Z87.891 HISTORY OF TOBACCO USE: ICD-10-CM

## 2024-10-15 PROCEDURE — 1160F RVW MEDS BY RX/DR IN RCRD: CPT | Performed by: INTERNAL MEDICINE

## 2024-10-15 PROCEDURE — 99213 OFFICE O/P EST LOW 20 MIN: CPT | Performed by: INTERNAL MEDICINE

## 2024-10-15 PROCEDURE — 1159F MED LIST DOCD IN RCRD: CPT | Performed by: INTERNAL MEDICINE

## 2024-10-15 RX ORDER — ASPIRIN 81 MG/1
81 TABLET ORAL DAILY
Qty: 90 TABLET | Refills: 3 | Status: SHIPPED | OUTPATIENT
Start: 2024-10-15

## 2024-10-15 RX ORDER — ATORVASTATIN CALCIUM 40 MG/1
40 TABLET, FILM COATED ORAL DAILY
Qty: 90 TABLET | Refills: 1 | Status: SHIPPED | OUTPATIENT
Start: 2024-10-15 | End: 2025-02-12

## 2024-10-15 NOTE — PROGRESS NOTES
Subjective:     Encounter Date:10/15/2024      Patient ID: Saman Aguayo is a 69 y.o. male.    Chief Complaint: Bradycardia  HPI  This is a 69-year-old male patient who presents to clinic to follow-up results of outpatient testing for sinus bradycardia palpitations and shortness of breath.  The patient underwent a vasodilator nuclear stress test showing no evidence of ischemia or infarction.  The calculated ejection fraction was normal.  A prior echocardiogram showed normal global and regional LV systolic function.  Diastolic function was normal.  There were no severe valvular abnormalities or evidence of pericardial disease.  The patient wore an outpatient cardiac monitor.  There was concern related to the patient's resting sinus bradycardia.  His cardiac monitor showed over 300 sinus pauses all of which occurred during sleeping hours.  His average heart rate was 62 bpm.  All significant bradycardia occurred during sleeping hours.  His maximum heart rate was 106 bpm.  He demonstrated no sustained supraventricular or ventricular arrhythmia.  There were 5 asymptomatic ventricular triplets and 1 asymptomatic atrial triplet.  There was no correlation between his symptoms and underlying arrhythmia or ectopy.  The following portions of the patient's history were reviewed and updated as appropriate: allergies, current medications, past family history, past medical history, past social history, past surgical history and problem  Review of Systems   Constitutional: Negative for chills, diaphoresis, fever, malaise/fatigue, weight gain and weight loss.   HENT:  Negative for ear discharge, hearing loss, hoarse voice and nosebleeds.    Eyes:  Negative for discharge, double vision, pain and photophobia.   Cardiovascular:  Positive for palpitations. Negative for chest pain, claudication, cyanosis, dyspnea on exertion, irregular heartbeat, leg swelling, near-syncope, orthopnea, paroxysmal nocturnal dyspnea and syncope.  "  Respiratory:  Negative for cough, hemoptysis, sputum production and wheezing.    Endocrine: Negative for cold intolerance, heat intolerance, polydipsia, polyphagia and polyuria.   Hematologic/Lymphatic: Negative for adenopathy and bleeding problem. Does not bruise/bleed easily.   Skin:  Negative for color change, flushing, itching and rash.   Musculoskeletal:  Negative for muscle cramps, muscle weakness, myalgias and stiffness.   Gastrointestinal:  Negative for abdominal pain, diarrhea, hematemesis, hematochezia, nausea and vomiting.   Genitourinary:  Negative for dysuria, frequency and nocturia.   Neurological:  Negative for focal weakness, loss of balance, numbness, paresthesias and seizures.   Psychiatric/Behavioral:  Negative for altered mental status, hallucinations and suicidal ideas.    Allergic/Immunologic: Negative for HIV exposure, hives and persistent infections.           Current Outpatient Medications:     albuterol (ACCUNEB) 1.25 MG/3ML nebulizer solution, Take 3 mL by nebulization Every 6 (Six) Hours As Needed for Wheezing., Disp: 30 each, Rfl: 0    albuterol sulfate  (90 Base) MCG/ACT inhaler, Inhale 2 puffs Every 4 (Four) Hours As Needed for Wheezing., Disp: , Rfl:     O2 (OXYGEN), Inhale 2 L/min every night at bedtime., Disp: , Rfl:     predniSONE (DELTASONE) 20 MG tablet, Take 1 tablet by mouth As Needed., Disp: , Rfl:     tiotropium bromide-olodaterol (Stiolto Respimat) 2.5-2.5 MCG/ACT aerosol solution inhaler, Inhale 2 puffs Daily., Disp: 4 g, Rfl: 9    atorvastatin (LIPITOR) 40 MG tablet, TAKE 1 TABLET BY MOUTH DAILY  DAYS., Disp: 90 tablet, Rfl: 1    Objective:   Physical Exam  Blood pressure 130/70, pulse 50, height 182.9 cm (72\"), weight 75.3 kg (166 lb), SpO2 90%.   Lab Review:     Assessment:       1. Bradycardia, sinus  Mild resting sinus bradycardia.  All severe bradycardia and sinus pauses occurred during sleeping hours.  Currently no class I indication for pacemaker " implantation based on the results of this monitor.    2. Cor pulmonale (chronic)  No peripheral edema.    3. Coronary artery calcification  Angina free.  The patient has been recommended to take an enteric-coated baby aspirin and statin based cholesterol-lowering therapy for secondary prevention.    4. Intermittent claudication  No lifestyle limiting claudication or evidence of chronic critical limb ischemia.    5. Palpitations  No frequent/complex atrial or ventricular ectopy.  No supraventricular or ventricular tachycardia of significance.  No symptom correlation.    6. Lung cancer, middle lobe s/p right VATS with lobectomy      7. Centrilobular emphysema  Typical tobacco related lung disease.    8. Pulmonary hypertension   No evidence of overt clinical right heart failure.    9. History of tobacco use  Fortunately the patient no longer smokes.    Procedures    Plan:     Advance Care Planning   ACP discussion was held with the patient during this visit. Patient has an advance directive (not in EMR), copy requested.     The patient has been reassured regarding the benign nature of his cardiac test results.    No further cardiovascular testing indicated at this time.    The patient is advised that he should begin taking the Lipitor prescribed by his primary care provider for a goal LDL cholesterol of less than 70 mg/dL.  Management of cholesterol treatment goals deferred to primary care provider.    The patient has been advised to take an enteric-coated baby aspirin daily.

## 2024-11-13 RX ORDER — ALBUTEROL SULFATE 1.25 MG/3ML
1 SOLUTION RESPIRATORY (INHALATION) EVERY 6 HOURS PRN
Qty: 30 EACH | Refills: 0 | Status: SHIPPED | OUTPATIENT
Start: 2024-11-13

## 2024-11-13 NOTE — TELEPHONE ENCOUNTER
Caller: Anna Aguayo    Relationship: Emergency Contact    Best call back number: [phone]     Requested Prescriptions:   Requested Prescriptions     Pending Prescriptions Disp Refills    albuterol (ACCUNEB) 1.25 MG/3ML nebulizer solution 30 each 0     Sig: Take 3 mL by nebulization Every 6 (Six) Hours As Needed for Wheezing.        Pharmacy where request should be sent:    [facility]ACY #[id number] - [address] [phone] [place] [phone]    Last office visit with prescribing clinician: 9/19/2024   Last telemedicine visit with prescribing clinician: Visit date not found   Next office visit with prescribing clinician: Visit date not found     Additional details provided by patient: PATIENTS WIFE SAYS IT IS OK TO SEND A TEXT AS WELL TO LET THEM KNOW IF THE REFILL WAS APPROVED.    Does the patient have less than a 3 day supply:  [x] Yes  [] No    Would you like a call back once the refill request has been completed: [x] Yes [] No    If the office needs to give you a call back, can they leave a voicemail: [x] Yes [] No    Simone Alba Rep   11/13/24 09:57 EST

## 2024-11-25 ENCOUNTER — OFFICE VISIT (OUTPATIENT)
Dept: INTERNAL MEDICINE | Facility: CLINIC | Age: 69
End: 2024-11-25
Payer: MEDICARE

## 2024-11-25 ENCOUNTER — HOSPITAL ENCOUNTER (OUTPATIENT)
Dept: GENERAL RADIOLOGY | Facility: HOSPITAL | Age: 69
Discharge: HOME | End: 2024-11-25
Admitting: STUDENT IN AN ORGANIZED HEALTH CARE EDUCATION/TRAINING PROGRAM
Payer: MEDICARE

## 2024-11-25 VITALS
HEART RATE: 50 BPM | SYSTOLIC BLOOD PRESSURE: 138 MMHG | OXYGEN SATURATION: 100 % | WEIGHT: 162 LBS | TEMPERATURE: 97.8 F | BODY MASS INDEX: 21.94 KG/M2 | DIASTOLIC BLOOD PRESSURE: 72 MMHG | HEIGHT: 72 IN

## 2024-11-25 DIAGNOSIS — C34.2 LUNG CANCER, MIDDLE LOBE: ICD-10-CM

## 2024-11-25 DIAGNOSIS — J18.9 COMMUNITY ACQUIRED PNEUMONIA, UNSPECIFIED LATERALITY: ICD-10-CM

## 2024-11-25 DIAGNOSIS — J43.2 CENTRILOBULAR EMPHYSEMA: ICD-10-CM

## 2024-11-25 DIAGNOSIS — J10.1 INFLUENZA B: ICD-10-CM

## 2024-11-25 DIAGNOSIS — J10.1 INFLUENZA B: Primary | ICD-10-CM

## 2024-11-25 LAB
EXPIRATION DATE: ABNORMAL
FLUAV AG UPPER RESP QL IA.RAPID: NOT DETECTED
FLUBV AG UPPER RESP QL IA.RAPID: DETECTED
INTERNAL CONTROL: ABNORMAL
Lab: ABNORMAL
SARS-COV-2 AG UPPER RESP QL IA.RAPID: NOT DETECTED

## 2024-11-25 PROCEDURE — 1159F MED LIST DOCD IN RCRD: CPT | Performed by: STUDENT IN AN ORGANIZED HEALTH CARE EDUCATION/TRAINING PROGRAM

## 2024-11-25 PROCEDURE — 1126F AMNT PAIN NOTED NONE PRSNT: CPT | Performed by: STUDENT IN AN ORGANIZED HEALTH CARE EDUCATION/TRAINING PROGRAM

## 2024-11-25 PROCEDURE — 87428 SARSCOV & INF VIR A&B AG IA: CPT | Performed by: STUDENT IN AN ORGANIZED HEALTH CARE EDUCATION/TRAINING PROGRAM

## 2024-11-25 PROCEDURE — 71046 X-RAY EXAM CHEST 2 VIEWS: CPT

## 2024-11-25 PROCEDURE — 99214 OFFICE O/P EST MOD 30 MIN: CPT | Performed by: STUDENT IN AN ORGANIZED HEALTH CARE EDUCATION/TRAINING PROGRAM

## 2024-11-25 PROCEDURE — 1160F RVW MEDS BY RX/DR IN RCRD: CPT | Performed by: STUDENT IN AN ORGANIZED HEALTH CARE EDUCATION/TRAINING PROGRAM

## 2024-11-25 RX ORDER — BENZONATATE 100 MG/1
100 CAPSULE ORAL 3 TIMES DAILY PRN
Qty: 30 CAPSULE | Refills: 0 | Status: SHIPPED | OUTPATIENT
Start: 2024-11-25

## 2024-11-25 RX ORDER — DEXTROMETHORPHAN HYDROBROMIDE AND PROMETHAZINE HYDROCHLORIDE 15; 6.25 MG/5ML; MG/5ML
5 SYRUP ORAL 4 TIMES DAILY PRN
Qty: 240 ML | Refills: 0 | Status: SHIPPED | OUTPATIENT
Start: 2024-11-25

## 2024-11-25 RX ORDER — LEVOFLOXACIN 750 MG/1
750 TABLET, FILM COATED ORAL DAILY
Qty: 7 TABLET | Refills: 0 | Status: SHIPPED | OUTPATIENT
Start: 2024-11-25

## 2024-11-25 NOTE — PROGRESS NOTES
Subjective   Saman Aguayo is a 69 y.o. male.     History of Present Illness  Pt presents with 10 days of cough with yellow sputum production, fatigue, pain with inspiration and cough, soa.       The following portions of the patient's history were reviewed and updated as appropriate: allergies, current medications, past family history, past medical history, past social history, past surgical history, and problem list.    Review of Systems   All other systems reviewed and are negative.      Objective   Physical Exam  Vitals and nursing note reviewed.   Constitutional:       Appearance: Normal appearance. He is normal weight.   HENT:      Head: Normocephalic and atraumatic.      Right Ear: External ear normal.      Left Ear: External ear normal.      Nose: Nose normal.      Mouth/Throat:      Mouth: Mucous membranes are moist.      Pharynx: Oropharynx is clear.   Eyes:      Extraocular Movements: Extraocular movements intact.      Conjunctiva/sclera: Conjunctivae normal.      Pupils: Pupils are equal, round, and reactive to light.   Cardiovascular:      Rate and Rhythm: Normal rate and regular rhythm.      Pulses: Normal pulses.      Heart sounds: Normal heart sounds. No murmur heard.     No gallop.   Pulmonary:      Effort: Pulmonary effort is normal. No respiratory distress.   Abdominal:      General: Abdomen is flat. Bowel sounds are normal.      Palpations: Abdomen is soft.   Musculoskeletal:         General: Normal range of motion.      Cervical back: Normal range of motion and neck supple. No rigidity or tenderness.   Lymphadenopathy:      Cervical: No cervical adenopathy.   Skin:     General: Skin is warm.      Capillary Refill: Capillary refill takes less than 2 seconds.      Coloration: Skin is not jaundiced or pale.      Findings: No bruising, erythema, lesion or rash.   Neurological:      General: No focal deficit present.      Mental Status: He is alert and oriented to person, place, and time. Mental  status is at baseline.   Psychiatric:         Mood and Affect: Mood normal.         Behavior: Behavior normal.         Thought Content: Thought content normal.         Judgment: Judgment normal.         Assessment & Plan   Diagnoses and all orders for this visit:    1. Influenza B (Primary)  -     XR Chest PA & Lateral; Future  -     promethazine-dextromethorphan (PROMETHAZINE-DM) 6.25-15 MG/5ML syrup; Take 5 mL by mouth 4 (Four) Times a Day As Needed for Cough.  Dispense: 240 mL; Refill: 0  -     benzonatate (Tessalon Perles) 100 MG capsule; Take 1 capsule by mouth 3 (Three) Times a Day As Needed for Cough.  Dispense: 30 capsule; Refill: 0  -     POCT SARS-CoV-2 Antigen PIERRE + Flu    2. Lung cancer, middle lobe s/p right VATS with lobectomy  -     XR Chest PA & Lateral; Future    3. Centrilobular emphysema  -     XR Chest PA & Lateral; Future    4. Community acquired pneumonia, unspecified laterality  -     XR Chest PA & Lateral; Future  -     levoFLOXacin (Levaquin) 750 MG tablet; Take 1 tablet by mouth Daily.  Dispense: 7 tablet; Refill: 0

## 2024-12-05 ENCOUNTER — OFFICE VISIT (OUTPATIENT)
Dept: PULMONOLOGY | Facility: CLINIC | Age: 69
End: 2024-12-05
Payer: MEDICARE

## 2024-12-05 VITALS
SYSTOLIC BLOOD PRESSURE: 110 MMHG | WEIGHT: 163 LBS | BODY MASS INDEX: 22.08 KG/M2 | OXYGEN SATURATION: 93 % | RESPIRATION RATE: 18 BRPM | DIASTOLIC BLOOD PRESSURE: 64 MMHG | HEART RATE: 53 BPM | HEIGHT: 72 IN

## 2024-12-05 DIAGNOSIS — C34.91 NON-SMALL CELL CANCER OF RIGHT LUNG: ICD-10-CM

## 2024-12-05 DIAGNOSIS — R06.02 SOB (SHORTNESS OF BREATH): ICD-10-CM

## 2024-12-05 DIAGNOSIS — J44.9 CHRONIC OBSTRUCTIVE PULMONARY DISEASE, UNSPECIFIED COPD TYPE: Primary | ICD-10-CM

## 2024-12-05 DIAGNOSIS — G47.34 NOCTURNAL HYPOXIA: ICD-10-CM

## 2024-12-05 RX ORDER — MEGESTROL ACETATE 40 MG/ML
200 SUSPENSION ORAL
Qty: 480 ML | Refills: 1 | Status: SHIPPED | OUTPATIENT
Start: 2024-12-05

## 2024-12-05 NOTE — PROGRESS NOTES
"Chief Complaint   Patient presents with    Breathing Problem    Follow-up         Subjective   Saman Aguayo is a 69 y.o. male.   The patient comes in today for follow-up of shortness of breath.    He was positive for flu B around Thanksgiving.  He was prescribed Levaquin.    He c/o significant fatigue. He states he feels very weak and his legs are very weak. He feels his breathing is much better from the flu.     He states the fatigue really started when he had Covid.     His cancer is in remission. He f/w Dr. Farooq.     He takes prednisone as needed for arthritis.     He uses Stiolto daily and VILMA 2 times a week on average.    He uses oxygen at night only.    He c/o poor appetite but states weight is holding steady. He says food does not taste good.     The following portions of the patient's history were reviewed and updated as appropriate: allergies, current medications, past family history, past medical history, past social history, and past surgical history.      Review of Systems   HENT:  Positive for sinus pressure. Negative for sneezing and sore throat.    Respiratory:  Positive for cough, chest tightness and wheezing (some). Negative for shortness of breath.        Objective   Visit Vitals  /64   Pulse 53   Resp 18   Ht 182.9 cm (72.01\")   Wt 73.9 kg (163 lb)   SpO2 93%   BMI 22.10 kg/m²     ============================  ============================    6 MINUTE WALK TEST    Saman Aguayo   1955             BASELINE   SpO2%: 93 % RA   Heart Rate 53   Blood Pressure 110/64     EXERCISE SpO2% HEART RATE RA or O2 @ LPM   1 MINUTE 95 86 RA   2 MINUTES 94 54 RA   3 MINUTES 93 54 RA   4 MINUTES 92 76 RA   5 MINUTES 92 78 RA   6 MINUTES 91 84 RA   (Number of laps: 9 X 36 meters + Final partial lap:  meters = 324 meters)            Distance Walked:  324 Meters   SpO2% Post Exercise:  94 %   HR Post Exercise:  55     Reason to stop (if applicable):   ____ Chest Pain   ____ Light Headedness   ____ Dyspnea " Unrelieved by Rest   ____ Abnormal Gait Pattern   ____ Severe Fatigue   ____ Other (Specify: __________________________)    Tech Comments (if any):      Test performed by: FIDEL    ============================  ============================      Physical Exam  Vitals reviewed.   Constitutional:       Comments: Underweight.   HENT:      Head: Atraumatic.      Mouth/Throat:      Mouth: Mucous membranes are moist.   Eyes:      Extraocular Movements: Extraocular movements intact.   Cardiovascular:      Rate and Rhythm: Normal rate and regular rhythm.   Pulmonary:      Effort: Pulmonary effort is normal. No respiratory distress.      Comments: Somewhat decreased air entry without wheezing.  Skin:     General: Skin is warm.   Neurological:      Mental Status: He is alert and oriented to person, place, and time.           Assessment & Plan   Diagnoses and all orders for this visit:    1. Chronic obstructive pulmonary disease, unspecified COPD type (Primary)    2. Non-small cell cancer of right lung    3. Nocturnal hypoxia    4. Coronary artery calcification    5. SOB (shortness of breath)  -     Converted Six Minute Walk    Other orders  -     megestrol (MEGACE) 40 MG/ML suspension; Take 5 mL by mouth 3 (Three) Times a Day With Meals.  Dispense: 480 mL; Refill: 1           Return for KEEP APPT IN JULY.    DISCUSSION (if any):  Overall, the patient's breathing is back to baseline since being treated for the flu and possible pneumonia.  His x-ray from November 25 shows no acute disease process.    His main complaint is fatigue which he feels has continued to worsen since he had COVID at least a year ago.    On 6-minute walk, he did not display exercise hypoxemia.    I have discussed with the patient that I am not sure why he continues to feel more fatigued and weak.  He states he has always been a person who stays busy and he feels like he cannot do the activities he wants.    He is underweight but states his weight is holding  steady.  He complains of poor appetite because food does not taste good.  I suggested trying Megace to improve his appetite.  If his appetite improves and he is eating more, maybe this will help improve his strength some.  I discussed the risk of increased thrombotic event with Megace in patients that are older than 65 years old.  The patient verbalizes understanding.  He states he is willing to take the risk.  I told him he could try the medication short-term and if it does not seem to help then he can stop it.    He states he takes 81 mg aspirin but he can only take it every 2 to 3 days because he has bleeding if he takes it every day.    He is s/p cyberknife and he is in remission.  He follows with Dr. Lombardi and has imaging planned for March 2025.    I have asked him to follow-up with his PCP and discuss lab work to help rule out reasons for his increased fatigue and overall weakness.  He verbalizes understanding.    He should continue Stiolto daily and the rescue medication as needed for shortness of breath and wheezing.    He should continue using oxygen at night.    I reviewed his chest x-ray from 11/25/2024.  The chest x-ray shows no acute cardiopulmonary findings.    Study Result    Narrative & Impression   XR CHEST PA AND LATERAL     Date of Exam: 11/25/2024 8:19 AM EST     Indication: cough,wheeze, flu     Comparison: Chest CT August 30, 2024, chest radiographs December 20, 2023     Findings:  There is advanced emphysema, as before. There is elevation of the right diaphragm, as before. Right midlung opacities appear grossly similar to the prior chest CT. No definite new pulmonary infiltrate is identified. No pleural effusion or pneumothorax.   There are degenerative changes in the thoracic spine.     IMPRESSION:  Impression:  1.Advanced emphysema and chronic appearing parenchymal changes, similar to prior imaging.  2.No definite radiographic findings of acute cardiopulmonary abnormality. If there is  further clinical concern, consider further evaluation with chest CT.           Dictated utilizing Dragon dictation.    This document was electronically signed by SAMIRA Wylie December 5, 2024  09:54 EST

## 2024-12-20 ENCOUNTER — TELEPHONE (OUTPATIENT)
Dept: PULMONOLOGY | Facility: CLINIC | Age: 69
End: 2024-12-20

## 2024-12-20 NOTE — TELEPHONE ENCOUNTER
Caller: Saman Aguayo    Relationship: Self    Best call back number: [phone]     What form or medical record are you requesting: ALL SUPPORTING DOCUMENTS FOR ASSISTANCE  WITH THE tiotropium bromide-olodaterol (Stiolto Respimat) 2.5-2.5 MCG/ACT aerosol solution inhaler [[id number]] (Order [id number])       Who is requesting this form or medical record from you[facility]    How would you like to receive the form or medical records (pick-up, mail, fax): FAX  If fax, what is the fax number: [phone]    Timeframe paperwork needed: BEFORE DECEMBER 31ST    Additional notes: PT STATED THAT THIS HAS BEEN FAXED BUT HE WAS TOLD THAT NOT ALL SUPPORTING DOCUMENTS WERE SENT. PLEASE CALL PT AND ADVISE AND UPDATE WITH HIM

## 2025-02-19 NOTE — OP NOTE
DATE OF PROCEDURE: 3/13/2020     PREOPERATIVE DIAGNOSES:  1. Right upper lobe nodule  2. [id number] Stage IA non-small cell lung cancer of the middle lobe  3. Tobacco abuse  4. COPD     POSTOPERATIVE DIAGNOSES:    1. Right upper lobe nodule  2. [id number] Stage IA non-small cell lung cancer of the middle lobe  3. Tobacco abuse  4. COPD     PROCEDURES PERFORMED:    1. Bronchoscopy  2. Redo right video assisted thoracoscopic surgery  3. Right upper lobe wedge resection  4. Conversion to right thoracotomy  4. Right upper lobe completion lobectomy  5. Mediastinal lymph node dissection  6. Intercostal nerve blocks with cryoablation    SURGEON: Chris Porter MD       ASSISTANTS:    1. Dr. Ravi Moon MD  2. Efrain Landa PA-C      ANESTHESIA: General endotracheal anesthesia with Tiffany Bangha, CRNA     ESTIMATED BLOOD LOSS: 150 mL.       INDICATIONS:  64-year-old  male with a history of hypertension, COPD, tobacco abuse and lung cancer ([id number] Stage IA non-small cell undifferentiated lung cancer of the middle lobe resected 11/18/16 via VATS by myself) who presented with an enlarging right upper lobe nodule concerning for malignancy.  The patient was felt to be a reasonable candidate for bronchoscopy, right VATS, right upper lobe wedge resection, possible right upper lobectomy, mediastinal lymph node dissection and intercostal nerve blocks with cryoablation. The risks and benefits of surgery were discussed with the patient including pain, bleeding, infection, air leak, myocardial infarction and death. The patient understood these risks and wished to proceed with surgery.      DESCRIPTION OF PROCEDURE:  The patient was taken to the operating room and placed under general endotracheal anesthesia.  A double-lumen endotracheal tube was placed and position verified with the pediatric bronchoscope.  Examination of the bilateral bronchial tree down to the level of the subsegmental bronchi did not reveal any evidence of  endobronchial mass or blood.  The right middle lobe bronchial stump from previous resection was intact with no evidence of mass.  There were minimal secretions.  The patient was placed in the left lateral decubitus position and all 4 extremities were padded and secured to prevent neurologic injury.  He was prepped and draped in the usual sterile fashion and a timeout was performed including the patient's name, procedure and antibiotic administration.  His previous incisions were utilized along the seventh intercostal space in the midaxillary line.  Additional incisions were utilized posteriorly at the seventh intercostal space and an anterior incision at the level one interspace below the superior pulmonary vein.  There were significant adhesions present on the right upper lobe to the diaphragm, lateral chest wall and apex.  These were carefully taken down using electrocautery, scissors and blunt dissection.  Care was taken to avoid the phrenic nerve.  Eventually, the right upper lobe was freed from all attachments and the known nodule was discovered in its expected location.  A wedge resection was performed using purple staple loads and the specimen externalized using a 10 mm endocatch bag and sent for frozen pathology.  This was consistent with malignancy.  Decision was made to proceed with right upper lobectomy.  The hilum was unfortunately severely scarred from his previous right middle lobectomy.  I could not advance any instrument in the plane between the superior pulmonary vein and the pulmonary artery branches to the upper lobe inferiorly.  After some time, I did not think this operation could be carried out with the thoracoscope safely given these dense adhesions.  I elected to convert to an open thoracotomy.  The fourth interspace was utilized.  The superior pulmonary vein was carefully dissected from the superior margin to the more dense inferior margin bluntly.  I ended up using a 15 blade to divide this  thick cicatrix along the inferior margin.  The superior pulmonary vein was divided using a vascular stapler.  The arterial branches to the upper lobe were identified, encircled and divided using vascular staple loads.  The remaining upper lobe bronchus was encircled and a test clamp applied with a 45 black staple load.  Test insufflation revealed satisfactory ventilation of the lower lobe.  The bronchus was divided.  The remaining fissure was divided with additional purple staple loads.  The lobe was then externalized and sent for margins. The inferior pulmonary ligament was divided up to the inferior pulmonary vein.  There were no lymph nodes identified within the ligament. The 2R, 4R, station 7 and 9 distributions were all thoroughly inspected and found to have no lymph nodes after previous surgical resection and mediastinal lymph node dissection.  All involved interspaces were anesthetized using cryoablation intercostal nerve blocks within the chest under direct vision.  Intercostal nerves from T3 to T8 were frozen down to negative sixty degrees for two minutes each.  The chest was then irrigated with warm water and test insufflation did not reveal any evidence of air leak.  Two 28 Vietnamese channel drains were placed anterior and posterior the hilum.  These were secured using 0 silk suture.  A 0 Ethibond suture was placed in a mattress fashion for later thoracostomy site closure.  The lung was then inflated under direct vision and found to satisfactorily occupy the chest.  The incision was closed with number two Vicryl pericostal sutures, a running 2-0 Vicryl suture followed by 3-0 Vicryl dermal layer and 4-0 Monocryl subcuticular stitch.  Overlying skin glue was applied to this incision and gauze and tape to the chest tube sites.  The patient was returned to the supine position, extubated in the operating room and transported to recovery in stable condition.       no

## 2025-03-04 ENCOUNTER — HOSPITAL ENCOUNTER (OUTPATIENT)
Dept: CT IMAGING | Facility: HOSPITAL | Age: 70
Discharge: HOME | End: 2025-03-04
Payer: MEDICARE

## 2025-03-04 DIAGNOSIS — C34.31 MALIGNANT NEOPLASM OF LOWER LOBE OF RIGHT LUNG: ICD-10-CM

## 2025-03-04 PROCEDURE — 71250 CT THORAX DX C-: CPT

## 2025-03-04 PROCEDURE — 74176 CT ABD & PELVIS W/O CONTRAST: CPT

## 2025-03-06 ENCOUNTER — OFFICE VISIT (OUTPATIENT)
Dept: ONCOLOGY | Facility: CLINIC | Age: 70
End: 2025-03-06
Payer: MEDICARE

## 2025-03-06 VITALS
OXYGEN SATURATION: 97 % | TEMPERATURE: 97.3 F | WEIGHT: 166.6 LBS | SYSTOLIC BLOOD PRESSURE: 173 MMHG | BODY MASS INDEX: 22.57 KG/M2 | DIASTOLIC BLOOD PRESSURE: 73 MMHG | RESPIRATION RATE: 16 BRPM | HEIGHT: 72 IN | HEART RATE: 49 BPM

## 2025-03-06 DIAGNOSIS — C34.31 MALIGNANT NEOPLASM OF LOWER LOBE OF RIGHT LUNG: Primary | ICD-10-CM

## 2025-03-06 PROCEDURE — 1126F AMNT PAIN NOTED NONE PRSNT: CPT | Performed by: INTERNAL MEDICINE

## 2025-03-06 PROCEDURE — 99214 OFFICE O/P EST MOD 30 MIN: CPT | Performed by: INTERNAL MEDICINE

## 2025-03-06 NOTE — PROGRESS NOTES
Follow Up Office Visit      Date: 2025     Patient Name: Saman Aguayo  MRN: [id number]  : 1955  Referring Physician: Dre Do     Chief Complaint: Follow-up for history of previously resected right lung cancer and newly diagnosed right lung cancer in May 2023     History of Present Illness: Saman Aguayo is a pleasant 68 y.o. male history of right lung adenocarcinoma status post 2 resections, osteoarthritis, COPD who presents today for evaluation of history of lung cancer new right lung nodule. The patient is accompanied by their wife who contributes to the history of their care.  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  He then had recurrence early  requiring redo right VATS with right upper wedge resection conversion to thoracotomy with completion lobectomy and mediastinal lymph node dissection 2020 by Dr. Porter.  Pathology consistent with stage IA2 non-small cell carcinoma most consistent with adenocarcinoma ([id number]).   He had negative CT scans until 2023 when he presented with significant bilateral shoulder pain.  CT chest notable for a 1.3 cm right upper lobe nodule concerning for disease recurrence or metastasis.  PET/CT with some slight hypermetabolic activity in the lesion but no other sites of disease noted.  Underwent biopsy with Dr. Damon on 5/10/2023.  Pathology not consistent with malignancy     Interval History:  Presents to clinic for follow-up.  Completed CyberKnife radiation to his new right lung malignancy in 2023.  Continues to do well.  Denies any shortness of breath or chest pain.  Helping more with his great-grandchildren as his wife recently hurt her shoulder    Oncology History:    Oncology/Hematology History   Malignant neoplasm of lower lobe of  right lung   5/9/2023 Cancer Staged    Staging form: Lung, AJCC 8th Edition  - Clinical stage from 5/9/2023: Stage IA2 (cT1b, cN0, cM0) - Signed by Nathan Bermeo MD on 5/18/2023 5/18/2023 Initial Diagnosis    Malignant neoplasm of lower lobe of right lung     5/31/2023 - 6/7/2023 Radiation    Radiation OncologyTreatment Course:  Saman Aguayo received 4800 cGy in 3 fractions to right lung via Stereotactic Radiation Therapy - SRT.         Subjective      Review of Systems:   Constitutional: Negative for fevers, chills, or weight loss  Eyes: Negative for blurred vision or discharge         Ear/Nose/Throat: Negative for difficulty swallowing, sore throat, LAD                                                       Respiratory: Negative for cough, SOA, wheezing                                                                                        Cardiovascular: Negative for chest pain or palpitations                                                                  Gastrointestinal: Negative for nausea, vomiting or diarrhea                                                                     Genitourinary: Negative for dysuria or hematuria                                                                                           Musculoskeletal: Negative for any joint pains or muscle aches                                                                        Neurologic: Negative for any weakness, headaches, dizziness                                                                         Hematologic: Negative for any easy bleeding or bruising                                                                                   Psychiatric: Negative for anxiety or depression                          Past Medical History/Past Surgical History/ Family History/ Social History: Reviewed by me and unchanged from my previous documentation done on September 2024.     Medications:     Current Outpatient Medications:      "albuterol (ACCUNEB) 1.25 MG/3ML nebulizer solution, Take 3 mL by nebulization Every 6 (Six) Hours As Needed for Wheezing., Disp: 30 each, Rfl: 0    albuterol sulfate  (90 Base) MCG/ACT inhaler, Inhale 2 puffs Every 4 (Four) Hours As Needed for Wheezing., Disp: , Rfl:     aspirin 81 MG EC tablet, Take 1 tablet by mouth Daily. (Patient taking differently: Take 1 tablet by mouth Every 72 (Seventy-Two) Hours.), Disp: 90 tablet, Rfl: 3    megestrol (MEGACE) 40 MG/ML suspension, Take 5 mL by mouth 3 (Three) Times a Day With Meals., Disp: 480 mL, Rfl: 1    O2 (OXYGEN), Inhale 2 L/min every night at bedtime., Disp: , Rfl:     predniSONE (DELTASONE) 20 MG tablet, Take 1 tablet by mouth As Needed., Disp: , Rfl:     tiotropium bromide-olodaterol (Stiolto Respimat) 2.5-2.5 MCG/ACT aerosol solution inhaler, Inhale 2 puffs Daily., Disp: 4 g, Rfl: 9    Allergies:   No Known Allergies    Objective     Physical Exam:  Vital Signs:   Vitals:    03/06/25 0754   BP: 173/73   Pulse: (!) 49   Resp: 16   Temp: 97.3 °F (36.3 °C)   SpO2: 97%   Weight: 75.6 kg (166 lb 9.6 oz)   Height: 182.9 cm (72.01\")   PainSc: 0-No pain     Pain Score    03/06/25 0754   PainSc: 0-No pain     ECOG Performance Status: 0 - Asymptomatic    Constitutional: NAD, ECOG 0  Eyes: PERRLA, scleral anicteric  ENT: No LAD, no thyromegaly  Respiratory: CTAB, no wheezing, rales, rhonchi  Cardiovascular: RRR, no murmurs, pulses 2+ bilaterally  Abdomen: soft, NT/ND, no HSM  Musculoskeletal: strength 5/5 bilaterally, no c/c/e  Neurologic: A&O x 3, CN II-XII intact grossly    Results Review:   No visits with results within 2 Week(s) from this visit.   Latest known visit with results is:   Office Visit on 11/25/2024   Component Date Value Ref Range Status    SARS Antigen 11/25/2024 Not Detected  Not Detected, Presumptive Negative Final    Influenza A Antigen PIERRE 11/25/2024 Not Detected  Not Detected Final    Influenza B Antigen PIERRE 11/25/2024 Detected (A)  Not Detected " Final    Internal Control 11/25/2024 Passed  Passed Final    Lot Number 11/25/2024 [id number]   Final    Expiration Date 11/25/2024 10-11-25   Final       CT Chest Without Contrast Diagnostic    Result Date: 3/4/2025  Narrative: CT CHEST WITHOUT CONTRAST DIAGNOSTIC  HISTORY: History of NSCLC; C34.31-Malignant neoplasm of lower lobe, right bronchus or lung, status post lobectomy.  COMPARISON: 08/30/2024  PROCEDURE: Axial images were obtained from the lung apex to the mid abdomen by computed tomography. This study was performed with techniques to keep radiation doses as low as reasonably achievable, (ALARA). Individualized dose reduction techniques using automated exposure control or adjustment of mA and/or kV according to the patient size were employed.  FINDINGS:  CHEST: There is no suspicious axillary adenopathy. There is no suspicious hilar or mediastinal adenopathy. The heart is proper size. There is no pericardial or pleural effusion.again noted is the mildly lobulated 1 cm nodule anteriorly left lower lobe, stable from prior. Soft tissue density right perihilar region appears stable, likely posttreatment change. Right hilar surgical clips noted in this patient status post lobectomy. Elevation of the right hemidiaphragm and blunting of the right costophrenic angle again identified. Emphysematous change noted. Limited images of the upper abdomen demonstrate cholecystectomy clips. Vascular calcifications noted.      Impression: Stable right perihilar and left lower lobe nodularity from prior as described, recommend continued follow-up.      CTDI: 3.31 mGy DLP:415.74 mGy.cm  This report was signed and finalized on 3/4/2025 10:58 AM by Mindi Del Valle MD.      CT Abdomen Pelvis Without Contrast    Result Date: 3/4/2025  Narrative: PROCEDURE: CT ABDOMEN PELVIS WO CONTRAST-  HISTORY: hx of NSCLC; C34.31-Malignant neoplasm of lower lobe, right bronchus or lung  COMPARISON: August 30, 2024..  PROCEDURE: Axial images were  obtained from the lung bases to the pubic symphysis by computed tomography. This study was performed with techniques to keep radiation doses as low as reasonably achievable, (ALARA). Individualized dose reduction techniques using automated exposure control or adjustment of mA and/or kV according to the patient size were employed.  FINDINGS:  ABDOMEN: The lung bases again demonstrate emphysematous change and a stable, 10 mm left lower lobe nodule. The heart is proper size. The limited non-contrast images of the liver are unremarkable; exam is a sensitive for detection of masses given the lack of IV contrast.. There are metallic surgical clips in the right upper quadrant consistent with previous cholecystectomy. The spleen is unremarkable. No adrenal masses are seen. The aorta is normal in caliber. There is no significant free fluid or adenopathy.  There is no nephrolithiasis. There is no hydronephrosis. Vascular calcifications noted.  PELVIS: The GI tract demonstrate no obstruction. Moderate calcified plaque noted in the proximal right common iliac artery which appears similar to prior. There is diverticulosis without evidence of diverticulitis. Prostate is normal in size and contains calcifications. The appendix is not identified; metallic right lower quadrant surgical clips noted with history of appendectomy. The urinary bladder is unremarkable. There is no fluid or adenopathy. There is stable, probable postsurgical change left inguinal region.      Impression: No evidence of metastatic disease; exam is less sensitive given the lack of IV and oral contrast.     CTDI: 3.31 mGy DLP:415.74 mGy.cm  This report was signed and finalized on 3/4/2025 10:20 AM by Mindi Del Valle MD.       Assessment / Plan      Assessment/Plan:   1. 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    -Recurrence early 2020 requiring redo right VATS with right upper wedge resection conversion to thoracotomy with completion lobectomy and mediastinal lymph node dissection March 2020 by Dr. Porter.  Pathology consistent with stage IA2 non-small cell carcinoma most consistent with adenocarcinoma ([id number]).   -CT scan in April 2023 concerning for new right lung lesion measuring 1.3 cm  -PET/CT with avidity noted in the primary lesion but no other sites of disease  -Biopsy nondiagnostic in May 2023  -Status post CyberKnife in June 2023  -CT C/A/P in September 2023 reviewed and showing significant resolution of his disease in the right but a concerning nodule in the left lingula  -PET/CT without evidence of significant hypermetabolic activity  -CT scan in December 2023 reviewed and showing a slight new lesion within the right lung however given his history of infection over the past few weeks this could be inflammatory  -CT C/A/P in February 2024 reviewed and showing no new nodules or concerns for malignancy.  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  Some slight wall thickening of the distal esophagus noted.  No pancreatic lesion noted  -CT C/A/P in August 2024 reviewed without evidence of recurrent or metastatic disease  -CT C/A/P in March 2025 reviewed without evidence of recurrent or metastatic disease  -Plan for repeat CT scans without contrast in 6 months.  Ordered today     2.  Esophageal wall thickening  -Incidentally noted on recent CT scan.  Patient asymptomatic and had an EGD in 2020  -Should he have any worsening dysphagia, will plan to contact Dr. Singh for EGD         Follow Up:   Follow-up in 6 months     Camden Lombardi,  MD  Hematology and Oncology     Please note that portions of this note may have been completed with a voice recognition program. Efforts were made to edit the dictations, but occasionally words are mistranscribed.

## 2025-04-10 ENCOUNTER — OFFICE VISIT (OUTPATIENT)
Dept: INTERNAL MEDICINE | Facility: CLINIC | Age: 70
End: 2025-04-10
Payer: MEDICARE

## 2025-04-10 ENCOUNTER — TELEPHONE (OUTPATIENT)
Dept: SURGERY | Facility: CLINIC | Age: 70
End: 2025-04-10
Payer: MEDICARE

## 2025-04-10 VITALS
WEIGHT: 169 LBS | BODY MASS INDEX: 22.89 KG/M2 | RESPIRATION RATE: 18 BRPM | TEMPERATURE: 97.7 F | SYSTOLIC BLOOD PRESSURE: 155 MMHG | HEART RATE: 45 BPM | DIASTOLIC BLOOD PRESSURE: 66 MMHG | OXYGEN SATURATION: 97 % | HEIGHT: 72 IN

## 2025-04-10 DIAGNOSIS — J43.2 CENTRILOBULAR EMPHYSEMA: ICD-10-CM

## 2025-04-10 DIAGNOSIS — Z12.11 COLON CANCER SCREENING: ICD-10-CM

## 2025-04-10 DIAGNOSIS — C34.31 MALIGNANT NEOPLASM OF LOWER LOBE OF RIGHT LUNG: ICD-10-CM

## 2025-04-10 DIAGNOSIS — Z00.00 ANNUAL VISIT FOR GENERAL ADULT MEDICAL EXAMINATION WITHOUT ABNORMAL FINDINGS: Primary | ICD-10-CM

## 2025-04-10 DIAGNOSIS — R00.1 BRADYCARDIA, SINUS: ICD-10-CM

## 2025-04-10 DIAGNOSIS — Z80.0 FAMILY HISTORY OF COLON CANCER IN MOTHER: ICD-10-CM

## 2025-04-10 DIAGNOSIS — K86.9 LESION OF PANCREAS: ICD-10-CM

## 2025-04-10 PROBLEM — M25.50 PAIN IN JOINTS: Status: RESOLVED | Noted: 2023-07-18 | Resolved: 2025-04-10

## 2025-04-10 NOTE — TELEPHONE ENCOUNTER
Pt would like to be scheduled at [facility] on 06/20 W/ Dr. Singh-5 yr colon recall-verified pharmacy/insurance. Thank you

## 2025-04-10 NOTE — TELEPHONE ENCOUNTER
PRESCREENING FOR OPEN ACCESS SCHEDULING    Saman Aguayo, 1955  [id number]    04/10/25    If, the patient answers yes to any of the following questions the provider will be informed prior to scheduling open access for approval and documented in the chart.    [x]  Yes  [] No    1. Have you ever had a colonoscopy in the past?      When:  06/19/2020      Where: [facility] w/ Dr. Singh       Polyps or other:     [x]  Yes  [] No    2. Family history of colon cancer?      Relation: Mother and Father       Age of onset:       Do you currently have any of the following?    []  Yes  [x] No  Rectal bleeding, if so, how long?     []  Yes  [x] No  Abdominal pain, if so, how long?    []  Yes  [x] No  Constipation, if so, how long?    []  Yes  [x] No  Diarrhea, if so, how long?    []  Yes  [x] No  Weight loss, is so, how much?    [] Yes  [x] No  Small caliber stool, if so, how long?    []Yes  [x] No  Do you have Hemorroids?    []Yes  [x] No  Have you been diagnosed with Anemia?    []Yes  [x] No  Do you have difficulty swallowing?  []Yes  [x] No  Do you have a history of esophageal stricture or dilation?(If yes do not schedule with Dr Lr)    []Yes  [x] No  Do you have acid reflux?    Have you ever had any of the following conditions?    [] Yes  [x] No  Heart attack?      When?       Last cardiac workup?     Current Cardiologist? Dr. Almonte      Blood thinners?    [x] Yes  [] No   Lung problems, asthma or COPD? COPD & only 1 Lung   [x] Yes  [] No  Oxygen required? Only at night       [] Yes  [x] No  Stroke?     [] Yes  [x] No  Have you ever had a reaction to anesthesia?

## 2025-04-10 NOTE — PROGRESS NOTES
Subjective   The ABCs of the Annual Wellness Visit  Medicare Wellness Visit      Saman Aguayo is a 70 y.o. patient who presents for a Medicare Wellness Visit.    Hx of right Lung CA s/p right middle lobectomy 2016 and right upper lobectomy 2020 for early stage non-small cell lung cancers.  History of COPD.  Follows with Dr Lim and Dr Lombardi: last CT scan showed stable postsurgical and emphysematous change, new posterior LLL edema vs pneumonia on 2/2024. PFT showed moderate obstruction. On albuterol and stiolto. On prednisone prn. On home O2 at night 3.5lpm     Mildly enlarged cystic lesion on pancreas: monitored by oncology, for repeat 3/2025     Bradycardia HR today 49. Notes symptoms when he stands up as he gets dizzy. No episodes of falling. Follows cardiology. High Bp today, mowed two yards yesterday     Colonoscopy 2020 fhx of colon CA (mother and father), due in 5 years  150 pack year history    The following portions of the patient's history were reviewed and   updated as appropriate: allergies, current medications, past family history, past medical history, past social history, past surgical history, and problem list.    Compared to one year ago, the patient's physical   health is better.  Compared to one year ago, the patient's mental   health is better.    Recent Hospitalizations:  He was not admitted to the hospital during the last year.     Current Medical Providers:  Patient Care Team:  Kelsy Swan MD as PCP - General (Family Medicine)  Max Almonte MD as Consulting Physician (Cardiology)  Juan Alberto Carty MD as Surgeon (Cardiothoracic Surgery)  Shady Singh MD as Consulting Physician (General Surgery)  Erin Sosa APRN as Nurse Practitioner (Cardiothoracic Surgery)  Hilda Smyth APRN as Nurse Practitioner (Pulmonary Disease)  Janina Mojica APRN as Nurse Practitioner (Nurse Practitioner)  Jose Lee MD as Consulting Physician  (Otolaryngology)  Veronika Barry, QUIANA as Ambulatory  (Oncology) (Population Health)  Nathan Bermeo MD as Consulting Physician (Radiation Oncology)  Camden Lombardi MD as Referring Physician (Hematology and Oncology)  Raghu Morejon MD as Consulting Physician (Rheumatology)    Outpatient Medications Prior to Visit   Medication Sig Dispense Refill    albuterol (ACCUNEB) 1.25 MG/3ML nebulizer solution Take 3 mL by nebulization Every 6 (Six) Hours As Needed for Wheezing. 30 each 0    O2 (OXYGEN) Inhale 2 L/min every night at bedtime.      predniSONE (DELTASONE) 20 MG tablet Take 1 tablet by mouth As Needed.      tiotropium bromide-olodaterol (Stiolto Respimat) 2.5-2.5 MCG/ACT aerosol solution inhaler Inhale 2 puffs Daily. 4 g 9    albuterol sulfate  (90 Base) MCG/ACT inhaler Inhale 2 puffs Every 4 (Four) Hours As Needed for Wheezing.      aspirin 81 MG EC tablet Take 1 tablet by mouth Daily. (Patient not taking: Reported on 4/10/2025) 90 tablet 3    megestrol (MEGACE) 40 MG/ML suspension Take 5 mL by mouth 3 (Three) Times a Day With Meals. (Patient not taking: Reported on 4/10/2025) 480 mL 1     No facility-administered medications prior to visit.     No opioid medication identified on active medication list. I have reviewed chart for other potential  high risk medication/s and harmful drug interactions in the elderly.      Aspirin is on active medication list. Aspirin use is indicated based on review of current medical condition/s. Pros and cons of this therapy have been discussed today. Benefits of this medication outweigh potential harm.  Patient has been encouraged to continue taking this medication.  .      Patient Active Problem List   Diagnosis    Osteoarthritis    History of tobacco use    Pulmonary hypertension     Lung cancer, middle lobe s/p right VATS with lobectomy    COPD    Right upper lobe pulmonary nodule    History of colon polyps    Intermittent claudication    Herpes zoster  "without complication    Palpitations    Cor pulmonale (chronic)    Nuclear sclerotic cataract of left eye    Malignant neoplasm of lower lobe of right lung    Lesion of pancreas    Bradycardia, sinus    Coronary artery calcification     Advance Care Planning Advance Directive is not on file.  ACP discussion was held with the patient during this visit. Patient does not have an advance directive, information provided.            Objective   Vitals:    04/10/25 0822   BP: 155/66   Pulse: (!) 45   Resp: 18   Temp: 97.7 °F (36.5 °C)   TempSrc: Infrared   SpO2: 97%   Weight: 76.7 kg (169 lb)   Height: 182.9 cm (72.01\")   PainSc: 0-No pain       Estimated body mass index is 22.92 kg/m² as calculated from the following:    Height as of this encounter: 182.9 cm (72.01\").    Weight as of this encounter: 76.7 kg (169 lb).    BMI is within normal parameters. No other follow-up for BMI required.           Does the patient have evidence of cognitive impairment? No                                                                                                Health  Risk Assessment    Smoking Status:  Social History     Tobacco Use   Smoking Status Former    Current packs/day: 0.00    Average packs/day: 3.0 packs/day for 50.0 years (150.0 ttl pk-yrs)    Types: Cigarettes    Start date: 1966    Quit date: 2016    Years since quittin.4    Passive exposure: Past   Smokeless Tobacco Never     Alcohol Consumption:  Social History     Substance and Sexual Activity   Alcohol Use Yes    Comment: 2-3 CANS DAILY       Fall Risk Screen  STEADI Fall Risk Assessment was completed, and patient is at MODERATE risk for falls. Assessment completed on:4/10/2025    Depression Screening   Little interest or pleasure in doing things? Not at all   Feeling down, depressed, or hopeless? Not at all   PHQ-2 Total Score 0      Health Habits and Functional and Cognitive Screenin/10/2025     8:21 AM   Functional & Cognitive Status   Do " you have difficulty preparing food and eating? No   Do you have difficulty bathing yourself, getting dressed or grooming yourself? No   Do you have difficulty using the toilet? No   Do you have difficulty moving around from place to place? No   Do you have trouble with steps or getting out of a bed or a chair? No   Current Diet Limited Junk Food   Dental Exam Up to date   Eye Exam Up to date   Exercise (times per week) 3 times per week   Current Exercises Include Yard Work   Do you need help using the phone?  No   Are you deaf or do you have serious difficulty hearing?  No   Do you need help to go to places out of walking distance? No   Do you need help shopping? No   Do you need help preparing meals?  No   Do you need help with housework?  No   Do you need help with laundry? No   Do you need help taking your medications? No   Do you need help managing money? No   Do you ever drive or ride in a car without wearing a seat belt? No   Have you felt unusual stress, anger or loneliness in the last month? No   Who do you live with? Spouse   If you need help, do you have trouble finding someone available to you? No   Have you been bothered in the last four weeks by sexual problems? No   Do you have difficulty concentrating, remembering or making decisions? No           Age-appropriate Screening Schedule:  Refer to the list below for future screening recommendations based on patient's age, sex and/or medical conditions. Orders for these recommended tests are listed in the plan section. The patient has been provided with a written plan.    Health Maintenance List  Health Maintenance   Topic Date Due    COLORECTAL CANCER SCREENING  06/19/2023    ANNUAL WELLNESS VISIT  04/03/2025    COVID-19 Vaccine (3 - Pfizer risk series) 10/01/2025 (Originally 4/26/2021)    INFLUENZA VACCINE  07/01/2025    TDAP/TD VACCINES (2 - Td or Tdap) 01/26/2027    HEPATITIS C SCREENING  Completed    Pneumococcal Vaccine 50+  Completed    AAA SCREEN  "ONCE  Completed    ZOSTER VACCINE  Completed    LUNG CANCER SCREENING  Discontinued                                                                                                                                                CMS Preventative Services Quick Reference  Risk Factors Identified During Encounter  Dental Screening Recommended  Vision Screening Recommended    The above risks/problems have been discussed with the patient.  Pertinent information has been shared with the patient in the After Visit Summary.  An After Visit Summary and PPPS were made available to the patient.    Follow Up:   Next Medicare Wellness visit to be scheduled in 1 year.         Additional E&M Note during same encounter follows:  Patient has additional, significant, and separately identifiable condition(s)/problem(s) that require work above and beyond the Medicare Wellness Visit     Chief Complaint  Medicare Wellness-subsequent (Annual Medicare Wellness Visit/)    Subjective   HPI  Saman is also being seen today for an annual adult preventative physical exam.                 Objective   Vital Signs:  /66   Pulse (!) 45   Temp 97.7 °F (36.5 °C) (Infrared)   Resp 18   Ht 182.9 cm (72.01\")   Wt 76.7 kg (169 lb)   SpO2 97%   BMI 22.92 kg/m²   Physical Exam  Vitals and nursing note reviewed.   Constitutional:       Appearance: Normal appearance.   HENT:      Head: Normocephalic and atraumatic.   Cardiovascular:      Rate and Rhythm: Normal rate and regular rhythm.      Pulses: Normal pulses.      Heart sounds: Normal heart sounds.   Pulmonary:      Effort: Pulmonary effort is normal. No respiratory distress.      Breath sounds: Normal breath sounds. No wheezing, rhonchi or rales.   Abdominal:      General: Abdomen is flat. Bowel sounds are normal.      Palpations: Abdomen is soft.      Tenderness: There is no abdominal tenderness. There is no guarding.   Musculoskeletal:      Cervical back: Neck supple.   Skin:     General: " Skin is warm.      Capillary Refill: Capillary refill takes less than 2 seconds.   Neurological:      General: No focal deficit present.      Mental Status: He is alert. Mental status is at baseline.   Psychiatric:         Mood and Affect: Mood normal.         Behavior: Behavior normal.                    Assessment and Plan      Colon cancer screening    Orders:    Ambulatory Referral For Screening Colonoscopy    Family history of colon cancer in mother    Orders:    Ambulatory Referral For Screening Colonoscopy    Malignant neoplasm of lower lobe of right lung  Follows with pulmonology Dr. Lim and Dr. Lombardi.         Lesion of pancreas  Follows with Oncology           Bradycardia, sinus  Asymptomatic, follows with cardiology  Stable at this time         Centrilobular emphysema  On prn albuterol and stiolto  Stable on current medication and dosage. Will continue current management. Refill medication as necessary.  Follows with oncology and pulmonology  Currently on 3.5 L nasal cannula home oxygen at night         Annual visit for general adult medical examination without abnormal findings    Orders:    CBC (No Diff)    Basic Metabolic Panel    Lipid Panel    TSH Rfx On Abnormal To Free T4            Follow Up   No follow-ups on file.  Patient was given instructions and counseling regarding his condition or for health maintenance advice. Please see specific information pulled into the AVS if appropriate.

## 2025-04-11 RX ORDER — POLYETHYLENE GLYCOL 3350 17 G/17G
17 POWDER, FOR SOLUTION ORAL DAILY
Qty: 238 G | Refills: 0 | Status: SHIPPED | OUTPATIENT
Start: 2025-04-11

## 2025-04-11 RX ORDER — BISACODYL 5 MG
TABLET, DELAYED RELEASE (ENTERIC COATED) ORAL
Qty: 4 TABLET | Refills: 0 | Status: SHIPPED | OUTPATIENT
Start: 2025-04-11

## 2025-04-17 ENCOUNTER — PREP FOR SURGERY (OUTPATIENT)
Dept: OTHER | Facility: HOSPITAL | Age: 70
End: 2025-04-17
Payer: MEDICARE

## 2025-04-17 DIAGNOSIS — Z12.11 SCREENING FOR COLON CANCER: Primary | ICD-10-CM

## 2025-04-22 NOTE — TELEPHONE ENCOUNTER
Caller: Anna Aguayo    Relationship: Emergency Contact    Best call back number: [phone]     What is the best time to reach you: ANYTIME    Who are you requesting to speak with (clinical staff, provider,  specific staff member): CLINICAL STAFF    Do you know the name of the person who called: WIFE    What was the call regarding: PATIENTS WIFE STATES THAT THEY HAVE TESTED POSITIVE FOR COVID. PATIENTS WIFE STATES THAT HE HAS CHILLS AND BODY ACHES.    Do you require a callback: YES          
I have sent in an albuterol inhaler for him to use as needed for any wheezing or shortness of breath due to his underlying history of lung disease.  Please tell him to continue using his daily inhaler.  Above recommendations are also encouraged.  If he develops any significant respiratory distress he is to go to emergency room.
Pt's wife notified. Also advised to do deep breathing exercises as well.   
Initiate Treatment: fluorouracil 5 % topical cream \\nQuantity: 40.0 g  Days Supply: 30\\nSig: Apply small amount sparingly to face, scalp and ears BID x1-2 weeks as directed. Wash hands immediately after.
Detail Level: Zone
Render In Strict Bullet Format?: No

## 2025-06-20 ENCOUNTER — OUTSIDE FACILITY SERVICE (OUTPATIENT)
Dept: SURGERY | Facility: CLINIC | Age: 70
End: 2025-06-20
Payer: MEDICARE

## 2025-06-20 PROCEDURE — 45380 COLONOSCOPY AND BIOPSY: CPT | Performed by: SURGERY

## 2025-06-20 PROCEDURE — 45384 COLONOSCOPY W/LESION REMOVAL: CPT | Performed by: SURGERY

## 2025-07-31 ENCOUNTER — OFFICE VISIT (OUTPATIENT)
Dept: PULMONOLOGY | Facility: CLINIC | Age: 70
End: 2025-07-31
Payer: MEDICARE

## 2025-07-31 VITALS
HEART RATE: 49 BPM | DIASTOLIC BLOOD PRESSURE: 72 MMHG | WEIGHT: 162.6 LBS | HEIGHT: 72 IN | OXYGEN SATURATION: 95 % | RESPIRATION RATE: 18 BRPM | SYSTOLIC BLOOD PRESSURE: 124 MMHG | BODY MASS INDEX: 22.02 KG/M2

## 2025-07-31 DIAGNOSIS — C34.91 NON-SMALL CELL CANCER OF RIGHT LUNG: ICD-10-CM

## 2025-07-31 DIAGNOSIS — J44.9 CHRONIC OBSTRUCTIVE PULMONARY DISEASE, UNSPECIFIED COPD TYPE: Primary | ICD-10-CM

## 2025-07-31 DIAGNOSIS — G47.34 NOCTURNAL HYPOXIA: ICD-10-CM

## 2025-07-31 DIAGNOSIS — J30.89 OTHER ALLERGIC RHINITIS: ICD-10-CM

## 2025-07-31 PROCEDURE — 99214 OFFICE O/P EST MOD 30 MIN: CPT | Performed by: NURSE PRACTITIONER

## 2025-07-31 RX ORDER — LEVOCETIRIZINE DIHYDROCHLORIDE 5 MG/1
5 TABLET, FILM COATED ORAL EVERY EVENING
Qty: 90 TABLET | Refills: 1 | Status: SHIPPED | OUTPATIENT
Start: 2025-07-31

## 2025-07-31 RX ORDER — TIOTROPIUM BROMIDE AND OLODATEROL 3.124; 2.736 UG/1; UG/1
2 SPRAY, METERED RESPIRATORY (INHALATION)
Qty: 4 G | Refills: 9 | Status: SHIPPED | OUTPATIENT
Start: 2025-07-31

## 2025-07-31 RX ORDER — AZELASTINE 1 MG/ML
1 SPRAY, METERED NASAL 2 TIMES DAILY PRN
Qty: 1 EACH | Refills: 5 | Status: SHIPPED | OUTPATIENT
Start: 2025-07-31

## 2025-07-31 NOTE — PROGRESS NOTES
"Chief Complaint   Patient presents with    Breathing Problem    Follow-up         Subjective   Saman Aguayo is a 70 y.o. male.   Patient comes today for follow up of shortness of breath and COPD.     Symptoms have been stable since the last clinic visit. Patient reports no recent exacerbations.     Patient is using medications, as prescribed. He uses Stiolto daily. He has not used VILMA nebs in a few months.     Exercise tolerance has also remained stable.     He uses oxygen at night.    He has been sneezing more, watery eyes.     Quit smoking 9 years ago.       The following portions of the patient's history were reviewed and updated as appropriate: allergies, current medications, past family history, past medical history, past social history, and past surgical history.    Review of Systems   HENT:  Positive for sinus pressure and sneezing. Negative for sore throat.    Respiratory:  Negative for cough, chest tightness, shortness of breath and wheezing.    Psychiatric/Behavioral:  Positive for sleep disturbance.        Objective   Visit Vitals  /72   Pulse (!) 49   Resp 18   Ht 182.9 cm (72.01\")   Wt 73.8 kg (162 lb 9.6 oz)   SpO2 95%   BMI 22.05 kg/m²       Physical Exam  Vitals reviewed.   HENT:      Head: Atraumatic.      Mouth/Throat:      Mouth: Mucous membranes are moist.   Eyes:      Extraocular Movements: Extraocular movements intact.   Cardiovascular:      Rate and Rhythm: Normal rate and regular rhythm.   Pulmonary:      Effort: Pulmonary effort is normal. No respiratory distress.      Comments: Somewhat decreased air entry without wheezing.  Neurological:      Mental Status: He is alert and oriented to person, place, and time.           Assessment & Plan   Diagnoses and all orders for this visit:    1. Chronic obstructive pulmonary disease, unspecified COPD type (Primary)    2. Non-small cell cancer of right lung    3. Nocturnal hypoxia    4. Other allergic rhinitis    Other orders  -     azelastine " (ASTELIN) 0.1 % nasal spray; Administer 1 spray into the nostril(s) as directed by provider 2 (Two) Times a Day As Needed for Rhinitis or Allergies. Use in each nostril as directed  Dispense: 1 each; Refill: 5  -     levocetirizine (XYZAL) 5 MG tablet; Take 1 tablet by mouth Every Evening.  Dispense: 90 tablet; Refill: 1  -     tiotropium bromide-olodaterol (Stiolto Respimat) 2.5-2.5 MCG/ACT aerosol solution inhaler; Inhale 2 puffs Daily.  Dispense: 4 g; Refill: 9           Return in about 6 months (around 1/31/2026) for Recheck, For Dr. Lim.    DISCUSSION (if any):  PFT 2/2024 consistent with moderate obstruction.     I have prescribed xyzal and astelin for allergic rhinitis sx.     Continue oxygen at night.    No change to the current medications has been made. Continue Stiolto daily and VILMA as needed.     Compliance with medications stressed.     Side effects of prescribed medications discussed with the patient.    I reviewed CT from March 2025. He has another CT ordered for September. CT surveillance per Oncology.  Study Result    Narrative & Impression   CT CHEST WITHOUT CONTRAST DIAGNOSTIC     HISTORY: History of NSCLC; C34.31-Malignant neoplasm of lower lobe,  right bronchus or lung, status post lobectomy.     COMPARISON: 08/30/2024     PROCEDURE: Axial images were obtained from the lung apex to the mid  abdomen by computed tomography. This study was performed with techniques  to keep radiation doses as low as reasonably achievable, (ALARA).  Individualized dose reduction techniques using automated exposure  control or adjustment of mA and/or kV according to the patient size were  employed.     FINDINGS:     CHEST: There is no suspicious axillary adenopathy. There is no  suspicious hilar or mediastinal adenopathy. The heart is proper size.  There is no pericardial or pleural effusion.again noted is the mildly  lobulated 1 cm nodule anteriorly left lower lobe, stable from prior.  Soft tissue density right  perihilar region appears stable, likely  posttreatment change. Right hilar surgical clips noted in this patient  status post lobectomy. Elevation of the right hemidiaphragm and blunting  of the right costophrenic angle again identified. Emphysematous change  noted. Limited images of the upper abdomen demonstrate cholecystectomy  clips. Vascular calcifications noted.     IMPRESSION:  Stable right perihilar and left lower lobe nodularity from  prior as described, recommend continued follow-up.             Dictated utilizing Dragon dictation.    This document was electronically signed by SAMIRA Wylie July 31, 2025  12:41 EDT

## 2025-08-19 RX ORDER — TIOTROPIUM BROMIDE AND OLODATEROL 3.124; 2.736 UG/1; UG/1
2 SPRAY, METERED RESPIRATORY (INHALATION) DAILY
Qty: 12 EACH | Refills: 2 | OUTPATIENT
Start: 2025-08-19

## (undated) DEVICE — TP ELECTRD LAP L WR SPLIT33CM

## (undated) DEVICE — ELECTRD BLD EXT EDGE/INSUL 6IN

## (undated) DEVICE — UNDYED BRAIDED (POLYGLACTIN 910), SYNTHETIC ABSORBABLE SUTURE: Brand: COATED VICRYL

## (undated) DEVICE — CONMED SCOPE SAVER BITE BLOCK, 20X27 MM: Brand: SCOPE SAVER

## (undated) DEVICE — SUT VIC 2/0 CT2 27IN J269H

## (undated) DEVICE — SPNG GZ WOVN 4X4IN 12PLY 10/BX STRL

## (undated) DEVICE — TRAP,MUCUS SPECIMEN,40CC: Brand: MEDLINE

## (undated) DEVICE — SOL IRRIG H2O 1000ML STRL

## (undated) DEVICE — Device

## (undated) DEVICE — CANN IRR/INJ AIR ANT CHAMBER 6MM BEND 27G

## (undated) DEVICE — CUFF SCD HEMOFORCE SEQ CALF STD MD

## (undated) DEVICE — SENSR O2 OXIMAX FNGR A/ 18IN NONSTR

## (undated) DEVICE — GLV SURG SENSICARE SLT PF LF 7.5 STRL

## (undated) DEVICE — PK KN ARTHSCP 20

## (undated) DEVICE — SYS PROB ABL/CRYO CRYOSPHERE 18IN

## (undated) DEVICE — SUCTION CANISTER, 1500CC, RIGID: Brand: DEROYAL

## (undated) DEVICE — MICROSURGICAL INSTRUMENT IRR CYSTITOME 27GA FORMED-BAFFLE CUTTING: Brand: ALCON

## (undated) DEVICE — SWIVEL CONNECTOR

## (undated) DEVICE — SUT ETHIB 1 CT1 30IN  X425H

## (undated) DEVICE — SOL IRR NACL 0.9PCT 1000ML

## (undated) DEVICE — BNDG ELAS ELITE V/CLOSE 4IN 5YD LF STRL

## (undated) DEVICE — GLV SURG BIOGEL M LTX PF 7

## (undated) DEVICE — ELECTRD BLD EDGE/INSUL1P SFTY SLV 2.75IN

## (undated) DEVICE — TOTAL TRAY, 16FR 10ML SIL FOLEY, URN: Brand: MEDLINE

## (undated) DEVICE — CLEARCUT® SIDEPORT KNIFE DUAL BEVEL 1.0MM ANGLED: Brand: CLEARCUT®

## (undated) DEVICE — SKIN AFFIX SURG ADHESIVE 72/CS 0.55ML: Brand: MEDLINE

## (undated) DEVICE — CANN HYDRODISSECTION

## (undated) DEVICE — SYR LL 3CC

## (undated) DEVICE — SINGLE USE BIOPSY VALVE MAJ-210: Brand: SINGLE USE BIOPSY VALVE (STERILE)

## (undated) DEVICE — SYR SLPTP 20CC

## (undated) DEVICE — POSTN SURG LEG WELL LOW EXTREM 1P/U

## (undated) DEVICE — ANTIBACTERIAL UNDYED BRAIDED (POLYGLACTIN 910), SYNTHETIC ABSORBABLE SURGICAL SUTURE: Brand: COATED VICRYL

## (undated) DEVICE — ENDOPATH 5MM ENDOSCOPIC BLUNT TIP DISSECTORS (12 POUCHES CONTAINING 3 DISSECTORS EACH): Brand: ENDOPATH

## (undated) DEVICE — SUCTION CANISTER, 2500CC, RIGID: Brand: DEROYAL

## (undated) DEVICE — APPL CHLORAPREP W/TINT 10.5ML ORNG BX25

## (undated) DEVICE — CLEARCUT® SLIT KNIFE INTREPID MICRO-COAXIAL SYSTEM 2.4 SB: Brand: CLEARCUT®; INTREPID

## (undated) DEVICE — THE HOBBS MICROBIOLOGY BRUSH IS INTENDED TO BE USED IN COMBINATION WITH A COMPATIBLE FLEXIBLE ENDOSCOPE TO COLLECT CELLS OF THE MUCOSA FOR PATHOLOGICAL DIAGNOSIS DURING ENDOSCOPY. IT IS INSERTED THROUGH THE WORKING CHANNEL OF THE ENDOSCOPE AND CONSISTS OF A FLEXIBLE INSERTION PORTION MADE OF A METAL COIL INSIDE A PLASTIC TUBE, WHOSE DISTAL END IS EQUIPPED WITH PLASTIC BRISTLES FOR HARVESTING AND PROTECTING MUCOSAL CELLS, E.G., DURING ENDOSCOPY. THIS IS A SINGLE-USE DEVICE.: Brand: HOBBS MICROBIOLOGY BRUSH

## (undated) DEVICE — EMERALD ARTHROSCOPY SHEET: Brand: CONVERTORS

## (undated) DEVICE — GLV SURG SENSICARE W/ALOE PF LF 8.5 STRL

## (undated) DEVICE — NDL HYPO ECLPS SFTY 18G 1 1/2IN

## (undated) DEVICE — ENDOSCOPY PORT CONNECTOR FOR OLYMPUS® SCOPES: Brand: ERBE

## (undated) DEVICE — DYONICS 25 PATIENT TUBE SET MUST                                    BE USED WITH 7211007, 12 PER BOX

## (undated) DEVICE — DRN WND CH RND FUL/FLUT NO/TROC 3/8IN 28F

## (undated) DEVICE — SUT SILK 2/0 30IN A305H

## (undated) DEVICE — MONOPOLAR METZENBAUM SCISSOR TIP, DISPOSABLE: Brand: MONOPOLAR METZENBAUM SCISSOR TIP, DISPOSABLE

## (undated) DEVICE — SUT MONOCRYL PLS ANTIB UND 3/0  PS1 27IN

## (undated) DEVICE — BIOPSY NEEDLE, 21G: Brand: FLEXISION

## (undated) DEVICE — VISION PROBE ADAPTER AND SUCTION ADAPTER

## (undated) DEVICE — HP CONCL INTREPID COAX I/A CRV .3MM

## (undated) DEVICE — SAFESECURE,SECUREMENT,FOLEY CATH,STERILE: Brand: MEDLINE

## (undated) DEVICE — GLV SURG TRIUMPH PF LTX 8 STRL

## (undated) DEVICE — FRCP BIOP CAPTURA BRONCH 1.8 110CM BLU

## (undated) DEVICE — SUT VIC PLS 0 CTX 36IN UD VCP978H

## (undated) DEVICE — ENDOPOUCH RETRIEVER SPECIMEN RETRIEVAL BAGS: Brand: ENDOPOUCH RETRIEVER

## (undated) DEVICE — Device: Brand: MALYUGIN RING SYSTEM 7.0MM

## (undated) DEVICE — HYBRID TUBING/CAP SET FOR OLYMPUS® SCOPES: Brand: ERBE

## (undated) DEVICE — SUT VIC 2 TP1 54IN J880T

## (undated) DEVICE — PAD,ABDOMINAL,5"X9",STERILE,LF,1/PK: Brand: MEDLINE INDUSTRIES, INC.

## (undated) DEVICE — CLTH CLENS READYCLEANSE PERI CARE PK/5

## (undated) DEVICE — DRSNG WND GZ CURAD OIL EMULSION 3X3IN STRL

## (undated) DEVICE — VLV SXN BRONCH DISP FOR FLEX ENDOSCOPE

## (undated) DEVICE — 3.5 MM FULL RADIUS STRAIGHT                                    BLADES, POWER/EP-1, BEIGE, PACKAGED                                    6 PER BOX, STERILE

## (undated) DEVICE — NON-ADHERENT STRIPS,OIL EMULSION: Brand: CURITY

## (undated) DEVICE — CATHETER GUIDE

## (undated) DEVICE — DISPOSABLE MONOPOLAR ENDOSCOPIC CORD 10 FT. (3M): Brand: KIRWAN

## (undated) DEVICE — SYR LL TP 10ML STRL

## (undated) DEVICE — SYR LUERLOK 30CC

## (undated) DEVICE — CATHETER: Brand: ION

## (undated) DEVICE — TRAP SPECI MUCUS 40CC LF STRL

## (undated) DEVICE — ENDOPATH XCEL BLADELESS TROCARS WITH STABILITY SLEEVES: Brand: ENDOPATH XCEL

## (undated) DEVICE — CVR HNDL LT SURG ACCSSRY BLU STRL

## (undated) DEVICE — NDL SPINE 20G 3 1/2 YEL STRL 1P/U

## (undated) DEVICE — ENDOPATH ETS-FLEX45 ARTICULATING ENDOSCOPIC LINEAR CUTTER, NO RELOAD: Brand: ENDOPATH

## (undated) DEVICE — FRCP BIOP COLD ENDOJAW ALLGTR W/NDL 2.8X2300MM BLU

## (undated) DEVICE — SYR SLP TP 10ML DISP

## (undated) DEVICE — VISION PROBE: Brand: ION

## (undated) DEVICE — Device: Brand: SINGLE USE ASPIRATION NEEDLE NA-U401SX

## (undated) DEVICE — BLAKE SILICONE DRAINS CARDIO CONNECTOR 2:1: Brand: BLAKE

## (undated) DEVICE — BOWL UTIL STRL 32OZ

## (undated) DEVICE — Device: Brand: ION

## (undated) DEVICE — LINEAR ADAPTER: Brand: SIGNIA

## (undated) DEVICE — OASIS DRAIN, SINGLE, INLINE & ATS COMPATIBLE: Brand: OASIS

## (undated) DEVICE — PK THORACOTOMY 10

## (undated) DEVICE — VISUALIZATION SYSTEM: Brand: CLEARIFY

## (undated) DEVICE — SUT MNCRYL PLS ANTIB UD 4/0 PS2 18IN

## (undated) DEVICE — SUT ETHLN 3-0 FS118IN 663H

## (undated) DEVICE — EYE CARE POST OP KIT: Brand: MEDLINE INDUSTRIES, INC.

## (undated) DEVICE — GLV SURG SENSICARE MICRO PF LF 7 STRL

## (undated) DEVICE — KT CATH CHOLANGIOGRA PERC W/BALLO

## (undated) DEVICE — VLV SXN AIR/H2O ORCAPOD3 1P/U STRL

## (undated) DEVICE — MONOPOLAR METZENBAUM SCISSOR, MINI BLADE TIP, DISPOSABLE: Brand: MONOPOLAR METZENBAUM SCISSOR, MINI BLADE TIP, DISPOSABLE

## (undated) DEVICE — ELECTRD BLD EDGE/INSUL1P 2.4X5.1MM STRL

## (undated) DEVICE — TUBING, SUCTION, 1/4" X 10', STRAIGHT: Brand: MEDLINE

## (undated) DEVICE — ENDOPATH XCEL UNIVERSAL TROCAR STABLILITY SLEEVES: Brand: ENDOPATH XCEL

## (undated) DEVICE — SINGLE USE SUCTION VALVE MAJ-209: Brand: SINGLE USE SUCTION VALVE (STERILE)

## (undated) DEVICE — LUBE JELLY PK/2.75GM STRL BX/144

## (undated) DEVICE — RICH GENERAL LAPAROSCOPY: Brand: MEDLINE INDUSTRIES, INC.